# Patient Record
Sex: FEMALE | Race: WHITE | Employment: FULL TIME | URBAN - METROPOLITAN AREA
[De-identification: names, ages, dates, MRNs, and addresses within clinical notes are randomized per-mention and may not be internally consistent; named-entity substitution may affect disease eponyms.]

---

## 2018-08-17 ENCOUNTER — TELEPHONE (OUTPATIENT)
Dept: FAMILY MEDICINE CLINIC | Age: 63
End: 2018-08-17

## 2018-08-17 RX ORDER — MECLIZINE HCL 12.5 MG/1
12.5 TABLET ORAL 3 TIMES DAILY PRN
Qty: 90 TABLET | Refills: 1 | Status: SHIPPED | OUTPATIENT
Start: 2018-08-17 | End: 2019-02-11 | Stop reason: SDUPTHER

## 2018-08-22 RX ORDER — ATENOLOL AND CHLORTHALIDONE TABLET 50; 25 MG/1; MG/1
1 TABLET ORAL DAILY
Qty: 90 TABLET | Refills: 3 | Status: SHIPPED | OUTPATIENT
Start: 2018-08-22 | End: 2018-10-31 | Stop reason: SDUPTHER

## 2018-12-12 ENCOUNTER — OFFICE VISIT (OUTPATIENT)
Dept: FAMILY MEDICINE CLINIC | Age: 63
End: 2018-12-12
Payer: COMMERCIAL

## 2018-12-12 ENCOUNTER — HOSPITAL ENCOUNTER (OUTPATIENT)
Age: 63
Discharge: HOME | End: 2018-12-14
Payer: COMMERCIAL

## 2018-12-12 VITALS
RESPIRATION RATE: 18 BRPM | HEIGHT: 62 IN | BODY MASS INDEX: 30.55 KG/M2 | TEMPERATURE: 98.2 F | SYSTOLIC BLOOD PRESSURE: 110 MMHG | HEART RATE: 55 BPM | DIASTOLIC BLOOD PRESSURE: 82 MMHG | OXYGEN SATURATION: 96 % | WEIGHT: 166 LBS

## 2018-12-12 DIAGNOSIS — I10 ESSENTIAL HYPERTENSION, BENIGN: ICD-10-CM

## 2018-12-12 DIAGNOSIS — F51.01 PRIMARY INSOMNIA: ICD-10-CM

## 2018-12-12 DIAGNOSIS — E55.9 VITAMIN D DEFICIENCY: ICD-10-CM

## 2018-12-12 DIAGNOSIS — H61.21 IMPACTED CERUMEN OF RIGHT EAR: ICD-10-CM

## 2018-12-12 DIAGNOSIS — E78.49 OTHER HYPERLIPIDEMIA: ICD-10-CM

## 2018-12-12 DIAGNOSIS — Z00.00 WELL ADULT EXAM: Primary | ICD-10-CM

## 2018-12-12 LAB
ALBUMIN SERPL-MCNC: 4.8 G/DL (ref 3.5–5.2)
ALP BLD-CCNC: 53 U/L (ref 35–104)
ALT SERPL-CCNC: 22 U/L (ref 0–32)
ANION GAP SERPL CALCULATED.3IONS-SCNC: 14 MMOL/L (ref 7–16)
AST SERPL-CCNC: 18 U/L (ref 0–31)
BASOPHILS ABSOLUTE: 0.02 E9/L (ref 0–0.2)
BASOPHILS RELATIVE PERCENT: 0.4 % (ref 0–2)
BILIRUB SERPL-MCNC: 0.7 MG/DL (ref 0–1.2)
BUN BLDV-MCNC: 10 MG/DL (ref 8–23)
CALCIUM SERPL-MCNC: 9.6 MG/DL (ref 8.6–10.2)
CHLORIDE BLD-SCNC: 98 MMOL/L (ref 98–107)
CHOLESTEROL, TOTAL: 204 MG/DL (ref 0–199)
CO2: 28 MMOL/L (ref 22–29)
CREAT SERPL-MCNC: 0.6 MG/DL (ref 0.5–1)
EOSINOPHILS ABSOLUTE: 0.03 E9/L (ref 0.05–0.5)
EOSINOPHILS RELATIVE PERCENT: 0.6 % (ref 0–6)
GFR AFRICAN AMERICAN: >60
GFR NON-AFRICAN AMERICAN: >60 ML/MIN/1.73
GLUCOSE BLD-MCNC: 104 MG/DL (ref 74–99)
HCT VFR BLD CALC: 41.9 % (ref 34–48)
HDLC SERPL-MCNC: 60 MG/DL
HEMOGLOBIN: 13.4 G/DL (ref 11.5–15.5)
IMMATURE GRANULOCYTES #: 0.01 E9/L
IMMATURE GRANULOCYTES %: 0.2 % (ref 0–5)
LDL CHOLESTEROL CALCULATED: 114 MG/DL (ref 0–99)
LYMPHOCYTES ABSOLUTE: 1.86 E9/L (ref 1.5–4)
LYMPHOCYTES RELATIVE PERCENT: 36.5 % (ref 20–42)
MCH RBC QN AUTO: 24.9 PG (ref 26–35)
MCHC RBC AUTO-ENTMCNC: 32 % (ref 32–34.5)
MCV RBC AUTO: 77.9 FL (ref 80–99.9)
MONOCYTES ABSOLUTE: 0.47 E9/L (ref 0.1–0.95)
MONOCYTES RELATIVE PERCENT: 9.2 % (ref 2–12)
NEUTROPHILS ABSOLUTE: 2.71 E9/L (ref 1.8–7.3)
NEUTROPHILS RELATIVE PERCENT: 53.1 % (ref 43–80)
PDW BLD-RTO: 14 FL (ref 11.5–15)
PLATELET # BLD: 315 E9/L (ref 130–450)
PMV BLD AUTO: 11.1 FL (ref 7–12)
POTASSIUM SERPL-SCNC: 3.4 MMOL/L (ref 3.5–5)
RBC # BLD: 5.38 E12/L (ref 3.5–5.5)
SODIUM BLD-SCNC: 140 MMOL/L (ref 132–146)
TOTAL PROTEIN: 7.3 G/DL (ref 6.4–8.3)
TRIGL SERPL-MCNC: 149 MG/DL (ref 0–149)
TSH SERPL DL<=0.05 MIU/L-ACNC: 1.85 UIU/ML (ref 0.27–4.2)
VITAMIN D 25-HYDROXY: 18 NG/ML (ref 30–100)
VLDLC SERPL CALC-MCNC: 30 MG/DL
WBC # BLD: 5.1 E9/L (ref 4.5–11.5)

## 2018-12-12 PROCEDURE — 85025 COMPLETE CBC W/AUTO DIFF WBC: CPT

## 2018-12-12 PROCEDURE — 99396 PREV VISIT EST AGE 40-64: CPT | Performed by: FAMILY MEDICINE

## 2018-12-12 PROCEDURE — 82306 VITAMIN D 25 HYDROXY: CPT

## 2018-12-12 PROCEDURE — 80061 LIPID PANEL: CPT

## 2018-12-12 PROCEDURE — 80053 COMPREHEN METABOLIC PANEL: CPT

## 2018-12-12 PROCEDURE — 36415 COLL VENOUS BLD VENIPUNCTURE: CPT | Performed by: FAMILY MEDICINE

## 2018-12-12 PROCEDURE — 84443 ASSAY THYROID STIM HORMONE: CPT

## 2018-12-12 ASSESSMENT — ENCOUNTER SYMPTOMS
COUGH: 0
CHEST TIGHTNESS: 0
SORE THROAT: 0
PHOTOPHOBIA: 0
DIARRHEA: 0
SHORTNESS OF BREATH: 0
COLOR CHANGE: 0
ABDOMINAL PAIN: 0
ALLERGIC/IMMUNOLOGIC NEGATIVE: 1
NAUSEA: 0
VOMITING: 0
BLOOD IN STOOL: 0
FACIAL SWELLING: 0
SINUS PRESSURE: 0
APNEA: 0
WHEEZING: 0
BACK PAIN: 0

## 2018-12-12 ASSESSMENT — PATIENT HEALTH QUESTIONNAIRE - PHQ9
2. FEELING DOWN, DEPRESSED OR HOPELESS: 0
SUM OF ALL RESPONSES TO PHQ QUESTIONS 1-9: 0
SUM OF ALL RESPONSES TO PHQ QUESTIONS 1-9: 0
SUM OF ALL RESPONSES TO PHQ9 QUESTIONS 1 & 2: 0
1. LITTLE INTEREST OR PLEASURE IN DOING THINGS: 0

## 2018-12-12 NOTE — PROGRESS NOTES
Chief Complaint:   Chief Complaint   Patient presents with    Annual Exam       Hypertension   This is a chronic problem. The current episode started yesterday. The problem has been resolved since onset. The problem is controlled. Pertinent negatives include no chest pain, headaches, palpitations or shortness of breath. Past treatments include diuretics and beta blockers. The current treatment provides significant improvement. Hyperlipidemia   This is a chronic problem. The current episode started more than 1 year ago. The problem is controlled. Recent lipid tests were reviewed and are normal. Pertinent negatives include no chest pain, myalgias or shortness of breath. Current antihyperlipidemic treatment includes statins. The current treatment provides significant improvement of lipids. Ear Fullness    There is pain in the right ear. This is a new problem. The current episode started in the past 7 days. The problem occurs constantly. The problem has been resolved. There has been no fever. The patient is experiencing no pain. Pertinent negatives include no abdominal pain, coughing, diarrhea, headaches, hearing loss, rash, sore throat or vomiting.   c/o insomnia- on multiple meds in past    Patient Active Problem List   Diagnosis    Hyperlipidemia    Essential hypertension, benign    Vitamin D deficiency       Past Medical History:   Diagnosis Date    Hyperlipidemia     Hypertension     Vitamin D deficiency        Past Surgical History:   Procedure Laterality Date    APPENDECTOMY      BREAST SURGERY Bilateral     cancer    HYSTERECTOMY      OVARY SURGERY      TONSILLECTOMY         Current Outpatient Prescriptions   Medication Sig Dispense Refill    Suvorexant 15 MG TABS Take 1 tablet by mouth nightly as needed (insomnia) for up to 30 days. . 30 tablet 2    rosuvastatin (CRESTOR) 20 MG tablet TAKE 1 TABLET DAILY 90 tablet 3    atenolol-chlorthalidone (TENORETIC) 50-25 MG per tablet TAKE 1 TABLET DAILY

## 2018-12-13 RX ORDER — CHOLECALCIFEROL (VITAMIN D3) 50 MCG
2000 TABLET ORAL DAILY
Qty: 90 TABLET | Refills: 3 | Status: SHIPPED | OUTPATIENT
Start: 2018-12-13 | End: 2018-12-14 | Stop reason: SDUPTHER

## 2018-12-14 RX ORDER — CHOLECALCIFEROL (VITAMIN D3) 50 MCG
2000 TABLET ORAL DAILY
Qty: 90 TABLET | Refills: 3 | Status: SHIPPED | OUTPATIENT
Start: 2018-12-14

## 2019-01-14 RX ORDER — PANTOPRAZOLE SODIUM 40 MG/1
TABLET, DELAYED RELEASE ORAL
Qty: 90 TABLET | Refills: 3 | Status: SHIPPED | OUTPATIENT
Start: 2019-01-14 | End: 2019-02-11 | Stop reason: SDUPTHER

## 2019-02-11 RX ORDER — ATENOLOL AND CHLORTHALIDONE TABLET 50; 25 MG/1; MG/1
TABLET ORAL
Qty: 90 TABLET | Refills: 3 | Status: SHIPPED | OUTPATIENT
Start: 2019-02-11 | End: 2019-12-13 | Stop reason: SDUPTHER

## 2019-02-11 RX ORDER — ROSUVASTATIN CALCIUM 20 MG/1
TABLET, COATED ORAL
Qty: 90 TABLET | Refills: 3 | Status: SHIPPED | OUTPATIENT
Start: 2019-02-11 | End: 2019-12-13 | Stop reason: SDUPTHER

## 2019-02-11 RX ORDER — PANTOPRAZOLE SODIUM 40 MG/1
TABLET, DELAYED RELEASE ORAL
Qty: 90 TABLET | Refills: 3 | Status: SHIPPED | OUTPATIENT
Start: 2019-02-11 | End: 2019-12-13 | Stop reason: SDUPTHER

## 2019-02-11 RX ORDER — MECLIZINE HCL 12.5 MG/1
12.5 TABLET ORAL 3 TIMES DAILY PRN
Qty: 90 TABLET | Refills: 1 | Status: SHIPPED
Start: 2019-02-11 | End: 2021-02-06 | Stop reason: SDUPTHER

## 2019-05-17 ENCOUNTER — OFFICE VISIT (OUTPATIENT)
Dept: FAMILY MEDICINE CLINIC | Age: 64
End: 2019-05-17
Payer: COMMERCIAL

## 2019-05-17 VITALS
RESPIRATION RATE: 18 BRPM | DIASTOLIC BLOOD PRESSURE: 70 MMHG | OXYGEN SATURATION: 96 % | TEMPERATURE: 98.5 F | SYSTOLIC BLOOD PRESSURE: 110 MMHG | HEIGHT: 62 IN | HEART RATE: 75 BPM | WEIGHT: 173 LBS | BODY MASS INDEX: 31.83 KG/M2

## 2019-05-17 DIAGNOSIS — J20.9 ACUTE BRONCHITIS, UNSPECIFIED ORGANISM: Primary | ICD-10-CM

## 2019-05-17 PROCEDURE — 99213 OFFICE O/P EST LOW 20 MIN: CPT | Performed by: FAMILY MEDICINE

## 2019-05-17 RX ORDER — METHYLPREDNISOLONE 4 MG/1
TABLET ORAL
Qty: 1 KIT | Refills: 0 | Status: SHIPPED | OUTPATIENT
Start: 2019-05-17 | End: 2019-12-13

## 2019-05-17 RX ORDER — DOXYCYCLINE HYCLATE 100 MG
100 TABLET ORAL 2 TIMES DAILY
Qty: 20 TABLET | Refills: 0 | Status: SHIPPED | OUTPATIENT
Start: 2019-05-17 | End: 2019-05-27

## 2019-05-17 ASSESSMENT — ENCOUNTER SYMPTOMS
WHEEZING: 0
SHORTNESS OF BREATH: 0
CHEST TIGHTNESS: 0
SORE THROAT: 0
BACK PAIN: 0
VOMITING: 0
BLOOD IN STOOL: 0
NAUSEA: 0
COUGH: 1
ALLERGIC/IMMUNOLOGIC NEGATIVE: 1
ABDOMINAL PAIN: 0
COLOR CHANGE: 0
PHOTOPHOBIA: 0
DIARRHEA: 0
SINUS PRESSURE: 0
APNEA: 0
FACIAL SWELLING: 0

## 2019-05-17 ASSESSMENT — PATIENT HEALTH QUESTIONNAIRE - PHQ9
SUM OF ALL RESPONSES TO PHQ QUESTIONS 1-9: 0
1. LITTLE INTEREST OR PLEASURE IN DOING THINGS: 0
SUM OF ALL RESPONSES TO PHQ9 QUESTIONS 1 & 2: 0
SUM OF ALL RESPONSES TO PHQ QUESTIONS 1-9: 0
2. FEELING DOWN, DEPRESSED OR HOPELESS: 0

## 2019-05-17 NOTE — PROGRESS NOTES
Chief Complaint:   Chief Complaint   Patient presents with    Cough     x5 days off and on, runny nose starting today    Nasal Congestion       Cough   This is a chronic problem. The current episode started in the past 7 days. The problem has been gradually worsening. The problem occurs every few minutes. The cough is productive of sputum. Pertinent negatives include no chest pain, headaches, myalgias, rash, sore throat, shortness of breath or wheezing. Patient Active Problem List   Diagnosis    Hyperlipidemia    Essential hypertension, benign    Vitamin D deficiency       Past Medical History:   Diagnosis Date    Hyperlipidemia     Hypertension     Vitamin D deficiency        Past Surgical History:   Procedure Laterality Date    APPENDECTOMY      BREAST SURGERY Bilateral     cancer    HYSTERECTOMY      OVARY SURGERY      TONSILLECTOMY         Current Outpatient Medications   Medication Sig Dispense Refill    doxycycline hyclate (VIBRA-TABS) 100 MG tablet Take 1 tablet by mouth 2 times daily for 10 days 20 tablet 0    methylPREDNISolone (MEDROL, LONNIE,) 4 MG tablet Take by mouth. 1 kit 0    rosuvastatin (CRESTOR) 20 MG tablet TAKE 1 TABLET DAILY 90 tablet 3    atenolol-chlorthalidone (TENORETIC) 50-25 MG per tablet TAKE 1 TABLET DAILY 90 tablet 3    pantoprazole (PROTONIX) 40 MG tablet TAKE 1 TABLET DAILY 90 tablet 3    meclizine (ANTIVERT) 12.5 MG tablet Take 1 tablet by mouth 3 times daily as needed for Dizziness 90 tablet 1    Cholecalciferol (VITAMIN D) 2000 units TABS tablet Take 1 tablet by mouth daily 90 tablet 3     No current facility-administered medications for this visit.         No Known Allergies    Social History     Socioeconomic History    Marital status: Single     Spouse name: Not on file    Number of children: Not on file    Years of education: Not on file    Highest education level: Not on file   Occupational History    Occupation: counselor   Social Needs    Financial resource strain: Not on file    Food insecurity:     Worry: Not on file     Inability: Not on file    Transportation needs:     Medical: Not on file     Non-medical: Not on file   Tobacco Use    Smoking status: Never Smoker    Smokeless tobacco: Never Used   Substance and Sexual Activity    Alcohol use: No    Drug use: No    Sexual activity: Not on file   Lifestyle    Physical activity:     Days per week: Not on file     Minutes per session: Not on file    Stress: Not on file   Relationships    Social connections:     Talks on phone: Not on file     Gets together: Not on file     Attends Pentecostalism service: Not on file     Active member of club or organization: Not on file     Attends meetings of clubs or organizations: Not on file     Relationship status: Not on file    Intimate partner violence:     Fear of current or ex partner: Not on file     Emotionally abused: Not on file     Physically abused: Not on file     Forced sexual activity: Not on file   Other Topics Concern    Not on file   Social History Narrative    Not on file       No family history on file. Review of Systems   Constitutional: Negative. HENT: Negative for congestion, facial swelling, hearing loss, nosebleeds, sinus pressure and sore throat. Eyes: Negative for photophobia and visual disturbance. Respiratory: Positive for cough. Negative for apnea, chest tightness, shortness of breath and wheezing. Cardiovascular: Negative for chest pain, palpitations and leg swelling. Gastrointestinal: Negative for abdominal pain, blood in stool, diarrhea, nausea and vomiting. Genitourinary: Negative for difficulty urinating, frequency and urgency. Musculoskeletal: Negative for arthralgias, back pain, joint swelling and myalgias. Skin: Negative for color change and rash. Allergic/Immunologic: Negative. Neurological: Negative for syncope, weakness, light-headedness and headaches. Hematological: Negative. Psychiatric/Behavioral: Negative for agitation, behavioral problems, confusion and self-injury. The patient is not nervous/anxious. All other systems reviewed and are negative. Physical Exam   Constitutional: She is oriented to person, place, and time. She appears well-developed and well-nourished. No distress. HENT:   Head: Normocephalic and atraumatic. Nose: Nose normal.   Mouth/Throat: Oropharynx is clear and moist.   Eyes: Pupils are equal, round, and reactive to light. Conjunctivae and EOM are normal.   Neck: Normal range of motion. Neck supple. No JVD present. No thyromegaly present. Cardiovascular: Normal rate, regular rhythm and normal heart sounds. Exam reveals no gallop and no friction rub. No murmur heard. Pulmonary/Chest: Effort normal. No respiratory distress. She has wheezes. She has rhonchi. Abdominal: Soft. Bowel sounds are normal. She exhibits no distension. There is no tenderness. There is no rebound and no guarding. Musculoskeletal: Normal range of motion. Lymphadenopathy:     She has no cervical adenopathy. Neurological: She is alert and oriented to person, place, and time. She has normal reflexes. No cranial nerve deficit. She exhibits normal muscle tone. Coordination normal.   Skin: Skin is warm and dry. No rash noted. No erythema. Psychiatric: She has a normal mood and affect. Her behavior is normal. Judgment normal.   Nursing note and vitals reviewed. ASSESSMENT/PLAN:    Ash Watson was seen today for cough and nasal congestion. Diagnoses and all orders for this visit:    Acute bronchitis, unspecified organism  -     doxycycline hyclate (VIBRA-TABS) 100 MG tablet; Take 1 tablet by mouth 2 times daily for 10 days  -     methylPREDNISolone (MEDROL, LONNIE,) 4 MG tablet; Take by mouth.             Johanna Beauchamp DO    5/17/2019  7:49 AM

## 2019-05-24 ENCOUNTER — OFFICE VISIT (OUTPATIENT)
Dept: FAMILY MEDICINE CLINIC | Age: 64
End: 2019-05-24
Payer: COMMERCIAL

## 2019-05-24 VITALS
HEIGHT: 62 IN | HEART RATE: 57 BPM | OXYGEN SATURATION: 97 % | RESPIRATION RATE: 16 BRPM | DIASTOLIC BLOOD PRESSURE: 78 MMHG | BODY MASS INDEX: 31.83 KG/M2 | TEMPERATURE: 98.6 F | SYSTOLIC BLOOD PRESSURE: 124 MMHG | WEIGHT: 173 LBS

## 2019-05-24 DIAGNOSIS — J01.90 ACUTE BACTERIAL SINUSITIS: Primary | ICD-10-CM

## 2019-05-24 DIAGNOSIS — B96.89 ACUTE BACTERIAL SINUSITIS: Primary | ICD-10-CM

## 2019-05-24 PROCEDURE — 99213 OFFICE O/P EST LOW 20 MIN: CPT | Performed by: FAMILY MEDICINE

## 2019-05-24 RX ORDER — FLUTICASONE PROPIONATE 50 MCG
1 SPRAY, SUSPENSION (ML) NASAL DAILY
Qty: 2 BOTTLE | Refills: 1 | Status: SHIPPED
Start: 2019-05-24 | End: 2020-12-14

## 2019-05-24 RX ORDER — LEVOFLOXACIN 500 MG/1
500 TABLET, FILM COATED ORAL DAILY
Qty: 10 TABLET | Refills: 0 | Status: SHIPPED | OUTPATIENT
Start: 2019-05-24 | End: 2019-06-03

## 2019-05-24 ASSESSMENT — ENCOUNTER SYMPTOMS
WHEEZING: 0
RHINORRHEA: 1
VOMITING: 0
DIARRHEA: 0
SINUS PRESSURE: 1
EYE REDNESS: 0
SORE THROAT: 0
SHORTNESS OF BREATH: 0
COUGH: 1
NAUSEA: 0

## 2019-05-24 ASSESSMENT — PATIENT HEALTH QUESTIONNAIRE - PHQ9
SUM OF ALL RESPONSES TO PHQ QUESTIONS 1-9: 0
2. FEELING DOWN, DEPRESSED OR HOPELESS: 0
1. LITTLE INTEREST OR PLEASURE IN DOING THINGS: 0
SUM OF ALL RESPONSES TO PHQ9 QUESTIONS 1 & 2: 0
SUM OF ALL RESPONSES TO PHQ QUESTIONS 1-9: 0

## 2019-05-24 NOTE — PROGRESS NOTES
Chief Complaint:     Chief Complaint   Patient presents with    Cough     bronchitis follow up, said missed two dose of medication dr Maranda Arvizu gave her becasue she threw up. Cough   This is a chronic problem. The current episode started 1 to 4 weeks ago. The problem has been waxing and waning. The problem occurs every few minutes. The cough is productive of sputum. Associated symptoms include ear pain, nasal congestion, postnasal drip and rhinorrhea. Pertinent negatives include no chest pain, chills, eye redness, fever, headaches, myalgias, rash, sore throat, shortness of breath or wheezing. Treatments tried: DOXY, MEDROL. The treatment provided mild relief. Patient Active Problem List   Diagnosis    Hyperlipidemia    Essential hypertension, benign    Vitamin D deficiency       Past Medical History:   Diagnosis Date    Hyperlipidemia     Hypertension     Vitamin D deficiency        Past Surgical History:   Procedure Laterality Date    APPENDECTOMY      BREAST SURGERY Bilateral     cancer    HYSTERECTOMY      OVARY SURGERY      TONSILLECTOMY         Current Outpatient Medications   Medication Sig Dispense Refill    fluticasone (FLONASE) 50 MCG/ACT nasal spray 1 spray by Each Nostril route daily 2 Bottle 1    levofloxacin (LEVAQUIN) 500 MG tablet Take 1 tablet by mouth daily for 10 days 10 tablet 0    doxycycline hyclate (VIBRA-TABS) 100 MG tablet Take 1 tablet by mouth 2 times daily for 10 days 20 tablet 0    methylPREDNISolone (MEDROL, LONNIE,) 4 MG tablet Take by mouth.  1 kit 0    rosuvastatin (CRESTOR) 20 MG tablet TAKE 1 TABLET DAILY 90 tablet 3    atenolol-chlorthalidone (TENORETIC) 50-25 MG per tablet TAKE 1 TABLET DAILY 90 tablet 3    pantoprazole (PROTONIX) 40 MG tablet TAKE 1 TABLET DAILY 90 tablet 3    meclizine (ANTIVERT) 12.5 MG tablet Take 1 tablet by mouth 3 times daily as needed for Dizziness 90 tablet 1    Cholecalciferol (VITAMIN D) 2000 units TABS tablet Take 1 tablet by mouth daily 90 tablet 3     No current facility-administered medications for this visit. No Known Allergies    Social History     Socioeconomic History    Marital status: Single     Spouse name: None    Number of children: None    Years of education: None    Highest education level: None   Occupational History    Occupation: counselor     Employer: [employer]    Financial resource strain: None    Food insecurity:     Worry: None     Inability: None    Transportation needs:     Medical: None     Non-medical: None   Tobacco Use    Smoking status: Never Smoker    Smokeless tobacco: Never Used   Substance and Sexual Activity    Alcohol use: No    Drug use: No    Sexual activity: None   Lifestyle    Physical activity:     Days per week: None     Minutes per session: None    Stress: None   Relationships    Social connections:     Talks on phone: None     Gets together: None     Attends Buddhism service: None     Active member of club or organization: None     Attends meetings of clubs or organizations: None     Relationship status: None    Intimate partner violence:     Fear of current or ex partner: None     Emotionally abused: None     Physically abused: None     Forced sexual activity: None   Other Topics Concern    None   Social History Narrative    None       History reviewed. No pertinent family history. Review of Systems   Constitutional: Negative for chills and fever. HENT: Positive for congestion, ear pain, postnasal drip, rhinorrhea and sinus pressure. Negative for ear discharge and sore throat. Eyes: Negative for redness. Respiratory: Positive for cough. Negative for shortness of breath and wheezing. Cardiovascular: Negative for chest pain. Gastrointestinal: Negative for diarrhea, nausea and vomiting. Musculoskeletal: Negative for myalgias. Skin: Negative for rash. Neurological: Negative for headaches.    All other systems reviewed and are negative. /78   Pulse 57   Temp 98.6 °F (37 °C)   Resp 16   Ht 5' 2\" (1.575 m)   Wt 173 lb (78.5 kg)   SpO2 97%   BMI 31.64 kg/m²     Physical Exam   Constitutional: She appears well-developed and well-nourished. Non-toxic appearance. HENT:   Head: Normocephalic and atraumatic. Right Ear: Ear canal normal. A middle ear effusion is present. Left Ear: Ear canal normal. A middle ear effusion is present. Nose: Mucosal edema, rhinorrhea and sinus tenderness present. No nasal deformity or septal deviation. No foreign bodies. Right sinus exhibits maxillary sinus tenderness. Left sinus exhibits maxillary sinus tenderness. Mouth/Throat: Uvula is midline. Mucous membranes are not pale and not dry. Posterior oropharyngeal erythema present. No oropharyngeal exudate or tonsillar abscesses. Eyes: Conjunctivae and EOM are normal. Right eye exhibits no discharge. Left eye exhibits no discharge. Right conjunctiva is not injected. Left conjunctiva is not injected. Right eye exhibits normal extraocular motion. Left eye exhibits normal extraocular motion. Neck: Normal range of motion. Neck supple. Cardiovascular: Normal rate, regular rhythm and normal heart sounds. No murmur heard. Pulmonary/Chest: Effort normal. No respiratory distress. She has no wheezes. She has no rales. She exhibits no tenderness. Abdominal: Soft. Bowel sounds are normal. There is no tenderness. Lymphadenopathy:     She has cervical adenopathy. Neurological: She is alert. Skin: Skin is warm and dry. No rash noted. No erythema. Nursing note and vitals reviewed. ASSESSMENT/PLAN:    Patient Active Problem List   Diagnosis    Hyperlipidemia    Essential hypertension, benign    Vitamin D deficiency       Maggy Krishnamurthy was seen today for cough.     Diagnoses and all orders for this visit:    Acute bacterial sinusitis    Other orders  -     fluticasone (FLONASE) 50 MCG/ACT nasal spray; 1 spray by Each Nostril route daily  -     levofloxacin (LEVAQUIN) 500 MG tablet; Take 1 tablet by mouth daily for 10 days          Return if symptoms worsen or fail to improve.         Lamont Hightower DO  5/24/2019  9:01 AM

## 2019-12-13 ENCOUNTER — OFFICE VISIT (OUTPATIENT)
Dept: PRIMARY CARE CLINIC | Age: 64
End: 2019-12-13
Payer: COMMERCIAL

## 2019-12-13 ENCOUNTER — HOSPITAL ENCOUNTER (OUTPATIENT)
Age: 64
Discharge: HOME | End: 2019-12-15
Payer: COMMERCIAL

## 2019-12-13 VITALS
BODY MASS INDEX: 33.49 KG/M2 | HEIGHT: 62 IN | HEART RATE: 59 BPM | SYSTOLIC BLOOD PRESSURE: 130 MMHG | WEIGHT: 182 LBS | RESPIRATION RATE: 18 BRPM | TEMPERATURE: 99.1 F | OXYGEN SATURATION: 96 % | DIASTOLIC BLOOD PRESSURE: 86 MMHG

## 2019-12-13 DIAGNOSIS — E78.49 OTHER HYPERLIPIDEMIA: ICD-10-CM

## 2019-12-13 DIAGNOSIS — J20.9 ACUTE BRONCHITIS, UNSPECIFIED ORGANISM: Primary | ICD-10-CM

## 2019-12-13 DIAGNOSIS — Z00.01 ENCOUNTER FOR WELL ADULT EXAM WITH ABNORMAL FINDINGS: ICD-10-CM

## 2019-12-13 LAB
ALBUMIN SERPL-MCNC: 4.6 G/DL (ref 3.5–5.2)
ALP BLD-CCNC: 66 U/L (ref 35–104)
ALT SERPL-CCNC: 20 U/L (ref 0–32)
ANION GAP SERPL CALCULATED.3IONS-SCNC: 16 MMOL/L (ref 7–16)
AST SERPL-CCNC: 19 U/L (ref 0–31)
BASOPHILS ABSOLUTE: 0.02 E9/L (ref 0–0.2)
BASOPHILS RELATIVE PERCENT: 0.3 % (ref 0–2)
BILIRUB SERPL-MCNC: 0.8 MG/DL (ref 0–1.2)
BUN BLDV-MCNC: 13 MG/DL (ref 8–23)
CALCIUM SERPL-MCNC: 10.1 MG/DL (ref 8.6–10.2)
CHLORIDE BLD-SCNC: 98 MMOL/L (ref 98–107)
CHOLESTEROL, TOTAL: 217 MG/DL (ref 0–199)
CO2: 25 MMOL/L (ref 22–29)
CREAT SERPL-MCNC: 0.6 MG/DL (ref 0.5–1)
EOSINOPHILS ABSOLUTE: 0.05 E9/L (ref 0.05–0.5)
EOSINOPHILS RELATIVE PERCENT: 0.8 % (ref 0–6)
GFR AFRICAN AMERICAN: >60
GFR NON-AFRICAN AMERICAN: >60 ML/MIN/1.73
GLUCOSE BLD-MCNC: 99 MG/DL (ref 74–99)
HCT VFR BLD CALC: 43.7 % (ref 34–48)
HDLC SERPL-MCNC: 56 MG/DL
HEMOGLOBIN: 13.6 G/DL (ref 11.5–15.5)
IMMATURE GRANULOCYTES #: 0.04 E9/L
IMMATURE GRANULOCYTES %: 0.6 % (ref 0–5)
LDL CHOLESTEROL CALCULATED: 110 MG/DL (ref 0–99)
LYMPHOCYTES ABSOLUTE: 1.83 E9/L (ref 1.5–4)
LYMPHOCYTES RELATIVE PERCENT: 29.7 % (ref 20–42)
MCH RBC QN AUTO: 24.5 PG (ref 26–35)
MCHC RBC AUTO-ENTMCNC: 31.1 % (ref 32–34.5)
MCV RBC AUTO: 78.6 FL (ref 80–99.9)
MONOCYTES ABSOLUTE: 0.54 E9/L (ref 0.1–0.95)
MONOCYTES RELATIVE PERCENT: 8.8 % (ref 2–12)
NEUTROPHILS ABSOLUTE: 3.69 E9/L (ref 1.8–7.3)
NEUTROPHILS RELATIVE PERCENT: 59.8 % (ref 43–80)
PDW BLD-RTO: 13.9 FL (ref 11.5–15)
PLATELET # BLD: 340 E9/L (ref 130–450)
PMV BLD AUTO: 11 FL (ref 7–12)
POTASSIUM SERPL-SCNC: 3.5 MMOL/L (ref 3.5–5)
RBC # BLD: 5.56 E12/L (ref 3.5–5.5)
SODIUM BLD-SCNC: 139 MMOL/L (ref 132–146)
TOTAL PROTEIN: 7.5 G/DL (ref 6.4–8.3)
TRIGL SERPL-MCNC: 254 MG/DL (ref 0–149)
VLDLC SERPL CALC-MCNC: 51 MG/DL
WBC # BLD: 6.2 E9/L (ref 4.5–11.5)

## 2019-12-13 PROCEDURE — 99213 OFFICE O/P EST LOW 20 MIN: CPT | Performed by: FAMILY MEDICINE

## 2019-12-13 PROCEDURE — 85025 COMPLETE CBC W/AUTO DIFF WBC: CPT

## 2019-12-13 PROCEDURE — 36415 COLL VENOUS BLD VENIPUNCTURE: CPT

## 2019-12-13 PROCEDURE — 80053 COMPREHEN METABOLIC PANEL: CPT

## 2019-12-13 PROCEDURE — 80061 LIPID PANEL: CPT

## 2019-12-13 RX ORDER — BROMPHENIRAMINE MALEATE, PSEUDOEPHEDRINE HYDROCHLORIDE, AND DEXTROMETHORPHAN HYDROBROMIDE 2; 30; 10 MG/5ML; MG/5ML; MG/5ML
5 SYRUP ORAL 4 TIMES DAILY PRN
Qty: 80 ML | Refills: 0 | Status: SHIPPED
Start: 2019-12-13 | End: 2020-12-14

## 2019-12-13 RX ORDER — ROSUVASTATIN CALCIUM 20 MG/1
TABLET, COATED ORAL
Qty: 90 TABLET | Refills: 3 | Status: SHIPPED
Start: 2019-12-13 | End: 2021-02-08

## 2019-12-13 RX ORDER — CYCLOSPORINE 0.5 MG/ML
1 EMULSION OPHTHALMIC 2 TIMES DAILY
COMMUNITY

## 2019-12-13 RX ORDER — PANTOPRAZOLE SODIUM 40 MG/1
TABLET, DELAYED RELEASE ORAL
Qty: 90 TABLET | Refills: 3 | Status: SHIPPED
Start: 2019-12-13 | End: 2021-02-06 | Stop reason: SDUPTHER

## 2019-12-13 RX ORDER — ATENOLOL AND CHLORTHALIDONE TABLET 50; 25 MG/1; MG/1
TABLET ORAL
Qty: 90 TABLET | Refills: 3 | Status: SHIPPED
Start: 2019-12-13 | End: 2021-02-08

## 2019-12-13 RX ORDER — DOXYCYCLINE HYCLATE 100 MG
100 TABLET ORAL 2 TIMES DAILY
Qty: 20 TABLET | Refills: 0 | Status: SHIPPED | OUTPATIENT
Start: 2019-12-13 | End: 2019-12-23

## 2019-12-13 RX ORDER — PREDNISONE 20 MG/1
TABLET ORAL
Qty: 11 TABLET | Refills: 0 | Status: SHIPPED
Start: 2019-12-13 | End: 2020-12-14

## 2019-12-13 ASSESSMENT — ENCOUNTER SYMPTOMS
BACK PAIN: 0
COUGH: 1
BLOOD IN STOOL: 0
SORE THROAT: 0
ABDOMINAL PAIN: 0
APNEA: 0
CHEST TIGHTNESS: 0
COLOR CHANGE: 0
WHEEZING: 1
DIARRHEA: 0
VOMITING: 0
SHORTNESS OF BREATH: 0
ALLERGIC/IMMUNOLOGIC NEGATIVE: 1
FACIAL SWELLING: 0
PHOTOPHOBIA: 0
SINUS PRESSURE: 0
NAUSEA: 0

## 2019-12-17 ENCOUNTER — TELEPHONE (OUTPATIENT)
Dept: PRIMARY CARE CLINIC | Age: 64
End: 2019-12-17

## 2019-12-18 ENCOUNTER — TELEPHONE (OUTPATIENT)
Dept: PRIMARY CARE CLINIC | Age: 64
End: 2019-12-18

## 2019-12-18 RX ORDER — BROMPHENIRAMINE MALEATE, PSEUDOEPHEDRINE HYDROCHLORIDE, AND DEXTROMETHORPHAN HYDROBROMIDE 2; 30; 10 MG/5ML; MG/5ML; MG/5ML
5 SYRUP ORAL 4 TIMES DAILY PRN
Qty: 80 ML | Refills: 0 | Status: SHIPPED
Start: 2019-12-18 | End: 2020-12-14

## 2020-12-14 ENCOUNTER — OFFICE VISIT (OUTPATIENT)
Dept: PRIMARY CARE CLINIC | Age: 65
End: 2020-12-14
Payer: COMMERCIAL

## 2020-12-14 VITALS
HEART RATE: 59 BPM | HEIGHT: 62 IN | SYSTOLIC BLOOD PRESSURE: 126 MMHG | WEIGHT: 175 LBS | BODY MASS INDEX: 32.2 KG/M2 | RESPIRATION RATE: 18 BRPM | DIASTOLIC BLOOD PRESSURE: 84 MMHG | TEMPERATURE: 97.7 F | OXYGEN SATURATION: 98 %

## 2020-12-14 DIAGNOSIS — I10 ESSENTIAL HYPERTENSION, BENIGN: ICD-10-CM

## 2020-12-14 DIAGNOSIS — E55.9 VITAMIN D DEFICIENCY: ICD-10-CM

## 2020-12-14 DIAGNOSIS — I10 ESSENTIAL HYPERTENSION: ICD-10-CM

## 2020-12-14 DIAGNOSIS — E78.49 OTHER HYPERLIPIDEMIA: ICD-10-CM

## 2020-12-14 LAB
ALBUMIN SERPL-MCNC: 4.8 G/DL (ref 3.5–5.2)
ALP BLD-CCNC: 62 U/L (ref 35–104)
ALT SERPL-CCNC: 21 U/L (ref 0–32)
ANION GAP SERPL CALCULATED.3IONS-SCNC: 14 MMOL/L (ref 7–16)
AST SERPL-CCNC: 22 U/L (ref 0–31)
BASOPHILS ABSOLUTE: 0.02 E9/L (ref 0–0.2)
BASOPHILS RELATIVE PERCENT: 0.4 % (ref 0–2)
BILIRUB SERPL-MCNC: 1.1 MG/DL (ref 0–1.2)
BUN BLDV-MCNC: 10 MG/DL (ref 8–23)
CALCIUM SERPL-MCNC: 10.3 MG/DL (ref 8.6–10.2)
CHLORIDE BLD-SCNC: 98 MMOL/L (ref 98–107)
CHOLESTEROL, TOTAL: 199 MG/DL (ref 0–199)
CO2: 27 MMOL/L (ref 22–29)
CREAT SERPL-MCNC: 0.6 MG/DL (ref 0.5–1)
EOSINOPHILS ABSOLUTE: 0.05 E9/L (ref 0.05–0.5)
EOSINOPHILS RELATIVE PERCENT: 0.9 % (ref 0–6)
GFR AFRICAN AMERICAN: >60
GFR NON-AFRICAN AMERICAN: >60 ML/MIN/1.73
GLUCOSE BLD-MCNC: 102 MG/DL (ref 74–99)
HCT VFR BLD CALC: 42 % (ref 34–48)
HDLC SERPL-MCNC: 62 MG/DL
HEMOGLOBIN: 13.4 G/DL (ref 11.5–15.5)
IMMATURE GRANULOCYTES #: 0.01 E9/L
IMMATURE GRANULOCYTES %: 0.2 % (ref 0–5)
LDL CHOLESTEROL CALCULATED: 101 MG/DL (ref 0–99)
LYMPHOCYTES ABSOLUTE: 1.77 E9/L (ref 1.5–4)
LYMPHOCYTES RELATIVE PERCENT: 32.4 % (ref 20–42)
MCH RBC QN AUTO: 24.9 PG (ref 26–35)
MCHC RBC AUTO-ENTMCNC: 31.9 % (ref 32–34.5)
MCV RBC AUTO: 78.1 FL (ref 80–99.9)
MONOCYTES ABSOLUTE: 0.52 E9/L (ref 0.1–0.95)
MONOCYTES RELATIVE PERCENT: 9.5 % (ref 2–12)
NEUTROPHILS ABSOLUTE: 3.1 E9/L (ref 1.8–7.3)
NEUTROPHILS RELATIVE PERCENT: 56.6 % (ref 43–80)
PDW BLD-RTO: 14 FL (ref 11.5–15)
PLATELET # BLD: 321 E9/L (ref 130–450)
PMV BLD AUTO: 10.7 FL (ref 7–12)
POTASSIUM SERPL-SCNC: 3.6 MMOL/L (ref 3.5–5)
RBC # BLD: 5.38 E12/L (ref 3.5–5.5)
SODIUM BLD-SCNC: 139 MMOL/L (ref 132–146)
TOTAL PROTEIN: 7.8 G/DL (ref 6.4–8.3)
TRIGL SERPL-MCNC: 181 MG/DL (ref 0–149)
TSH SERPL DL<=0.05 MIU/L-ACNC: 1.31 UIU/ML (ref 0.27–4.2)
VITAMIN D 25-HYDROXY: 33 NG/ML (ref 30–100)
VLDLC SERPL CALC-MCNC: 36 MG/DL
WBC # BLD: 5.5 E9/L (ref 4.5–11.5)

## 2020-12-14 PROCEDURE — 99397 PER PM REEVAL EST PAT 65+ YR: CPT | Performed by: FAMILY MEDICINE

## 2020-12-14 RX ORDER — M-VIT,TX,IRON,MINS/CALC/FOLIC 27MG-0.4MG
1 TABLET ORAL DAILY
COMMUNITY

## 2020-12-14 ASSESSMENT — ENCOUNTER SYMPTOMS
ALLERGIC/IMMUNOLOGIC NEGATIVE: 1
CHEST TIGHTNESS: 0
NAUSEA: 0
VOMITING: 0
WHEEZING: 0
BLOOD IN STOOL: 0
DIARRHEA: 0
COUGH: 0
ABDOMINAL PAIN: 0
PHOTOPHOBIA: 0
BACK PAIN: 0
SORE THROAT: 0
COLOR CHANGE: 0
SHORTNESS OF BREATH: 0
APNEA: 0
FACIAL SWELLING: 0
SINUS PRESSURE: 0

## 2020-12-14 ASSESSMENT — PATIENT HEALTH QUESTIONNAIRE - PHQ9
SUM OF ALL RESPONSES TO PHQ QUESTIONS 1-9: 0
1. LITTLE INTEREST OR PLEASURE IN DOING THINGS: 0
2. FEELING DOWN, DEPRESSED OR HOPELESS: 0
SUM OF ALL RESPONSES TO PHQ QUESTIONS 1-9: 0
SUM OF ALL RESPONSES TO PHQ QUESTIONS 1-9: 0
SUM OF ALL RESPONSES TO PHQ9 QUESTIONS 1 & 2: 0

## 2020-12-14 NOTE — PROGRESS NOTES
Chief Complaint:   Chief Complaint   Patient presents with    Annual Exam    Hip Pain     right sided for about 2mo       Hip Pain   The incident occurred more than 1 week ago. There was no injury mechanism. The pain is present in the right hip. The quality of the pain is described as aching. The pain is at a severity of 5/10. The pain is moderate. The pain has been worsening since onset. Hypertension  This is a chronic problem. The current episode started more than 1 year ago. The problem has been resolved since onset. The problem is controlled. Pertinent negatives include no chest pain, headaches, palpitations or shortness of breath. Past treatments include beta blockers and diuretics. The current treatment provides significant improvement. There are no compliance problems. Hyperlipidemia  This is a chronic problem. The current episode started more than 1 year ago. The problem is controlled. Recent lipid tests were reviewed and are normal. Pertinent negatives include no chest pain, myalgias or shortness of breath. Current antihyperlipidemic treatment includes statins. The current treatment provides significant improvement of lipids.        Patient Active Problem List   Diagnosis    Hyperlipidemia    Essential hypertension, benign    Vitamin D deficiency       Past Medical History:   Diagnosis Date    Hyperlipidemia     Hypertension     Vitamin D deficiency        Past Surgical History:   Procedure Laterality Date    APPENDECTOMY      BREAST SURGERY Bilateral     cancer    HYSTERECTOMY      OVARY SURGERY      TONSILLECTOMY         Current Outpatient Medications   Medication Sig Dispense Refill    Probiotic Product (PROBIOTIC MULTI-ENZYME PO) Take by mouth      Multiple Vitamins-Minerals (THERAPEUTIC MULTIVITAMIN-MINERALS) tablet Take 1 tablet by mouth daily      cycloSPORINE (RESTASIS) 0.05 % ophthalmic emulsion Place 1 drop into both eyes 2 times daily      rosuvastatin (CRESTOR) 20 MG tablet disturbance. Respiratory: Negative for apnea, cough, chest tightness, shortness of breath and wheezing. Cardiovascular: Negative for chest pain, palpitations and leg swelling. Gastrointestinal: Negative for abdominal pain, blood in stool, diarrhea, nausea and vomiting. Genitourinary: Negative for difficulty urinating, frequency and urgency. Musculoskeletal: Positive for arthralgias. Negative for back pain, joint swelling and myalgias. Skin: Negative for color change and rash. Allergic/Immunologic: Negative. Neurological: Negative for syncope, weakness, light-headedness and headaches. Hematological: Negative. Psychiatric/Behavioral: Negative for agitation, behavioral problems, confusion and self-injury. The patient is not nervous/anxious. All other systems reviewed and are negative. Physical Exam  Vitals signs and nursing note reviewed. Constitutional:       General: She is not in acute distress. Appearance: She is well-developed. HENT:      Head: Normocephalic and atraumatic. Nose: Nose normal.   Eyes:      Conjunctiva/sclera: Conjunctivae normal.      Pupils: Pupils are equal, round, and reactive to light. Neck:      Musculoskeletal: Normal range of motion and neck supple. Thyroid: No thyromegaly. Vascular: No JVD. Cardiovascular:      Rate and Rhythm: Normal rate and regular rhythm. Heart sounds: Normal heart sounds. No murmur. No friction rub. No gallop. Pulmonary:      Effort: Pulmonary effort is normal. No respiratory distress. Breath sounds: Normal breath sounds. No wheezing. Abdominal:      General: Bowel sounds are normal. There is no distension. Palpations: Abdomen is soft. Tenderness: There is no abdominal tenderness. There is no guarding or rebound. Musculoskeletal:      Right hip: She exhibits decreased range of motion, tenderness and bony tenderness. Lymphadenopathy:      Cervical: No cervical adenopathy.    Skin: General: Skin is warm and dry. Findings: No erythema or rash. Neurological:      Mental Status: She is alert and oriented to person, place, and time. Cranial Nerves: No cranial nerve deficit. Motor: No abnormal muscle tone. Coordination: Coordination normal.      Deep Tendon Reflexes: Reflexes are normal and symmetric. Psychiatric:         Behavior: Behavior normal.         Judgment: Judgment normal.                               ASSESSMENT/PLAN:    Rosette Kocher was seen today for annual exam and hip pain. Diagnoses and all orders for this visit:    Encounter for well adult exam with abnormal findings    Hip pain  -     XR HIP RIGHT (2-3 VIEWS); Future  -     External Referral To Orthopedic Surgery    Other hyperlipidemia  -     Comprehensive Metabolic Panel; Future  -     CBC Auto Differential; Future  -     Lipid Panel; Future  -     TSH without Reflex; Future  -     Vitamin D 25 Hydroxy; Future    Essential hypertension  -     Comprehensive Metabolic Panel; Future  -     CBC Auto Differential; Future  -     Lipid Panel; Future  -     TSH without Reflex; Future  -     Vitamin D 25 Hydroxy; Future    Vitamin D deficiency  -     Comprehensive Metabolic Panel; Future  -     CBC Auto Differential; Future  -     Lipid Panel; Future  -     TSH without Reflex; Future  -     Vitamin D 25 Hydroxy; Future    Essential hypertension, benign  -     Comprehensive Metabolic Panel; Future  -     CBC Auto Differential; Future  -     Lipid Panel; Future  -     TSH without Reflex; Future  -     Vitamin D 25 Hydroxy; Future    Exposure to COVID-19 virus  -     COVID-19 Ambulatory;  Future            Gerri Ritter DO    12/14/2020  7:52 AM

## 2020-12-18 DIAGNOSIS — Z20.822 EXPOSURE TO COVID-19 VIRUS: ICD-10-CM

## 2020-12-19 LAB
SARS-COV-2: NOT DETECTED
SOURCE: NORMAL

## 2021-02-08 RX ORDER — PANTOPRAZOLE SODIUM 40 MG/1
TABLET, DELAYED RELEASE ORAL
Qty: 90 TABLET | Refills: 3 | Status: SHIPPED
Start: 2021-02-08 | End: 2021-12-20

## 2021-02-08 RX ORDER — ROSUVASTATIN CALCIUM 20 MG/1
TABLET, COATED ORAL
Qty: 90 TABLET | Refills: 3 | Status: SHIPPED
Start: 2021-02-08 | End: 2021-12-20

## 2021-02-08 RX ORDER — ATENOLOL AND CHLORTHALIDONE TABLET 50; 25 MG/1; MG/1
TABLET ORAL
Qty: 90 TABLET | Refills: 3 | Status: SHIPPED
Start: 2021-02-08 | End: 2021-12-20

## 2021-02-08 RX ORDER — MECLIZINE HCL 12.5 MG/1
12.5 TABLET ORAL 3 TIMES DAILY PRN
Qty: 90 TABLET | Refills: 1 | Status: SHIPPED | OUTPATIENT
Start: 2021-02-08

## 2021-03-08 ENCOUNTER — OFFICE VISIT (OUTPATIENT)
Dept: PRIMARY CARE CLINIC | Age: 66
End: 2021-03-08
Payer: COMMERCIAL

## 2021-03-08 VITALS
OXYGEN SATURATION: 96 % | TEMPERATURE: 97.8 F | BODY MASS INDEX: 34.16 KG/M2 | HEIGHT: 62 IN | WEIGHT: 185.6 LBS | DIASTOLIC BLOOD PRESSURE: 82 MMHG | SYSTOLIC BLOOD PRESSURE: 136 MMHG | RESPIRATION RATE: 16 BRPM | HEART RATE: 74 BPM

## 2021-03-08 DIAGNOSIS — R94.31 ABNORMAL EKG: ICD-10-CM

## 2021-03-08 DIAGNOSIS — M16.11 PRIMARY OSTEOARTHRITIS OF RIGHT HIP: ICD-10-CM

## 2021-03-08 DIAGNOSIS — Z01.818 PREOP EXAMINATION: Primary | ICD-10-CM

## 2021-03-08 PROCEDURE — 99214 OFFICE O/P EST MOD 30 MIN: CPT | Performed by: FAMILY MEDICINE

## 2021-03-08 PROCEDURE — 93000 ELECTROCARDIOGRAM COMPLETE: CPT | Performed by: FAMILY MEDICINE

## 2021-03-08 ASSESSMENT — ENCOUNTER SYMPTOMS
ALLERGIC/IMMUNOLOGIC NEGATIVE: 1
NAUSEA: 0
BLOOD IN STOOL: 0
PHOTOPHOBIA: 0
CHEST TIGHTNESS: 0
FACIAL SWELLING: 0
COLOR CHANGE: 0
DIARRHEA: 0
SHORTNESS OF BREATH: 0
COUGH: 0
SORE THROAT: 0
VOMITING: 0
APNEA: 0
WHEEZING: 0
BACK PAIN: 0
SINUS PRESSURE: 0
ABDOMINAL PAIN: 0

## 2021-03-08 ASSESSMENT — PATIENT HEALTH QUESTIONNAIRE - PHQ9
SUM OF ALL RESPONSES TO PHQ9 QUESTIONS 1 & 2: 0
2. FEELING DOWN, DEPRESSED OR HOPELESS: 0
SUM OF ALL RESPONSES TO PHQ QUESTIONS 1-9: 0

## 2021-03-08 NOTE — PROGRESS NOTES
Chief Complaint:   Chief Complaint   Patient presents with    Pre-op Exam       Hip Pain   The incident occurred more than 1 week ago. The incident occurred at the gym. There was no injury mechanism. The pain is present in the right hip. The quality of the pain is described as aching. The pain is at a severity of 5/10. The pain is severe. The pain has been worsening since onset. She has tried acetaminophen for the symptoms. The treatment provided mild relief. Patient Active Problem List   Diagnosis    Hyperlipidemia    Essential hypertension, benign    Vitamin D deficiency       Past Medical History:   Diagnosis Date    Hyperlipidemia     Hypertension     Vitamin D deficiency        Past Surgical History:   Procedure Laterality Date    APPENDECTOMY      BREAST SURGERY Bilateral     cancer    HYSTERECTOMY      OVARY SURGERY      TONSILLECTOMY         Current Outpatient Medications   Medication Sig Dispense Refill    meclizine (ANTIVERT) 12.5 MG tablet Take 1 tablet by mouth 3 times daily as needed for Dizziness 90 tablet 1    pantoprazole (PROTONIX) 40 MG tablet TAKE 1 TABLET DAILY 90 tablet 3    rosuvastatin (CRESTOR) 20 MG tablet TAKE 1 TABLET DAILY 90 tablet 3    atenolol-chlorthalidone (TENORETIC) 50-25 MG per tablet TAKE 1 TABLET DAILY 90 tablet 3    Probiotic Product (PROBIOTIC MULTI-ENZYME PO) Take by mouth      Multiple Vitamins-Minerals (THERAPEUTIC MULTIVITAMIN-MINERALS) tablet Take 1 tablet by mouth daily      cycloSPORINE (RESTASIS) 0.05 % ophthalmic emulsion Place 1 drop into both eyes 2 times daily      Cholecalciferol (VITAMIN D) 2000 units TABS tablet Take 1 tablet by mouth daily 90 tablet 3     No current facility-administered medications for this visit.         No Known Allergies    Social History     Socioeconomic History    Marital status: Single     Spouse name: None    Number of children: None    Years of education: None    Highest education level: None Occupational History    Occupation: counselor     Employer: [employer]    Financial resource strain: None    Food insecurity     Worry: None     Inability: None    Transportation needs     Medical: None     Non-medical: None   Tobacco Use    Smoking status: Never Smoker    Smokeless tobacco: Never Used   Substance and Sexual Activity    Alcohol use: No    Drug use: No    Sexual activity: None   Lifestyle    Physical activity     Days per week: None     Minutes per session: None    Stress: None   Relationships    Social connections     Talks on phone: None     Gets together: None     Attends Anglican service: None     Active member of club or organization: None     Attends meetings of clubs or organizations: None     Relationship status: None    Intimate partner violence     Fear of current or ex partner: None     Emotionally abused: None     Physically abused: None     Forced sexual activity: None   Other Topics Concern    None   Social History Narrative    None       History reviewed. No pertinent family history. Review of Systems   Constitutional: Negative. HENT: Negative for congestion, facial swelling, hearing loss, nosebleeds, sinus pressure and sore throat. Eyes: Negative for photophobia and visual disturbance. Respiratory: Negative for apnea, cough, chest tightness, shortness of breath and wheezing. Cardiovascular: Negative for chest pain, palpitations and leg swelling. Gastrointestinal: Negative for abdominal pain, blood in stool, diarrhea, nausea and vomiting. Genitourinary: Negative for difficulty urinating, frequency and urgency. Musculoskeletal: Positive for arthralgias. Negative for back pain, joint swelling and myalgias. Skin: Negative for color change and rash. Allergic/Immunologic: Negative. Neurological: Negative for syncope, weakness, light-headedness and headaches. Hematological: Negative.     Psychiatric/Behavioral: Negative for agitation, behavioral problems, confusion and self-injury. The patient is not nervous/anxious. All other systems reviewed and are negative. Physical Exam  Vitals signs and nursing note reviewed. Constitutional:       General: She is not in acute distress. Appearance: She is well-developed. HENT:      Head: Normocephalic and atraumatic. Nose: Nose normal.   Eyes:      Conjunctiva/sclera: Conjunctivae normal.      Pupils: Pupils are equal, round, and reactive to light. Neck:      Musculoskeletal: Normal range of motion and neck supple. Thyroid: No thyromegaly. Vascular: No JVD. Cardiovascular:      Rate and Rhythm: Normal rate and regular rhythm. Heart sounds: Normal heart sounds. No murmur. No friction rub. No gallop. Pulmonary:      Effort: Pulmonary effort is normal. No respiratory distress. Breath sounds: Normal breath sounds. No wheezing. Abdominal:      General: Bowel sounds are normal. There is no distension. Palpations: Abdomen is soft. Tenderness: There is no abdominal tenderness. There is no guarding or rebound. Musculoskeletal:      Right hip: She exhibits decreased range of motion, decreased strength and tenderness. Lymphadenopathy:      Cervical: No cervical adenopathy. Skin:     General: Skin is warm and dry. Findings: No erythema or rash. Neurological:      Mental Status: She is alert and oriented to person, place, and time. Cranial Nerves: No cranial nerve deficit. Motor: No abnormal muscle tone. Coordination: Coordination normal.      Deep Tendon Reflexes: Reflexes are normal and symmetric. Psychiatric:         Behavior: Behavior normal.         Judgment: Judgment normal.                               ASSESSMENT/PLAN:    Alma Dasilva was seen today for pre-op exam.    Diagnoses and all orders for this visit:    Preop examination  -     EKG 12 lead;  Future  -     EKG 12 lead  -     Cancel: Boo Reddy Khadra Craven MD, Cardiology, Jazmine Roblero MD, Cardiology, [place]    Abnormal EKG  -     Cancel: Tyra Linares MD, Cardiology, Jazmine Roblero MD, Cardiology, [place]    Primary osteoarthritis of right hip            Maggi Farah DO    3/8/2021  4:11 PM

## 2021-03-10 ENCOUNTER — OFFICE VISIT (OUTPATIENT)
Dept: CARDIOLOGY CLINIC | Age: 66
End: 2021-03-10
Payer: COMMERCIAL

## 2021-03-10 VITALS
HEART RATE: 66 BPM | HEIGHT: 62 IN | DIASTOLIC BLOOD PRESSURE: 90 MMHG | RESPIRATION RATE: 12 BRPM | BODY MASS INDEX: 33.13 KG/M2 | SYSTOLIC BLOOD PRESSURE: 140 MMHG | WEIGHT: 180 LBS

## 2021-03-10 DIAGNOSIS — Z01.818 PRE-OPERATIVE CLEARANCE: Primary | ICD-10-CM

## 2021-03-10 PROCEDURE — 99202 OFFICE O/P NEW SF 15 MIN: CPT | Performed by: STUDENT IN AN ORGANIZED HEALTH CARE EDUCATION/TRAINING PROGRAM

## 2021-03-10 PROCEDURE — 93000 ELECTROCARDIOGRAM COMPLETE: CPT | Performed by: STUDENT IN AN ORGANIZED HEALTH CARE EDUCATION/TRAINING PROGRAM

## 2021-03-10 NOTE — PROGRESS NOTES
OUTPATIENT CARDIOLOGY CONSULT    Name: Jose Barry    Age: 76 y.o. Date of Service: 3/10/2021    Reason for Consultation: Preoperative cardiac evaluation. Referring Physician: Bina Milligan DO    History of Present Illness:  48-year-old female with no significant cardiac medical history and past medical history of hypertension hyperlipidemia presents for preoperative cardiac evaluation prior to hip replacement. Patient was very active prior to her hip pain that started last September. She used to walk 5 miles every day with no cardiac symptoms and no limitations. Review of Systems:  Cardiac: As per HPI  General: No fever, chills  Pulmonary: As per HPI  HEENT: No visual disturbances, difficult swallowing  GI: No nausea, vomiting  Endocrine: No thyroid disease or DM  Musculoskeletal: SELBY x 4, no focal motor deficits  Skin: Intact, no rashes  Neuro/Psych: No headache or seizures    Past Medical History:  Past Medical History:   Diagnosis Date    Hyperlipidemia     Hypertension     Vitamin D deficiency        Past Surgical History:  Past Surgical History:   Procedure Laterality Date    APPENDECTOMY      BREAST SURGERY Bilateral     cancer    HYSTERECTOMY      OVARY SURGERY      TONSILLECTOMY         Family History:  No family history on file. Social History:  Social History     Socioeconomic History    Marital status: Single     Spouse name: Not on file    Number of children: Not on file    Years of education: Not on file    Highest education level: Not on file   Occupational History    Occupation: counselor     Employer: [employer]    Financial resource strain: Not on file    Food insecurity     Worry: Not on file     Inability: Not on file    Transportation needs     Medical: Not on file     Non-medical: Not on file   Tobacco Use    Smoking status: Never Smoker    Smokeless tobacco: Never Used   Substance and Sexual Activity    Alcohol use:  No  Drug use: No    Sexual activity: Not on file   Lifestyle    Physical activity     Days per week: Not on file     Minutes per session: Not on file    Stress: Not on file   Relationships    Social connections     Talks on phone: Not on file     Gets together: Not on file     Attends Advent service: Not on file     Active member of club or organization: Not on file     Attends meetings of clubs or organizations: Not on file     Relationship status: Not on file    Intimate partner violence     Fear of current or ex partner: Not on file     Emotionally abused: Not on file     Physically abused: Not on file     Forced sexual activity: Not on file   Other Topics Concern    Not on file   Social History Narrative    Not on file       Allergies:  No Known Allergies    Current Medications:  Current Outpatient Medications   Medication Sig Dispense Refill    meclizine (ANTIVERT) 12.5 MG tablet Take 1 tablet by mouth 3 times daily as needed for Dizziness 90 tablet 1    pantoprazole (PROTONIX) 40 MG tablet TAKE 1 TABLET DAILY 90 tablet 3    rosuvastatin (CRESTOR) 20 MG tablet TAKE 1 TABLET DAILY 90 tablet 3    atenolol-chlorthalidone (TENORETIC) 50-25 MG per tablet TAKE 1 TABLET DAILY 90 tablet 3    Multiple Vitamins-Minerals (THERAPEUTIC MULTIVITAMIN-MINERALS) tablet Take 1 tablet by mouth daily      Cholecalciferol (VITAMIN D) 2000 units TABS tablet Take 1 tablet by mouth daily 90 tablet 3    Probiotic Product (PROBIOTIC MULTI-ENZYME PO) Take by mouth      cycloSPORINE (RESTASIS) 0.05 % ophthalmic emulsion Place 1 drop into both eyes 2 times daily       No current facility-administered medications for this visit.         Physical Exam:  BP (!) 140/90   Pulse 66   Resp 12   Ht 5' 2\" (1.575 m)   Wt 180 lb (81.6 kg)   BMI 32.92 kg/m²   Wt Readings from Last 3 Encounters:   03/10/21 180 lb (81.6 kg)   03/08/21 185 lb 9.6 oz (84.2 kg)   12/14/20 175 lb (79.4 kg)     Appearance: Awake, alert, no acute respiratory distress  Skin: Intact, no rash  Head: Normocephalic, atraumatic  Eyes: EOMI, no conjunctival erythema  Neck: Supple, no elevated JVP, no carotid bruits  Lungs: Clear to auscultation bilaterally. No wheezes, rales, or rhonchi.   Cardiac: Regular rate and rhythm, +S1S2, no murmurs apparent  Abdomen: Soft, nontender, +bowel sounds  Extremities: Moves all extremities x 4, no lower extremity edema  Neurologic: No focal motor deficits apparent, normal mood and affect  Peripheral Pulses: Intact posterior tibial pulses bilaterally    Laboratory Tests:  Lab Results   Component Value Date    CREATININE 0.6 12/14/2020    BUN 10 12/14/2020     12/14/2020    K 3.6 12/14/2020    CL 98 12/14/2020    CO2 27 12/14/2020     No results found for: MG  Lab Results   Component Value Date    WBC 5.5 12/14/2020    HGB 13.4 12/14/2020    HCT 42.0 12/14/2020    MCV 78.1 (L) 12/14/2020     12/14/2020     Lab Results   Component Value Date    ALT 21 12/14/2020    AST 22 12/14/2020    ALKPHOS 62 12/14/2020    BILITOT 1.1 12/14/2020     Lab Results   Component Value Date    TROPONINI <0.01 04/09/2017     Lab Results   Component Value Date    INR 1.2 04/09/2017    PROTIME 12.8 (H) 04/09/2017     Lab Results   Component Value Date    TSH 1.310 12/14/2020     No results found for: LABA1C  No results found for: EAG  Lab Results   Component Value Date    CHOL 199 12/14/2020    CHOL 217 (H) 12/13/2019    CHOL 204 (H) 12/12/2018     Lab Results   Component Value Date    TRIG 181 (H) 12/14/2020    TRIG 254 (H) 12/13/2019    TRIG 149 12/12/2018     Lab Results   Component Value Date    HDL 62 12/14/2020    HDL 56 12/13/2019    HDL 60 12/12/2018     Lab Results   Component Value Date    LDLCALC 101 (H) 12/14/2020    LDLCALC 110 (H) 12/13/2019    LDLCALC 114 (H) 12/12/2018     Lab Results   Component Value Date    LABVLDL 36 12/14/2020    LABVLDL 51 12/13/2019    LABVLDL 30 12/12/2018     No results found for: CHOLHDLRATIO  No results for input(s): PROBNP in the last 72 hours. Cardiac Tests:  ECG: Normal sinus rhythm. ASSESSMENT / PLAN:  48-year-old female presents for preoperative cardiac evaluation prior to hip surgery. No cardiac symptoms. Cardiac exam is normal.  ECG is normal.    Recommendations  METs above 4 with risk stratification index of 0. I recommend no further cardiac work up prior to surgery. Thank you for allowing me to participate in your patient's care. Please feel free to contact me if you have any questions or concerns.     Stefanie Whitehead MD  [facility]) Cardiology

## 2021-03-23 LAB
ALBUMIN SERPL-MCNC: 4.5 G/DL
ALBUMIN SERPL-MCNC: 4.5 G/DL
ALP BLD-CCNC: 59 U/L
ALP BLD-CCNC: 59 U/L
ALT SERPL-CCNC: 23 U/L
ALT SERPL-CCNC: 23 U/L
ANION GAP SERPL CALCULATED.3IONS-SCNC: 11 MMOL/L
ANION GAP SERPL CALCULATED.3IONS-SCNC: NORMAL MMOL/L
AST SERPL-CCNC: 21 U/L
AST SERPL-CCNC: 21 U/L
BASOPHILS ABSOLUTE: ABNORMAL
BASOPHILS RELATIVE PERCENT: ABNORMAL
BILIRUB SERPL-MCNC: 1 MG/DL (ref 0.1–1.4)
BILIRUB SERPL-MCNC: 1 MG/DL (ref 0.1–1.4)
BILIRUBIN, URINE: NEGATIVE
BILIRUBIN, URINE: NEGATIVE
BLOOD, URINE: POSITIVE
BLOOD, URINE: POSITIVE
BUN BLDV-MCNC: 11 MG/DL
BUN BLDV-MCNC: 11 MG/DL
CALCIUM SERPL-MCNC: 9.7 MG/DL
CALCIUM SERPL-MCNC: 9.7 MG/DL
CHLORIDE BLD-SCNC: 99 MMOL/L
CHLORIDE BLD-SCNC: 99 MMOL/L
CLARITY: CLEAR
CLARITY: CLEAR
CO2: 26 MMOL/L
CO2: 26 MMOL/L
COLOR: YELLOW
COLOR: YELLOW
CREAT SERPL-MCNC: 0.7 MG/DL
CREAT SERPL-MCNC: 0.7 MG/DL
EOSINOPHILS ABSOLUTE: ABNORMAL
EOSINOPHILS RELATIVE PERCENT: ABNORMAL
GFR CALCULATED: 84
GFR CALCULATED: 84
GLUCOSE BLD-MCNC: 101 MG/DL
GLUCOSE BLD-MCNC: 101 MG/DL
GLUCOSE URINE: NEGATIVE
GLUCOSE URINE: NORMAL
HCT VFR BLD CALC: 39.4 % (ref 36–46)
HEMOGLOBIN: 13 G/DL (ref 12–16)
INR BLD: 0.95
INR BLD: 0.95
KETONES, URINE: NEGATIVE
KETONES, URINE: NEGATIVE
LEUKOCYTE ESTERASE, URINE: NEGATIVE
LEUKOCYTE ESTERASE, URINE: NEGATIVE
LYMPHOCYTES ABSOLUTE: ABNORMAL
LYMPHOCYTES RELATIVE PERCENT: ABNORMAL
MCH RBC QN AUTO: 25.4 PG
MCHC RBC AUTO-ENTMCNC: 33.1 G/DL
MCV RBC AUTO: 76.7 FL
MONOCYTES ABSOLUTE: ABNORMAL
MONOCYTES RELATIVE PERCENT: ABNORMAL
NEUTROPHILS ABSOLUTE: ABNORMAL
NEUTROPHILS RELATIVE PERCENT: ABNORMAL
NITRITE, URINE: NEGATIVE
NITRITE, URINE: NEGATIVE
PH UA: 8 (ref 4.5–8)
PH UA: 8 (ref 4.5–8)
PLATELET # BLD: 283 K/ΜL
PMV BLD AUTO: 8.3 FL
POTASSIUM SERPL-SCNC: 3.4 MMOL/L
POTASSIUM SERPL-SCNC: 3.4 MMOL/L
PROTEIN UA: NEGATIVE
PROTEIN UA: NEGATIVE
PROTIME: 11.2 SECONDS
PROTIME: 11.2 SECONDS
RBC # BLD: 5.14 10^6/ΜL
SODIUM BLD-SCNC: 136 MMOL/L
SODIUM BLD-SCNC: 136 MMOL/L
SPECIFIC GRAVITY, URINE: 1.01
SPECIFIC GRAVITY, URINE: 1.01
TOTAL PROTEIN: 7.5
TOTAL PROTEIN: 7.5
UROBILINOGEN, URINE: NORMAL
UROBILINOGEN, URINE: NORMAL
WBC # BLD: 6.7 10^3/ML

## 2021-04-27 ENCOUNTER — TELEPHONE (OUTPATIENT)
Dept: PRIMARY CARE CLINIC | Age: 66
End: 2021-04-27

## 2021-04-27 DIAGNOSIS — F41.9 ANXIETY: Primary | ICD-10-CM

## 2021-04-27 RX ORDER — LORAZEPAM 0.5 MG/1
0.5 TABLET ORAL NIGHTLY PRN
Qty: 30 TABLET | Refills: 0 | Status: SHIPPED
Start: 2021-04-27 | End: 2021-05-23 | Stop reason: SDUPTHER

## 2021-04-27 NOTE — TELEPHONE ENCOUNTER
The pt had a hip replacement done at the beginning of the month, and is now having lots of anxiety and having a hard time sleeping. She is calling to see if something can be sent over to the pharmacy that can help her sleep.  She did want me to let you know that she is taking tramadol

## 2021-05-23 DIAGNOSIS — F41.9 ANXIETY: ICD-10-CM

## 2021-05-24 RX ORDER — LORAZEPAM 0.5 MG/1
0.5 TABLET ORAL NIGHTLY PRN
Qty: 30 TABLET | Refills: 0 | Status: SHIPPED
Start: 2021-05-24 | End: 2021-07-02

## 2021-06-30 DIAGNOSIS — F41.9 ANXIETY: ICD-10-CM

## 2021-07-02 RX ORDER — LORAZEPAM 0.5 MG/1
0.5 TABLET ORAL NIGHTLY PRN
Qty: 30 TABLET | Refills: 0 | Status: SHIPPED
Start: 2021-07-02 | End: 2021-08-02

## 2021-08-01 DIAGNOSIS — F41.9 ANXIETY: ICD-10-CM

## 2021-08-02 RX ORDER — LORAZEPAM 0.5 MG/1
0.5 TABLET ORAL NIGHTLY PRN
Qty: 30 TABLET | Refills: 0 | Status: SHIPPED
Start: 2021-08-02 | End: 2021-09-01

## 2021-08-31 DIAGNOSIS — F41.9 ANXIETY: ICD-10-CM

## 2021-09-01 RX ORDER — LORAZEPAM 0.5 MG/1
TABLET ORAL
Qty: 30 TABLET | Refills: 0 | Status: SHIPPED
Start: 2021-09-01 | End: 2021-10-06

## 2021-10-05 DIAGNOSIS — F41.9 ANXIETY: ICD-10-CM

## 2021-10-06 RX ORDER — LORAZEPAM 0.5 MG/1
TABLET ORAL
Qty: 30 TABLET | Refills: 0 | Status: SHIPPED
Start: 2021-10-06 | End: 2021-11-02

## 2021-11-01 DIAGNOSIS — F41.9 ANXIETY: ICD-10-CM

## 2021-11-02 RX ORDER — LORAZEPAM 0.5 MG/1
TABLET ORAL
Qty: 30 TABLET | Refills: 0 | Status: SHIPPED
Start: 2021-11-02 | End: 2021-11-29

## 2021-11-28 DIAGNOSIS — F41.9 ANXIETY: ICD-10-CM

## 2021-11-29 RX ORDER — LORAZEPAM 0.5 MG/1
TABLET ORAL
Qty: 30 TABLET | Refills: 0 | Status: SHIPPED
Start: 2021-11-29 | End: 2021-12-20 | Stop reason: SDUPTHER

## 2021-12-20 ENCOUNTER — OFFICE VISIT (OUTPATIENT)
Dept: PRIMARY CARE CLINIC | Age: 66
End: 2021-12-20
Payer: COMMERCIAL

## 2021-12-20 VITALS
DIASTOLIC BLOOD PRESSURE: 76 MMHG | RESPIRATION RATE: 18 BRPM | WEIGHT: 180 LBS | HEIGHT: 62 IN | SYSTOLIC BLOOD PRESSURE: 136 MMHG | TEMPERATURE: 97.1 F | BODY MASS INDEX: 33.13 KG/M2 | OXYGEN SATURATION: 98 % | HEART RATE: 62 BPM

## 2021-12-20 DIAGNOSIS — F41.9 ANXIETY: ICD-10-CM

## 2021-12-20 DIAGNOSIS — M79.89 SWELLING OF LOWER EXTREMITY: ICD-10-CM

## 2021-12-20 DIAGNOSIS — E55.9 VITAMIN D DEFICIENCY: ICD-10-CM

## 2021-12-20 DIAGNOSIS — M79.672 FOOT PAIN, BILATERAL: ICD-10-CM

## 2021-12-20 DIAGNOSIS — I10 ESSENTIAL HYPERTENSION, BENIGN: ICD-10-CM

## 2021-12-20 DIAGNOSIS — I10 ESSENTIAL HYPERTENSION: Primary | ICD-10-CM

## 2021-12-20 DIAGNOSIS — I10 ESSENTIAL HYPERTENSION: ICD-10-CM

## 2021-12-20 DIAGNOSIS — M79.604 PAIN OF RIGHT LOWER EXTREMITY: ICD-10-CM

## 2021-12-20 DIAGNOSIS — M79.671 FOOT PAIN, BILATERAL: ICD-10-CM

## 2021-12-20 DIAGNOSIS — E78.49 OTHER HYPERLIPIDEMIA: ICD-10-CM

## 2021-12-20 LAB
ALBUMIN SERPL-MCNC: 5 G/DL (ref 3.5–5.2)
ALP BLD-CCNC: 61 U/L (ref 35–104)
ALT SERPL-CCNC: 17 U/L (ref 0–32)
ANION GAP SERPL CALCULATED.3IONS-SCNC: 15 MMOL/L (ref 7–16)
AST SERPL-CCNC: 22 U/L (ref 0–31)
BASOPHILS ABSOLUTE: 0.02 E9/L (ref 0–0.2)
BASOPHILS RELATIVE PERCENT: 0.3 % (ref 0–2)
BILIRUB SERPL-MCNC: 1.1 MG/DL (ref 0–1.2)
BUN BLDV-MCNC: 9 MG/DL (ref 6–23)
CALCIUM SERPL-MCNC: 10.2 MG/DL (ref 8.6–10.2)
CHLORIDE BLD-SCNC: 96 MMOL/L (ref 98–107)
CHOLESTEROL, TOTAL: 216 MG/DL (ref 0–199)
CO2: 26 MMOL/L (ref 22–29)
CREAT SERPL-MCNC: 0.6 MG/DL (ref 0.5–1)
EOSINOPHILS ABSOLUTE: 0.05 E9/L (ref 0.05–0.5)
EOSINOPHILS RELATIVE PERCENT: 0.6 % (ref 0–6)
GFR AFRICAN AMERICAN: >60
GFR NON-AFRICAN AMERICAN: >60 ML/MIN/1.73
GLUCOSE BLD-MCNC: 104 MG/DL (ref 74–99)
HCT VFR BLD CALC: 40.9 % (ref 34–48)
HDLC SERPL-MCNC: 59 MG/DL
HEMOGLOBIN: 13.3 G/DL (ref 11.5–15.5)
IMMATURE GRANULOCYTES #: 0.01 E9/L
IMMATURE GRANULOCYTES %: 0.1 % (ref 0–5)
LDL CHOLESTEROL CALCULATED: 116 MG/DL (ref 0–99)
LYMPHOCYTES ABSOLUTE: 2.55 E9/L (ref 1.5–4)
LYMPHOCYTES RELATIVE PERCENT: 32 % (ref 20–42)
MAMMOGRAPHY, EXTERNAL: NORMAL
MAMMOGRAPHY, EXTERNAL: NORMAL
MCH RBC QN AUTO: 24.7 PG (ref 26–35)
MCHC RBC AUTO-ENTMCNC: 32.5 % (ref 32–34.5)
MCV RBC AUTO: 76 FL (ref 80–99.9)
MONOCYTES ABSOLUTE: 0.71 E9/L (ref 0.1–0.95)
MONOCYTES RELATIVE PERCENT: 8.9 % (ref 2–12)
NEUTROPHILS ABSOLUTE: 4.62 E9/L (ref 1.8–7.3)
NEUTROPHILS RELATIVE PERCENT: 58.1 % (ref 43–80)
PDW BLD-RTO: 13.8 FL (ref 11.5–15)
PLATELET # BLD: 367 E9/L (ref 130–450)
PMV BLD AUTO: 10.6 FL (ref 7–12)
POTASSIUM SERPL-SCNC: 3.3 MMOL/L (ref 3.5–5)
RBC # BLD: 5.38 E12/L (ref 3.5–5.5)
SODIUM BLD-SCNC: 137 MMOL/L (ref 132–146)
TOTAL PROTEIN: 7.6 G/DL (ref 6.4–8.3)
TRIGL SERPL-MCNC: 206 MG/DL (ref 0–149)
TSH SERPL DL<=0.05 MIU/L-ACNC: 1.11 UIU/ML (ref 0.27–4.2)
VITAMIN D 25-HYDROXY: 44 NG/ML (ref 30–100)
VLDLC SERPL CALC-MCNC: 41 MG/DL
WBC # BLD: 8 E9/L (ref 4.5–11.5)

## 2021-12-20 PROCEDURE — G8417 CALC BMI ABV UP PARAM F/U: HCPCS | Performed by: FAMILY MEDICINE

## 2021-12-20 PROCEDURE — 99214 OFFICE O/P EST MOD 30 MIN: CPT | Performed by: FAMILY MEDICINE

## 2021-12-20 PROCEDURE — G8427 DOCREV CUR MEDS BY ELIG CLIN: HCPCS | Performed by: FAMILY MEDICINE

## 2021-12-20 PROCEDURE — 1036F TOBACCO NON-USER: CPT | Performed by: FAMILY MEDICINE

## 2021-12-20 PROCEDURE — 1123F ACP DISCUSS/DSCN MKR DOCD: CPT | Performed by: FAMILY MEDICINE

## 2021-12-20 PROCEDURE — 4040F PNEUMOC VAC/ADMIN/RCVD: CPT | Performed by: FAMILY MEDICINE

## 2021-12-20 PROCEDURE — G9899 SCRN MAM PERF RSLTS DOC: HCPCS | Performed by: FAMILY MEDICINE

## 2021-12-20 PROCEDURE — G8484 FLU IMMUNIZE NO ADMIN: HCPCS | Performed by: FAMILY MEDICINE

## 2021-12-20 PROCEDURE — G8400 PT W/DXA NO RESULTS DOC: HCPCS | Performed by: FAMILY MEDICINE

## 2021-12-20 PROCEDURE — 1090F PRES/ABSN URINE INCON ASSESS: CPT | Performed by: FAMILY MEDICINE

## 2021-12-20 PROCEDURE — 3017F COLORECTAL CA SCREEN DOC REV: CPT | Performed by: FAMILY MEDICINE

## 2021-12-20 RX ORDER — ATENOLOL AND CHLORTHALIDONE TABLET 50; 25 MG/1; MG/1
TABLET ORAL
Qty: 90 TABLET | Refills: 3 | Status: SHIPPED
Start: 2021-12-20 | End: 2021-12-20 | Stop reason: SDUPTHER

## 2021-12-20 RX ORDER — ROSUVASTATIN CALCIUM 20 MG/1
TABLET, COATED ORAL
Qty: 90 TABLET | Refills: 3 | Status: SHIPPED | OUTPATIENT
Start: 2021-12-20

## 2021-12-20 RX ORDER — ATENOLOL AND CHLORTHALIDONE TABLET 50; 25 MG/1; MG/1
TABLET ORAL
Qty: 90 TABLET | Refills: 3 | Status: SHIPPED | OUTPATIENT
Start: 2021-12-20

## 2021-12-20 RX ORDER — ROSUVASTATIN CALCIUM 20 MG/1
TABLET, COATED ORAL
Qty: 90 TABLET | Refills: 3 | Status: SHIPPED
Start: 2021-12-20 | End: 2021-12-20 | Stop reason: SDUPTHER

## 2021-12-20 RX ORDER — PANTOPRAZOLE SODIUM 40 MG/1
TABLET, DELAYED RELEASE ORAL
Qty: 90 TABLET | Refills: 3 | Status: SHIPPED
Start: 2021-12-20 | End: 2021-12-20 | Stop reason: SDUPTHER

## 2021-12-20 RX ORDER — PANTOPRAZOLE SODIUM 40 MG/1
TABLET, DELAYED RELEASE ORAL
Qty: 90 TABLET | Refills: 3 | Status: SHIPPED | OUTPATIENT
Start: 2021-12-20

## 2021-12-20 RX ORDER — LORAZEPAM 0.5 MG/1
0.5 TABLET ORAL 2 TIMES DAILY
Qty: 60 TABLET | Refills: 2 | Status: SHIPPED
Start: 2021-12-20 | End: 2022-03-21

## 2021-12-20 ASSESSMENT — ENCOUNTER SYMPTOMS
PHOTOPHOBIA: 0
NAUSEA: 0
SINUS PRESSURE: 0
ABDOMINAL PAIN: 0
DIARRHEA: 0
SORE THROAT: 0
BACK PAIN: 0
VOMITING: 0
WHEEZING: 0
SHORTNESS OF BREATH: 0
ALLERGIC/IMMUNOLOGIC NEGATIVE: 1
APNEA: 0
BLOOD IN STOOL: 0
CHEST TIGHTNESS: 0
COUGH: 0
COLOR CHANGE: 0
FACIAL SWELLING: 0

## 2021-12-20 NOTE — PROGRESS NOTES
Chief Complaint:   Chief Complaint   Patient presents with    Annual Exam    Hypertension    Hip Pain     from hip replacement.  Discuss Medications       Hypertension  This is a chronic problem. The current episode started 1 to 4 weeks ago. The problem is controlled. Pertinent negatives include no chest pain, headaches, palpitations or shortness of breath. Past treatments include beta blockers and diuretics. The current treatment provides significant improvement. There are no compliance problems. Hyperlipidemia  This is a chronic problem. The problem is controlled. Recent lipid tests were reviewed and are normal. Associated symptoms include leg pain. Pertinent negatives include no chest pain, myalgias or shortness of breath. Current antihyperlipidemic treatment includes statins. The current treatment provides significant improvement of lipids. There are no compliance problems. Leg Pain   The incident occurred more than 1 week ago. There was no injury mechanism. The pain is present in the right leg. The quality of the pain is described as aching. The pain is at a severity of 5/10. The pain is moderate. The pain has been constant since onset. Mental Health Problem  Primary symptoms comment: anxiety seems worsens. The current episode started 1 to 2 weeks ago. The degree of incapacity that she is experiencing as a consequence of her illness is moderate. Additional symptoms of the illness do not include agitation, headaches or abdominal pain. She does not admit to suicidal ideas. She does not have a plan to attempt suicide. She does not contemplate harming herself. She has not already injured self. She does not contemplate injuring another person. She has not already  injured another person.        Patient Active Problem List   Diagnosis    Hyperlipidemia    Essential hypertension, benign    Vitamin D deficiency       Past Medical History:   Diagnosis Date    Hyperlipidemia     Hypertension     Vitamin D deficiency        Past Surgical History:   Procedure Laterality Date    APPENDECTOMY      BREAST SURGERY Bilateral     cancer    HYSTERECTOMY      OVARY SURGERY      TONSILLECTOMY         Current Outpatient Medications   Medication Sig Dispense Refill    DULoxetine HCl 30 MG CSDR Take by mouth      atenolol-chlorthalidone (TENORETIC) 50-25 MG per tablet TAKE 1 TABLET DAILY 90 tablet 3    pantoprazole (PROTONIX) 40 MG tablet TAKE 1 TABLET DAILY 90 tablet 3    rosuvastatin (CRESTOR) 20 MG tablet TAKE 1 TABLET DAILY 90 tablet 3    LORazepam (ATIVAN) 0.5 MG tablet Take 1 tablet by mouth 2 times daily for 30 days. 60 tablet 2    meclizine (ANTIVERT) 12.5 MG tablet Take 1 tablet by mouth 3 times daily as needed for Dizziness 90 tablet 1    Probiotic Product (PROBIOTIC MULTI-ENZYME PO) Take by mouth      Multiple Vitamins-Minerals (THERAPEUTIC MULTIVITAMIN-MINERALS) tablet Take 1 tablet by mouth daily      cycloSPORINE (RESTASIS) 0.05 % ophthalmic emulsion Place 1 drop into both eyes 2 times daily      Cholecalciferol (VITAMIN D) 2000 units TABS tablet Take 1 tablet by mouth daily 90 tablet 3     No current facility-administered medications for this visit.        No Known Allergies    Social History     Socioeconomic History    Marital status: Single     Spouse name: None    Number of children: None    Years of education: None    Highest education level: None   Occupational History    Occupation: counselor     Employer: [employer]   Tobacco Use    Smoking status: Never Smoker    Smokeless tobacco: Never Used   Substance and Sexual Activity    Alcohol use: No    Drug use: No    Sexual activity: None   Other Topics Concern    None   Social History Narrative    None     Social Determinants of Health     Financial Resource Strain:     Difficulty of Paying Living Expenses: Not on file   Food Insecurity:     Worried About Running Out of Food in the Last Year: Not on file   Cate Busch Ran Out of Food in the Last Year: Not on file   Transportation Needs:     Lack of Transportation (Medical): Not on file    Lack of Transportation (Non-Medical): Not on file   Physical Activity:     Days of Exercise per Week: Not on file    Minutes of Exercise per Session: Not on file   Stress:     Feeling of Stress : Not on file   Social Connections:     Frequency of Communication with Friends and Family: Not on file    Frequency of Social Gatherings with Friends and Family: Not on file    Attends Gnosticist Services: Not on file    Active Member of [address] [place] [address] [employer] or Organizations: Not on file    Attends Club or Organization Meetings: Not on file    Marital Status: Not on file   Intimate Partner Violence:     Fear of Current or Ex-Partner: Not on file    Emotionally Abused: Not on file    Physically Abused: Not on file    Sexually Abused: Not on file   Housing Stability:     Unable to Pay for Housing in the Last Year: Not on file    Number of [place] in the Last Year: Not on file    Unstable Housing in the Last Year: Not on file       No family history on file. Review of Systems   Constitutional: Negative. HENT: Negative for congestion, facial swelling, hearing loss, nosebleeds, sinus pressure and sore throat. Eyes: Negative for photophobia and visual disturbance. Respiratory: Negative for apnea, cough, chest tightness, shortness of breath and wheezing. Cardiovascular: Positive for leg swelling. Negative for chest pain and palpitations. Gastrointestinal: Negative for abdominal pain, blood in stool, diarrhea, nausea and vomiting. Genitourinary: Negative for difficulty urinating, frequency and urgency. Musculoskeletal: Positive for arthralgias. Negative for back pain, joint swelling and myalgias. Skin: Negative for color change and rash. Allergic/Immunologic: Negative. Neurological: Negative for syncope, weakness, light-headedness and headaches. Hematological: Negative. Psychiatric/Behavioral: Negative for agitation, behavioral problems, confusion and self-injury. The patient is nervous/anxious. All other systems reviewed and are negative. Physical Exam  Vitals and nursing note reviewed. Constitutional:       General: She is not in acute distress. Appearance: She is well-developed. HENT:      Head: Normocephalic and atraumatic. Nose: Nose normal.   Eyes:      Conjunctiva/sclera: Conjunctivae normal.      Pupils: Pupils are equal, round, and reactive to light. Neck:      Thyroid: No thyromegaly. Vascular: No JVD. Cardiovascular:      Rate and Rhythm: Normal rate and regular rhythm. Heart sounds: Normal heart sounds. No murmur heard. No friction rub. No gallop. Pulmonary:      Effort: Pulmonary effort is normal. No respiratory distress. Breath sounds: Normal breath sounds. No wheezing. Abdominal:      General: Bowel sounds are normal. There is no distension. Palpations: Abdomen is soft. Tenderness: There is no abdominal tenderness. There is no guarding or rebound. Musculoskeletal:         General: Normal range of motion. Cervical back: Normal range of motion and neck supple. Right upper leg: Swelling and tenderness present. Lymphadenopathy:      Cervical: No cervical adenopathy. Skin:     General: Skin is warm and dry. Findings: No erythema or rash. Neurological:      Mental Status: She is alert and oriented to person, place, and time. Cranial Nerves: No cranial nerve deficit. Motor: No abnormal muscle tone. Coordination: Coordination normal.      Deep Tendon Reflexes: Reflexes are normal and symmetric. Psychiatric:         Mood and Affect: Mood is anxious. Behavior: Behavior normal.         Judgment: Judgment normal.                               ASSESSMENT/PLAN:    Robbi Bal was seen today for annual exam, hypertension, hip pain and discuss medications.     Diagnoses and all orders for this visit:    Essential hypertension  -     atenolol-chlorthalidone (TENORETIC) 50-25 MG per tablet; TAKE 1 TABLET DAILY  -     CBC Auto Differential; Future  -     Comprehensive Metabolic Panel; Future  -     Lipid Panel; Future  -     TSH without Reflex; Future  -     Vitamin D 25 Hydroxy; Future    Swelling of lower extremity  -     US DUP LOWER EXTREMITY RIGHT TAMANNA; Future  -     CBC Auto Differential; Future  -     Comprehensive Metabolic Panel; Future  -     Lipid Panel; Future  -     TSH without Reflex; Future  -     Vitamin D 25 Hydroxy; Future    Other hyperlipidemia  -     rosuvastatin (CRESTOR) 20 MG tablet; TAKE 1 TABLET DAILY  -     CBC Auto Differential; Future  -     Comprehensive Metabolic Panel; Future  -     Lipid Panel; Future  -     TSH without Reflex; Future  -     Vitamin D 25 Hydroxy; Future    Essential hypertension, benign  -     atenolol-chlorthalidone (TENORETIC) 50-25 MG per tablet; TAKE 1 TABLET DAILY  -     CBC Auto Differential; Future  -     Comprehensive Metabolic Panel; Future  -     Lipid Panel; Future  -     TSH without Reflex; Future  -     Vitamin D 25 Hydroxy; Future    Vitamin D deficiency  -     CBC Auto Differential; Future  -     Comprehensive Metabolic Panel; Future  -     Lipid Panel; Future  -     TSH without Reflex; Future  -     Vitamin D 25 Hydroxy; Future    Pain of right lower extremity  -     XR FEMUR RIGHT (MIN 2 VIEWS); Future  -     US DUP LOWER EXTREMITY RIGHT TAMANNA; Future    Anxiety  -     LORazepam (ATIVAN) 0.5 MG tablet; Take 1 tablet by mouth 2 times daily for 30 days. Foot pain, bilateral  -     XR FOOT LEFT (MIN 3 VIEWS); Future  -     XR FOOT RIGHT (MIN 3 VIEWS);  Future  -     External Referral To Podiatry    Other orders  -     pantoprazole (PROTONIX) 40 MG tablet; TAKE 1 TABLET DAILY            Lopez Mckay DO    12/20/2021  10:46 AM

## 2022-03-18 DIAGNOSIS — F41.9 ANXIETY: ICD-10-CM

## 2022-03-21 RX ORDER — LORAZEPAM 0.5 MG/1
TABLET ORAL
Qty: 60 TABLET | Refills: 1 | Status: SHIPPED | OUTPATIENT
Start: 2022-03-21 | End: 2022-04-20

## 2022-05-23 DIAGNOSIS — F41.9 ANXIETY: Primary | ICD-10-CM

## 2022-05-23 RX ORDER — LORAZEPAM 0.5 MG/1
0.5 TABLET ORAL 2 TIMES DAILY
Qty: 60 TABLET | Refills: 0 | Status: SHIPPED
Start: 2022-05-23 | End: 2022-06-13

## 2022-05-23 RX ORDER — LORAZEPAM 0.5 MG/1
0.5 TABLET ORAL 2 TIMES DAILY
COMMUNITY
End: 2022-05-23 | Stop reason: SDUPTHER

## 2022-06-13 DIAGNOSIS — F41.9 ANXIETY: ICD-10-CM

## 2022-06-13 RX ORDER — LORAZEPAM 0.5 MG/1
0.5 TABLET ORAL 2 TIMES DAILY
Qty: 60 TABLET | Refills: 0 | Status: SHIPPED
Start: 2022-06-13 | End: 2022-07-29

## 2022-06-20 ENCOUNTER — OFFICE VISIT (OUTPATIENT)
Dept: PRIMARY CARE CLINIC | Age: 67
End: 2022-06-20
Payer: MEDICARE

## 2022-06-20 VITALS
BODY MASS INDEX: 34.41 KG/M2 | WEIGHT: 187 LBS | TEMPERATURE: 97.2 F | HEART RATE: 74 BPM | DIASTOLIC BLOOD PRESSURE: 82 MMHG | OXYGEN SATURATION: 97 % | SYSTOLIC BLOOD PRESSURE: 136 MMHG | HEIGHT: 62 IN

## 2022-06-20 DIAGNOSIS — I10 ESSENTIAL HYPERTENSION: Primary | ICD-10-CM

## 2022-06-20 DIAGNOSIS — E55.9 VITAMIN D DEFICIENCY: ICD-10-CM

## 2022-06-20 DIAGNOSIS — G89.4 CHRONIC PAIN SYNDROME: ICD-10-CM

## 2022-06-20 DIAGNOSIS — E78.49 OTHER HYPERLIPIDEMIA: ICD-10-CM

## 2022-06-20 DIAGNOSIS — F41.9 ANXIETY: ICD-10-CM

## 2022-06-20 PROCEDURE — 3017F COLORECTAL CA SCREEN DOC REV: CPT | Performed by: FAMILY MEDICINE

## 2022-06-20 PROCEDURE — 99213 OFFICE O/P EST LOW 20 MIN: CPT | Performed by: FAMILY MEDICINE

## 2022-06-20 PROCEDURE — 1090F PRES/ABSN URINE INCON ASSESS: CPT | Performed by: FAMILY MEDICINE

## 2022-06-20 PROCEDURE — 1123F ACP DISCUSS/DSCN MKR DOCD: CPT | Performed by: FAMILY MEDICINE

## 2022-06-20 PROCEDURE — G8400 PT W/DXA NO RESULTS DOC: HCPCS | Performed by: FAMILY MEDICINE

## 2022-06-20 PROCEDURE — G8417 CALC BMI ABV UP PARAM F/U: HCPCS | Performed by: FAMILY MEDICINE

## 2022-06-20 PROCEDURE — G8427 DOCREV CUR MEDS BY ELIG CLIN: HCPCS | Performed by: FAMILY MEDICINE

## 2022-06-20 PROCEDURE — 1036F TOBACCO NON-USER: CPT | Performed by: FAMILY MEDICINE

## 2022-06-20 RX ORDER — DOXYCYCLINE 100 MG/1
100 CAPSULE ORAL 2 TIMES DAILY
COMMUNITY
Start: 2022-06-08 | End: 2022-08-31

## 2022-06-20 RX ORDER — SENNA PLUS 8.6 MG/1
8.6 TABLET ORAL 2 TIMES DAILY PRN
COMMUNITY
Start: 2022-06-08 | End: 2022-06-22

## 2022-06-20 RX ORDER — DULOXETIN HYDROCHLORIDE 60 MG/1
CAPSULE, DELAYED RELEASE ORAL
COMMUNITY
Start: 2022-05-30 | End: 2022-06-20 | Stop reason: SDUPTHER

## 2022-06-20 RX ORDER — PSEUDOEPHEDRINE HCL 30 MG
100 TABLET ORAL 2 TIMES DAILY PRN
COMMUNITY
Start: 2022-06-08 | End: 2022-06-22

## 2022-06-20 RX ORDER — DULOXETIN HYDROCHLORIDE 60 MG/1
CAPSULE, DELAYED RELEASE ORAL
Qty: 90 CAPSULE | Refills: 3 | Status: SHIPPED | OUTPATIENT
Start: 2022-06-20

## 2022-06-20 RX ORDER — ASPIRIN 81 MG/1
81 TABLET ORAL 2 TIMES DAILY WITH MEALS
COMMUNITY
Start: 2022-06-08 | End: 2022-07-06

## 2022-06-20 RX ORDER — MELOXICAM 7.5 MG/1
7.5 TABLET ORAL
COMMUNITY
Start: 2022-06-09 | End: 2022-07-09

## 2022-06-20 RX ORDER — ACETAMINOPHEN 500 MG
1000 TABLET ORAL EVERY 8 HOURS PRN
COMMUNITY
Start: 2022-06-08 | End: 2022-06-22

## 2022-06-20 RX ORDER — OXYCODONE HYDROCHLORIDE 5 MG/1
5 TABLET ORAL EVERY 6 HOURS PRN
COMMUNITY
Start: 2022-06-17 | End: 2022-06-24

## 2022-06-20 ASSESSMENT — ENCOUNTER SYMPTOMS
COLOR CHANGE: 0
COUGH: 0
APNEA: 0
PHOTOPHOBIA: 0
SORE THROAT: 0
ALLERGIC/IMMUNOLOGIC NEGATIVE: 1
BACK PAIN: 0
SHORTNESS OF BREATH: 0
ABDOMINAL PAIN: 0
CHEST TIGHTNESS: 0
VOMITING: 0
NAUSEA: 0
FACIAL SWELLING: 0
DIARRHEA: 0
BLOOD IN STOOL: 0
WHEEZING: 0
SINUS PRESSURE: 0

## 2022-06-20 ASSESSMENT — PATIENT HEALTH QUESTIONNAIRE - PHQ9
SUM OF ALL RESPONSES TO PHQ QUESTIONS 1-9: 0
SUM OF ALL RESPONSES TO PHQ QUESTIONS 1-9: 0
SUM OF ALL RESPONSES TO PHQ9 QUESTIONS 1 & 2: 0
2. FEELING DOWN, DEPRESSED OR HOPELESS: 0
SUM OF ALL RESPONSES TO PHQ QUESTIONS 1-9: 0
1. LITTLE INTEREST OR PLEASURE IN DOING THINGS: 0
SUM OF ALL RESPONSES TO PHQ QUESTIONS 1-9: 0

## 2022-06-20 NOTE — PROGRESS NOTES
Chief Complaint:   Chief Complaint   Patient presents with    6 Month Follow-Up       Hyperlipidemia  This is a chronic problem. The current episode started more than 1 year ago. The problem is controlled. Recent lipid tests were reviewed and are normal. Pertinent negatives include no chest pain, myalgias or shortness of breath. Current antihyperlipidemic treatment includes statins. The current treatment provides significant improvement of lipids. There are no compliance problems. Hypertension  This is a chronic problem. The current episode started more than 1 year ago. The problem has been resolved since onset. The problem is controlled. Pertinent negatives include no chest pain, headaches, palpitations or shortness of breath. Past treatments include beta blockers and diuretics. The current treatment provides significant improvement. There are no compliance problems. Patient Active Problem List   Diagnosis    Hyperlipidemia    Essential hypertension, benign    Vitamin D deficiency       Past Medical History:   Diagnosis Date    Hyperlipidemia     Hypertension     Vitamin D deficiency        Past Surgical History:   Procedure Laterality Date    APPENDECTOMY      BREAST SURGERY Bilateral     cancer    HYSTERECTOMY      OVARY SURGERY      TONSILLECTOMY         Current Outpatient Medications   Medication Sig Dispense Refill    acetaminophen (TYLENOL) 500 MG tablet Take 1,000 mg by mouth every 8 hours as needed      aspirin 81 MG EC tablet Take 81 mg by mouth 2 times daily (with meals)      docusate (COLACE, DULCOLAX) 100 MG CAPS Take 100 mg by mouth 2 times daily as needed      doxycycline monohydrate (MONODOX) 100 MG capsule Take 100 mg by mouth 2 times daily      meloxicam (MOBIC) 7.5 MG tablet Take 7.5 mg by mouth      oxyCODONE (ROXICODONE) 5 MG immediate release tablet Take 5 mg by mouth every 6 hours as needed.       senna (SENOKOT) 8.6 MG tablet Take 8.6 mg by mouth 2 times daily as needed      DULoxetine (CYMBALTA) 60 MG extended release capsule TAKE ONE CAPSULE BY MOUTH ONCE A DAY 90 capsule 3    LORazepam (ATIVAN) 0.5 MG tablet TAKE 1 TABLET BY MOUTH 2 TIMES DAILY FOR 30 DAYS 60 tablet 0    atenolol-chlorthalidone (TENORETIC) 50-25 MG per tablet TAKE 1 TABLET DAILY 90 tablet 3    pantoprazole (PROTONIX) 40 MG tablet TAKE 1 TABLET DAILY 90 tablet 3    rosuvastatin (CRESTOR) 20 MG tablet TAKE 1 TABLET DAILY 90 tablet 3    meclizine (ANTIVERT) 12.5 MG tablet Take 1 tablet by mouth 3 times daily as needed for Dizziness 90 tablet 1    Probiotic Product (PROBIOTIC MULTI-ENZYME PO) Take by mouth      Multiple Vitamins-Minerals (THERAPEUTIC MULTIVITAMIN-MINERALS) tablet Take 1 tablet by mouth daily      cycloSPORINE (RESTASIS) 0.05 % ophthalmic emulsion Place 1 drop into both eyes 2 times daily      Cholecalciferol (VITAMIN D) 2000 units TABS tablet Take 1 tablet by mouth daily 90 tablet 3     No current facility-administered medications for this visit. No Known Allergies    Social History     Socioeconomic History    Marital status: Single     Spouse name: Not on file    Number of children: Not on file    Years of education: Not on file    Highest education level: Not on file   Occupational History    Occupation: counselor     Employer: [employer] [address] [place] [address]   Tobacco Use    Smoking status: Never Smoker    Smokeless tobacco: Never Used   Substance and Sexual Activity    Alcohol use: No    Drug use: No    Sexual activity: Not on file   Other Topics Concern    Not on file   Social History Narrative    Not on file     Social Determinants of Health     Financial Resource Strain:     Difficulty of Paying Living Expenses: Not on file   Food Insecurity:     Worried About Running Out of Food in the Last Year: Not on file    Marge of Food in the Last Year: Not on file   Transportation Needs:     Lack of Transportation (Medical):  Not on file    Lack of Transportation (Non-Medical): Not on file   Physical Activity:     Days of Exercise per Week: Not on file    Minutes of Exercise per Session: Not on file   Stress:     Feeling of Stress : Not on file   Social Connections:     Frequency of Communication with Friends and Family: Not on file    Frequency of Social Gatherings with Friends and Family: Not on file    Attends [employer] Services: Not on file    Active Member of [address] [place] [address] or Organizations: Not on file    Attends Club or Organization Meetings: Not on file    Marital Status: Not on file   Intimate Partner Violence:     Fear of Current or Ex-Partner: Not on file    Emotionally Abused: Not on file    Physically Abused: Not on file    Sexually Abused: Not on file   Housing Stability:     Unable to Pay for Housing in the Last Year: Not on file    Number of [place] in the Last Year: Not on file    Unstable Housing in the Last Year: Not on file       No family history on file. Review of Systems   Constitutional: Negative. HENT: Negative for congestion, facial swelling, hearing loss, nosebleeds, sinus pressure and sore throat. Eyes: Negative for photophobia and visual disturbance. Respiratory: Negative for apnea, cough, chest tightness, shortness of breath and wheezing. Cardiovascular: Negative for chest pain, palpitations and leg swelling. Gastrointestinal: Negative for abdominal pain, blood in stool, diarrhea, nausea and vomiting. Genitourinary: Negative for difficulty urinating, frequency and urgency. Musculoskeletal: Negative for arthralgias, back pain, joint swelling and myalgias. Skin: Negative for color change and rash. Allergic/Immunologic: Negative. Neurological: Negative for syncope, weakness, light-headedness and headaches. Hematological: Negative. Psychiatric/Behavioral: Negative for agitation, behavioral problems, confusion and self-injury. The patient is not nervous/anxious.     All other systems reviewed and are negative. Physical Exam  Vitals and nursing note reviewed. Constitutional:       General: She is not in acute distress. Appearance: She is well-developed. HENT:      Head: Normocephalic and atraumatic. Nose: Nose normal.   Eyes:      Conjunctiva/sclera: Conjunctivae normal.      Pupils: Pupils are equal, round, and reactive to light. Neck:      Thyroid: No thyromegaly. Vascular: No JVD. Cardiovascular:      Rate and Rhythm: Normal rate and regular rhythm. Heart sounds: Normal heart sounds. No murmur heard. No friction rub. No gallop. Pulmonary:      Effort: Pulmonary effort is normal. No respiratory distress. Breath sounds: Normal breath sounds. No wheezing. Abdominal:      General: Bowel sounds are normal. There is no distension. Palpations: Abdomen is soft. Tenderness: There is no abdominal tenderness. There is no guarding or rebound. Musculoskeletal:         General: Normal range of motion. Cervical back: Normal range of motion and neck supple. Lymphadenopathy:      Cervical: No cervical adenopathy. Skin:     General: Skin is warm and dry. Findings: No erythema or rash. Neurological:      Mental Status: She is alert and oriented to person, place, and time. Cranial Nerves: No cranial nerve deficit. Motor: No abnormal muscle tone. Coordination: Coordination normal.      Deep Tendon Reflexes: Reflexes are normal and symmetric. Psychiatric:         Behavior: Behavior normal.         Judgment: Judgment normal.                               ASSESSMENT/PLAN:    Furman Schlatter was seen today for 6 month follow-up.     Diagnoses and all orders for this visit:    Essential hypertension    Other hyperlipidemia    Anxiety    Vitamin D deficiency    Chronic pain syndrome  -     DULoxetine (CYMBALTA) 60 MG extended release capsule; TAKE ONE CAPSULE BY MOUTH ONCE A DAY      The current medical regimen is effective;  continue present plan and medications.         Maggi Griffin DO    6/20/2022  8:55 AM

## 2022-07-29 DIAGNOSIS — F41.9 ANXIETY: ICD-10-CM

## 2022-07-29 RX ORDER — LORAZEPAM 0.5 MG/1
TABLET ORAL
Qty: 60 TABLET | Refills: 1 | Status: SHIPPED
Start: 2022-07-29 | End: 2022-09-23

## 2022-09-22 DIAGNOSIS — F41.9 ANXIETY: ICD-10-CM

## 2022-09-23 RX ORDER — LORAZEPAM 0.5 MG/1
TABLET ORAL
Qty: 60 TABLET | Refills: 0 | Status: SHIPPED
Start: 2022-09-23 | End: 2022-10-24

## 2022-10-23 DIAGNOSIS — F41.9 ANXIETY: ICD-10-CM

## 2022-10-24 RX ORDER — LORAZEPAM 0.5 MG/1
TABLET ORAL
Qty: 60 TABLET | Refills: 0 | Status: ON HOLD
Start: 2022-10-24 | End: 2022-12-01

## 2022-11-22 ENCOUNTER — APPOINTMENT (OUTPATIENT)
Dept: CT IMAGING | Age: 67
DRG: 853 | End: 2022-11-22
Payer: MEDICARE

## 2022-11-22 ENCOUNTER — ANESTHESIA (OUTPATIENT)
Dept: OPERATING ROOM | Age: 67
DRG: 853 | End: 2022-11-22
Payer: MEDICARE

## 2022-11-22 ENCOUNTER — HOSPITAL ENCOUNTER (INPATIENT)
Age: 67
LOS: 9 days | Discharge: HOME | DRG: 853 | End: 2022-12-01
Attending: STUDENT IN AN ORGANIZED HEALTH CARE EDUCATION/TRAINING PROGRAM | Admitting: SURGERY
Payer: MEDICARE

## 2022-11-22 ENCOUNTER — ANESTHESIA EVENT (OUTPATIENT)
Dept: OPERATING ROOM | Age: 67
DRG: 853 | End: 2022-11-22
Payer: MEDICARE

## 2022-11-22 DIAGNOSIS — K65.9 PERITONITIS (HCC): ICD-10-CM

## 2022-11-22 DIAGNOSIS — F41.9 ANXIETY: ICD-10-CM

## 2022-11-22 DIAGNOSIS — K55.9 ISCHEMIC BOWEL DISEASE (HCC): ICD-10-CM

## 2022-11-22 DIAGNOSIS — K57.80 PERFORATED DIVERTICULUM: ICD-10-CM

## 2022-11-22 DIAGNOSIS — E87.20 LACTIC ACIDOSIS: Primary | ICD-10-CM

## 2022-11-22 PROBLEM — A41.9 SEPSIS WITHOUT ACUTE ORGAN DYSFUNCTION (HCC): Status: ACTIVE | Noted: 2022-11-22

## 2022-11-22 PROBLEM — K66.8 INTRA-ABDOMINAL FREE AIR OF UNKNOWN ETIOLOGY: Status: ACTIVE | Noted: 2022-11-22

## 2022-11-22 LAB
ABO/RH: NORMAL
ALBUMIN SERPL-MCNC: 4.6 G/DL (ref 3.5–5.2)
ALP BLD-CCNC: 81 U/L (ref 35–104)
ALT SERPL-CCNC: 19 U/L (ref 0–32)
ANION GAP SERPL CALCULATED.3IONS-SCNC: 22 MMOL/L (ref 7–16)
ANTIBODY SCREEN: NORMAL
APTT: 25.5 SEC (ref 24.5–35.1)
AST SERPL-CCNC: 22 U/L (ref 0–31)
BASOPHILS ABSOLUTE: 0.02 E9/L (ref 0–0.2)
BASOPHILS RELATIVE PERCENT: 0.1 % (ref 0–2)
BILIRUB SERPL-MCNC: 0.7 MG/DL (ref 0–1.2)
BILIRUBIN DIRECT: <0.2 MG/DL (ref 0–0.3)
BILIRUBIN, INDIRECT: NORMAL MG/DL (ref 0–1)
BUN BLDV-MCNC: 12 MG/DL (ref 6–23)
CALCIUM SERPL-MCNC: 10.4 MG/DL (ref 8.6–10.2)
CHLORIDE BLD-SCNC: 92 MMOL/L (ref 98–107)
CO2: 22 MMOL/L (ref 22–29)
CREAT SERPL-MCNC: 0.7 MG/DL (ref 0.5–1)
EOSINOPHILS ABSOLUTE: 0.01 E9/L (ref 0.05–0.5)
EOSINOPHILS RELATIVE PERCENT: 0.1 % (ref 0–6)
GFR SERPL CREATININE-BSD FRML MDRD: >60 ML/MIN/1.73
GLUCOSE BLD-MCNC: 122 MG/DL (ref 74–99)
HCT VFR BLD CALC: 43.8 % (ref 34–48)
HEMOGLOBIN: 14.3 G/DL (ref 11.5–15.5)
IMMATURE GRANULOCYTES #: 0.04 E9/L
IMMATURE GRANULOCYTES %: 0.3 % (ref 0–5)
INR BLD: 1.1
LACTIC ACID: 6.6 MMOL/L (ref 0.5–2.2)
LYMPHOCYTES ABSOLUTE: 1.24 E9/L (ref 1.5–4)
LYMPHOCYTES RELATIVE PERCENT: 9.3 % (ref 20–42)
MCH RBC QN AUTO: 24.9 PG (ref 26–35)
MCHC RBC AUTO-ENTMCNC: 32.6 % (ref 32–34.5)
MCV RBC AUTO: 76.3 FL (ref 80–99.9)
MONOCYTES ABSOLUTE: 0.52 E9/L (ref 0.1–0.95)
MONOCYTES RELATIVE PERCENT: 3.9 % (ref 2–12)
NEUTROPHILS ABSOLUTE: 11.51 E9/L (ref 1.8–7.3)
NEUTROPHILS RELATIVE PERCENT: 86.3 % (ref 43–80)
PDW BLD-RTO: 14.2 FL (ref 11.5–15)
PLATELET # BLD: 374 E9/L (ref 130–450)
PMV BLD AUTO: 10.6 FL (ref 7–12)
POTASSIUM SERPL-SCNC: 2.9 MMOL/L (ref 3.5–5)
PROTHROMBIN TIME: 12.2 SEC (ref 9.3–12.4)
RBC # BLD: 5.74 E12/L (ref 3.5–5.5)
SODIUM BLD-SCNC: 136 MMOL/L (ref 132–146)
TOTAL PROTEIN: 7.8 G/DL (ref 6.4–8.3)
WBC # BLD: 13.3 E9/L (ref 4.5–11.5)

## 2022-11-22 PROCEDURE — 3600000014 HC SURGERY LEVEL 4 ADDTL 15MIN: Performed by: SURGERY

## 2022-11-22 PROCEDURE — 6360000004 HC RX CONTRAST MEDICATION: Performed by: RADIOLOGY

## 2022-11-22 PROCEDURE — 2580000003 HC RX 258: Performed by: STUDENT IN AN ORGANIZED HEALTH CARE EDUCATION/TRAINING PROGRAM

## 2022-11-22 PROCEDURE — 96375 TX/PRO/DX INJ NEW DRUG ADDON: CPT

## 2022-11-22 PROCEDURE — 86900 BLOOD TYPING SEROLOGIC ABO: CPT

## 2022-11-22 PROCEDURE — 6360000002 HC RX W HCPCS: Performed by: STUDENT IN AN ORGANIZED HEALTH CARE EDUCATION/TRAINING PROGRAM

## 2022-11-22 PROCEDURE — 7100000000 HC PACU RECOVERY - FIRST 15 MIN: Performed by: SURGERY

## 2022-11-22 PROCEDURE — 85610 PROTHROMBIN TIME: CPT

## 2022-11-22 PROCEDURE — 0DTN0ZZ RESECTION OF SIGMOID COLON, OPEN APPROACH: ICD-10-PCS | Performed by: SURGERY

## 2022-11-22 PROCEDURE — 87205 SMEAR GRAM STAIN: CPT

## 2022-11-22 PROCEDURE — 80048 BASIC METABOLIC PNL TOTAL CA: CPT

## 2022-11-22 PROCEDURE — 88307 TISSUE EXAM BY PATHOLOGIST: CPT

## 2022-11-22 PROCEDURE — 6360000002 HC RX W HCPCS

## 2022-11-22 PROCEDURE — 87075 CULTR BACTERIA EXCEPT BLOOD: CPT

## 2022-11-22 PROCEDURE — 86850 RBC ANTIBODY SCREEN: CPT

## 2022-11-22 PROCEDURE — 2709999900 HC NON-CHARGEABLE SUPPLY: Performed by: SURGERY

## 2022-11-22 PROCEDURE — 86901 BLOOD TYPING SEROLOGIC RH(D): CPT

## 2022-11-22 PROCEDURE — 87206 SMEAR FLUORESCENT/ACID STAI: CPT

## 2022-11-22 PROCEDURE — 2500000003 HC RX 250 WO HCPCS

## 2022-11-22 PROCEDURE — 85025 COMPLETE CBC W/AUTO DIFF WBC: CPT

## 2022-11-22 PROCEDURE — 2580000003 HC RX 258

## 2022-11-22 PROCEDURE — 99223 1ST HOSP IP/OBS HIGH 75: CPT | Performed by: SURGERY

## 2022-11-22 PROCEDURE — 80076 HEPATIC FUNCTION PANEL: CPT

## 2022-11-22 PROCEDURE — 3600000004 HC SURGERY LEVEL 4 BASE: Performed by: SURGERY

## 2022-11-22 PROCEDURE — 83605 ASSAY OF LACTIC ACID: CPT

## 2022-11-22 PROCEDURE — 87070 CULTURE OTHR SPECIMN AEROBIC: CPT

## 2022-11-22 PROCEDURE — 1200000000 HC SEMI PRIVATE

## 2022-11-22 PROCEDURE — 74177 CT ABD & PELVIS W/CONTRAST: CPT

## 2022-11-22 PROCEDURE — 99285 EMERGENCY DEPT VISIT HI MDM: CPT

## 2022-11-22 PROCEDURE — 0DBN0ZZ EXCISION OF SIGMOID COLON, OPEN APPROACH: ICD-10-PCS | Performed by: SURGERY

## 2022-11-22 PROCEDURE — 44143 PARTIAL REMOVAL OF COLON: CPT | Performed by: SURGERY

## 2022-11-22 PROCEDURE — 87116 MYCOBACTERIA CULTURE: CPT

## 2022-11-22 PROCEDURE — 87102 FUNGUS ISOLATION CULTURE: CPT

## 2022-11-22 PROCEDURE — 85730 THROMBOPLASTIN TIME PARTIAL: CPT

## 2022-11-22 PROCEDURE — 3700000000 HC ANESTHESIA ATTENDED CARE: Performed by: SURGERY

## 2022-11-22 PROCEDURE — 96365 THER/PROPH/DIAG IV INF INIT: CPT

## 2022-11-22 PROCEDURE — 3700000001 HC ADD 15 MINUTES (ANESTHESIA): Performed by: SURGERY

## 2022-11-22 PROCEDURE — 2720000010 HC SURG SUPPLY STERILE: Performed by: SURGERY

## 2022-11-22 PROCEDURE — 7100000001 HC PACU RECOVERY - ADDTL 15 MIN: Performed by: SURGERY

## 2022-11-22 RX ORDER — NEOSTIGMINE METHYLSULFATE 1 MG/ML
INJECTION, SOLUTION INTRAVENOUS PRN
Status: DISCONTINUED | OUTPATIENT
Start: 2022-11-22 | End: 2022-11-22 | Stop reason: SDUPTHER

## 2022-11-22 RX ORDER — LABETALOL HYDROCHLORIDE 5 MG/ML
INJECTION, SOLUTION INTRAVENOUS PRN
Status: DISCONTINUED | OUTPATIENT
Start: 2022-11-22 | End: 2022-11-22 | Stop reason: SDUPTHER

## 2022-11-22 RX ORDER — SODIUM CHLORIDE 9 MG/ML
INJECTION, SOLUTION INTRAVENOUS CONTINUOUS PRN
Status: DISCONTINUED | OUTPATIENT
Start: 2022-11-22 | End: 2022-11-22 | Stop reason: SDUPTHER

## 2022-11-22 RX ORDER — SODIUM CHLORIDE 0.9 % (FLUSH) 0.9 %
5-40 SYRINGE (ML) INJECTION PRN
Status: DISCONTINUED | OUTPATIENT
Start: 2022-11-22 | End: 2022-11-23 | Stop reason: HOSPADM

## 2022-11-22 RX ORDER — MIDAZOLAM HYDROCHLORIDE 1 MG/ML
INJECTION INTRAMUSCULAR; INTRAVENOUS PRN
Status: DISCONTINUED | OUTPATIENT
Start: 2022-11-22 | End: 2022-11-22 | Stop reason: SDUPTHER

## 2022-11-22 RX ORDER — DIPHENHYDRAMINE HYDROCHLORIDE 50 MG/ML
12.5 INJECTION INTRAMUSCULAR; INTRAVENOUS
Status: DISCONTINUED | OUTPATIENT
Start: 2022-11-22 | End: 2022-11-23 | Stop reason: HOSPADM

## 2022-11-22 RX ORDER — ONDANSETRON 2 MG/ML
4 INJECTION INTRAMUSCULAR; INTRAVENOUS
Status: DISCONTINUED | OUTPATIENT
Start: 2022-11-22 | End: 2022-11-23 | Stop reason: HOSPADM

## 2022-11-22 RX ORDER — SODIUM CHLORIDE 0.9 % (FLUSH) 0.9 %
5-40 SYRINGE (ML) INJECTION EVERY 12 HOURS SCHEDULED
Status: DISCONTINUED | OUTPATIENT
Start: 2022-11-22 | End: 2022-11-23 | Stop reason: HOSPADM

## 2022-11-22 RX ORDER — SODIUM CHLORIDE, SODIUM LACTATE, POTASSIUM CHLORIDE, AND CALCIUM CHLORIDE .6; .31; .03; .02 G/100ML; G/100ML; G/100ML; G/100ML
1000 INJECTION, SOLUTION INTRAVENOUS ONCE
Status: COMPLETED | OUTPATIENT
Start: 2022-11-22 | End: 2022-11-23

## 2022-11-22 RX ORDER — SODIUM CHLORIDE, SODIUM LACTATE, POTASSIUM CHLORIDE, CALCIUM CHLORIDE 600; 310; 30; 20 MG/100ML; MG/100ML; MG/100ML; MG/100ML
INJECTION, SOLUTION INTRAVENOUS CONTINUOUS
Status: DISCONTINUED | OUTPATIENT
Start: 2022-11-22 | End: 2022-11-22

## 2022-11-22 RX ORDER — HYDRALAZINE HYDROCHLORIDE 20 MG/ML
5 INJECTION INTRAMUSCULAR; INTRAVENOUS
Status: DISCONTINUED | OUTPATIENT
Start: 2022-11-22 | End: 2022-11-23 | Stop reason: HOSPADM

## 2022-11-22 RX ORDER — MIDAZOLAM HYDROCHLORIDE 2 MG/2ML
2 INJECTION, SOLUTION INTRAMUSCULAR; INTRAVENOUS
Status: DISCONTINUED | OUTPATIENT
Start: 2022-11-22 | End: 2022-11-23 | Stop reason: HOSPADM

## 2022-11-22 RX ORDER — TRAMADOL HYDROCHLORIDE 50 MG/1
50 TABLET ORAL
Status: DISCONTINUED | OUTPATIENT
Start: 2022-11-22 | End: 2022-11-23 | Stop reason: HOSPADM

## 2022-11-22 RX ORDER — GLYCOPYRROLATE 0.2 MG/ML
INJECTION INTRAMUSCULAR; INTRAVENOUS PRN
Status: DISCONTINUED | OUTPATIENT
Start: 2022-11-22 | End: 2022-11-22 | Stop reason: SDUPTHER

## 2022-11-22 RX ORDER — POTASSIUM CHLORIDE 7.45 MG/ML
INJECTION INTRAVENOUS PRN
Status: DISCONTINUED | OUTPATIENT
Start: 2022-11-22 | End: 2022-11-22

## 2022-11-22 RX ORDER — LABETALOL HYDROCHLORIDE 5 MG/ML
5 INJECTION, SOLUTION INTRAVENOUS
Status: DISCONTINUED | OUTPATIENT
Start: 2022-11-22 | End: 2022-11-23 | Stop reason: HOSPADM

## 2022-11-22 RX ORDER — ETOMIDATE 2 MG/ML
INJECTION INTRAVENOUS PRN
Status: DISCONTINUED | OUTPATIENT
Start: 2022-11-22 | End: 2022-11-22 | Stop reason: SDUPTHER

## 2022-11-22 RX ORDER — ACETAMINOPHEN 325 MG/1
650 TABLET ORAL
Status: DISCONTINUED | OUTPATIENT
Start: 2022-11-22 | End: 2022-11-23 | Stop reason: HOSPADM

## 2022-11-22 RX ORDER — IPRATROPIUM BROMIDE AND ALBUTEROL SULFATE 2.5; .5 MG/3ML; MG/3ML
1 SOLUTION RESPIRATORY (INHALATION)
Status: DISCONTINUED | OUTPATIENT
Start: 2022-11-22 | End: 2022-11-23 | Stop reason: HOSPADM

## 2022-11-22 RX ORDER — LIDOCAINE HYDROCHLORIDE 20 MG/ML
INJECTION, SOLUTION INTRAVENOUS PRN
Status: DISCONTINUED | OUTPATIENT
Start: 2022-11-22 | End: 2022-11-22 | Stop reason: SDUPTHER

## 2022-11-22 RX ORDER — ONDANSETRON 2 MG/ML
INJECTION INTRAMUSCULAR; INTRAVENOUS PRN
Status: DISCONTINUED | OUTPATIENT
Start: 2022-11-22 | End: 2022-11-22 | Stop reason: SDUPTHER

## 2022-11-22 RX ORDER — SODIUM CHLORIDE, SODIUM LACTATE, POTASSIUM CHLORIDE, CALCIUM CHLORIDE 600; 310; 30; 20 MG/100ML; MG/100ML; MG/100ML; MG/100ML
INJECTION, SOLUTION INTRAVENOUS CONTINUOUS PRN
Status: DISCONTINUED | OUTPATIENT
Start: 2022-11-22 | End: 2022-11-22

## 2022-11-22 RX ORDER — DEXAMETHASONE SODIUM PHOSPHATE 10 MG/ML
INJECTION INTRAMUSCULAR; INTRAVENOUS PRN
Status: DISCONTINUED | OUTPATIENT
Start: 2022-11-22 | End: 2022-11-22 | Stop reason: SDUPTHER

## 2022-11-22 RX ORDER — SODIUM CHLORIDE 9 MG/ML
25 INJECTION, SOLUTION INTRAVENOUS PRN
Status: DISCONTINUED | OUTPATIENT
Start: 2022-11-22 | End: 2022-11-23 | Stop reason: HOSPADM

## 2022-11-22 RX ORDER — SODIUM CHLORIDE, SODIUM LACTATE, POTASSIUM CHLORIDE, CALCIUM CHLORIDE 600; 310; 30; 20 MG/100ML; MG/100ML; MG/100ML; MG/100ML
INJECTION, SOLUTION INTRAVENOUS CONTINUOUS
Status: DISCONTINUED | OUTPATIENT
Start: 2022-11-22 | End: 2022-12-01 | Stop reason: HOSPADM

## 2022-11-22 RX ORDER — POTASSIUM CHLORIDE 7.45 MG/ML
10 INJECTION INTRAVENOUS
Status: COMPLETED | OUTPATIENT
Start: 2022-11-22 | End: 2022-11-23

## 2022-11-22 RX ORDER — NALOXONE HYDROCHLORIDE 0.4 MG/ML
INJECTION, SOLUTION INTRAMUSCULAR; INTRAVENOUS; SUBCUTANEOUS PRN
Status: DISCONTINUED | OUTPATIENT
Start: 2022-11-22 | End: 2022-11-22 | Stop reason: SDUPTHER

## 2022-11-22 RX ORDER — FENTANYL CITRATE 50 UG/ML
INJECTION, SOLUTION INTRAMUSCULAR; INTRAVENOUS PRN
Status: DISCONTINUED | OUTPATIENT
Start: 2022-11-22 | End: 2022-11-22 | Stop reason: SDUPTHER

## 2022-11-22 RX ORDER — ROCURONIUM BROMIDE 10 MG/ML
INJECTION, SOLUTION INTRAVENOUS PRN
Status: DISCONTINUED | OUTPATIENT
Start: 2022-11-22 | End: 2022-11-22 | Stop reason: SDUPTHER

## 2022-11-22 RX ORDER — SUCCINYLCHOLINE/SOD CL,ISO/PF 200MG/10ML
SYRINGE (ML) INTRAVENOUS PRN
Status: DISCONTINUED | OUTPATIENT
Start: 2022-11-22 | End: 2022-11-22 | Stop reason: SDUPTHER

## 2022-11-22 RX ORDER — DROPERIDOL 2.5 MG/ML
0.62 INJECTION, SOLUTION INTRAMUSCULAR; INTRAVENOUS
Status: DISCONTINUED | OUTPATIENT
Start: 2022-11-22 | End: 2022-11-23 | Stop reason: HOSPADM

## 2022-11-22 RX ADMIN — ROCURONIUM BROMIDE 10 MG: 10 INJECTION INTRAVENOUS at 22:30

## 2022-11-22 RX ADMIN — LABETALOL HYDROCHLORIDE 10 MG: 5 INJECTION INTRAVENOUS at 22:02

## 2022-11-22 RX ADMIN — IOPAMIDOL 75 ML: 755 INJECTION, SOLUTION INTRAVENOUS at 19:16

## 2022-11-22 RX ADMIN — SODIUM CHLORIDE, POTASSIUM CHLORIDE, SODIUM LACTATE AND CALCIUM CHLORIDE 1000 ML: 600; 310; 30; 20 INJECTION, SOLUTION INTRAVENOUS at 20:56

## 2022-11-22 RX ADMIN — DEXAMETHASONE SODIUM PHOSPHATE 10 MG: 10 INJECTION INTRAMUSCULAR; INTRAVENOUS at 21:47

## 2022-11-22 RX ADMIN — Medication 160 MG: at 21:38

## 2022-11-22 RX ADMIN — ROCURONIUM BROMIDE 30 MG: 10 INJECTION INTRAVENOUS at 21:43

## 2022-11-22 RX ADMIN — SODIUM CHLORIDE, POTASSIUM CHLORIDE, SODIUM LACTATE AND CALCIUM CHLORIDE: 600; 310; 30; 20 INJECTION, SOLUTION INTRAVENOUS at 23:27

## 2022-11-22 RX ADMIN — Medication 10 ML: at 21:43

## 2022-11-22 RX ADMIN — HYDROMORPHONE HYDROCHLORIDE 1 MG: 1 INJECTION, SOLUTION INTRAMUSCULAR; INTRAVENOUS; SUBCUTANEOUS at 21:05

## 2022-11-22 RX ADMIN — ONDANSETRON 4 MG: 2 INJECTION INTRAMUSCULAR; INTRAVENOUS at 22:09

## 2022-11-22 RX ADMIN — LABETALOL HYDROCHLORIDE 5 MG: 5 INJECTION INTRAVENOUS at 21:59

## 2022-11-22 RX ADMIN — PIPERACILLIN SODIUM AND TAZOBACTAM SODIUM 4500 MG: 4; .5 INJECTION, POWDER, LYOPHILIZED, FOR SOLUTION INTRAVENOUS at 20:36

## 2022-11-22 RX ADMIN — FENTANYL CITRATE 50 MCG: 50 INJECTION, SOLUTION INTRAMUSCULAR; INTRAVENOUS at 21:56

## 2022-11-22 RX ADMIN — SODIUM CHLORIDE: 9 INJECTION, SOLUTION INTRAVENOUS at 21:46

## 2022-11-22 RX ADMIN — FENTANYL CITRATE 100 MCG: 50 INJECTION, SOLUTION INTRAMUSCULAR; INTRAVENOUS at 22:02

## 2022-11-22 RX ADMIN — NALXONE HYDROCHLORIDE 0.1 MG: 0.4 INJECTION INTRAMUSCULAR; INTRAVENOUS; SUBCUTANEOUS at 23:02

## 2022-11-22 RX ADMIN — Medication 10 MEQ: at 23:33

## 2022-11-22 RX ADMIN — Medication 3 MG: at 22:42

## 2022-11-22 RX ADMIN — HYDROMORPHONE HYDROCHLORIDE 0.5 MG: 1 INJECTION, SOLUTION INTRAMUSCULAR; INTRAVENOUS; SUBCUTANEOUS at 19:28

## 2022-11-22 RX ADMIN — FENTANYL CITRATE 150 MCG: 50 INJECTION, SOLUTION INTRAMUSCULAR; INTRAVENOUS at 21:38

## 2022-11-22 RX ADMIN — MIDAZOLAM 2 MG: 1 INJECTION INTRAMUSCULAR; INTRAVENOUS at 21:34

## 2022-11-22 RX ADMIN — Medication 1 MG: at 21:05

## 2022-11-22 RX ADMIN — GLYCOPYRROLATE 0.6 MG: 0.2 INJECTION, SOLUTION INTRAMUSCULAR; INTRAVENOUS at 22:42

## 2022-11-22 RX ADMIN — FENTANYL CITRATE 50 MCG: 50 INJECTION, SOLUTION INTRAMUSCULAR; INTRAVENOUS at 21:55

## 2022-11-22 RX ADMIN — ETOMIDATE 180 MG: 2 INJECTION, SOLUTION INTRAVENOUS at 21:38

## 2022-11-22 RX ADMIN — SODIUM CHLORIDE: 9 INJECTION, SOLUTION INTRAVENOUS at 21:33

## 2022-11-22 RX ADMIN — LIDOCAINE HYDROCHLORIDE 100 MG: 20 INJECTION, SOLUTION INTRAVENOUS at 21:38

## 2022-11-22 RX ADMIN — Medication 10 MEQ: at 20:48

## 2022-11-22 ASSESSMENT — PAIN SCALES - GENERAL
PAINLEVEL_OUTOF10: 9
PAINLEVEL_OUTOF10: 9

## 2022-11-22 ASSESSMENT — ENCOUNTER SYMPTOMS
EYE REDNESS: 0
SINUS PRESSURE: 0
CONSTIPATION: 1
BACK PAIN: 0
COUGH: 0
ABDOMINAL PAIN: 1
VOMITING: 1
WHEEZING: 0
EYE DISCHARGE: 0
DIARRHEA: 0
NAUSEA: 1
SORE THROAT: 0
SHORTNESS OF BREATH: 0
EYE PAIN: 0

## 2022-11-22 ASSESSMENT — PAIN DESCRIPTION - LOCATION: LOCATION: ABDOMEN

## 2022-11-22 ASSESSMENT — PAIN DESCRIPTION - DESCRIPTORS: DESCRIPTORS: DISCOMFORT

## 2022-11-22 ASSESSMENT — PAIN - FUNCTIONAL ASSESSMENT: PAIN_FUNCTIONAL_ASSESSMENT: 0-10

## 2022-11-22 ASSESSMENT — PAIN DESCRIPTION - ORIENTATION: ORIENTATION: LOWER

## 2022-11-22 ASSESSMENT — LIFESTYLE VARIABLES: SMOKING_STATUS: 0

## 2022-11-22 NOTE — ED PROVIDER NOTES
69-year-old female presenting emerged hospitals emergency department for sudden onset lower abdominal pain, severe in severity, worse with palpation, associated with abdominal distention, nothing makes it better. She is a history of prior hysterectomy, prior appendectomy, still has a gallbladder. States she has had a bowel movement, but was unable to complete bowel movement as she says it was extremely painful. No nausea or vomiting. Review of Systems   Constitutional:  Negative for chills and fever. HENT:  Negative for ear pain, sinus pressure and sore throat. Eyes:  Negative for pain, discharge and redness. Respiratory:  Negative for cough, shortness of breath and wheezing. Cardiovascular:  Negative for chest pain. Gastrointestinal:  Positive for abdominal pain, constipation, nausea and vomiting. Negative for diarrhea. Genitourinary:  Negative for dysuria and frequency. Musculoskeletal:  Negative for arthralgias and back pain. Skin:  Negative for rash and wound. Neurological:  Negative for weakness and headaches. Hematological:  Negative for adenopathy. All other systems reviewed and are negative. Physical Exam  Vitals and nursing note reviewed. Constitutional:       General: She is in acute distress. Appearance: Normal appearance. She is ill-appearing. HENT:      Head: Normocephalic and atraumatic. Right Ear: External ear normal.      Left Ear: External ear normal.      Nose: Nose normal.      Mouth/Throat:      Mouth: Mucous membranes are moist.   Eyes:      Extraocular Movements: Extraocular movements intact. Pupils: Pupils are equal, round, and reactive to light. Cardiovascular:      Rate and Rhythm: Normal rate and regular rhythm. Pulses: Normal pulses. Heart sounds: Normal heart sounds. Pulmonary:      Effort: Pulmonary effort is normal.      Breath sounds: Normal breath sounds. Abdominal:      General: Abdomen is flat.  Bowel sounds are normal.      Palpations: Abdomen is soft. Tenderness: There is abdominal tenderness. There is guarding. Musculoskeletal:         General: Normal range of motion. Cervical back: Normal range of motion and neck supple. Skin:     General: Skin is warm and dry. Neurological:      Mental Status: She is alert. Procedures     MDM     Amount and/or Complexity of Data Reviewed  Clinical lab tests: reviewed  Tests in the radiology section of CPT®: reviewed         ED Course as of 11/22/22 2258 Tue Nov 22, 2022 1932 Free air on CT, will consult surgery, no prior general surgeon. Dr. Satya Lizama consulted [JG]   2015 Surgery resident currently evaluating patient. [JG]   [age]-year-old female presenting emerged part for abdominal pain, nausea, vomiting on initial exam patient was peritoneal with what appeared to be surgical abdomen, with patient's labs, taken to CAT scan, found to have free air, likely from a perforated sigmoid diverticulum. Admitted by general surgery. She was given antibiotics and pain medication in the emergency department. [JG]      ED Course User Index  [JG] Annamarie Alonzo MD      19-year-old female presenting emerged part for abdominal pain, nausea, vomiting on initial exam patient was peritoneal with what appeared to be surgical abdomen, with patient's labs, taken to CAT scan, found to have free air, likely from a perforated sigmoid diverticulum. Admitted by general surgery. She was given antibiotics and pain medication in the emergency department. ED Course as of 11/22/22 2258 Tue Nov 22, 2022 1932 Free air on CT, will consult surgery, no prior general surgeon. Dr. Satya Lizama consulted [JG]   2015 Surgery resident currently evaluating patient.  [JG]   [age]-year-old female presenting emerged part for abdominal pain, nausea, vomiting on initial exam patient was peritoneal with what appeared to be surgical abdomen, with patient's labs, taken to CAT scan, found to have free air, likely from a perforated sigmoid diverticulum. Admitted by general surgery. She was given antibiotics and pain medication in the emergency department. [JG]      ED Course User Index  [JG] Dayanara Schumacher MD       --------------------------------------------- PAST HISTORY ---------------------------------------------  Past Medical History:  has a past medical history of Hyperlipidemia, Hypertension, and Vitamin D deficiency. Past Surgical History:  has a past surgical history that includes Breast surgery (Bilateral); Ovary surgery; Appendectomy; Hysterectomy; and Tonsillectomy. Social History:  reports that she has never smoked. She has never used smokeless tobacco. She reports that she does not drink alcohol and does not use drugs. Family History: family history is not on file. The patients home medications have been reviewed. Allergies: Patient has no known allergies.     -------------------------------------------------- RESULTS -------------------------------------------------    Lab  Results for orders placed or performed during the hospital encounter of 11/22/22   CBC with Auto Differential   Result Value Ref Range    WBC 13.3 (H) 4.5 - 11.5 E9/L    RBC 5.74 (H) 3.50 - 5.50 E12/L    Hemoglobin 14.3 11.5 - 15.5 g/dL    Hematocrit 43.8 34.0 - 48.0 %    MCV 76.3 (L) 80.0 - 99.9 fL    MCH 24.9 (L) 26.0 - 35.0 pg    MCHC 32.6 32.0 - 34.5 %    RDW 14.2 11.5 - 15.0 fL    Platelets 183 893 - 915 E9/L    MPV 10.6 7.0 - 12.0 fL    Neutrophils % 86.3 (H) 43.0 - 80.0 %    Immature Granulocytes % 0.3 0.0 - 5.0 %    Lymphocytes % 9.3 (L) 20.0 - 42.0 %    Monocytes % 3.9 2.0 - 12.0 %    Eosinophils % 0.1 0.0 - 6.0 %    Basophils % 0.1 0.0 - 2.0 %    Neutrophils Absolute 11.51 (H) 1.80 - 7.30 E9/L    Immature Granulocytes # 0.04 E9/L    Lymphocytes Absolute 1.24 (L) 1.50 - 4.00 E9/L    Monocytes Absolute 0.52 0.10 - 0.95 E9/L    Eosinophils Absolute 0.01 (L) 0.05 - 0.50 E9/L    Basophils Absolute 0.02 0.00 - 0.20 H5/P   Basic Metabolic Panel   Result Value Ref Range    Sodium 136 132 - 146 mmol/L    Potassium 2.9 (L) 3.5 - 5.0 mmol/L    Chloride 92 (L) 98 - 107 mmol/L    CO2 22 22 - 29 mmol/L    Anion Gap 22 (H) 7 - 16 mmol/L    Glucose 122 (H) 74 - 99 mg/dL    BUN 12 6 - 23 mg/dL    Creatinine 0.7 0.5 - 1.0 mg/dL    Est, Glom Filt Rate >60 >=60 mL/min/1.73    Calcium 10.4 (H) 8.6 - 10.2 mg/dL   Hepatic Function Panel   Result Value Ref Range    Total Protein 7.8 6.4 - 8.3 g/dL    Albumin 4.6 3.5 - 5.2 g/dL    Alkaline Phosphatase 81 35 - 104 U/L    ALT 19 0 - 32 U/L    AST 22 0 - 31 U/L    Total Bilirubin 0.7 0.0 - 1.2 mg/dL    Bilirubin, Direct <0.2 0.0 - 0.3 mg/dL    Bilirubin, Indirect see below 0.0 - 1.0 mg/dL   Lactic Acid   Result Value Ref Range    Lactic Acid 6.6 (HH) 0.5 - 2.2 mmol/L   Protime-INR   Result Value Ref Range    Protime 12.2 9.3 - 12.4 sec    INR 1.1    APTT   Result Value Ref Range    aPTT 25.5 24.5 - 35.1 sec   TYPE AND SCREEN   Result Value Ref Range    ABO/Rh O POS     Antibody Screen NEG        Radiology  CT ABDOMEN PELVIS W IV CONTRAST Additional Contrast? None   Final Result   1. Pneumoperitoneum and ascites, most likely due to perforated sigmoid   diverticulitis. Critical results were called by Dr. Gwenith Peabody, MD to Dr. Lisa Philip MD on 11/22/2022 at 20:17.   2. Incidental findings given above with note of mesenteric adenopathy which   is more conspicuous and abnormality such as lymphoma is a consideration.               ------------------------- NURSING NOTES AND VITALS REVIEWED ---------------------------  Date / Time Roomed:  11/22/2022  6:57 PM  ED Bed Assignment:  OR POOL /NONE    The nursing notes within the ED encounter and vital signs as below have been reviewed.    Patient Vitals for the past 24 hrs:   BP Temp Pulse Resp SpO2   11/22/22 1859 (!) 150/109 97.9 °F (36.6 °C) 84 18 93 %       Oxygen Saturation Interpretation: Normal      ------------------------------------------ PROGRESS NOTES ------------------------------------------      I have spoken with the patient and discussed todays results, in addition to providing specific details for the plan of care and counseling regarding the diagnosis and prognosis. Their questions are answered at this time and they are agreeable with the plan.      --------------------------------- ADDITIONAL PROVIDER NOTES ---------------------------------  Consultations:  Spoke with Dr. Tanvi Swenson,  They will admit this patient. This patient's ED course included: a personal history and physicial examination, re-evaluation prior to disposition, multiple bedside re-evaluations, IV medications, cardiac monitoring, continuous pulse oximetry, and complex medical decision making and emergency management    This patient has remained hemodynamically stable and been closely monitored during their ED course. Please note that the withdrawal or failure to initiate urgent interventions for this patient would likely result in a life threatening deterioration or permanent disability. Accordingly this patient received 31 minutes of critical care time, excluding separately billable procedures. Clinical Impression  1. Lactic acidosis    2. Ischemic bowel disease ([address]    3. Perforated diverticulum          Disposition  Patient's disposition: Admit to operating room  Patient's condition is stable.          Rudi Duque MD  Resident  11/22/22 9358

## 2022-11-23 LAB
ANION GAP SERPL CALCULATED.3IONS-SCNC: 18 MMOL/L (ref 7–16)
BASOPHILS ABSOLUTE: 0 E9/L (ref 0–0.2)
BASOPHILS RELATIVE PERCENT: 0.2 % (ref 0–2)
BUN BLDV-MCNC: 12 MG/DL (ref 6–23)
CALCIUM SERPL-MCNC: 8.7 MG/DL (ref 8.6–10.2)
CHLORIDE BLD-SCNC: 98 MMOL/L (ref 98–107)
CO2: 20 MMOL/L (ref 22–29)
CREAT SERPL-MCNC: 0.7 MG/DL (ref 0.5–1)
EOSINOPHILS ABSOLUTE: 0 E9/L (ref 0.05–0.5)
EOSINOPHILS RELATIVE PERCENT: 0 % (ref 0–6)
GFR SERPL CREATININE-BSD FRML MDRD: >60 ML/MIN/1.73
GLUCOSE BLD-MCNC: 173 MG/DL (ref 74–99)
GRAM STAIN ORDERABLE: NORMAL
HCT VFR BLD CALC: 36.2 % (ref 34–48)
HEMOGLOBIN: 11.8 G/DL (ref 11.5–15.5)
LACTIC ACID: 3.4 MMOL/L (ref 0.5–2.2)
LYMPHOCYTES ABSOLUTE: 0.44 E9/L (ref 1.5–4)
LYMPHOCYTES RELATIVE PERCENT: 3.5 % (ref 20–42)
MCH RBC QN AUTO: 25.1 PG (ref 26–35)
MCHC RBC AUTO-ENTMCNC: 32.6 % (ref 32–34.5)
MCV RBC AUTO: 77 FL (ref 80–99.9)
MONOCYTES ABSOLUTE: 0.44 E9/L (ref 0.1–0.95)
MONOCYTES RELATIVE PERCENT: 3.5 % (ref 2–12)
NEUTROPHILS ABSOLUTE: 10.23 E9/L (ref 1.8–7.3)
NEUTROPHILS RELATIVE PERCENT: 93 % (ref 43–80)
OVALOCYTES: ABNORMAL
PDW BLD-RTO: 14.1 FL (ref 11.5–15)
PLATELET # BLD: 291 E9/L (ref 130–450)
PMV BLD AUTO: 10.8 FL (ref 7–12)
POIKILOCYTES: ABNORMAL
POTASSIUM REFLEX MAGNESIUM: 3.9 MMOL/L (ref 3.5–5)
RBC # BLD: 4.7 E12/L (ref 3.5–5.5)
SODIUM BLD-SCNC: 136 MMOL/L (ref 132–146)
WBC # BLD: 11 E9/L (ref 4.5–11.5)

## 2022-11-23 PROCEDURE — 97165 OT EVAL LOW COMPLEX 30 MIN: CPT

## 2022-11-23 PROCEDURE — 80048 BASIC METABOLIC PNL TOTAL CA: CPT

## 2022-11-23 PROCEDURE — 6370000000 HC RX 637 (ALT 250 FOR IP)

## 2022-11-23 PROCEDURE — 6360000002 HC RX W HCPCS: Performed by: STUDENT IN AN ORGANIZED HEALTH CARE EDUCATION/TRAINING PROGRAM

## 2022-11-23 PROCEDURE — 83605 ASSAY OF LACTIC ACID: CPT

## 2022-11-23 PROCEDURE — 2580000003 HC RX 258: Performed by: STUDENT IN AN ORGANIZED HEALTH CARE EDUCATION/TRAINING PROGRAM

## 2022-11-23 PROCEDURE — 36415 COLL VENOUS BLD VENIPUNCTURE: CPT

## 2022-11-23 PROCEDURE — 97535 SELF CARE MNGMENT TRAINING: CPT

## 2022-11-23 PROCEDURE — 6370000000 HC RX 637 (ALT 250 FOR IP): Performed by: STUDENT IN AN ORGANIZED HEALTH CARE EDUCATION/TRAINING PROGRAM

## 2022-11-23 PROCEDURE — 1200000000 HC SEMI PRIVATE

## 2022-11-23 PROCEDURE — 85025 COMPLETE CBC W/AUTO DIFF WBC: CPT

## 2022-11-23 PROCEDURE — 99024 POSTOP FOLLOW-UP VISIT: CPT | Performed by: SURGERY

## 2022-11-23 PROCEDURE — 97530 THERAPEUTIC ACTIVITIES: CPT

## 2022-11-23 PROCEDURE — 2700000000 HC OXYGEN THERAPY PER DAY

## 2022-11-23 RX ORDER — ONDANSETRON 2 MG/ML
4 INJECTION INTRAMUSCULAR; INTRAVENOUS EVERY 6 HOURS PRN
Status: DISCONTINUED | OUTPATIENT
Start: 2022-11-23 | End: 2022-12-01 | Stop reason: HOSPADM

## 2022-11-23 RX ORDER — OXYCODONE HYDROCHLORIDE 10 MG/1
10 TABLET ORAL EVERY 4 HOURS PRN
Status: DISCONTINUED | OUTPATIENT
Start: 2022-11-23 | End: 2022-11-28 | Stop reason: SDUPTHER

## 2022-11-23 RX ORDER — CHLORTHALIDONE 25 MG/1
25 TABLET ORAL DAILY
Status: DISCONTINUED | OUTPATIENT
Start: 2022-11-23 | End: 2022-12-01 | Stop reason: HOSPADM

## 2022-11-23 RX ORDER — ATENOLOL AND CHLORTHALIDONE TABLET 50; 25 MG/1; MG/1
1 TABLET ORAL DAILY
Status: DISCONTINUED | OUTPATIENT
Start: 2022-11-23 | End: 2022-11-23 | Stop reason: CLARIF

## 2022-11-23 RX ORDER — SODIUM CHLORIDE 0.9 % (FLUSH) 0.9 %
10 SYRINGE (ML) INJECTION PRN
Status: DISCONTINUED | OUTPATIENT
Start: 2022-11-23 | End: 2022-12-01 | Stop reason: HOSPADM

## 2022-11-23 RX ORDER — ONDANSETRON 4 MG/1
4 TABLET, ORALLY DISINTEGRATING ORAL EVERY 8 HOURS PRN
Status: DISCONTINUED | OUTPATIENT
Start: 2022-11-23 | End: 2022-12-01 | Stop reason: HOSPADM

## 2022-11-23 RX ORDER — ATENOLOL 50 MG/1
50 TABLET ORAL DAILY
Status: DISCONTINUED | OUTPATIENT
Start: 2022-11-23 | End: 2022-12-01 | Stop reason: HOSPADM

## 2022-11-23 RX ORDER — DULOXETIN HYDROCHLORIDE 60 MG/1
60 CAPSULE, DELAYED RELEASE ORAL DAILY
Status: DISCONTINUED | OUTPATIENT
Start: 2022-11-23 | End: 2022-12-01 | Stop reason: HOSPADM

## 2022-11-23 RX ORDER — LORAZEPAM 0.5 MG/1
0.5 TABLET ORAL DAILY PRN
Status: DISCONTINUED | OUTPATIENT
Start: 2022-11-23 | End: 2022-12-01 | Stop reason: HOSPADM

## 2022-11-23 RX ORDER — ENOXAPARIN SODIUM 100 MG/ML
40 INJECTION SUBCUTANEOUS DAILY
Status: DISCONTINUED | OUTPATIENT
Start: 2022-11-23 | End: 2022-12-01 | Stop reason: HOSPADM

## 2022-11-23 RX ORDER — ACETAMINOPHEN 325 MG/1
650 TABLET ORAL EVERY 6 HOURS
Status: DISCONTINUED | OUTPATIENT
Start: 2022-11-23 | End: 2022-11-28 | Stop reason: SDUPTHER

## 2022-11-23 RX ORDER — ROSUVASTATIN CALCIUM 20 MG/1
20 TABLET, COATED ORAL DAILY
Status: DISCONTINUED | OUTPATIENT
Start: 2022-11-23 | End: 2022-12-01 | Stop reason: HOSPADM

## 2022-11-23 RX ORDER — SODIUM CHLORIDE 9 MG/ML
INJECTION, SOLUTION INTRAVENOUS PRN
Status: DISCONTINUED | OUTPATIENT
Start: 2022-11-23 | End: 2022-12-01 | Stop reason: HOSPADM

## 2022-11-23 RX ORDER — SODIUM CHLORIDE 0.9 % (FLUSH) 0.9 %
10 SYRINGE (ML) INJECTION EVERY 12 HOURS SCHEDULED
Status: DISCONTINUED | OUTPATIENT
Start: 2022-11-23 | End: 2022-12-01 | Stop reason: HOSPADM

## 2022-11-23 RX ORDER — OXYCODONE HYDROCHLORIDE 5 MG/1
5 TABLET ORAL EVERY 4 HOURS PRN
Status: DISCONTINUED | OUTPATIENT
Start: 2022-11-23 | End: 2022-11-28 | Stop reason: SDUPTHER

## 2022-11-23 RX ORDER — PANTOPRAZOLE SODIUM 40 MG/1
40 TABLET, DELAYED RELEASE ORAL
Status: DISCONTINUED | OUTPATIENT
Start: 2022-11-23 | End: 2022-11-24

## 2022-11-23 RX ADMIN — CHLORTHALIDONE 25 MG: 25 TABLET ORAL at 09:25

## 2022-11-23 RX ADMIN — ACETAMINOPHEN 650 MG: 325 TABLET, FILM COATED ORAL at 05:16

## 2022-11-23 RX ADMIN — Medication 10 MEQ: at 02:06

## 2022-11-23 RX ADMIN — HYDROMORPHONE HYDROCHLORIDE 0.5 MG: 1 INJECTION, SOLUTION INTRAMUSCULAR; INTRAVENOUS; SUBCUTANEOUS at 15:47

## 2022-11-23 RX ADMIN — OXYCODONE HYDROCHLORIDE 10 MG: 10 TABLET ORAL at 09:40

## 2022-11-23 RX ADMIN — ACETAMINOPHEN 650 MG: 325 TABLET, FILM COATED ORAL at 23:26

## 2022-11-23 RX ADMIN — DULOXETINE HYDROCHLORIDE 60 MG: 60 CAPSULE, DELAYED RELEASE ORAL at 09:25

## 2022-11-23 RX ADMIN — Medication 10 MEQ: at 01:02

## 2022-11-23 RX ADMIN — ROSUVASTATIN 20 MG: 20 TABLET, FILM COATED ORAL at 09:26

## 2022-11-23 RX ADMIN — ENOXAPARIN SODIUM 40 MG: 100 INJECTION SUBCUTANEOUS at 09:26

## 2022-11-23 RX ADMIN — OXYCODONE HYDROCHLORIDE 10 MG: 10 TABLET ORAL at 05:16

## 2022-11-23 RX ADMIN — HYDROMORPHONE HYDROCHLORIDE 0.5 MG: 1 INJECTION, SOLUTION INTRAMUSCULAR; INTRAVENOUS; SUBCUTANEOUS at 09:25

## 2022-11-23 RX ADMIN — PIPERACILLIN AND TAZOBACTAM 3375 MG: 3; .375 INJECTION, POWDER, FOR SOLUTION INTRAVENOUS at 20:33

## 2022-11-23 RX ADMIN — ACETAMINOPHEN 650 MG: 325 TABLET, FILM COATED ORAL at 18:39

## 2022-11-23 RX ADMIN — PIPERACILLIN AND TAZOBACTAM 3375 MG: 3; .375 INJECTION, POWDER, FOR SOLUTION INTRAVENOUS at 13:20

## 2022-11-23 RX ADMIN — HYDROMORPHONE HYDROCHLORIDE 0.5 MG: 1 INJECTION, SOLUTION INTRAMUSCULAR; INTRAVENOUS; SUBCUTANEOUS at 02:02

## 2022-11-23 RX ADMIN — OXYCODONE HYDROCHLORIDE 10 MG: 10 TABLET ORAL at 20:27

## 2022-11-23 RX ADMIN — Medication 10 ML: at 15:48

## 2022-11-23 RX ADMIN — PANTOPRAZOLE SODIUM 40 MG: 40 TABLET, DELAYED RELEASE ORAL at 05:16

## 2022-11-23 RX ADMIN — OXYCODONE HYDROCHLORIDE 10 MG: 10 TABLET ORAL at 01:00

## 2022-11-23 RX ADMIN — ACETAMINOPHEN 650 MG: 325 TABLET, FILM COATED ORAL at 13:20

## 2022-11-23 RX ADMIN — ACETAMINOPHEN 650 MG: 325 TABLET, FILM COATED ORAL at 01:00

## 2022-11-23 RX ADMIN — SODIUM CHLORIDE, POTASSIUM CHLORIDE, SODIUM LACTATE AND CALCIUM CHLORIDE: 600; 310; 30; 20 INJECTION, SOLUTION INTRAVENOUS at 15:42

## 2022-11-23 RX ADMIN — PIPERACILLIN AND TAZOBACTAM 3375 MG: 3; .375 INJECTION, POWDER, FOR SOLUTION INTRAVENOUS at 05:20

## 2022-11-23 RX ADMIN — BENZOCAINE AND MENTHOL 1 LOZENGE: 15; 3.6 LOZENGE ORAL at 15:42

## 2022-11-23 RX ADMIN — ATENOLOL 50 MG: 50 TABLET ORAL at 09:26

## 2022-11-23 ASSESSMENT — PAIN DESCRIPTION - DESCRIPTORS
DESCRIPTORS: ACHING;DISCOMFORT;THROBBING
DESCRIPTORS: THROBBING;STABBING;SORE
DESCRIPTORS: STABBING;SHARP;TIGHTNESS
DESCRIPTORS: ACHING;DISCOMFORT;SORE
DESCRIPTORS: ACHING;DISCOMFORT;SORE

## 2022-11-23 ASSESSMENT — PAIN DESCRIPTION - ONSET
ONSET: ON-GOING

## 2022-11-23 ASSESSMENT — PAIN SCALES - GENERAL
PAINLEVEL_OUTOF10: 10
PAINLEVEL_OUTOF10: 7
PAINLEVEL_OUTOF10: 8
PAINLEVEL_OUTOF10: 8
PAINLEVEL_OUTOF10: 0
PAINLEVEL_OUTOF10: 8
PAINLEVEL_OUTOF10: 7
PAINLEVEL_OUTOF10: 6
PAINLEVEL_OUTOF10: 4
PAINLEVEL_OUTOF10: 10

## 2022-11-23 ASSESSMENT — PAIN DESCRIPTION - FREQUENCY
FREQUENCY: CONTINUOUS

## 2022-11-23 ASSESSMENT — PAIN DESCRIPTION - ORIENTATION
ORIENTATION: LOWER
ORIENTATION: LOWER;MID
ORIENTATION: LOWER;MID
ORIENTATION: RIGHT;LEFT

## 2022-11-23 ASSESSMENT — PAIN - FUNCTIONAL ASSESSMENT
PAIN_FUNCTIONAL_ASSESSMENT: PREVENTS OR INTERFERES SOME ACTIVE ACTIVITIES AND ADLS

## 2022-11-23 ASSESSMENT — PAIN DESCRIPTION - LOCATION
LOCATION: ABDOMEN

## 2022-11-23 ASSESSMENT — PAIN DESCRIPTION - PAIN TYPE
TYPE: SURGICAL PAIN

## 2022-11-23 NOTE — OP NOTE
Operative Note      Patient: Gosia Franklin  YOB: 1955  MRN: [id number]    Date of Procedure: 11/22/2022    Pre-Op Diagnosis: peritonitis     Post-Op Diagnosis:  perforated stercoral sigmoid colitis       Procedure(s):  EXPLORATORY LAPAROTOMY, MIKE'S  PROCEDURE    Surgeon(s):  Mariana Wilson MD    Assistant:   Resident: Hunter Cox MD; Ubaldo Tovar DO; Julio Vitale MD    Anesthesia: General    Estimated Blood Loss (mL): less than 469     Complications: None    Specimens:   ID Type Source Tests Collected by Time Destination   1 : PERITONEAL FLUID Body Fluid Fluid CULTURE, ANAEROBIC, CULTURE, FUNGUS, GRAM STAIN, CULTURE, BODY FLUID, CULTURE WITH SMEAR, ACID FAST Colleen Will MD 11/22/2022 2154    A : PERFORATED SIGMOID COLON Tissue Colon SURGICAL PATHOLOGY Mariana Wilson MD 11/22/2022 2201        Implants:  * No implants in log *      Drains:   Colostomy LUQ Transverse (Active)       Urinary Catheter 11/22/22 Reyes (Active)       Findings: perforated sigmoid stercoral colitis, resected ~10cm segment, end colostomy, peritoneal fluid cultures taken; THE ABDOMEN APPEARED INFECTED    Detailed Description of Procedure: The patient was brought to the operating room and positioned supine on the operating room table. Sequential compression devices were placed on the patient's lower extremities and were powered on. General anesthesia was administered and preoperative antibiotics were administered per the anesthetic record. The patient was prepped and draped in the usual sterile fashion and the procedure went forth with strict aseptic technique under maximal barrier precautions. Immediately prior to the procedure a time-out was called and the surgical checklist was reviewed and agreed upon by all present. A lower midline incision was made with a 10 scalpel blade.   Incision was deepened using electrocautery through the dermis and subcutaneous tissues until the midline fascia was encountered. This was incised with electrocautery and the finger was inserted into the fascia and through the preperitoneum injury into the peritoneal space. The incision was opened cephalad and caudad using electrocautery using a finger to protect the intra-abdominal contents. The abdomen appeared infected. We are met with a significant amount of dark reddish-brown, purulent appearing fluid which was suctioned. A portion of this was collected in a Lukens trap and sent for as peritoneal fluid for culture and sensitivity. Once completely evacuated we inspected the left lower quadrant and found a solitary stool ball which was evacuated. The rectum and distal sigmoid colon appeared without injury or perforation however at the mid sigmoid colon there was a 1.5 centimeter perforation noted. At this location the associated mesentery was significantly inflamed, edematous, hyperemic. We proceeded with resection of the perforated segment. Proximally and distally a mesenteric window was created with electrocautery and a IRA 75 blue load stapler was fired across either ends of an approximately 10 cm segment of sigmoid colon. The LigaSure device was used to come across the mesentery which was fired multiple times to ensure that the engorged and hyperemic mesentery was hemostatic. Despite these efforts bleeding was controlled with 3-0 silk suture ligation as needed. Once the specimen was taken away from the associated mesentery was passed off the field for pathologic examination. Using LigaSure device to lengthen the associated mesentery without compromising the blood supply of the proximal stapled distal descending colon which we would use to form an end colostomy. Once we are satisfied with the lengthening of the mesentery and hemostasis was obtained, we turned towards creation of our end colostomy site.     An Allis clamp was used to grasp the left hemiabdominal wall skin overlying the rectus muscle and electrocautery was used to come through the skin and subcutaneous tissues in a circular fashion. We then amputated this and passed off the field. Using [employer] retractors we were able to identify the anterior rectus muscle fascia which was incised with electrocautery in a cruciate fashion. We then used [employer] retractors in order to visualize the posterior sheath of the rectus fascia by retracting the muscle fibers of the rectus muscle laterally and medially and incised this with electrocautery using a lap pad within the intra-abdominal space to protect the intra-abdominal contents. Using a Stealth10 grasper after fitting 2 fingers through the ostomy site, we grasped the proximal stapled descending colon segment which we would used to fashion our end colostomy. It was delivered through the wound and placed aside. We inspected the remainder of the abdomen which appeared grossly normal without signs of perforation or other pathology. We irrigated the abdomen copiously with warm saline and suctioned this until dry. We then proceeded with closure of the midline fascia. Using 2 O - looped PDS sutures we closed the midline fascia cephalad to caudad and caudad to cephalad meeting in the middle and tying the two together. The subcutaneous tissues were irrigated and a blue towel was used to cover the midline wound. We turned towards maturation of the end colostomy. Hernia scissors were used to amputate the staple line and bleeding was controlled with selective electrocautery. Using 3-0 Vicryl suture we matured the end colostomy to the surrounding dermis. The end colostomy was digitized with a lubricated finger and was noted to be widely patent. An ostomy appliance was cut to size and applied. The midline wound was packed with saline soaked Kerlix gauze and covered with an absorbent dressing and secured with tape. Counts were correct x2.  The patient was awoken from anesthesia and brought to the PACU in stable condition. Dr. Adan Kirby was present for this procedure.       Electronically signed by Valentín Granado MD on 11/22/2022 at 11:00 PM

## 2022-11-23 NOTE — PROGRESS NOTES
This patient is on medication that requires renal, weight, and/or indication dose adjustment. Date Body Weight IBW  Adjusted BW SCr  CrCl Dialysis status   11/23/2022 190 lb 8 oz (86.4 kg) Ideal body weight: 50.1 kg (110 lb 7.2 oz)  Adjusted ideal body weight: 64.6 kg (142 lb 7.5 oz) Serum creatinine: 0.7 mg/dL 11/22/22 1938  Estimated creatinine clearance: 78 mL/min N/a       Pharmacy has dose-adjusted the following medication(s):    Date Previous Order Adjusted Order   11/23/2022 Piperacillin-tazobactam 4,500mg IV q8h Piperacillin-tazobactam 3,375mg IV q8h       These changes were made per protocol according to the [facility] Clinical Guidance for Pharmacists. *Please note this dose may need readjusted if patient's condition changes. Please contact pharmacy with any questions regarding these changes.     Giuliana Flores, PharmD, [facility], BCPS 11/23/2022 1:02 AM

## 2022-11-23 NOTE — PROGRESS NOTES
NEEDED TO CUT PT'S SHIRT OFF. PT'S BELONGINGS (SHOES, SOCKS, BOTTOMS, AND SHIRT) PLACED IN A BAG WITH PT'S STICKER, AND SENT WITH THE PT'S CHART TO PACU.

## 2022-11-23 NOTE — PROGRESS NOTES
Patient has 1 bag of clothes, jacket with phone, cane, and glasses to go with her upstairs, she is awake and alert and aware of transfer.

## 2022-11-23 NOTE — H&P
GENERAL SURGERY  HISTORY AND PHYSICAL  11/22/2022    Chief Complaint   Patient presents with    Abdominal Pain     Abd pain denies N/V last bowel movement today       DINO Kulkarni is a 75 y.o. female who presents for evaluation of sudden onset abdominal pain that started at 1100 today. The pain is constant, diffuse, stabbing, and not relieved by anything. Movement makes the pain worse. She reports some nausea but no emesis. She had a bowel movement immediately prior to the pain. She thinks that her stool was hard but has never had an issue with constipation in the past. She recently had hip surgery and has been taking Advil daily. She has not been on steroids recently. She has never had anything like this before. She is not on any anticoagulation. Patient denies personal/family history of ulcerative colitis, Crohn's disease, colon cancer, and irritable bowel disease. She had a colonoscopy within the past 10 years with Dr. Kayren Primrose that she reports was unremarkable. She denies recent weight loss. Patient has history of appendectomy and hysterectomy. CT AP reveals sigmoid inflammation with moderate amount of free air around sigmoid and under the diaphragm by liver. She has lactic acidosis of 6.6 and has been ordered 1 L bolus and started on fluids. She is hypokalemic at 2.9 that is being treated and has leukocytosis of 13.3. She has been hypertensive, with regular rate, and afebrile. Past Medical History:   Diagnosis Date    Hyperlipidemia     Hypertension     Vitamin D deficiency        Past Surgical History:   Procedure Laterality Date    APPENDECTOMY      BREAST SURGERY Bilateral     cancer    HYSTERECTOMY      OVARY SURGERY      TONSILLECTOMY         Prior to Admission medications    Medication Sig Start Date End Date Taking?  Authorizing Provider   LORazepam (ATIVAN) 0.5 MG tablet TAKE ONE TABLET BY MOUTH TWO TIMES A DAY for 30 days 10/24/22 11/23/22  Charline Roberto DO   meloxicam (MOBIC) 7.5 MG tablet Take 7.5 mg by mouth 6/9/22 7/9/22  Historical Provider, MD   DULoxetine (CYMBALTA) 60 MG extended release capsule TAKE ONE CAPSULE BY MOUTH ONCE A DAY 6/20/22   Sylvester Aleman DO   atenolol-chlorthalidone (TENORETIC) 50-25 MG per tablet TAKE 1 TABLET DAILY 12/20/21   Sylvester Aleman DO   pantoprazole (PROTONIX) 40 MG tablet TAKE 1 TABLET DAILY 12/20/21   Sylvester Aleman DO   rosuvastatin (CRESTOR) 20 MG tablet TAKE 1 TABLET DAILY 12/20/21   Sylvester Aleman DO   meclizine (ANTIVERT) 12.5 MG tablet Take 1 tablet by mouth 3 times daily as needed for Dizziness 2/8/21   Sylvester Aleman DO   Probiotic Product (PROBIOTIC MULTI-ENZYME PO) Take by mouth    Historical Provider, MD   Multiple Vitamins-Minerals (THERAPEUTIC MULTIVITAMIN-MINERALS) tablet Take 1 tablet by mouth daily    Historical Provider, MD   cycloSPORINE (RESTASIS) 0.05 % ophthalmic emulsion Place 1 drop into both eyes 2 times daily    Historical Provider, MD   Cholecalciferol (VITAMIN D) 2000 units TABS tablet Take 1 tablet by mouth daily 12/14/18   Sylvester Aleman DO       No Known Allergies    No family history on file. Social History     Tobacco Use    Smoking status: Never    Smokeless tobacco: Never   Substance Use Topics    Alcohol use: No    Drug use: No         Review of Systems - History obtained from chart review and the patient  General ROS: negative for - chills or fever  Hematological and Lymphatic ROS: negative for - bleeding problems or blood clots  Respiratory ROS: no cough, shortness of breath, or wheezing  Cardiovascular ROS: no chest pain or dyspnea on exertion  Gastrointestinal ROS: positive for abdominal pain and nausea. Negative for emesis.   Genito-Urinary ROS: no dysuria, trouble voiding, or hematuria  Musculoskeletal ROS: negative for - muscle pain or muscular weakness  Neurological ROS: no TIA or stroke symptoms  Dermatological ROS: negative for - rash      PHYSICAL EXAM:    Vitals:    11/22/22 1859   BP: (!) 150/109   Pulse: 84   Resp: 18   Temp: 97.9 °F (36.6 °C)   SpO2: 93%       General Appearance:  awake, alert, oriented, in no acute distress  Skin:  Skin color, texture, turgor normal. No rashes or lesions. Head/face:  NCAT  Eyes:  No gross abnormalities. Lungs:  normal work of breathing on room air  Heart:  regular rate, hypertensive  Abdomen:  firm, distended, peritoneal signs present  Extremities: Extremities warm to touch, pink, with no edema. Neurologic:  grossly normal    LABS:  CBC  Recent Labs     11/22/22 1938   WBC 13.3*   HGB 14.3   HCT 43.8        BMP  Recent Labs     11/22/22 1938      K 2.9*   CL 92*   CO2 22   BUN 12   CREATININE 0.7   CALCIUM 10.4*     Liver Function  Recent Labs     11/22/22 1938   BILITOT 0.7   BILIDIR <0.2   AST 22   ALT 19   ALKPHOS 81   PROT 7.8   LABALBU 4.6     No results for input(s): LACTATE in the last 72 hours. Recent Labs     11/22/22 1938   INR 1.1       RADIOLOGY    CT ABDOMEN PELVIS W IV CONTRAST Additional Contrast? None    Result Date: 11/22/2022  EXAMINATION: CT OF THE ABDOMEN AND PELVIS WITH CONTRAST 11/22/2022 7:15 pm TECHNIQUE: CT of the abdomen and pelvis was performed with the administration of intravenous contrast. Multiplanar reformatted images are provided for review. Automated exposure control, iterative reconstruction, and/or weight based adjustment of the mA/kV was utilized to reduce the radiation dose to as low as reasonably achievable.  COMPARISON: 4-17 HISTORY: ORDERING SYSTEM PROVIDED HISTORY: suddne onset lower abdominal pain, hisotry of hysterectomy and prior appendectomy TECHNOLOGIST PROVIDED HISTORY: Reason for exam:->suddne onset lower abdominal pain, hisotry of hysterectomy and prior appendectomy Additional Contrast?->None Decision Support Exception - unselect if not a suspected or confirmed emergency medical condition->Emergency Medical Condition (MA) What reading provider will be dictating this exam?->CRC FINDINGS: Lower Chest:   Stable tiny nodule lateral image 3 right-side. No routine follow-up imaging is recommended. .  No infiltrate or effusion in the visible lower chest.  Minimal hiatal hernia Organs: Low-attenuation hepatic lesion on image 55 is subcentimeter about 8 mm maximum size, not able to be characterized. Not clearly seen previously but noncontrast exam.  Subtle calcification right renal hilus around image 62 suspicious for small renal artery aneurysm. There are subcentimeter low-attenuation renal lesions which are too small to characterize but most likely represent cysts. GI/Bowel: Large amount of colonic stool suggests constipation. Diverticulosis of the sigmoid colon. Free air most conspicuous at the sigmoid mesentery along with fluid but not limited to this location also seen in the abdomen including into the subdiaphragmatic location, along with mild ascites mainly pericolic gutter position. Also mildly prominent fluid-filled small bowel suggesting reactive Pelvis: Post hysterectomy. Right hip replacement Peritoneum/Retroperitoneum: In addition to the findings described above there are prominent mesenteric lymph nodes most conspicuous at the root, present previously but appearing to be worse for example image 93 on the right is approximately 2.7 x 1.4 cm which may be an aggregate of nodes and along the retroperitoneum the nodes are mildly prominent although not technically enlarged. Consider PET-CT or tissue sampling of mesenteric lymph nodes as clinically warranted Bones/Soft Tissues: Upper left acetabulum sclerotic area probably a relatively large bone island but has increased compared to previously such that other etiology is not excluded. Scoliosis, lumbar degenerative changes, probable osteoporosis and chronic thoracolumbar compression deformities     1. Pneumoperitoneum and ascites, most likely due to perforated sigmoid diverticulitis.   Critical results were called by Dr. Rogelio Waldron MD to Dr. Linda Lehman MD on 11/22/2022 at 20:17. 2. Incidental findings given above with note of mesenteric adenopathy which is more conspicuous and abnormality such as lymphoma is a consideration. ASSESSMENT:  75 y.o. female with free air and peritonitis. CT imaging and clinical presentation concerning for sigmoid perforation. PLAN:  NPO  Type and screen  Being treated for hypokalemia  1 L fluid bolus and LR at 125  Zosyn  STAT OR for exploratory laparotomy, possible ostomy, possible bowel resection. Risks, benefits, and complications of the procedure discussed with the patient and her daughter who express understanding and agree to proceed. Patient findings and plan discussed with Dr. Adele Reeves.      Electronically signed by Nury Sidhu MD on 11/22/22 at 8:37 PM EST

## 2022-11-23 NOTE — ED NOTES
Radiology Procedure Waiver   Name: Jeremiah Fofana  : 1955  MRN: [id number]    Date:  22    Time: 7:06 PM EST    Benefits of immediately proceeding with Radiology exam(s) without pre-testing outweigh the risks or are not indicated as specified below and therefore the following is/are being waived:    [] Pregnancy test   [] Patients LMP on-time and regular.   [] Patient had Tubal Ligation or has other Contraception Device. [] Patient  is Menopausal or Premenarcheal.    [] Patient had Full or Partial Hysterectomy. [] Protocol for Iodine allergy    [] MRI Questionnaire     [x] BUN/Creatinine   [] Patient age w/no hx of renal dysfunction. [] Patient on Dialysis. [] Recent Normal Labs.   Electronically signed by Margy Osei MD on 22 at 7:06 PM EST            Margy Osei MD PGY-3  2022 7:06 PM       Annamarie Alnozo MD  Resident  [phone]

## 2022-11-23 NOTE — FLOWSHEET NOTE
ET Nurse (Initial consult) [id number]  Admit Date: 11/22/2022  6:57 PM    Reason for consult:  New Colostomy    Significant history: Per general surgery note     Pre-Op Diagnosis: peritonitis      Post-Op Diagnosis:  perforated stercoral sigmoid colitis       Procedure(s):  EXPLORATORY LAPAROTOMY, MIKE'S  PROCEDURE    Stoma assessment:     11/23/22 1130   Colostomy LLQ   Placement Date/Time: 11/22/22 2255   Present on Admission/Arrival: No  Location: LLQ   Stomal Appliance 1 piece; Intact   Treatment   (Pouch not changed)   Stool Appearance Bloody   Stool Color Red   Stool Amount Smear       Plan:  Pouch not change, no output at this time  Teaching initiated with patient, will follow up on [address] [place] [address] for colostomy teach and care      Sajan Asencio RN 11/23/2022 11:31 AM

## 2022-11-23 NOTE — PROGRESS NOTES
Physical Therapy    Date: 2022       Patient Name: Ragena Angelucci  : 1955      MRN: [id number]    PT orders received and appreciated; medical chart reviewed. Patient held for physical therapy services as patient verbalized significant pain and wishes to participate at later date. Will follow up as appropriate.     Thank you,  Jaskaran Bolaños PT, DPT  [id number]

## 2022-11-23 NOTE — DISCHARGE INSTRUCTIONS
Your information:  Name: Suzetet Ragsdale  : 1955    Your instructions:    Home care for: Colostomy teaching and care    What to do after you leave the hospital:    Recommended diet: {diet:13068}    Recommended activity: {discharge activity:19802}        The following personal items were collected during your admission and were returned to you:    Belongings  Dental Appliances: None  Vision - Corrective Lenses: Eyeglasses  Hearing Aid: None  Clothing: Shirt, Undergarments, Pants, Footwear  Jewelry: None  Body Piercings Removed: N/A  Electronic Devices: Cell Phone  Weapons (Notify Protective Services/Security): None  Other Valuables: Cane  Home Medications: None  Valuables Given To: Patient  Provide Name(s) of Who Valuable(s) Were Given To: na  Responsible person(s) in the waiting room: na  Patient approves for provider to speak to responsible person post operatively: Yes    Information obtained by:  By signing below, I understand that if any problems occur once I leave the hospital I am to contact ***. I understand and acknowledge receipt of the instructions indicated above. INFORMATION ABOUT OSTOMIES    LEAVE ABDOMINAL DRESSING ON UNTIL   You should have received instructions on how to care for your ostomy in the hospital.  If you did not please contact your surgeon or the [id number] [facility]. You most likely will have a home health nurse helping you at home for the first few days as well. Instructions   Belt application. If a belt is used, attach to the tabs on the pouch's flange and adjust to a comfortable fit. Pouch replacement and gas release. Remove the pouch by pulling the tab at the top of the pouch while maintaining gentle pressure on the wafer. To release accumulated gas, use same tab and then reseal pouch. If you have a pouch with a filter system the gas should release naturally on its own.    Tail closure attachment and removal: Tail closure may be attached before pouch application. Fold pouch tail over knife edge of closure only once. Hold in place. Press firmly on raised bar of closure (especially at center) until it snaps securely closed. To remove closure, separate knife edge from bar. If you have been given a pouch without a plastic clamp, roll the bottom of the pouch 3 times and push closed the Velcro system to lock in place. Stoma Problems  Discoloration: It should be pink to red in color. Should the stoma show color changes (gray/black), notify your surgeon. Retraction: The surface of the stoma will be at the same level as the surface of the abdomen or protrude ½ to 1 inch. If the stoma appears to be disappearing below the surface of your abdomen, then notify your surgeon. Prolapse: The stoma will project ½ to 1 inch from the surface of your abdomen. If the stoma becomes prolapsed it will look much longer than this. Herniation: A bulge in the skin around the stoma. Stenosis: Stoma becomes narrow. Laceration: Small cuts to the soma surface due to scraping from the ostomy appliance or being too rough when cleansing stoma. Bleeding: Small pinpoint droplets of blood are normal. If bleeding doesn't stop, your surgeon should be notified. Nutrition  You will eventually be able to return to most of your normal dietary habits. You may need to modify your diet. You will learn which foods produce gas or odor, which cause diarrhea, and which produce constipation. The dietitian will give you information about your dietary regime. Ensure to drink plenty of fluids (6-8 glasses/day). Drinking cranberry juice twice a day is encouraged to maintain adequate output and lessen the occurrence of infection and odor in the urine. Activity   Once you have recovered your health, you may continue a normal daily routine as you did before the operation. You are advised not to do heavy lifting or do strenuous activity for at least six weeks unless ordered by your surgeon.  Walking is a good form of exercise and helps to tone up muscles. Special Instructions  You will be shown how to care for your ostomy by the ET/Ostomy Nurse. Your incision may have a special dressing. You will be shown how to care for this and a Home Health Nurse will visit your home. Your sutures/staples are usually removed within 1-2 weeks following your surgery. You are advised to continue to do your deep breathing and coughing exercises to prevent chest congestion and to do your toe, leg, and ankle exercises to prevent blood clots in the legs and for good circulation. Take regular pain medications as ordered by your surgeon. Alcoholic beverages are not recommended while taking pain medications. Certain medications may affect the color and consistency of your ostomy. You may be given information on this, or your pharmacist can advise you also. When You Should Call a Doctor or a Nurse   Fever - this may be a sign of infection. Pain - prolonged unusual pain in the operative and stoma area. Increased tenderness, swelling, bleeding, redness, or drainage from incision. Unusual change in stoma size and appearance (may become narrow, may grow outward, or may protrude). Obstruction at the stoma site and/or prolapse. Excessive bleeding from the stoma opening or a moderate amount in the pouch over several emptying of the pouch. (Note: The eating of beets will lead to some red discoloration). Injury to the stoma or a cut in the stoma. Continuous bleeding at the junction between the stoma and the skin. Severe watery discharge lasting more than 5-6 hours. Any other unusual occurrences regarding the ostomy. How to Take Care of Minor Skin Irritations   Wash affected area with water only. Soap leaves residue that may irritate the skin. Make sure the appliance is secure and doesn't leak. If it does leak, do not patch the leak. Change the appliance.   Do not use ointment or cream unless recommended by your physician or ET Nurse. Do not use any barrier wipes containing alcohol to skin that is already irritated. If skin irritation persists, notify your ET nurse or doctor. Stoma Hints   Stoma will get smaller over a 6-8-week period. This is normal. Stoma should be pink to red in color and moist.   Stoma surface will be at the same level as the surface of the abdomen or protrude ½ to 1 inch. At times, your stoma may bleed slightly when you change the appliance or clean the surrounding skin. Do not be alarmed, this is not a cause for concern. POSTOPERATIVE TOPICS  After you go home, you may wish to have someone at home with you at least part of each day for the first one or two weeks to help with meals, bathing, etc. Often it is possible for you to stay with a relative or friend for several weeks until you are back on your feet. If no one is available, let us know so that we can begin planning for a home health aide or home healthcare referral. Sometimes placement in a temporary rehabilitation facility is an option. Constipation  Many people get constipated following surgery from the pain medications. This is the most common problem experienced after discharge. Increase fluid intake and fiber in your diet. We recommend all patients start taking stool softeners (such as Metamucil®) two to three times per day as directed on the label while you are still taking narcotic pain medicine. Don't wait until you are constipated to start. Also, increase physical activity such as walking. Over the counter stool softeners, laxatives, etc:  Miralax  Milk of magnesia  Benefiber, Fibercon, citrucel  Colace (docusate)  Dulcolax  Senokot (senna)    Sleeping Problems  Occasionally patients will have problems sleeping at night after discharge home. Be sure you are not napping too much during the day. Naps should be limited to a total of one hour per day.  Be sure to exercise by walking frequently, especially outside of your house, so you are tired and sleepy by evening. Finally, most people find that just before bedtime taking a warm bath and drinking some milk products (milk, milkshake, ice cream, etc.) will relax them, making them drowsy and promote a good night sleep. Taking a dose of pain medication just before bedtime also helps, especially if the reason for sleeping poorly is incisional pain. Fever  Take your temperature in the evening if you feel feverish especially in the first couple of weeks you are home. Call us if you have a temperature over 100.4°F degrees. Many patients experience night sweats early after surgery that produce enough perspiration to dampen your bedclothes. They are not usually caused by a fever. Although frustrating and worrisome, these sweats are of no significance and will resolve on their own; however, check your temperature if they occur. Leg Swelling  For the first 4?6 weeks following surgery, many patients notice swelling of their ankles and sometimes their lower legs. This is usually the result of sitting for longer periods of time with their legs hanging down. This problem is quite preventable if you sit with your legs elevated on a footstool or recliner. If you have swelling, it will also tend to resolve by sleeping at night with your legs elevated on a pillow or two. You also should try wearing the tight?fitting stockings (YOMI hose). Wearing these stocking during the day will usually decrease leg swelling (edema) quite rapidly. Blood Clot in the Leg  Very rarely, a patient will develop a red, swollen, sore leg after surgery. This could be a sign of a blood clot forming. You should call us if you notice this happening. You should also call us if you have increasing cough, pain, or shortness of breath. We must be kept informed of your progress, especially if your condition worsens.      Post?operative Depression  It is relatively common and normal for patients undergoing any type of major surgery to feel blue and slightly depressed in the early period after surgery, especially after they first arrive home. This is a common and very natural reaction generally related to the frustration of not being able to return rapidly to normal activities after surgery. This feeling is natural and passes quickly within a few days or weeks. The best remedy is to not sit in the house and brood, but rather to spend as much time as possible keeping busy, especially outside of the house walking, going to the shopping mall, the movies or dinner. At home, keeping busy with reading or other hobbies will take your mind off your surgery and the inconvenience of your convalescence. Remember this early depression is very common and normal and will pass. Diet  Your doctor will advise on any special diets. While a well?balanced diet does provide all the vitamins and minerals your body needs, you may wish to take a vitamin supplement. Often, many foods may taste bland or even unpleasant after surgery. This is usually caused by medications and will pass with time, especially after you no longer require strong pain medication. In addition, you should drink plenty of fluids like water and juice, to help keep your bowels moving. Activities  A sensible balance of rest and activity is the key to recovery. Keep in mind that you will have good days when you seem to have a lot of energy, and days when you feel as though you are backsliding. This is normal convalescence. Try not to overextend yourself and slowly increase your activities every day. Household Tasks and Lifting  Although you may perform light tasks around the house, you should stop if you tire or have pain. You should lift no more than 10 pounds for about 6 weeks following surgery. Lifting may also include such tasks as vacuuming and lawn?mowing, so be sensible. Exercise  Walking is the single best exercise for anyone.  Beginning upon your discharge from the hospital, we recommend that you do it daily, preferably two to three times per day. You should start by going short distances and gradually work your way up to a mile or more per day. If the weather outside is rainy or too hot, most communities have air conditioned shopping malls that are perfect for walking. Going for walks outside of your home is preferable as it will improve your self?confidence, feeling of well?being, and generally hasten recovery. We encourage an early return to normal activity. Sports  You should not engage in any strenuous sports such as weight?lifting, tennis or jogging, for the first 6 weeks after surgery. Then, restart these sports gradually. Other activities such as walking or riding a stationary bicycle are accepted and encouraged. Golf is initially restricted to some extent after surgery. Putting practice may be started as soon as you are comfortable. After 2?3 weeks you may start chipping practice. However, you should wait 6 weeks before beginning drives, and then start at a driving range. It may be 8 weeks before you are playing a full 9 or 18 holes of golf. Driving  Driving should be left to someone else if you are taking narcotic pain medicine. You may, however, take car trips as a passenger. If the trip will last for over an hour, get out and walk every hour to stretch your legs. Always wear your seatbelt. Your driving restriction should only last 2?3 weeks. Social Activity  Resume your social and recreational activity slowly at first. Fatigue is the best gauge of overdoing. When you first return home, limit the number of visits by well?meaning, enthusiastic friends, but then gradually ease up these restrictions as you gain back your strength. Sex  Sexual activity is like exercise. Your capacity to tolerate it will return as you continue your recovery. There is no set time when you may resume sexual activity.  Touching, hugging and massage are all wonderful for your mood and morale and are good alternatives until you feel ready for intercourse. Certain medications may affect sexual performance, so be patient. If you have questions, discuss this with your surgeon. Travel  Depending on the type of surgery you had, there may be restrictions on air travel for a short time after surgery. Ask your surgeon before making reservations. Generally, do not travel further than 250 miles until you have returned for your initial first post?op clinic visit 2 weeks after hospital discharge. Work  Most people can return to work approximately 6?8 weeks after surgery. An earlier return may be possible depending on the type of surgery you had and your type of work. If your job is particularly strenuous, light duty work should be done until at least 8 weeks after surgery. Alcohol  Alcohol consumption is discouraged but not prohibited DO NOT DRINK alcohol while on narcotic pain medications since they interact and cause bad side effects. Use alcohol in moderation, and NEVER together with narcotic pain medication. Smoking  Tobacco is strictly forbidden. It is necessary to avoid all smoke while you are healing, and it is very strongly recommended that you stop smoking entirely. You must avoid tobacco use to preserve lung function. Avoid second hand smoke also. If you have had surgical removal of a lung cancer, continued smoking substantially increases the risk of a second lung cancer developing later. We will be very happy to refer you to a smoking cessation program to help you remain tobacco free. Cold and Flu Exposure  You should avoid large crowds or anyone with a known cold or flu until 6?8 weeks following your surgery. Your immune system is minimally depressed for a few weeks after surgery, but will return to normal by 4?6 weeks. If you develop a cough with significant phlegm production during this period, contact your surgeon. Flu Shot/ Pneumonia Shot  Many people ask about getting a flu shot. We recommend that you obtain a flu shot yearly. However, you should ask your family doctor or oncologist first. Also, inquire about a pneumonia shot (Pneumovax®), which is given once every five years. Emergencies  If you develop a sudden, serious and apparently life-threatening problem such as severe shortness of breath, chest pain suggesting angina or a heart attack, immediately call 911 for an ambulance and go directly to the nearest emergency room. After you are stabilized, your emergency room doctor may call your surgeon to inquire about more information about your medical condition. JUST FOR WOMEN  Elsy Shaw (if you have an incision on your chest or back)  Wearing support undergarments for the first few weeks can put an uncomfortable amount of pressure on the incision. Many women have found that using an athletic support bra is very comfortable. You should buy the bra two sizes larger around the chest than your normally wear. The cup size will remain the same. Look for a bra that hooks in the front for ease of application. Menses  Women who have not reached menopause may find their periods disrupted by the stress of surgery. This is not uncommon, and should resolve on its own within a month or two. If it has not returned to normal by that time, you should see your gynecologist for an evaluation. Hormone Replacement  If you are on a hormone replacement regimen, you should continue to take them as directed unless otherwise instructed by your surgeon.

## 2022-11-23 NOTE — PROGRESS NOTES
Occupational Therapy  OCCUPATIONAL THERAPY INITIAL EVALUATION     [address] [address] [facility]  [address] [place] [address]      LZTN:                                                Patient Name: Jacinda Blackman  MRN: [id number]  : 1955  Room: [address] [place] [address] [id number]    Referring Provider: Kevin Snellen, MD  Specific Provider Orders/Date: OT eval and treat 22    Diagnosis: Intra-abdominal free air of unknown etiology [K66.8]     Surgery / Procedure: EXPLORATORY LAPAROTOMY, MIKE'S  PROCEDURE on      Pertinent Medical History:  has a past medical history of Hyperlipidemia, Hypertension, and Vitamin D deficiency.        Precautions:  Fall Risk, abdominal, pedroza    Assessment of current deficits    [x] Functional mobility  [x]ADLs  [x] Strength               []Cognition    [x] Functional transfers   [x] IADLs         [x] Safety Awareness   [x]Endurance    [] Fine Coordination              [x] Balance      [] Vision/perception   []Sensation     []Gross Motor Coordination  [] ROM  [] Delirium                   [] Motor Control     OT PLAN OF CARE   OT POC based on physician orders, patient diagnosis and results of clinical assessment    Frequency/Duration 1-3 days/wk for 2 weeks PRN   Specific OT Treatment Interventions to include:   * Instruction/training on adapted ADL techniques and AE recommendations to increase functional independence within precautions       * Training on energy conservation strategies, correct breathing pattern and techniques to improve independence/tolerance for self-care routine  * Functional transfer/mobility training/DME recommendations for increased independence, safety, and fall prevention  * Patient/Family education to increase follow through with safety techniques and functional independence  * Recommendation of environmental modifications for increased safety with functional transfers/mobility and ADLs  * Therapeutic exercise to improve motor endurance, ROM, and functional strength for ADLs/functional transfers  * Therapeutic activities to facilitate/challenge dynamic balance, stand tolerance for increased safety and independence with ADLs  * Therapeutic activities to facilitate gross/fine motor skills for increased independence with ADLs  * Positioning to improve skin integrity, interaction with environment and functional independence    Recommended Adaptive Equipment: AE for LB self-care tasks, TBD for additional AE    Home Living: Pt lives alone in 2 floor home. 4 ALAN, 0 handrail  Bathroom on 1st and 2nd floors, bedroom on 2nd floor - flight of stairs    Bathroom setup: tub only    Equipment owned: shower chair, [facility]    Prior Level of Function: independent with ADLs , independent with IADLs; ambulated independently w/ SPC   Driving: yes    Pain Level: Pt c/o 7/10 abdominal pain at rest, increased in severity to 10/10 w/ activity this session ; RN notified. Pt medicated while at EOB. Frequent, EXTENSIVE rest breaks provided during, following all functional tasks in order to manage pain    Cognition: A&O: 4/4; Follows 1 step directions   Memory:  good    Sequencing:  good    Problem solving:  fair    Judgement/safety:  fair      Functional Assessment:  AM-PAC Daily Activity Raw Score: 12/24   Initial Eval Status  Date: 11/23/22 Treatment Status  Date: STGs = LTGs  Time frame: 10-14 days   Feeding NPO  -    Grooming Minimal Assist   Modified [place]    UB Dressing Moderate Assist   Supervision    LB Dressing Dependent   Minimal Assist    Bathing Maximal Assist  Minimal Assist    Toileting Dependent   Minimal Assist    Bed Mobility  Rolling:  Max A  Supine to sit: Maximal Assist   Sit to supine: Maximal Assist x2  Supine to sit: Minimal Assist   Sit to supine: Minimal Assist    Functional Transfers Moderate Assist   Supervision    Functional Mobility Moderate Assist w/ HHA  1 step to [facility]  Supervision    Balance Sitting:     Static:  Min>SBA    Dynamic:Min A  Standing: Mod A      Activity Tolerance Fair-  Limited by severe pain  Good   Visual/  Perceptual Glasses: yes                  Hand Dominance R   AROM (PROM) Strength Additional Info:    RUE  WFL Deferred s/p abdominal sx good  and wfl FMC/dexterity noted during ADL tasks       LUE WFL Deferred good  and wfl FMC/dexterity noted during ADL tasks       Hearing: WFL   Sensation: c/o numbness/tingling B hands  Tone: WFL   Edema: none noted    Comments: Upon arrival patient lying in bed. Therapist educated pt on role of OT. RN clearance. At end of session, patient lying in bed with call light and phone within reach, all lines and tubes intact. Overall patient demonstrated decreased independence and safety during completion of ADL/functional transfer/mobility tasks. Pt would benefit from continued skilled OT to increase safety and independence with completion of ADL/IADL tasks for functional independence and quality of life. Treatment: OT treatment provided this date includes: Prior to and during activity, thorough education provided on abdominal precautions and importance of splinting abdomen for pain management. Facilitation of bed mobility (education/cues for body mechanics, logroll technique) and unsupported sitting balance (addressing posture, weight shifting and safety to prep for ADL's. Pt required EXTENSIVE rest breaks upon sitting EOB (18+ minutes) d/t increased pain. Facilitated light repositioning to manage pain. Call light rang for RN - pt medicated during session. Therapist then facilitated functional transfers (w/ education/cues for safety/hand placement) and standing balance/tolerance tasks (addressing posture, balance and activity tolerance impacting ADLs and functional activity. Increased time (10+ minutes) in static standing position d/t pain).  Facilitated 1 lateral sidestep to [facility] w/ HHA (w/ education/cuing on posture, sequencing and safety). Therapist facilitated self-care retraining: UB self-care tasks and seated grooming tasks while educating pt on modified techniques, posture, safety and energy conservation techniques. Therapist then facilitated sit>supine while maintaining abdominal precautions. Increased time for repositioning for comfort. Skilled monitoring of HR, O2 sats and pts response to treatment. Rehab Potential: Good for established goals     Patient / Family Goal: not stated      Patient and/or family were instructed on functional diagnosis, prognosis/goals and OT plan of care. Demonstrated fair understanding. Eval Complexity: Low    Time In: 08:35  Time Out: 09:30  Total Treatment Time: 39 minutes    Min Units   OT Eval Low 97165  X  1   OT Eval Medium 45219      OT Eval High 83040      OT Re-Eval [id number]       Therapeutic Ex 00156       Therapeutic Activities 60653  29  2   ADL/Self Care 31980  10  1   Orthotic Management 18523       Manual 49982     Neuro Re-Ed 91457       Non-Billable Time          Evaluation Time additionally includes thorough review of current medical information, gathering information on past medical history/social history and prior level of function, interpretation of standardized testing/informal observation of tasks, assessment of data and development of plan of care and goals.         Radha OTR/L #[id number]

## 2022-11-23 NOTE — ED NOTES
Pt arrived from home, c/o lower abd pain since 1pm. Pain persistently getting worse. Pt unable to complete full bowel movement x2 days.       Porter Keller  11/22/22 1911       Porter Keller  11/22/22 1933

## 2022-11-23 NOTE — ED NOTES
Surgical consent signed and sent with soft chart and patient to surgery.       Mary Alice Garcia  11/22/22 7933

## 2022-11-23 NOTE — PLAN OF CARE
Problem: Discharge Planning  Goal: Discharge to home or other facility with appropriate resources  11/23/2022 1044 by Karuna Licona RN  Outcome: Progressing  11/23/2022 0054 by Kristen Campos RN  Outcome: Progressing  Flowsheets (Taken 11/23/2022 420)  Discharge to home or other facility with appropriate resources: Identify barriers to discharge with patient and caregiver     Problem: Pain  Goal: Verbalizes/displays adequate comfort level or baseline comfort level  11/23/2022 1044 by Karuna Licona RN  Outcome: Progressing  11/23/2022 0054 by Kristen Campos RN  Outcome: Progressing     Problem: Safety - Adult  Goal: Free from fall injury  11/23/2022 1044 by Karuna Licona RN  Outcome: Progressing  11/23/2022 0054 by Kristen Campos RN  Outcome: Progressing     Problem: ABCDS Injury Assessment  Goal: Absence of physical injury  11/23/2022 1044 by Karuna Licona RN  Outcome: Progressing  11/23/2022 0054 by Kristen Campos RN  Outcome: Progressing

## 2022-11-23 NOTE — ANESTHESIA PRE PROCEDURE
Department of Anesthesiology  Preprocedure Note       Name:  Donna Contreras   Age:  75 y.o.  :  1955                                          MRN:  [id number]         Date:  2022      Surgeon: Gonzalo Flynn):  Mily Lewis MD    Procedure: Procedure(s):  EXPLORATORY LAPAROTOMY; POSSIBLE BOWEL RESECTION; POSSIBLE OSTOMY    Medications prior to admission:   Prior to Admission medications    Medication Sig Start Date End Date Taking?  Authorizing Provider   LORazepam (ATIVAN) 0.5 MG tablet TAKE ONE TABLET BY MOUTH TWO TIMES A DAY for 30 days 10/24/22 11/23/22  Aleta Pritchard DO   meloxicam (MOBIC) 7.5 MG tablet Take 7.5 mg by mouth 22  Historical Provider, MD   DULoxetine (CYMBALTA) 60 MG extended release capsule TAKE ONE CAPSULE BY MOUTH ONCE A DAY 22   Aleta Pritchard DO   atenolol-chlorthalidone (TENORETIC) 50-25 MG per tablet TAKE 1 TABLET DAILY 21   Aleta Pritchard, DO   pantoprazole (PROTONIX) 40 MG tablet TAKE 1 TABLET DAILY 21   Aleta Pritchard, DO   rosuvastatin (CRESTOR) 20 MG tablet TAKE 1 TABLET DAILY 21   Aleta Pritchard DO   meclizine (ANTIVERT) 12.5 MG tablet Take 1 tablet by mouth 3 times daily as needed for Dizziness 21   Aleta Pritchard DO   Probiotic Product (PROBIOTIC MULTI-ENZYME PO) Take by mouth    Historical Provider, MD   Multiple Vitamins-Minerals (THERAPEUTIC MULTIVITAMIN-MINERALS) tablet Take 1 tablet by mouth daily    Historical Provider, MD   cycloSPORINE (RESTASIS) 0.05 % ophthalmic emulsion Place 1 drop into both eyes 2 times daily    Historical Provider, MD   Cholecalciferol (VITAMIN D) 2000 units TABS tablet Take 1 tablet by mouth daily 18   Aleta Pritchard DO       Current medications:    Current Facility-Administered Medications   Medication Dose Route Frequency Provider Last Rate Last Admin    potassium chloride 10 mEq/100 mL IVPB (Peripheral Line)  10 mEq IntraVENous Q1H Lesley Bernal  mL/hr at 22 10 mEq at 22 2048    lactated ringers bolus  1,000 mL IntraVENous Once Madison Dennis .9 mL/hr at 11/22/22 2056 1,000 mL at 11/22/22 2056    lactated ringers infusion   IntraVENous Continuous Madison Dennis MD         Facility-Administered Medications Ordered in Other Encounters   Medication Dose Route Frequency Provider Last Rate Last Admin    rocuronium (Edwinna Bury) injection   IntraVENous PRN Zunilda Dacosta RN   30 mg at 11/22/22 2143    lidocaine (cardiac) (XYLOCAINE) injection   IntraVENous PRN Zunilda Dacosta RN   100 mg at 11/22/22 2138    fentaNYL (SUBLIMAZE) injection   IntraVENous PRN Zunilda Dacosta RN   150 mcg at 11/22/22 2138    succinylcholine (ANECTINE) injection   IntraVENous PRN Zunilda Dacosta RN   160 mg at 11/22/22 2138    etomidate (AMIDATE) injection   IntraVENous PRN Zunilda Dacosta RN   180 mg at 11/22/22 2138    lactated ringers infusion   IntraVENous Continuous PRN Zunilda Dacosta RN   New Bag at 11/22/22 2146    midazolam (VERSED) injection   IntraVENous PRN Zunilda Dacosta RN   2 mg at 11/22/22 2134       Allergies:  No Known Allergies    Problem List:    Patient Active Problem List   Diagnosis Code    Hyperlipidemia E78.5    Essential hypertension, benign I10    Vitamin D deficiency E55.9    Intra-abdominal free air of unknown etiology K66.8    Sepsis without acute organ dysfunction ([facility] [address] A41.9    Peritonitis ([facility] [address] K65.9       Past Medical History:        Diagnosis Date    Hyperlipidemia     Hypertension     Vitamin D deficiency        Past Surgical History:        Procedure Laterality Date    APPENDECTOMY      BREAST SURGERY Bilateral     cancer    HYSTERECTOMY      OVARY SURGERY      TONSILLECTOMY         Social History:    Social History     Tobacco Use    Smoking status: Never    Smokeless tobacco: Never   Substance Use Topics    Alcohol use:  No                                Counseling given: Not Answered      Vital Signs (Current):   Vitals:    11/22/22 1859   BP: (!) 150/109   Pulse: 84   Resp: 18   Temp: 97.9 °F (36.6 °C)   SpO2: 93%                                              BP Readings from Last 3 Encounters:   11/22/22 (!) 150/109   06/20/22 136/82   12/20/21 136/76       NPO Status:                                                                                 BMI:   Wt Readings from Last 3 Encounters:   06/20/22 187 lb (84.8 kg)   12/20/21 180 lb (81.6 kg)   03/10/21 180 lb (81.6 kg)     There is no height or weight on file to calculate BMI.    CBC:   Lab Results   Component Value Date/Time    WBC 13.3 11/22/2022 07:38 PM    RBC 5.74 11/22/2022 07:38 PM    HGB 14.3 11/22/2022 07:38 PM    HCT 43.8 11/22/2022 07:38 PM    MCV 76.3 11/22/2022 07:38 PM    RDW 14.2 11/22/2022 07:38 PM     11/22/2022 07:38 PM       CMP:   Lab Results   Component Value Date/Time     11/22/2022 07:38 PM    K 2.9 11/22/2022 07:38 PM    CL 92 11/22/2022 07:38 PM    CO2 22 11/22/2022 07:38 PM    BUN 12 11/22/2022 07:38 PM    CREATININE 0.7 11/22/2022 07:38 PM    GFRAA >60 12/20/2021 11:14 AM    AGRATIO 1.5 03/23/2021 07:14 AM    LABGLOM >60 11/22/2022 07:38 PM    GLUCOSE 122 11/22/2022 07:38 PM    PROT 7.8 11/22/2022 07:38 PM    CALCIUM 10.4 11/22/2022 07:38 PM    BILITOT 0.7 11/22/2022 07:38 PM    ALKPHOS 81 11/22/2022 07:38 PM    AST 22 11/22/2022 07:38 PM    ALT 19 11/22/2022 07:38 PM       POC Tests: No results for input(s): POCGLU, POCNA, POCK, POCCL, POCBUN, POCHEMO, POCHCT in the last 72 hours.     Coags:   Lab Results   Component Value Date/Time    PROTIME 12.2 11/22/2022 07:38 PM    PROTIME 11.2 03/23/2021 07:14 AM    PROTIME 11.2 03/23/2021 12:00 AM    PROTIME 11.2 03/23/2021 12:00 AM    INR 1.1 11/22/2022 07:38 PM    APTT 25.5 11/22/2022 07:38 PM       HCG (If Applicable): No results found for: PREGTESTUR, PREGSERUM, HCG, HCGQUANT     ABGs: No results found for: PHART, PO2ART, ANN4SCI, DZM7DLG, BEART, F8FVSPYZ     Type & Screen (If Applicable):  No results found for: LABABO, [address]    Drug/Infectious Status (If Applicable):  No results found for: HIV, HEPCAB    COVID-19 Screening (If Applicable):   Lab Results   Component Value Date/Time    COVID19 Not Detected 12/17/2020 07:52 AM           Anesthesia Evaluation  Patient summary reviewed and Nursing notes reviewed no history of anesthetic complications:   Airway: Mallampati: III  TM distance: <3 FB   Neck ROM: full  Mouth opening: > = 3 FB   Dental:          Pulmonary:Negative Pulmonary ROS breath sounds clear to auscultation      (-) not a current smoker                          ROS comment: H/o tonsillectomy   Cardiovascular:    (+) hypertension:, hyperlipidemia      ECG reviewed  Rhythm: regular  Rate: abnormal  Echocardiogram reviewed         Beta Blocker:  Dose within 24 Hrs      ROS comment: ECHO 6/26/04:  Benign with      Neuro/Psych:   Negative Neuro/Psych ROS              GI/Hepatic/Renal:   (+) GERD:,          ROS comment: H/o appendectomy  CT abd/pelvis 11/22/22:  Impression  1. Pneumoperitoneum and ascites, most likely due to perforated sigmoid diverticulitis.    Critical results were called by Dr. Gwenith Peabody, MD to Dr. Lisa Philip MD on 11/22/2022 at 20:17.  2. Incidental findings given above with note of mesenteric adenopathy which is more conspicuous and abnormality such as lymphoma is a consideration. .   Endo/Other:    (+) electrolyte abnormalities, . ROS comment: H/o hysterectomy  H/o breast surgery Abdominal:   (+) obese,     Abdomen: soft and tender. Vascular: negative vascular ROS. Other Findings:           Anesthesia Plan      general     ASA 3 - emergent     (PIV)  Induction: intravenous and rapid sequence. BIS  MIPS: Postoperative opioids intended, Prophylactic antiemetics administered and Postoperative trial extubation. Anesthetic plan and risks discussed with patient. Use of blood products discussed with patient whom consented to blood products.    Plan discussed with CRNA and attending. DOS STAFF ADDENDUM:    Patient seen and chart reviewed. Physical exam and history updated as indicated. NPO status confirmed. Anesthesia options and plan discussed including risks benefits with patient/legal guardian and family as available. Concerns and questions addressed. Consent verbalized to proceed.   Anesthesia plan, options and intraoperative/postoperative concerns discussed with care team.    Jacky Singh MD, MD  11/22/2022  9:48 PM        Jacky Singh MD   11/22/2022

## 2022-11-24 LAB
ANION GAP SERPL CALCULATED.3IONS-SCNC: 7 MMOL/L (ref 7–16)
BASOPHILS ABSOLUTE: 0.02 E9/L (ref 0–0.2)
BASOPHILS RELATIVE PERCENT: 0.2 % (ref 0–2)
BUN BLDV-MCNC: 13 MG/DL (ref 6–23)
CALCIUM SERPL-MCNC: 9.3 MG/DL (ref 8.6–10.2)
CHLORIDE BLD-SCNC: 98 MMOL/L (ref 98–107)
CO2: 31 MMOL/L (ref 22–29)
CREAT SERPL-MCNC: 0.6 MG/DL (ref 0.5–1)
EOSINOPHILS ABSOLUTE: 0 E9/L (ref 0.05–0.5)
EOSINOPHILS RELATIVE PERCENT: 0 % (ref 0–6)
GFR SERPL CREATININE-BSD FRML MDRD: >60 ML/MIN/1.73
GLUCOSE BLD-MCNC: 118 MG/DL (ref 74–99)
HCT VFR BLD CALC: 30.2 % (ref 34–48)
HEMOGLOBIN: 9.8 G/DL (ref 11.5–15.5)
IMMATURE GRANULOCYTES #: 0.04 E9/L
IMMATURE GRANULOCYTES %: 0.3 % (ref 0–5)
LYMPHOCYTES ABSOLUTE: 0.9 E9/L (ref 1.5–4)
LYMPHOCYTES RELATIVE PERCENT: 7.3 % (ref 20–42)
MCH RBC QN AUTO: 25.3 PG (ref 26–35)
MCHC RBC AUTO-ENTMCNC: 32.5 % (ref 32–34.5)
MCV RBC AUTO: 77.8 FL (ref 80–99.9)
MONOCYTES ABSOLUTE: 0.61 E9/L (ref 0.1–0.95)
MONOCYTES RELATIVE PERCENT: 4.9 % (ref 2–12)
NEUTROPHILS ABSOLUTE: 10.84 E9/L (ref 1.8–7.3)
NEUTROPHILS RELATIVE PERCENT: 87.3 % (ref 43–80)
PDW BLD-RTO: 14.6 FL (ref 11.5–15)
PLATELET # BLD: 262 E9/L (ref 130–450)
PMV BLD AUTO: 10.7 FL (ref 7–12)
POTASSIUM REFLEX MAGNESIUM: 3.9 MMOL/L (ref 3.5–5)
RBC # BLD: 3.88 E12/L (ref 3.5–5.5)
SODIUM BLD-SCNC: 136 MMOL/L (ref 132–146)
WBC # BLD: 12.4 E9/L (ref 4.5–11.5)

## 2022-11-24 PROCEDURE — 85025 COMPLETE CBC W/AUTO DIFF WBC: CPT

## 2022-11-24 PROCEDURE — 6360000002 HC RX W HCPCS: Performed by: STUDENT IN AN ORGANIZED HEALTH CARE EDUCATION/TRAINING PROGRAM

## 2022-11-24 PROCEDURE — 2580000003 HC RX 258: Performed by: STUDENT IN AN ORGANIZED HEALTH CARE EDUCATION/TRAINING PROGRAM

## 2022-11-24 PROCEDURE — 80048 BASIC METABOLIC PNL TOTAL CA: CPT

## 2022-11-24 PROCEDURE — 6360000002 HC RX W HCPCS

## 2022-11-24 PROCEDURE — 6370000000 HC RX 637 (ALT 250 FOR IP)

## 2022-11-24 PROCEDURE — 99024 POSTOP FOLLOW-UP VISIT: CPT | Performed by: SURGERY

## 2022-11-24 PROCEDURE — 36415 COLL VENOUS BLD VENIPUNCTURE: CPT

## 2022-11-24 PROCEDURE — 6370000000 HC RX 637 (ALT 250 FOR IP): Performed by: STUDENT IN AN ORGANIZED HEALTH CARE EDUCATION/TRAINING PROGRAM

## 2022-11-24 PROCEDURE — 2700000000 HC OXYGEN THERAPY PER DAY

## 2022-11-24 PROCEDURE — 1200000000 HC SEMI PRIVATE

## 2022-11-24 RX ORDER — CALCIUM CARBONATE 200(500)MG
500 TABLET,CHEWABLE ORAL 3 TIMES DAILY PRN
Status: DISCONTINUED | OUTPATIENT
Start: 2022-11-24 | End: 2022-12-01 | Stop reason: HOSPADM

## 2022-11-24 RX ADMIN — OXYCODONE HYDROCHLORIDE 10 MG: 10 TABLET ORAL at 06:00

## 2022-11-24 RX ADMIN — PANTOPRAZOLE SODIUM 40 MG: 40 TABLET, DELAYED RELEASE ORAL at 05:58

## 2022-11-24 RX ADMIN — ACETAMINOPHEN 650 MG: 325 TABLET, FILM COATED ORAL at 05:58

## 2022-11-24 RX ADMIN — ONDANSETRON 4 MG: 2 INJECTION INTRAMUSCULAR; INTRAVENOUS at 14:34

## 2022-11-24 RX ADMIN — PIPERACILLIN AND TAZOBACTAM 3375 MG: 3; .375 INJECTION, POWDER, FOR SOLUTION INTRAVENOUS at 13:26

## 2022-11-24 RX ADMIN — SODIUM CHLORIDE, POTASSIUM CHLORIDE, SODIUM LACTATE AND CALCIUM CHLORIDE: 600; 310; 30; 20 INJECTION, SOLUTION INTRAVENOUS at 14:08

## 2022-11-24 RX ADMIN — DULOXETINE HYDROCHLORIDE 60 MG: 60 CAPSULE, DELAYED RELEASE ORAL at 19:11

## 2022-11-24 RX ADMIN — CHLORTHALIDONE 25 MG: 25 TABLET ORAL at 19:12

## 2022-11-24 RX ADMIN — ROSUVASTATIN 20 MG: 20 TABLET, FILM COATED ORAL at 19:12

## 2022-11-24 RX ADMIN — ENOXAPARIN SODIUM 40 MG: 100 INJECTION SUBCUTANEOUS at 11:13

## 2022-11-24 RX ADMIN — HYDROMORPHONE HYDROCHLORIDE 0.5 MG: 1 INJECTION, SOLUTION INTRAMUSCULAR; INTRAVENOUS; SUBCUTANEOUS at 14:34

## 2022-11-24 RX ADMIN — ACETAMINOPHEN 650 MG: 325 TABLET, FILM COATED ORAL at 19:11

## 2022-11-24 RX ADMIN — CALCIUM CARBONATE 500 MG: 500 TABLET, CHEWABLE ORAL at 00:36

## 2022-11-24 RX ADMIN — PIPERACILLIN AND TAZOBACTAM 3375 MG: 3; .375 INJECTION, POWDER, FOR SOLUTION INTRAVENOUS at 21:02

## 2022-11-24 RX ADMIN — TRIMETHOBENZAMIDE HYDROCHLORIDE 200 MG: 100 INJECTION INTRAMUSCULAR at 11:14

## 2022-11-24 RX ADMIN — SODIUM CHLORIDE, PRESERVATIVE FREE 10 ML: 5 INJECTION INTRAVENOUS at 20:57

## 2022-11-24 RX ADMIN — SODIUM CHLORIDE, POTASSIUM CHLORIDE, SODIUM LACTATE AND CALCIUM CHLORIDE: 600; 310; 30; 20 INJECTION, SOLUTION INTRAVENOUS at 22:20

## 2022-11-24 RX ADMIN — TRIMETHOBENZAMIDE HYDROCHLORIDE 200 MG: 100 INJECTION INTRAMUSCULAR at 22:17

## 2022-11-24 RX ADMIN — ATENOLOL 50 MG: 50 TABLET ORAL at 19:12

## 2022-11-24 RX ADMIN — LORAZEPAM 0.5 MG: 0.5 TABLET ORAL at 19:24

## 2022-11-24 RX ADMIN — ONDANSETRON 4 MG: 2 INJECTION INTRAMUSCULAR; INTRAVENOUS at 20:57

## 2022-11-24 RX ADMIN — ONDANSETRON 4 MG: 2 INJECTION INTRAMUSCULAR; INTRAVENOUS at 08:24

## 2022-11-24 RX ADMIN — HYDROMORPHONE HYDROCHLORIDE 0.5 MG: 1 INJECTION, SOLUTION INTRAMUSCULAR; INTRAVENOUS; SUBCUTANEOUS at 23:40

## 2022-11-24 RX ADMIN — PIPERACILLIN AND TAZOBACTAM 3375 MG: 3; .375 INJECTION, POWDER, FOR SOLUTION INTRAVENOUS at 06:01

## 2022-11-24 ASSESSMENT — PAIN SCALES - GENERAL
PAINLEVEL_OUTOF10: 7
PAINLEVEL_OUTOF10: 7
PAINLEVEL_OUTOF10: 8
PAINLEVEL_OUTOF10: 9
PAINLEVEL_OUTOF10: 2

## 2022-11-24 ASSESSMENT — PAIN DESCRIPTION - DESCRIPTORS
DESCRIPTORS: ACHING;DISCOMFORT
DESCRIPTORS: ACHING;SORE

## 2022-11-24 ASSESSMENT — PAIN DESCRIPTION - LOCATION
LOCATION: ABDOMEN

## 2022-11-24 NOTE — PROGRESS NOTES
[facility]   ATTENDING PHYSICIAN PROGRESS NOTE     I have examined the patient, reviewed the record, and discussed the case with the APN/ Resident. I have reviewed all relevant labs and imaging data. Please refer to the APN/ resident's note. I agree with the assessment and plan with the following corrections/ additions. The following summarizes my clinical findings and independent assessment. CC: perforated stercoral colitis    Patient reports some nausea due to acid reflux. Awake and alert  Follows commands  Heart: Regular rate/rhythm; no murmur  Lungs: Fairly clear bilaterally  Abdomen: Soft; expected postop tenderness; hypoactive bowel sounds; ostomy pink/viable; dressing to midline incision  Skin: Warm/dry    Labs personally reviewed    Patient Active Problem List    Diagnosis Date Noted    Intra-abdominal free air of unknown etiology 11/22/2022    Sepsis without acute organ dysfunction ([facility] [address] 11/22/2022    Peritonitis ([facility] [address] 11/22/2022    Hyperlipidemia 12/02/2016    Essential hypertension, benign 12/02/2016    Vitamin D deficiency 12/02/2016     S/p ex lap with sigmoid colon resection/colostomy for perforated stercoral colitis    Pain control  Monitor abdominal exam  Monitor bowel function  OOB to chair/ambulate  IS/cough/deep breathing  Cont Zosyn for intra-abd process  DVT risk--PCDs/lovenox    Severiano Sox, MD, FACS  11/24/2022  12:03 PM    NOTE: This report was transcribed using voice recognition software. Every effort was made to ensure accuracy; however, inadvertent computerized transcription errors may be present.

## 2022-11-24 NOTE — PROGRESS NOTES
[facility]   ATTENDING PHYSICIAN PROGRESS NOTE     I have examined the patient, reviewed the record, and discussed the case with the APN/ Resident. I have reviewed all relevant labs and imaging data. Please refer to the APN/ resident's note. I agree with the assessment and plan with the following corrections/ additions. The following summarizes my clinical findings and independent assessment. CC: perforated stercoral colitis    Pt seen/evaluated early this AM.    Patient reports some ongoing abdominal pain. States pain meds are helping somewhat. Awake and alert  Follows commands  Heart: Regular rate/rhythm; no murmur  Lungs: Fairly clear bilaterally  Abdomen: Soft; expected postop tenderness; hypoactive bowel sounds; ostomy pink/viable; dressing to midline incision  Skin: Warm/dry    Labs personally reviewed    Patient Active Problem List    Diagnosis Date Noted    Intra-abdominal free air of unknown etiology 11/22/2022    Sepsis without acute organ dysfunction ([facility] [address] 11/22/2022    Peritonitis ([facility] [address] 11/22/2022    Hyperlipidemia 12/02/2016    Essential hypertension, benign 12/02/2016    Vitamin D deficiency 12/02/2016     S/p ex lap with sigmoid colon resection/colostomy for perforated stercoral colitis    Pain control  Monitor abdominal exam  Monitor bowel function  OOB to chair/ambulate  IS/cough/deep breathing  Cont Zosyn for intra-abd process  DVT risk--PCDs/lovenox    Subhash Chaves MD, FACS  11/23/2022  8:18 PM    NOTE: This report was transcribed using voice recognition software. Every effort was made to ensure accuracy; however, inadvertent computerized transcription errors may be present.

## 2022-11-25 ENCOUNTER — APPOINTMENT (OUTPATIENT)
Dept: GENERAL RADIOLOGY | Age: 67
DRG: 853 | End: 2022-11-25
Payer: MEDICARE

## 2022-11-25 LAB
ANION GAP SERPL CALCULATED.3IONS-SCNC: 8 MMOL/L (ref 7–16)
BACTERIA: ABNORMAL /HPF
BASOPHILS ABSOLUTE: 0.01 E9/L (ref 0–0.2)
BASOPHILS RELATIVE PERCENT: 0.1 % (ref 0–2)
BILIRUBIN URINE: NEGATIVE
BLOOD, URINE: ABNORMAL
BUN BLDV-MCNC: 13 MG/DL (ref 6–23)
CALCIUM SERPL-MCNC: 9.2 MG/DL (ref 8.6–10.2)
CHLORIDE BLD-SCNC: 99 MMOL/L (ref 98–107)
CLARITY: ABNORMAL
CO2: 32 MMOL/L (ref 22–29)
COLOR: YELLOW
CREAT SERPL-MCNC: 0.6 MG/DL (ref 0.5–1)
EKG ATRIAL RATE: 72 BPM
EKG P AXIS: 22 DEGREES
EKG P-R INTERVAL: 154 MS
EKG Q-T INTERVAL: 404 MS
EKG QRS DURATION: 88 MS
EKG QTC CALCULATION (BAZETT): 442 MS
EKG R AXIS: 25 DEGREES
EKG T AXIS: 31 DEGREES
EKG VENTRICULAR RATE: 72 BPM
EOSINOPHILS ABSOLUTE: 0.01 E9/L (ref 0.05–0.5)
EOSINOPHILS RELATIVE PERCENT: 0.1 % (ref 0–6)
GFR SERPL CREATININE-BSD FRML MDRD: >60 ML/MIN/1.73
GLUCOSE BLD-MCNC: 97 MG/DL (ref 74–99)
GLUCOSE URINE: NEGATIVE MG/DL
HCT VFR BLD CALC: 33.1 % (ref 34–48)
HEMOGLOBIN: 10.4 G/DL (ref 11.5–15.5)
IMMATURE GRANULOCYTES #: 0.06 E9/L
IMMATURE GRANULOCYTES %: 0.5 % (ref 0–5)
KETONES, URINE: 15 MG/DL
LEUKOCYTE ESTERASE, URINE: NEGATIVE
LYMPHOCYTES ABSOLUTE: 1.57 E9/L (ref 1.5–4)
LYMPHOCYTES RELATIVE PERCENT: 13.4 % (ref 20–42)
MCH RBC QN AUTO: 24.6 PG (ref 26–35)
MCHC RBC AUTO-ENTMCNC: 31.4 % (ref 32–34.5)
MCV RBC AUTO: 78.4 FL (ref 80–99.9)
MONOCYTES ABSOLUTE: 0.62 E9/L (ref 0.1–0.95)
MONOCYTES RELATIVE PERCENT: 5.3 % (ref 2–12)
NEUTROPHILS ABSOLUTE: 9.41 E9/L (ref 1.8–7.3)
NEUTROPHILS RELATIVE PERCENT: 80.6 % (ref 43–80)
NITRITE, URINE: NEGATIVE
PDW BLD-RTO: 14.5 FL (ref 11.5–15)
PH UA: 8 (ref 5–9)
PLATELET # BLD: 279 E9/L (ref 130–450)
PMV BLD AUTO: 10.6 FL (ref 7–12)
POTASSIUM REFLEX MAGNESIUM: 3.6 MMOL/L (ref 3.5–5)
PROTEIN UA: 30 MG/DL
RBC # BLD: 4.22 E12/L (ref 3.5–5.5)
RBC UA: ABNORMAL /HPF (ref 0–2)
SODIUM BLD-SCNC: 139 MMOL/L (ref 132–146)
SPECIFIC GRAVITY UA: 1.02 (ref 1–1.03)
UROBILINOGEN, URINE: 1 E.U./DL
WBC # BLD: 11.7 E9/L (ref 4.5–11.5)
WBC UA: ABNORMAL /HPF (ref 0–5)

## 2022-11-25 PROCEDURE — 93010 ELECTROCARDIOGRAM REPORT: CPT | Performed by: INTERNAL MEDICINE

## 2022-11-25 PROCEDURE — 74018 RADEX ABDOMEN 1 VIEW: CPT

## 2022-11-25 PROCEDURE — 36415 COLL VENOUS BLD VENIPUNCTURE: CPT

## 2022-11-25 PROCEDURE — 97161 PT EVAL LOW COMPLEX 20 MIN: CPT

## 2022-11-25 PROCEDURE — 93005 ELECTROCARDIOGRAM TRACING: CPT | Performed by: SURGERY

## 2022-11-25 PROCEDURE — 2580000003 HC RX 258

## 2022-11-25 PROCEDURE — 51701 INSERT BLADDER CATHETER: CPT

## 2022-11-25 PROCEDURE — 85025 COMPLETE CBC W/AUTO DIFF WBC: CPT

## 2022-11-25 PROCEDURE — C9113 INJ PANTOPRAZOLE SODIUM, VIA: HCPCS

## 2022-11-25 PROCEDURE — 6370000000 HC RX 637 (ALT 250 FOR IP): Performed by: STUDENT IN AN ORGANIZED HEALTH CARE EDUCATION/TRAINING PROGRAM

## 2022-11-25 PROCEDURE — 81001 URINALYSIS AUTO W/SCOPE: CPT

## 2022-11-25 PROCEDURE — 6360000002 HC RX W HCPCS: Performed by: STUDENT IN AN ORGANIZED HEALTH CARE EDUCATION/TRAINING PROGRAM

## 2022-11-25 PROCEDURE — 6370000000 HC RX 637 (ALT 250 FOR IP)

## 2022-11-25 PROCEDURE — 80048 BASIC METABOLIC PNL TOTAL CA: CPT

## 2022-11-25 PROCEDURE — 1200000000 HC SEMI PRIVATE

## 2022-11-25 PROCEDURE — 2580000003 HC RX 258: Performed by: STUDENT IN AN ORGANIZED HEALTH CARE EDUCATION/TRAINING PROGRAM

## 2022-11-25 PROCEDURE — A4216 STERILE WATER/SALINE, 10 ML: HCPCS

## 2022-11-25 PROCEDURE — 99024 POSTOP FOLLOW-UP VISIT: CPT | Performed by: SURGERY

## 2022-11-25 PROCEDURE — 97530 THERAPEUTIC ACTIVITIES: CPT

## 2022-11-25 PROCEDURE — 2700000000 HC OXYGEN THERAPY PER DAY

## 2022-11-25 PROCEDURE — 6360000002 HC RX W HCPCS

## 2022-11-25 RX ADMIN — ATENOLOL 50 MG: 50 TABLET ORAL at 08:48

## 2022-11-25 RX ADMIN — LORAZEPAM 0.5 MG: 0.5 TABLET ORAL at 14:27

## 2022-11-25 RX ADMIN — ACETAMINOPHEN 650 MG: 325 TABLET, FILM COATED ORAL at 21:49

## 2022-11-25 RX ADMIN — PIPERACILLIN AND TAZOBACTAM 3375 MG: 3; .375 INJECTION, POWDER, FOR SOLUTION INTRAVENOUS at 14:29

## 2022-11-25 RX ADMIN — CHLORTHALIDONE 25 MG: 25 TABLET ORAL at 08:47

## 2022-11-25 RX ADMIN — ROSUVASTATIN 20 MG: 20 TABLET, FILM COATED ORAL at 08:49

## 2022-11-25 RX ADMIN — OXYCODONE HYDROCHLORIDE 10 MG: 10 TABLET ORAL at 09:18

## 2022-11-25 RX ADMIN — SODIUM CHLORIDE 40 MG: 9 INJECTION, SOLUTION INTRAMUSCULAR; INTRAVENOUS; SUBCUTANEOUS at 08:47

## 2022-11-25 RX ADMIN — SODIUM CHLORIDE, POTASSIUM CHLORIDE, SODIUM LACTATE AND CALCIUM CHLORIDE: 600; 310; 30; 20 INJECTION, SOLUTION INTRAVENOUS at 14:28

## 2022-11-25 RX ADMIN — ENOXAPARIN SODIUM 40 MG: 100 INJECTION SUBCUTANEOUS at 08:49

## 2022-11-25 RX ADMIN — SODIUM CHLORIDE, POTASSIUM CHLORIDE, SODIUM LACTATE AND CALCIUM CHLORIDE: 600; 310; 30; 20 INJECTION, SOLUTION INTRAVENOUS at 22:26

## 2022-11-25 RX ADMIN — SODIUM CHLORIDE, POTASSIUM CHLORIDE, SODIUM LACTATE AND CALCIUM CHLORIDE: 600; 310; 30; 20 INJECTION, SOLUTION INTRAVENOUS at 06:25

## 2022-11-25 RX ADMIN — DULOXETINE HYDROCHLORIDE 60 MG: 60 CAPSULE, DELAYED RELEASE ORAL at 08:48

## 2022-11-25 RX ADMIN — ACETAMINOPHEN 650 MG: 325 TABLET, FILM COATED ORAL at 14:23

## 2022-11-25 RX ADMIN — PIPERACILLIN AND TAZOBACTAM 3375 MG: 3; .375 INJECTION, POWDER, FOR SOLUTION INTRAVENOUS at 05:14

## 2022-11-25 RX ADMIN — OXYCODONE 5 MG: 5 TABLET ORAL at 05:14

## 2022-11-25 RX ADMIN — PIPERACILLIN AND TAZOBACTAM 3375 MG: 3; .375 INJECTION, POWDER, FOR SOLUTION INTRAVENOUS at 21:47

## 2022-11-25 RX ADMIN — OXYCODONE HYDROCHLORIDE 10 MG: 10 TABLET ORAL at 21:50

## 2022-11-25 ASSESSMENT — PAIN SCALES - GENERAL
PAINLEVEL_OUTOF10: 6
PAINLEVEL_OUTOF10: 10
PAINLEVEL_OUTOF10: 7
PAINLEVEL_OUTOF10: 7
PAINLEVEL_OUTOF10: 8

## 2022-11-25 ASSESSMENT — PAIN DESCRIPTION - LOCATION
LOCATION: ABDOMEN
LOCATION: CHEST

## 2022-11-25 ASSESSMENT — PAIN DESCRIPTION - DESCRIPTORS
DESCRIPTORS: SQUEEZING
DESCRIPTORS: CRAMPING;ACHING;SORE
DESCRIPTORS: ACHING;CRAMPING

## 2022-11-25 ASSESSMENT — PAIN - FUNCTIONAL ASSESSMENT: PAIN_FUNCTIONAL_ASSESSMENT: PREVENTS OR INTERFERES SOME ACTIVE ACTIVITIES AND ADLS

## 2022-11-25 ASSESSMENT — PAIN DESCRIPTION - ORIENTATION: ORIENTATION: MID

## 2022-11-25 NOTE — PROGRESS NOTES
Physical Therapy  Physical Therapy Initial Assessment     Name: Ron Ledezma  : 1955  MRN: [id number]      Date of Service: 2022    Evaluating PT:  Lizzeth Rose, PT, DPT [id number]    Room #:  5153/5461-O  Diagnosis:  Intra-abdominal free air of unknown etiology [K66.8]  PMHx/PSHx:    Past Medical History:   Diagnosis Date    Hyperlipidemia     Hypertension     Vitamin D deficiency      Procedure/Surgery:  Ex Lap   Precautions:  Fall risk, Alarms, Abdominal precautions, NG with suction  Equipment Needs:  TBD    SUBJECTIVE:   Pt lives alone in 2 floor home. 4 ALAN, 0 handrail  Bathroom on 1st and 2nd floors, bedroom on 2nd floor - flight of stairs   Equipment Owned: [facility], Shower chair     OBJECTIVE:   Initial Evaluation  Date: 22 Treatment Short Term/ Long Term   Goals   AM-PAC 6 Clicks 93/16     Was pt agreeable to Eval/treatment? Yes     Does pt have pain?  Yes abdominal /10, chest pain 6/10     Bed Mobility  Rolling: NT  Supine to sit: Mod A  Sit to supine: NT  Scooting: SBA   Supervision   Transfers Sit to stand: Min A   Stand to sit: Min A  Stand pivot: Min A with FWW  Mod I with AAD   Ambulation    2 feet with FWW to chair Min A   >50 feet with AAD Supervision   Stair negotiation: ascended and descended  NT  >12 steps with unilateral rail SBA   ROM BUE:  WFL  BLE:  AROM WFL     Strength BUE:  Refer to OT  BLE:  4-/5  4/5   Balance Sitting EOB:  SBA static and dynamic   Dynamic Standing:  Min to Mod A with FWW  Sitting EOB:  Independent  Dynamic Standing:  Supervision with AAD     Pt is A & O x 4  Sensation:  Pt denies numbness and tingling to extremities  Edema:  B LE mild  Vitals:  Blood Pressure at rest - Blood Pressure post session -   Heart Rate at rest 88 Heart Rate post session 89   SPO2 at rest 91 RA, 94 with PLB SPO2 post session 93 RA 95 with PLB     Therapeutic Exercises:  NA    Patient education  Pt educated on role and benefits of physical therapy, safety and technique for mobility    Patient response to education:   Pt verbalized understanding Pt demonstrated skill Pt requires further education in this area   x x x     ASSESSMENT:    Conditions Requiring Skilled Therapeutic Intervention:    [x]Decreased strength     []Decreased ROM  []Decreased functional mobility  [x]Decreased balance   [x]Decreased endurance   []Decreased posture  [x]Decreased sensation  []Decreased coordination   []Decreased vision  [x]Decreased safety awareness   [x]Increased pain       Comments:  Patient deemed medically stable prior to therapy evaluation. Patient was semi supine resting upon therapy arrival and provided consent to evaluation. Patient states her nausea persists and that she had severe emesis yesterday and will need increased time for all movement throughout session. NG removed from suction to proceed. Assist for both trunk and LE management to complete bed mobility. Once sitting increased symptoms leading to pause for PLB and reduction of nausea. Once standing B LE visibly were weak and shaking, standing weight shift to acclimate to weight acceptance. Increased time to side step and pivot to chair. Once sitting patient states she would like to void, bed pan placed for sitting where she was successful. Additional stand to remove. Patient left upright in chair with cushion and neptali placed, with all needs met and call light in reach for safety. NG off suction as RN arrived for meds and aware. She will continue to benefit from skilled PT post DC in order to improve mobility and safety.     Treatment:  Patient practiced and was instructed in the following treatment:    Bed mobility training - pt given verbal and tactile cues to facilitate proper sequencing and safety during rolling and supine>sit as well as provided with physical assistance to complete task   Sitting EOB for >15 minutes for upright tolerance, postural awareness and BLE ROM, hygiene   Transfer training - pt was given verbal and tactile cues to facilitate proper hand placement, technique and safety during sit to stand and stand to sit as well as provided with physical assistance. Gait training- pt was given verbal and tactile cues to facilitate safe use of FWW and improved step length during ambulation as well as provided with physical assistance. Education- AROM seated to reduce risk of atrophy, seated upright goal for 45 min for cardiopulmonary optimization and facilitation of BM and urinary voids. Pt's/ family goals   1. Return to PLOF    Prognosis is fair+ for reaching above PT goals. Patient and or family understand(s) diagnosis, prognosis, and plan of care.   Yes    PHYSICAL THERAPY PLAN OF CARE:    PT POC is established based on physician order and patient diagnosis     Referring provider/PT Order:     PT evaluation and treat  Start:  11/23/22 0100,   End:  11/23/22 0100,   ONE TIME,   Standing Count:  1 Occurrences,   Anais Stewart MD      Diagnosis:  Intra-abdominal free air of unknown etiology [K66.8]  Specific instructions for next treatment:  increase ambulatory distance, increase upright tolerance    Current Treatment Recommendations:     [x] Strengthening to improve independence with functional mobility   [x] ROM to improve independence with functional mobility   [x] Balance Training to improve static/dynamic balance and to reduce fall risk  [x] Endurance Training to improve activity tolerance during functional mobility   [x] Transfer Training to improve safety and independence with all functional transfers   [x] Gait Training to improve gait mechanics, endurance and assess need for appropriate assistive device  [] Stair Training in preparation for safe discharge home and/or into the community   [x] Positioning to prevent skin breakdown and contractures  [x] Safety and Education Training   [x] Patient/Caregiver Education   [] HEP  [] Other     PT long term treatment goals are located in above grid    Frequency of treatments: 2-5x/week x 1-2 weeks. Time in  0822  Time out  0900    Total Treatment Time  23 minutes     Evaluation Time includes thorough review of current medical information, gathering information on past medical history/social history and prior level of function, completion of standardized testing/informal observation of tasks, assessment of data and education on plan of care and goals.     CPT codes:  [x] Low Complexity PT evaluation 85386  [] Moderate Complexity PT evaluation 63549  [] High Complexity PT evaluation 31804  [] PT Re-evaluation 45405  [] Gait training 63513 - minutes  [] Manual therapy 36583 - minutes  [x] Therapeutic activities 04397 23 minutes  [] Therapeutic exercises 21356 - minutes  [] Neuromuscular reeducation 14859 - minutes     Bjorn Walters, PT, DPT [id number]

## 2022-11-25 NOTE — PROGRESS NOTES
GENERAL SURGERY  DAILY PROGRESS NOTE  11/25/2022    CHIEF COMPLAINT:  Chief Complaint   Patient presents with    Abdominal Pain     Abd pain denies N/V last bowel movement today       SUBJECTIVE:  Vomited and NGT placed overnight. Also required straight cath. Feels better after 900cc removed  No gas or stool from ostomy yet    OBJECTIVE:  BP (!) 168/84   Pulse 80   Temp 98.3 °F (36.8 °C) (Oral)   Resp 16   Ht 5' 2\" (1.575 m)   Wt 190 lb 8 oz (86.4 kg)   SpO2 93%   BMI 34.84 kg/m²     GENERAL:  NAD. A&Ox3. LUNGS:  No increased work of breathing. CARDIOVASCULAR: RR  ABDOMEN:  Soft, non-distended, appropriately tender. Midline incision packing & dressing replaced,  wound clean. SToma pink & viable, no gas/stool in bag. No guarding, rigidity, rebound.     ASSESSMENT/PLAN:  75 y.o. female with perforated stercoral colitis s/p ex lap carol's 11/22, post-op ileus    Continue NGT LIS, IVF, and NPO  Await return of bowel function  Urinary retention requiring straight cath overnight  IV Abx  DVT PPx, SMI, Ambulate, [facility]  Surgery Resident PGY-5  11/25/2022  7:07 AM

## 2022-11-25 NOTE — CARE COORDINATION
11/25/22  Spoke with patient regarding transition of Care. Patient admitted for perforated stercoral colitis and is s/p ex lap carol's from 11/22. Patient currently has an NG tube in place and awaiting bowel function with new colostomy. Discussed with patient a skilled rehab stay as therapy AM-PAC scores were a 13/24 for PT and 12/24 for OT. Patient receptive and would like calls placed to 1, [place] [address], 2. [facility], 1. [facility] and 4. [facility] [place]. Referrals called to [facility] and [facility] . [facility] admission coordinator is gone until Monday. Referral left via voicemail for [facility] and pending. Referral also given to liastephanie Alfonso for [facility] who is reviewing. Patient is from home where she lives alone in a 2 story house. Patient states she was independent prior to admit. Patient is not active with any home care but does have a shower chair and SPC. PCP is Dr. Thais Verdugo and pharmacy choice is [facility] or [facility] order. SW/CM to follow for discharge planning once accepting facility is determined     3:30 [facility] is able to accept will await response from [facility].     Electronically signed by EMILY Aldrich on 11/25/2022 at 3:21 PM

## 2022-11-25 NOTE — FLOWSHEET NOTE
ET Nurse (Follow up) 8409A  Admit Date: 11/22/2022  6:57 PM    Reason for consult:  New Colostomy      Stoma assessment:     11/25/22 1116   Colostomy LLQ   Placement Date/Time: 11/22/22 2255   Present on Admission/Arrival: No  Location: LLQ   Stomal Appliance 1 piece; Changed   Stoma  Assessment Red;Moist;Protrudes   Peristomal Assessment Intact   Treatment Bag change;Site care   Stool Appearance Bloody   Stool Color Red   Stool Amount Smear       Plan:   Pouch change  Education and teaching for pouch change and care. Will continued to follow.     Randie Cogan, RN 11/25/2022 11:17 AM

## 2022-11-25 NOTE — PROGRESS NOTES
[facility]   ATTENDING PHYSICIAN PROGRESS NOTE     I have examined the patient, reviewed the record, and discussed the case with the APN/ Resident. I have reviewed all relevant labs and imaging data. Please refer to the APN/ resident's note. I agree with the assessment and plan with the following corrections/ additions. The following summarizes my clinical findings and independent assessment. CC: perforated stercoral colitis    Patient had emesis overnight; NG tube placed--pt states she is feeling better this AM.    Awake and alert  Follows commands  Heart: Regular rate/rhythm; no murmur  Lungs: Fairly clear bilaterally  Abdomen: Soft; expected postop tenderness; hypoactive bowel sounds; ostomy pink/viable; dressing to midline incision  Skin: Warm/dry    Labs personally reviewed    Patient Active Problem List    Diagnosis Date Noted    Intra-abdominal free air of unknown etiology 11/22/2022    Sepsis without acute organ dysfunction ([facility] [address] 11/22/2022    Peritonitis ([facility] [address] 11/22/2022    Hyperlipidemia 12/02/2016    Essential hypertension, benign 12/02/2016    Vitamin D deficiency 12/02/2016     S/p ex lap with sigmoid colon resection/colostomy for perforated stercoral colitis    Maintain NG tube and monitor bowel function  Pain control  Monitor abdominal exam  OOB to chair/ambulate  IS/cough/deep breathing  Cont Zosyn for intra-abd process  DVT risk--PCDs/lovenox    Adina Schroeder MD, FACS  11/25/2022  9:52 AM    NOTE: This report was transcribed using voice recognition software. Every effort was made to ensure accuracy; however, inadvertent computerized transcription errors may be present.

## 2022-11-26 LAB
ANAEROBIC CULTURE: ABNORMAL
ANION GAP SERPL CALCULATED.3IONS-SCNC: 12 MMOL/L (ref 7–16)
BASOPHILS ABSOLUTE: 0.02 E9/L (ref 0–0.2)
BASOPHILS RELATIVE PERCENT: 0.2 % (ref 0–2)
BODY FLUID CULTURE, STERILE: NORMAL
BUN BLDV-MCNC: 6 MG/DL (ref 6–23)
CALCIUM SERPL-MCNC: 9.6 MG/DL (ref 8.6–10.2)
CHLORIDE BLD-SCNC: 90 MMOL/L (ref 98–107)
CO2: 30 MMOL/L (ref 22–29)
CREAT SERPL-MCNC: 0.5 MG/DL (ref 0.5–1)
EOSINOPHILS ABSOLUTE: 0.04 E9/L (ref 0.05–0.5)
EOSINOPHILS RELATIVE PERCENT: 0.3 % (ref 0–6)
GFR SERPL CREATININE-BSD FRML MDRD: >60 ML/MIN/1.73
GLUCOSE BLD-MCNC: 85 MG/DL (ref 74–99)
GRAM STAIN RESULT: NORMAL
HCT VFR BLD CALC: 37 % (ref 34–48)
HEMOGLOBIN: 11.8 G/DL (ref 11.5–15.5)
IMMATURE GRANULOCYTES #: 0.08 E9/L
IMMATURE GRANULOCYTES %: 0.7 % (ref 0–5)
LYMPHOCYTES ABSOLUTE: 1.88 E9/L (ref 1.5–4)
LYMPHOCYTES RELATIVE PERCENT: 15.9 % (ref 20–42)
MAGNESIUM: 1.6 MG/DL (ref 1.6–2.6)
MCH RBC QN AUTO: 24.5 PG (ref 26–35)
MCHC RBC AUTO-ENTMCNC: 31.9 % (ref 32–34.5)
MCV RBC AUTO: 76.8 FL (ref 80–99.9)
MONOCYTES ABSOLUTE: 0.7 E9/L (ref 0.1–0.95)
MONOCYTES RELATIVE PERCENT: 5.9 % (ref 2–12)
NEUTROPHILS ABSOLUTE: 9.14 E9/L (ref 1.8–7.3)
NEUTROPHILS RELATIVE PERCENT: 77 % (ref 43–80)
ORGANISM: ABNORMAL
PDW BLD-RTO: 14.3 FL (ref 11.5–15)
PLATELET # BLD: 338 E9/L (ref 130–450)
PMV BLD AUTO: 10.4 FL (ref 7–12)
POTASSIUM REFLEX MAGNESIUM: 3.2 MMOL/L (ref 3.5–5)
RBC # BLD: 4.82 E12/L (ref 3.5–5.5)
SODIUM BLD-SCNC: 132 MMOL/L (ref 132–146)
WBC # BLD: 11.9 E9/L (ref 4.5–11.5)

## 2022-11-26 PROCEDURE — 36415 COLL VENOUS BLD VENIPUNCTURE: CPT

## 2022-11-26 PROCEDURE — 2580000003 HC RX 258: Performed by: STUDENT IN AN ORGANIZED HEALTH CARE EDUCATION/TRAINING PROGRAM

## 2022-11-26 PROCEDURE — 6360000002 HC RX W HCPCS

## 2022-11-26 PROCEDURE — 83735 ASSAY OF MAGNESIUM: CPT

## 2022-11-26 PROCEDURE — 1200000000 HC SEMI PRIVATE

## 2022-11-26 PROCEDURE — 99024 POSTOP FOLLOW-UP VISIT: CPT | Performed by: SURGERY

## 2022-11-26 PROCEDURE — 85025 COMPLETE CBC W/AUTO DIFF WBC: CPT

## 2022-11-26 PROCEDURE — 2580000003 HC RX 258

## 2022-11-26 PROCEDURE — C9113 INJ PANTOPRAZOLE SODIUM, VIA: HCPCS

## 2022-11-26 PROCEDURE — 80048 BASIC METABOLIC PNL TOTAL CA: CPT

## 2022-11-26 PROCEDURE — 6370000000 HC RX 637 (ALT 250 FOR IP)

## 2022-11-26 PROCEDURE — 6360000002 HC RX W HCPCS: Performed by: STUDENT IN AN ORGANIZED HEALTH CARE EDUCATION/TRAINING PROGRAM

## 2022-11-26 PROCEDURE — A4216 STERILE WATER/SALINE, 10 ML: HCPCS

## 2022-11-26 PROCEDURE — 6370000000 HC RX 637 (ALT 250 FOR IP): Performed by: STUDENT IN AN ORGANIZED HEALTH CARE EDUCATION/TRAINING PROGRAM

## 2022-11-26 RX ORDER — METOCLOPRAMIDE HYDROCHLORIDE 5 MG/ML
10 INJECTION INTRAMUSCULAR; INTRAVENOUS EVERY 8 HOURS
Status: DISCONTINUED | OUTPATIENT
Start: 2022-11-26 | End: 2022-12-01 | Stop reason: HOSPADM

## 2022-11-26 RX ORDER — POTASSIUM CHLORIDE 20 MEQ/1
40 TABLET, EXTENDED RELEASE ORAL ONCE
Status: COMPLETED | OUTPATIENT
Start: 2022-11-26 | End: 2022-11-26

## 2022-11-26 RX ADMIN — SODIUM CHLORIDE 40 MG: 9 INJECTION, SOLUTION INTRAMUSCULAR; INTRAVENOUS; SUBCUTANEOUS at 10:02

## 2022-11-26 RX ADMIN — PIPERACILLIN AND TAZOBACTAM 3375 MG: 3; .375 INJECTION, POWDER, FOR SOLUTION INTRAVENOUS at 13:59

## 2022-11-26 RX ADMIN — ATENOLOL 50 MG: 50 TABLET ORAL at 10:02

## 2022-11-26 RX ADMIN — CHLORTHALIDONE 25 MG: 25 TABLET ORAL at 10:02

## 2022-11-26 RX ADMIN — ACETAMINOPHEN 650 MG: 325 TABLET, FILM COATED ORAL at 13:55

## 2022-11-26 RX ADMIN — ONDANSETRON 4 MG: 2 INJECTION INTRAMUSCULAR; INTRAVENOUS at 02:01

## 2022-11-26 RX ADMIN — ACETAMINOPHEN 650 MG: 325 TABLET, FILM COATED ORAL at 18:44

## 2022-11-26 RX ADMIN — ROSUVASTATIN 20 MG: 20 TABLET, FILM COATED ORAL at 10:02

## 2022-11-26 RX ADMIN — METOCLOPRAMIDE HYDROCHLORIDE 10 MG: 5 INJECTION INTRAMUSCULAR; INTRAVENOUS at 10:02

## 2022-11-26 RX ADMIN — METOCLOPRAMIDE HYDROCHLORIDE 10 MG: 5 INJECTION INTRAMUSCULAR; INTRAVENOUS at 16:51

## 2022-11-26 RX ADMIN — POTASSIUM CHLORIDE 40 MEQ: 20 TABLET, EXTENDED RELEASE ORAL at 10:02

## 2022-11-26 RX ADMIN — SODIUM CHLORIDE, POTASSIUM CHLORIDE, SODIUM LACTATE AND CALCIUM CHLORIDE: 600; 310; 30; 20 INJECTION, SOLUTION INTRAVENOUS at 05:59

## 2022-11-26 RX ADMIN — SODIUM CHLORIDE, PRESERVATIVE FREE 10 ML: 5 INJECTION INTRAVENOUS at 10:03

## 2022-11-26 RX ADMIN — TRIMETHOBENZAMIDE HYDROCHLORIDE 200 MG: 100 INJECTION INTRAMUSCULAR at 05:59

## 2022-11-26 RX ADMIN — DULOXETINE HYDROCHLORIDE 60 MG: 60 CAPSULE, DELAYED RELEASE ORAL at 10:02

## 2022-11-26 RX ADMIN — SODIUM CHLORIDE, POTASSIUM CHLORIDE, SODIUM LACTATE AND CALCIUM CHLORIDE: 600; 310; 30; 20 INJECTION, SOLUTION INTRAVENOUS at 21:47

## 2022-11-26 RX ADMIN — OXYCODONE HYDROCHLORIDE 10 MG: 10 TABLET ORAL at 05:55

## 2022-11-26 RX ADMIN — OXYCODONE HYDROCHLORIDE 10 MG: 10 TABLET ORAL at 21:14

## 2022-11-26 RX ADMIN — OXYCODONE HYDROCHLORIDE 10 MG: 10 TABLET ORAL at 16:51

## 2022-11-26 RX ADMIN — PIPERACILLIN AND TAZOBACTAM 3375 MG: 3; .375 INJECTION, POWDER, FOR SOLUTION INTRAVENOUS at 21:13

## 2022-11-26 RX ADMIN — ONDANSETRON 4 MG: 2 INJECTION INTRAMUSCULAR; INTRAVENOUS at 21:14

## 2022-11-26 RX ADMIN — PIPERACILLIN AND TAZOBACTAM 3375 MG: 3; .375 INJECTION, POWDER, FOR SOLUTION INTRAVENOUS at 05:42

## 2022-11-26 RX ADMIN — OXYCODONE HYDROCHLORIDE 10 MG: 10 TABLET ORAL at 10:02

## 2022-11-26 RX ADMIN — ENOXAPARIN SODIUM 40 MG: 100 INJECTION SUBCUTANEOUS at 10:02

## 2022-11-26 ASSESSMENT — PAIN SCALES - GENERAL
PAINLEVEL_OUTOF10: 10
PAINLEVEL_OUTOF10: 7
PAINLEVEL_OUTOF10: 6
PAINLEVEL_OUTOF10: 6

## 2022-11-26 ASSESSMENT — PAIN DESCRIPTION - DESCRIPTORS
DESCRIPTORS: ACHING;SORE;DISCOMFORT
DESCRIPTORS: ACHING;CRAMPING;DISCOMFORT
DESCRIPTORS: ACHING;DISCOMFORT;SORE
DESCRIPTORS: ACHING;DISCOMFORT;CRAMPING
DESCRIPTORS: ACHING;DISCOMFORT;SORE

## 2022-11-26 ASSESSMENT — PAIN - FUNCTIONAL ASSESSMENT: PAIN_FUNCTIONAL_ASSESSMENT: PREVENTS OR INTERFERES SOME ACTIVE ACTIVITIES AND ADLS

## 2022-11-26 ASSESSMENT — PAIN DESCRIPTION - LOCATION
LOCATION: ABDOMEN
LOCATION: ABDOMEN;EAR
LOCATION: ABDOMEN

## 2022-11-26 ASSESSMENT — PAIN DESCRIPTION - PAIN TYPE: TYPE: SURGICAL PAIN

## 2022-11-26 ASSESSMENT — PAIN DESCRIPTION - ORIENTATION
ORIENTATION: MID

## 2022-11-26 NOTE — PROGRESS NOTES
[facility]   ATTENDING PHYSICIAN PROGRESS NOTE     I have examined the patient, reviewed the record, and discussed the case with the APN/ Resident. I have reviewed all relevant labs and imaging data. Please refer to the APN/ resident's note. I agree with the assessment and plan with the following corrections/ additions. The following summarizes my clinical findings and independent assessment. CC: perforated stercoral colitis    Patient reports some abdominal bloating today. Awake and alert  Follows commands  Heart: Regular rate/rhythm; no murmur  Lungs: Fairly clear bilaterally  Abdomen: Soft; expected postop tenderness; hypoactive bowel sounds; ostomy pink/viable; dressing to midline incision  Skin: Warm/dry    Labs personally reviewed    Patient Active Problem List    Diagnosis Date Noted    Intra-abdominal free air of unknown etiology 11/22/2022    Sepsis without acute organ dysfunction ([facility] [address] 11/22/2022    Peritonitis ([facility] [address] 11/22/2022    Hyperlipidemia 12/02/2016    Essential hypertension, benign 12/02/2016    Vitamin D deficiency 12/02/2016     S/p ex lap with sigmoid colon resection/colostomy for perforated stercoral colitis    Maintain NG tube and monitor bowel function--add reglan  Pain control  Monitor abdominal exam  OOB to chair/ambulate  IS/cough/deep breathing  Cont Zosyn for intra-abd process  DVT risk--PCDs/lovenox    Gia Velasco MD, FACS  11/26/2022  9:10 AM    NOTE: This report was transcribed using voice recognition software. Every effort was made to ensure accuracy; however, inadvertent computerized transcription errors may be present.

## 2022-11-26 NOTE — PROGRESS NOTES
GENERAL SURGERY  DAILY PROGRESS NOTE  11/26/2022    CHIEF COMPLAINT:  Chief Complaint   Patient presents with    Abdominal Pain     Abd pain denies N/V last bowel movement today       SUBJECTIVE:  Patient voiding without need for additional straight cath overnight. Good urine output. NG tube in place with bilious output currently to suction. OBJECTIVE:  BP (!) 163/85   Pulse 73   Temp 98.4 °F (36.9 °C) (Oral)   Resp 18   Ht 5' 2\" (1.575 m)   Wt 190 lb 8 oz (86.4 kg)   SpO2 96%   BMI 34.84 kg/m²     GENERAL:  NAD. A&Ox3. LUNGS:  No increased work of breathing. CARDIOVASCULAR: RR  ABDOMEN:  Soft, non-distended, appropriately tender. Midline incision packing & dressing replaced,  wound clean. SToma pink & viable, no gas/stool in bag. No guarding, rigidity, rebound. ASSESSMENT/PLAN:  75 y.o. female with perforated stercoral colitis s/p ex lap carol's 11/22, post-op ileus    Continue NGT LIS, IVF, and NPO with gum/hard candy.   Encouraged ambulation  Await return of bowel function  Urinary retention requiring straight cath overnight  IV Abx  DVT PPx, SMI, Ambulate, PTOT    Mickeal DO Ray  Surgery Resident PGY-1  11/26/2022  5:35 AM

## 2022-11-27 LAB
ANION GAP SERPL CALCULATED.3IONS-SCNC: 18 MMOL/L (ref 7–16)
BASOPHILS ABSOLUTE: 0.03 E9/L (ref 0–0.2)
BASOPHILS RELATIVE PERCENT: 0.2 % (ref 0–2)
BUN BLDV-MCNC: 5 MG/DL (ref 6–23)
CALCIUM SERPL-MCNC: 9.6 MG/DL (ref 8.6–10.2)
CHLORIDE BLD-SCNC: 91 MMOL/L (ref 98–107)
CO2: 25 MMOL/L (ref 22–29)
CREAT SERPL-MCNC: 0.5 MG/DL (ref 0.5–1)
EOSINOPHILS ABSOLUTE: 0.03 E9/L (ref 0.05–0.5)
EOSINOPHILS RELATIVE PERCENT: 0.2 % (ref 0–6)
GFR SERPL CREATININE-BSD FRML MDRD: >60 ML/MIN/1.73
GLUCOSE BLD-MCNC: 89 MG/DL (ref 74–99)
HCT VFR BLD CALC: 39.9 % (ref 34–48)
HEMOGLOBIN: 13.3 G/DL (ref 11.5–15.5)
IMMATURE GRANULOCYTES #: 0.12 E9/L
IMMATURE GRANULOCYTES %: 1 % (ref 0–5)
LYMPHOCYTES ABSOLUTE: 1.57 E9/L (ref 1.5–4)
LYMPHOCYTES RELATIVE PERCENT: 12.7 % (ref 20–42)
MAGNESIUM: 1.8 MG/DL (ref 1.6–2.6)
MCH RBC QN AUTO: 25 PG (ref 26–35)
MCHC RBC AUTO-ENTMCNC: 33.3 % (ref 32–34.5)
MCV RBC AUTO: 74.9 FL (ref 80–99.9)
MONOCYTES ABSOLUTE: 0.78 E9/L (ref 0.1–0.95)
MONOCYTES RELATIVE PERCENT: 6.3 % (ref 2–12)
NEUTROPHILS ABSOLUTE: 9.85 E9/L (ref 1.8–7.3)
NEUTROPHILS RELATIVE PERCENT: 79.6 % (ref 43–80)
PDW BLD-RTO: 14.2 FL (ref 11.5–15)
PLATELET # BLD: 428 E9/L (ref 130–450)
PMV BLD AUTO: 10 FL (ref 7–12)
POTASSIUM REFLEX MAGNESIUM: 2.9 MMOL/L (ref 3.5–5)
RBC # BLD: 5.33 E12/L (ref 3.5–5.5)
REASON FOR REJECTION: NORMAL
REJECTED TEST: NORMAL
SODIUM BLD-SCNC: 134 MMOL/L (ref 132–146)
WBC # BLD: 12.4 E9/L (ref 4.5–11.5)

## 2022-11-27 PROCEDURE — 6360000002 HC RX W HCPCS

## 2022-11-27 PROCEDURE — 2580000003 HC RX 258

## 2022-11-27 PROCEDURE — 6360000002 HC RX W HCPCS: Performed by: STUDENT IN AN ORGANIZED HEALTH CARE EDUCATION/TRAINING PROGRAM

## 2022-11-27 PROCEDURE — 99024 POSTOP FOLLOW-UP VISIT: CPT | Performed by: SURGERY

## 2022-11-27 PROCEDURE — A4216 STERILE WATER/SALINE, 10 ML: HCPCS

## 2022-11-27 PROCEDURE — 80048 BASIC METABOLIC PNL TOTAL CA: CPT

## 2022-11-27 PROCEDURE — 6370000000 HC RX 637 (ALT 250 FOR IP): Performed by: STUDENT IN AN ORGANIZED HEALTH CARE EDUCATION/TRAINING PROGRAM

## 2022-11-27 PROCEDURE — C9113 INJ PANTOPRAZOLE SODIUM, VIA: HCPCS

## 2022-11-27 PROCEDURE — 36415 COLL VENOUS BLD VENIPUNCTURE: CPT

## 2022-11-27 PROCEDURE — 6360000002 HC RX W HCPCS: Performed by: SURGERY

## 2022-11-27 PROCEDURE — 1200000000 HC SEMI PRIVATE

## 2022-11-27 PROCEDURE — 83735 ASSAY OF MAGNESIUM: CPT

## 2022-11-27 PROCEDURE — 85025 COMPLETE CBC W/AUTO DIFF WBC: CPT

## 2022-11-27 PROCEDURE — 2580000003 HC RX 258: Performed by: STUDENT IN AN ORGANIZED HEALTH CARE EDUCATION/TRAINING PROGRAM

## 2022-11-27 RX ORDER — POTASSIUM CHLORIDE 7.45 MG/ML
10 INJECTION INTRAVENOUS
Status: COMPLETED | OUTPATIENT
Start: 2022-11-27 | End: 2022-11-27

## 2022-11-27 RX ORDER — HYDRALAZINE HYDROCHLORIDE 20 MG/ML
10 INJECTION INTRAMUSCULAR; INTRAVENOUS EVERY 4 HOURS PRN
Status: DISCONTINUED | OUTPATIENT
Start: 2022-11-27 | End: 2022-12-01 | Stop reason: HOSPADM

## 2022-11-27 RX ORDER — LABETALOL HYDROCHLORIDE 5 MG/ML
10 INJECTION, SOLUTION INTRAVENOUS EVERY 4 HOURS PRN
Status: DISCONTINUED | OUTPATIENT
Start: 2022-11-27 | End: 2022-12-01 | Stop reason: HOSPADM

## 2022-11-27 RX ORDER — FUROSEMIDE 10 MG/ML
20 INJECTION INTRAMUSCULAR; INTRAVENOUS ONCE
Status: COMPLETED | OUTPATIENT
Start: 2022-11-27 | End: 2022-11-27

## 2022-11-27 RX ADMIN — POTASSIUM CHLORIDE 10 MEQ: 10 INJECTION, SOLUTION INTRAVENOUS at 15:17

## 2022-11-27 RX ADMIN — FUROSEMIDE 20 MG: 10 INJECTION, SOLUTION INTRAMUSCULAR; INTRAVENOUS at 10:18

## 2022-11-27 RX ADMIN — HYDROMORPHONE HYDROCHLORIDE 0.5 MG: 1 INJECTION, SOLUTION INTRAMUSCULAR; INTRAVENOUS; SUBCUTANEOUS at 06:30

## 2022-11-27 RX ADMIN — SODIUM CHLORIDE, POTASSIUM CHLORIDE, SODIUM LACTATE AND CALCIUM CHLORIDE: 600; 310; 30; 20 INJECTION, SOLUTION INTRAVENOUS at 05:30

## 2022-11-27 RX ADMIN — ROSUVASTATIN 20 MG: 20 TABLET, FILM COATED ORAL at 10:05

## 2022-11-27 RX ADMIN — SODIUM CHLORIDE 40 MG: 9 INJECTION, SOLUTION INTRAMUSCULAR; INTRAVENOUS; SUBCUTANEOUS at 10:05

## 2022-11-27 RX ADMIN — POTASSIUM CHLORIDE 10 MEQ: 10 INJECTION, SOLUTION INTRAVENOUS at 16:55

## 2022-11-27 RX ADMIN — ATENOLOL 50 MG: 50 TABLET ORAL at 10:06

## 2022-11-27 RX ADMIN — CHLORTHALIDONE 25 MG: 25 TABLET ORAL at 10:06

## 2022-11-27 RX ADMIN — PIPERACILLIN AND TAZOBACTAM 3375 MG: 3; .375 INJECTION, POWDER, FOR SOLUTION INTRAVENOUS at 05:23

## 2022-11-27 RX ADMIN — POTASSIUM CHLORIDE 10 MEQ: 10 INJECTION, SOLUTION INTRAVENOUS at 14:05

## 2022-11-27 RX ADMIN — OXYCODONE HYDROCHLORIDE 10 MG: 10 TABLET ORAL at 18:18

## 2022-11-27 RX ADMIN — OXYCODONE HYDROCHLORIDE 10 MG: 10 TABLET ORAL at 10:05

## 2022-11-27 RX ADMIN — ENOXAPARIN SODIUM 40 MG: 100 INJECTION SUBCUTANEOUS at 10:04

## 2022-11-27 RX ADMIN — ACETAMINOPHEN 650 MG: 325 TABLET, FILM COATED ORAL at 00:42

## 2022-11-27 RX ADMIN — ACETAMINOPHEN 650 MG: 325 TABLET, FILM COATED ORAL at 13:00

## 2022-11-27 RX ADMIN — OXYCODONE HYDROCHLORIDE 10 MG: 10 TABLET ORAL at 05:28

## 2022-11-27 RX ADMIN — ACETAMINOPHEN 650 MG: 325 TABLET, FILM COATED ORAL at 18:18

## 2022-11-27 RX ADMIN — POTASSIUM CHLORIDE 10 MEQ: 10 INJECTION, SOLUTION INTRAVENOUS at 18:33

## 2022-11-27 RX ADMIN — METOCLOPRAMIDE HYDROCHLORIDE 10 MG: 5 INJECTION INTRAMUSCULAR; INTRAVENOUS at 17:34

## 2022-11-27 RX ADMIN — DULOXETINE HYDROCHLORIDE 60 MG: 60 CAPSULE, DELAYED RELEASE ORAL at 10:05

## 2022-11-27 RX ADMIN — METOCLOPRAMIDE HYDROCHLORIDE 10 MG: 5 INJECTION INTRAMUSCULAR; INTRAVENOUS at 10:05

## 2022-11-27 RX ADMIN — HYDRALAZINE HYDROCHLORIDE 10 MG: 20 INJECTION INTRAMUSCULAR; INTRAVENOUS at 18:31

## 2022-11-27 RX ADMIN — METOCLOPRAMIDE HYDROCHLORIDE 10 MG: 5 INJECTION INTRAMUSCULAR; INTRAVENOUS at 00:43

## 2022-11-27 RX ADMIN — HYDRALAZINE HYDROCHLORIDE 10 MG: 20 INJECTION INTRAMUSCULAR; INTRAVENOUS at 06:29

## 2022-11-27 ASSESSMENT — PAIN SCALES - GENERAL
PAINLEVEL_OUTOF10: 10
PAINLEVEL_OUTOF10: 8
PAINLEVEL_OUTOF10: 7
PAINLEVEL_OUTOF10: 6
PAINLEVEL_OUTOF10: 0
PAINLEVEL_OUTOF10: 6
PAINLEVEL_OUTOF10: 6

## 2022-11-27 ASSESSMENT — PAIN DESCRIPTION - ONSET: ONSET: ON-GOING

## 2022-11-27 ASSESSMENT — PAIN DESCRIPTION - LOCATION
LOCATION: ABDOMEN

## 2022-11-27 ASSESSMENT — PAIN DESCRIPTION - DESCRIPTORS
DESCRIPTORS: ACHING;DISCOMFORT;SORE
DESCRIPTORS: ACHING;HEAVINESS
DESCRIPTORS: ACHING;HEAVINESS
DESCRIPTORS: ACHING;CRAMPING;SORE

## 2022-11-27 ASSESSMENT — PAIN DESCRIPTION - ORIENTATION
ORIENTATION: MID;LOWER;UPPER
ORIENTATION: MID

## 2022-11-27 ASSESSMENT — PAIN DESCRIPTION - PAIN TYPE: TYPE: SURGICAL PAIN

## 2022-11-27 ASSESSMENT — PAIN - FUNCTIONAL ASSESSMENT: PAIN_FUNCTIONAL_ASSESSMENT: PREVENTS OR INTERFERES SOME ACTIVE ACTIVITIES AND ADLS

## 2022-11-27 ASSESSMENT — PAIN DESCRIPTION - FREQUENCY: FREQUENCY: CONTINUOUS

## 2022-11-27 NOTE — PROGRESS NOTES
GENERAL SURGERY  DAILY PROGRESS NOTE  11/27/2022    CHIEF COMPLAINT:  Chief Complaint   Patient presents with    Abdominal Pain     Abd pain denies N/V last bowel movement today       SUBJECTIVE:  Patient voiding. Good urine output. NG tube in place with bilious output currently to suction. OBJECTIVE:  BP (!) 169/82   Pulse 81   Temp 98.1 °F (36.7 °C) (Oral)   Resp 22   Ht 5' 2\" (1.575 m)   Wt 190 lb 8 oz (86.4 kg)   SpO2 95%   BMI 34.84 kg/m²     GENERAL:  NAD. A&Ox3. LUNGS:  No increased work of breathing. CARDIOVASCULAR: RR  ABDOMEN:  Soft, non-distended, appropriately tender. Midline incision packing & dressing replaced,  wound clean. SToma pink & viable, no gas/stool in bag. No guarding, rigidity, rebound. ASSESSMENT/PLAN:  75 y.o. female with perforated stercoral colitis s/p ex lap carol's 11/22, post-op ileus    Continue NGT LIS, IVF, and NPO with gum/hard candy.   Encouraged ambulation  Await return of bowel function  Urinary retention requiring straight cath overnight  IV Abx  DVT PPx, SMI, Ambulate, PTOT    Yelena Akins DO  Surgery Resident PGY-4  11/27/2022  7:48 AM

## 2022-11-27 NOTE — PROGRESS NOTES
Patient seen and evaluated due to report of \"10/10\" pain from nursing staff this morning. Patient states her abdominal pain is much improved. Dressing taken down, C/D/I with fascia intact.        Belinda Rodriguez MD PGY 2

## 2022-11-27 NOTE — PROGRESS NOTES
[facility]   ATTENDING PHYSICIAN PROGRESS NOTE     I have examined the patient, reviewed the record, and discussed the case with the APN/ Resident. I have reviewed all relevant labs and imaging data. Please refer to the APN/ resident's note. I agree with the assessment and plan with the following corrections/ additions. The following summarizes my clinical findings and independent assessment. CC: perforated stercoral colitis    Patient states she had some bloating and pain overnight; reports she is feeling better this AM.    Awake and alert  Follows commands  Heart: Regular rate/rhythm; no murmur  Lungs: Fairly clear bilaterally  Abdomen: Soft; expected postop tenderness; hypoactive bowel sounds; ostomy pink/viable; dressing to midline incision  Skin: Warm/dry    Patient Active Problem List    Diagnosis Date Noted    Intra-abdominal free air of unknown etiology 11/22/2022    Sepsis without acute organ dysfunction ([facility] [address] 11/22/2022    Peritonitis ([facility] [address] 11/22/2022    Hyperlipidemia 12/02/2016    Essential hypertension, benign 12/02/2016    Vitamin D deficiency 12/02/2016     S/p ex lap with sigmoid colon resection/colostomy for perforated stercoral colitis    Maintain NG tube and monitor bowel function--cont reglan  Pain control  Monitor abdominal exam  OOB to chair/ambulate  IS/cough/deep breathing  Completed course of Zosyn  + fluid balance--diurese  DVT risk--PCDs/lovenox    Nieves Daniel MD, FACS  11/27/2022  10:04 AM    NOTE: This report was transcribed using voice recognition software. Every effort was made to ensure accuracy; however, inadvertent computerized transcription errors may be present.

## 2022-11-27 NOTE — FLOWSHEET NOTE
11/27/22 1624   Mobility   Activity Ambulate in room; Return to bed   Level of Assistance Modified independent, requires aide device or extra time   Assistive Device [facility] Ambulated (ft)   (to patient room door and back)

## 2022-11-28 ENCOUNTER — APPOINTMENT (OUTPATIENT)
Dept: CT IMAGING | Age: 67
DRG: 853 | End: 2022-11-28
Payer: MEDICARE

## 2022-11-28 LAB
ANION GAP SERPL CALCULATED.3IONS-SCNC: 21 MMOL/L (ref 7–16)
BUN BLDV-MCNC: 5 MG/DL (ref 6–23)
CALCIUM SERPL-MCNC: 9.4 MG/DL (ref 8.6–10.2)
CHLORIDE BLD-SCNC: 93 MMOL/L (ref 98–107)
CO2: 18 MMOL/L (ref 22–29)
CREAT SERPL-MCNC: 0.4 MG/DL (ref 0.5–1)
GFR SERPL CREATININE-BSD FRML MDRD: >60 ML/MIN/1.73
GLUCOSE BLD-MCNC: 96 MG/DL (ref 74–99)
POTASSIUM REFLEX MAGNESIUM: 3.7 MMOL/L (ref 3.5–5)
SODIUM BLD-SCNC: 132 MMOL/L (ref 132–146)

## 2022-11-28 PROCEDURE — 2580000003 HC RX 258: Performed by: STUDENT IN AN ORGANIZED HEALTH CARE EDUCATION/TRAINING PROGRAM

## 2022-11-28 PROCEDURE — 6360000004 HC RX CONTRAST MEDICATION: Performed by: RADIOLOGY

## 2022-11-28 PROCEDURE — 97535 SELF CARE MNGMENT TRAINING: CPT

## 2022-11-28 PROCEDURE — 6360000002 HC RX W HCPCS

## 2022-11-28 PROCEDURE — 6370000000 HC RX 637 (ALT 250 FOR IP): Performed by: SURGERY

## 2022-11-28 PROCEDURE — 2580000003 HC RX 258

## 2022-11-28 PROCEDURE — 80048 BASIC METABOLIC PNL TOTAL CA: CPT

## 2022-11-28 PROCEDURE — 1200000000 HC SEMI PRIVATE

## 2022-11-28 PROCEDURE — C9113 INJ PANTOPRAZOLE SODIUM, VIA: HCPCS

## 2022-11-28 PROCEDURE — 6370000000 HC RX 637 (ALT 250 FOR IP)

## 2022-11-28 PROCEDURE — 97530 THERAPEUTIC ACTIVITIES: CPT

## 2022-11-28 PROCEDURE — 6360000002 HC RX W HCPCS: Performed by: STUDENT IN AN ORGANIZED HEALTH CARE EDUCATION/TRAINING PROGRAM

## 2022-11-28 PROCEDURE — 36415 COLL VENOUS BLD VENIPUNCTURE: CPT

## 2022-11-28 PROCEDURE — 6370000000 HC RX 637 (ALT 250 FOR IP): Performed by: STUDENT IN AN ORGANIZED HEALTH CARE EDUCATION/TRAINING PROGRAM

## 2022-11-28 PROCEDURE — 74177 CT ABD & PELVIS W/CONTRAST: CPT

## 2022-11-28 PROCEDURE — A4216 STERILE WATER/SALINE, 10 ML: HCPCS

## 2022-11-28 RX ORDER — OXYCODONE HCL 5 MG/5 ML
10 SOLUTION, ORAL ORAL EVERY 4 HOURS PRN
Status: DISCONTINUED | OUTPATIENT
Start: 2022-11-28 | End: 2022-12-01 | Stop reason: HOSPADM

## 2022-11-28 RX ORDER — OXYCODONE HCL 5 MG/5 ML
5 SOLUTION, ORAL ORAL EVERY 4 HOURS PRN
Status: DISCONTINUED | OUTPATIENT
Start: 2022-11-28 | End: 2022-12-01 | Stop reason: HOSPADM

## 2022-11-28 RX ORDER — LACTULOSE 10 G/15ML
20 SOLUTION ORAL 3 TIMES DAILY
Status: COMPLETED | OUTPATIENT
Start: 2022-11-28 | End: 2022-11-29

## 2022-11-28 RX ORDER — ACETAMINOPHEN 160 MG/5ML
650 SOLUTION ORAL EVERY 6 HOURS
Status: DISCONTINUED | OUTPATIENT
Start: 2022-11-28 | End: 2022-12-01 | Stop reason: HOSPADM

## 2022-11-28 RX ADMIN — LORAZEPAM 0.5 MG: 0.5 TABLET ORAL at 00:32

## 2022-11-28 RX ADMIN — CHLORTHALIDONE 25 MG: 25 TABLET ORAL at 08:48

## 2022-11-28 RX ADMIN — ACETAMINOPHEN 650 MG: 325 TABLET, FILM COATED ORAL at 08:48

## 2022-11-28 RX ADMIN — HYDRALAZINE HYDROCHLORIDE 10 MG: 20 INJECTION INTRAMUSCULAR; INTRAVENOUS at 21:03

## 2022-11-28 RX ADMIN — IOPAMIDOL 50 ML: 612 INJECTION, SOLUTION INTRAVENOUS at 09:07

## 2022-11-28 RX ADMIN — IOPAMIDOL 75 ML: 755 INJECTION, SOLUTION INTRAVENOUS at 11:22

## 2022-11-28 RX ADMIN — ACETAMINOPHEN 650 MG: 160 SOLUTION ORAL at 14:10

## 2022-11-28 RX ADMIN — METOCLOPRAMIDE HYDROCHLORIDE 10 MG: 5 INJECTION INTRAMUSCULAR; INTRAVENOUS at 23:35

## 2022-11-28 RX ADMIN — SODIUM CHLORIDE 40 MG: 9 INJECTION, SOLUTION INTRAMUSCULAR; INTRAVENOUS; SUBCUTANEOUS at 08:49

## 2022-11-28 RX ADMIN — ACETAMINOPHEN 650 MG: 325 TABLET, FILM COATED ORAL at 00:28

## 2022-11-28 RX ADMIN — SODIUM CHLORIDE, PRESERVATIVE FREE 10 ML: 5 INJECTION INTRAVENOUS at 08:56

## 2022-11-28 RX ADMIN — LACTULOSE 20 G: 20 SOLUTION ORAL at 21:03

## 2022-11-28 RX ADMIN — METOCLOPRAMIDE HYDROCHLORIDE 10 MG: 5 INJECTION INTRAMUSCULAR; INTRAVENOUS at 16:12

## 2022-11-28 RX ADMIN — ACETAMINOPHEN 650 MG: 160 SOLUTION ORAL at 21:03

## 2022-11-28 RX ADMIN — OXYCODONE HYDROCHLORIDE 10 MG: 5 SOLUTION ORAL at 18:23

## 2022-11-28 RX ADMIN — METOCLOPRAMIDE HYDROCHLORIDE 10 MG: 5 INJECTION INTRAMUSCULAR; INTRAVENOUS at 00:28

## 2022-11-28 RX ADMIN — ROSUVASTATIN 20 MG: 20 TABLET, FILM COATED ORAL at 08:47

## 2022-11-28 RX ADMIN — POTASSIUM BICARBONATE 40 MEQ: 782 TABLET, EFFERVESCENT ORAL at 21:03

## 2022-11-28 RX ADMIN — ATENOLOL 50 MG: 50 TABLET ORAL at 08:47

## 2022-11-28 RX ADMIN — OXYCODONE HYDROCHLORIDE 5 MG: 5 SOLUTION ORAL at 23:38

## 2022-11-28 RX ADMIN — METOCLOPRAMIDE HYDROCHLORIDE 10 MG: 5 INJECTION INTRAMUSCULAR; INTRAVENOUS at 08:49

## 2022-11-28 RX ADMIN — LACTULOSE 20 G: 20 SOLUTION ORAL at 14:11

## 2022-11-28 RX ADMIN — POTASSIUM BICARBONATE 40 MEQ: 782 TABLET, EFFERVESCENT ORAL at 08:55

## 2022-11-28 RX ADMIN — SODIUM CHLORIDE, POTASSIUM CHLORIDE, SODIUM LACTATE AND CALCIUM CHLORIDE: 600; 310; 30; 20 INJECTION, SOLUTION INTRAVENOUS at 17:46

## 2022-11-28 RX ADMIN — ENOXAPARIN SODIUM 40 MG: 100 INJECTION SUBCUTANEOUS at 08:48

## 2022-11-28 RX ADMIN — DULOXETINE HYDROCHLORIDE 60 MG: 60 CAPSULE, DELAYED RELEASE ORAL at 08:47

## 2022-11-28 ASSESSMENT — PAIN SCALES - GENERAL
PAINLEVEL_OUTOF10: 5
PAINLEVEL_OUTOF10: 7
PAINLEVEL_OUTOF10: 5
PAINLEVEL_OUTOF10: 6
PAINLEVEL_OUTOF10: 7

## 2022-11-28 ASSESSMENT — PAIN DESCRIPTION - DESCRIPTORS
DESCRIPTORS: DISCOMFORT
DESCRIPTORS: ACHING
DESCRIPTORS: DISCOMFORT
DESCRIPTORS: THROBBING;ACHING;SHARP
DESCRIPTORS: ACHING;DISCOMFORT

## 2022-11-28 ASSESSMENT — PAIN DESCRIPTION - ORIENTATION
ORIENTATION: LEFT;RIGHT;MID
ORIENTATION: MID

## 2022-11-28 ASSESSMENT — PAIN DESCRIPTION - LOCATION
LOCATION: ABDOMEN

## 2022-11-28 NOTE — FLOWSHEET NOTE
ET Nurse (Follow up) 8403A  Admit Date: 11/22/2022  6:57 PM    Reason for consult:  New Colostomy    Stoma assessment:     11/28/22 1346   Colostomy LLQ   Placement Date/Time: 11/22/22 2255   Present on Admission/Arrival: No  Location: LLQ   Stomal Appliance 1 piece; Intact   Treatment   (Pouch not changed)   Stool Appearance Watery   Stool Color Brown;Red   Stool Amount Small       Plan:   Pouch not changed today  Patient sitting on side of bed working with therapy. Will follow up tomorrow.     John Gonzalez RN 11/28/2022 1:46 PM

## 2022-11-28 NOTE — PROGRESS NOTES
Physical Therapy  Physical Therapy Treatment    Name: Rose Lema  : 1955  MRN: [id number]      Date of Service: 2022    Evaluating PT:  Delmar Rojas PT, DPT [id number]    Room #:  1004/5206-R  Diagnosis:  Intra-abdominal free air of unknown etiology [K66.8]  PMHx/PSHx:    Past Medical History:   Diagnosis Date    Hyperlipidemia     Hypertension     Vitamin D deficiency      Procedure/Surgery:  Ex Lap   Precautions:  Fall risk, Alarms, Abdominal precautions, NG with suction  Equipment Needs:  TBD    SUBJECTIVE:   Pt lives alone in 2 floor home. 4 ALAN, 0 handrail  Bathroom on 1st and 2nd floors, bedroom on 2nd floor - flight of stairs   Equipment Owned: [facility], Shower chair     OBJECTIVE:   Initial Evaluation  Date: 22 Treatment  22 Short Term/ Long Term   Goals   AM-PAC 6 Clicks 55/03 39/11    Was pt agreeable to Eval/treatment? Yes Yes    Does pt have pain? Yes abdominal 4/10, chest pain 6/10 Yes abdominal 4/10    Bed Mobility  Rolling: NT  Supine to sit: Mod A  Sit to supine: NT  Scooting: SBA  Supine to sit: SBA with HOB elevated   Sit to supine: NT  Scooting: SBA  Supervision   Transfers Sit to stand: Min A   Stand to sit: Min A  Stand pivot: Min A with FWW Sit to stand: SBA   Stand to sit: SBA  Stand pivot: SBA with FWW Mod I with AAD   Ambulation    2 feet with FWW to chair Min A  30 feet with FWW SBA >50 feet with AAD Supervision   Stair negotiation: ascended and descended  NT  >12 steps with unilateral rail SBA   ROM BUE:  WFL  BLE:  AROM WFL     Strength BUE:  Refer to OT  BLE:  4-/5 BLE 4/5 4/5   Balance Sitting EOB:  SBA static and dynamic   Dynamic Standing:  Min to Mod A with FWW Sitting EOB:  SBA static and dynamic   Dynamic Standing:  Min to Mod A with FWW Sitting EOB:  Independent  Dynamic Standing:  Supervision with AAD     Pt is A & O x 4  Sensation:  Pt denies numbness and tingling to extremities  Edema:  B LE mild  Vitals:  WNLs    Therapeutic Exercises: NA    Patient education  Pt educated on role and benefits of physical therapy, safety and technique for mobility    Patient response to education:   Pt verbalized understanding Pt demonstrated skill Pt requires further education in this area   x x x     ASSESSMENT:    Conditions Requiring Skilled Therapeutic Intervention:    [x]Decreased strength     []Decreased ROM  []Decreased functional mobility  [x]Decreased balance   [x]Decreased endurance   []Decreased posture  [x]Decreased sensation  []Decreased coordination   []Decreased vision  [x]Decreased safety awareness   [x]Increased pain       Comments:  Patient deemed medically stable prior to therapy treatment. Patient was semi supine resting upon therapy arrival with daughter and provided consent to treatment. Mild abdominal pressure and pain this date, but no nausea. Patient additionally states she has been up utilizing bedside commode at this time. All transfers, bed mobility and gait demonstrated good effort of independence with assist for safety. Dynamic activities additionally revealed fair+ endurance comparatively to last treat. Education to patient and daughter on benefits of varying environments and frequency of therapy for optimized outcomes. She will continue to benefit from skilled PT post DC in order to improve mobility and safety. Treatment:  Patient practiced and was instructed in the following treatment:    Bed mobility training - pt given verbal and tactile cues to facilitate proper sequencing and safety during rolling and supine>sit as well as provided with physical assistance to complete task   Sitting EOB for >5 minutes for upright tolerance, postural awareness and BLE ROM, hygiene   Transfer training - pt was given verbal and tactile cues to facilitate proper hand placement, technique and safety during sit to stand and stand to sit as well as provided with physical assistance.   Gait training- pt was given verbal and tactile cues to facilitate safe use of FWW and improved step length during ambulation as well as provided with physical assistance. Education- AROM seated to reduce risk of atrophy, seated upright goal for 45 min for cardiopulmonary optimization and facilitation of BM and urinary voids. PHYSICAL THERAPY PLAN OF CARE:    Pt slowly progressing toward goals. POC to maintain as previously noted.      Time in  1335  Time out  1400    Total Treatment Time  25 minutes     CPT codes:  [] Low Complexity PT evaluation 10332  [] Moderate Complexity PT evaluation 61780  [] High Complexity PT evaluation 34426  [] PT Re-evaluation 15240  [] Gait training 52350 - minutes  [] Manual therapy 21765 - minutes  [x] Therapeutic activities 33942 25 minutes  [] Therapeutic exercises 19626 - minutes  [] Neuromuscular reeducation 45298 - minutes     Raúl Lopez, PT, DPT [id number]

## 2022-11-28 NOTE — CARE COORDINATION
Care Coordination  Spoke to the patient and the patients dgt in the room about discharge planning. Therapy's worked with the patient and she did much better. Awaiting therapy assessments. The patints wants to now go home with home care and they have used [facility] in the past and would like to use again. Referral made to [address] at [place] await if accepted. The patient has a wheeled walker and extented tuber bench already. She will need a 3 in 1 bedside commode upon discharge. Went over dme list they chose [facility]. Dme order in referral made Maryjo Alvarez. Back up plan [facility] also can accept as well as [facility] if needed. The Plan for Transition of Care is related to the following treatment goals: dme    The Patient and/or patient representative the patient was provided with a choice of provider and agrees   with the discharge plan. [x] Yes [] No    Freedom of choice list was provided with basic dialogue that supports the patient's individualized plan of care/goals, treatment preferences and shares the quality data associated with the providers.  [x] Yes [] No

## 2022-11-28 NOTE — CARE COORDINATION
Care Coordiantion   [facility] has accepted the patient and can accept the patient once medically discharged.

## 2022-11-28 NOTE — CARE COORDINATION
Care Coordination  The patient was originally admitted for a perforated stercoral colitis and is s/p ex lap carol's from 11/22. The patient continues with ngt and monitor bowel function. A ct of the abdomen was ordered for today and is pending. Have called for updated therapies. Last pt eval done on 11/25 [facility] was 13/24 and  and walked 2 feet with a front wheeled walker. Abdomen soft with hypoactive bowels sounds. She has a post op ileus with a midline incision. Vivian Hurst made a referral to [address] [place] [address] Samir as admission coordinator is gone until today. 2nd choice Called brian at [facility] to see if acceppted that referral was made last week. 3 choice is Kwaku dang called [facility] and left a voice message. 4 th choice is [facility]. Will follow        Received a call from kwaku [facility] can accept but would need plan for ngt once she more stable.

## 2022-11-28 NOTE — PROGRESS NOTES
[address] [place] [address],[address] [place] [address] [address], [place]      [facility]:  Patient Name: Jaxon Herring  MRN: [id number]  : 1955  Room: [address] [place] [address]     Per OT Eval:     Evaluating [facility], OTR/L #[id number]     Referring Provider: Michele Jimenez MD  Specific Provider Orders/Date: OT eval and treat 22     Diagnosis: Intra-abdominal free air of unknown etiology [K66.8]      Surgery / Procedure: EXPLORATORY LAPAROTOMY, MIKE'S  PROCEDURE on       Pertinent Medical History:  has a past medical history of Hyperlipidemia, Hypertension, and Vitamin D deficiency.          Precautions:  Fall Risk, abdominal, pedroza, Ileus      Assessment of current deficits    [x] Functional mobility         [x]ADLs           [x] Strength                  []Cognition    [x] Functional transfers       [x] IADLs         [x] Safety Awareness   [x]Endurance    [] Fine Coordination                      [x] Balance      [] Vision/perception   []Sensation      []Gross Motor Coordination          [] ROM           [] Delirium                   [] Motor Control      OT PLAN OF CARE   OT POC based on physician orders, patient diagnosis and results of clinical assessment     Frequency/Duration 1-3 days/wk for 2 weeks PRN   Specific OT Treatment Interventions to include:   * Instruction/training on adapted ADL techniques and AE recommendations to increase functional independence within precautions       * Training on energy conservation strategies, correct breathing pattern and techniques to improve independence/tolerance for self-care routine  * Functional transfer/mobility training/DME recommendations for increased independence, safety, and fall prevention  * Patient/Family education to increase follow through with safety techniques and functional independence  * Recommendation of environmental modifications for increased safety with functional transfers/mobility and ADLs  * Therapeutic exercise to improve motor endurance, ROM, and functional strength for ADLs/functional transfers  * Therapeutic activities to facilitate/challenge dynamic balance, stand tolerance for increased safety and independence with ADLs  * Therapeutic activities to facilitate gross/fine motor skills for increased independence with ADLs  * Positioning to improve skin integrity, interaction with environment and functional independence     Recommended Adaptive Equipment: AE for LB self-care tasks, TBD for additional AE     Home Living: Pt lives alone in 2 floor home with 4 ALAN 0 HR  Bathroom on 1st and 2nd floors, bedroom on 2nd floor - with full flight of steps to access   Bathroom setup: tub     Equipment owned: extended tub bench, SPC     Prior Level of Function: independent with ADLs , independent with IADLs; ambulated independently w/ SPC  Driving: yes     Pain Level: Pt c/o 7/10 abdominal pain at rest, increased in severity to 10/10 w/ activity this session ; RN notified. Pt medicated while at EOB.  Frequent, EXTENSIVE rest breaks provided during, following all functional tasks in order to manage pain     Cognition: A&O: 4/4; Follows 1 step directions           Memory:  good            Sequencing:  good            Problem solving:  fair            Judgement/safety:  fair              Functional Assessment:  AM-PAC Daily Activity Raw Score: 17/24    Initial Eval Status  Date: 11/23/22 Treatment Status  Date: 11/28/22 STGs = LTGs  Time frame: 10-14 days   Feeding NPO  SBA   Tray set up -    Grooming Minimal Assist   Min A  Combing hair seated EOB  Oral care standing sink side Modified [place]    UB Dressing Moderate Assist   Min A  Nando/doff gown seated  Applying deodorant seated Supervision    LB Dressing Dependent   Max A  Nando socks seated EOB  (History of hip replacement June 2022 with use of sock aide however declines to use) Minimal Assist    Bathing Maximal Assist  Min A  Completed UB bathing tasks seated in bathroom Minimal Assist    Toileting Dependent  Min A   Lower surface transfer with use of grab bars  Completed pericare Minimal Assist    Bed Mobility  Rolling: Max A  Supine to sit: Maximal Assist   Sit to supine: Maximal Assist x2 Rolling: SBA  Supine to sit: SBA  Sit to supine: NT - seated EOB at end of session  Supine to sit: Minimal Assist   Sit to supine: Minimal Assist    Functional Transfers Moderate Assist   Sit <> Stand: Min A with ww Supervision    Functional Mobility Moderate Assist w/ HHA  1 step to [facility] Min A with ww   From EOB <> bathroom Supervision    Balance Sitting:     Static:  Min>SBA    Dynamic:Min A  Standing: Mod A  Sitting:     Static:  SBA    Dynamic: SBA  Standing: Min A with ww       Activity Tolerance Fair-  Limited by severe pain  Fair   Limited d/t abdominal pain 7/10 and increased fatigue with functional mobility  Flat affect Good   Visual/  Perceptual Glasses: yes                          Hand Dominance R    AROM (PROM) Strength Additional Info:    RUE  WFL Deferred s/p abdominal sx good  and wfl FMC/dexterity noted during ADL tasks         LUE WFL Deferred good  and wfl FMC/dexterity noted during ADL tasks         Hearing: WFL   Sensation: c/o numbness/tingling B hands  Tone: WFL   Edema: none noted    Comments: Upon arrival pt lying supine in bed. Pt educated  through out treatment regarding proper technique & safety with bed mobility, functional transfers & mobility, walker safety & completion of ADL tasks with modified techniques to improve independence, safety, prevent falls and allow pt to return to prior level of function. OT will continue with POC with focus on increasing independence on ADLs. Pt completed log roll to EOB with use of bed rail. Functional mobility with use of ww from EOB <> bathroom. Pt completed UB bathing task seated with oral care standing sink side. Pt returned to EOB reported increased fatigue/pain.  pt seated EOB with daughter assisting with grooming tasks with all lines and tubes intact, call light within reach. Pt has made FAIR progress towards set goals.    Continue with current plan of care      Treatment Time In: 1335            Treatment Time Out: 1410               Treatment Charges: Mins Units   Ther Ex  [id number]     Manual Therapy [id number]     Thera Activities [id number] 12 1   ADL/Home Mgt [id number] 23 2   Neuro Re-ed [id number]     Group Therapy      Orthotic manage/training  [id number]     Non-Billable Time     Total Timed Treatment 35 [address] [place] [address], [address] [facility]

## 2022-11-28 NOTE — ANESTHESIA POSTPROCEDURE EVALUATION
Department of Anesthesiology  Postprocedure Note    Patient: Carley Gonzalez  MRN: [id number]  YOB: 1955  Date of evaluation: 11/28/2022      Procedure Summary     Date: 11/22/22 Room / Location: [facility] OR 10 / [facility]    Anesthesia Start: 2133 Anesthesia Stop: 2315    Procedure: EXPLORATORY LAPAROTOMY, PARTIAL DISTAL COLECTOMY, CREATION OF END-DISTAL COLOSTOMY, MIKE'S  PROCEDURE (Abdomen) Diagnosis:       Ischemic bowel disease ([address]      (BOWEL OBSTRUCTION)    Surgeons: Steve Hernandez MD Responsible Provider: Kimi Hall MD    Anesthesia Type: General ASA Status: 3 - Emergent          Anesthesia Type: General    Corby Phase I: Corby Score: 8    Corby Phase II:        Anesthesia Post Evaluation    Patient location during evaluation: PACU  Patient participation: complete - patient participated  Level of consciousness: awake and alert  Airway patency: patent  Nausea & Vomiting: no nausea and no vomiting  Complications: no  Cardiovascular status: blood pressure returned to baseline and hemodynamically stable  Respiratory status: acceptable and spontaneous ventilation  Hydration status: euvolemic  Multimodal analgesia pain management approach

## 2022-11-29 PROBLEM — E87.20 LACTIC ACIDOSIS: Status: ACTIVE | Noted: 2022-11-29

## 2022-11-29 LAB
AFB SMEAR: NORMAL
ANION GAP SERPL CALCULATED.3IONS-SCNC: 17 MMOL/L (ref 7–16)
BASOPHILS ABSOLUTE: 0.03 E9/L (ref 0–0.2)
BASOPHILS RELATIVE PERCENT: 0.3 % (ref 0–2)
BUN BLDV-MCNC: 4 MG/DL (ref 6–23)
CALCIUM SERPL-MCNC: 9.4 MG/DL (ref 8.6–10.2)
CHLORIDE BLD-SCNC: 91 MMOL/L (ref 98–107)
CO2: 25 MMOL/L (ref 22–29)
CREAT SERPL-MCNC: 0.3 MG/DL (ref 0.5–1)
EOSINOPHILS ABSOLUTE: 0.02 E9/L (ref 0.05–0.5)
EOSINOPHILS RELATIVE PERCENT: 0.2 % (ref 0–6)
GFR SERPL CREATININE-BSD FRML MDRD: >60 ML/MIN/1.73
GLUCOSE BLD-MCNC: 113 MG/DL (ref 74–99)
HCT VFR BLD CALC: 38.1 % (ref 34–48)
HEMOGLOBIN: 12.6 G/DL (ref 11.5–15.5)
IMMATURE GRANULOCYTES #: 0.12 E9/L
IMMATURE GRANULOCYTES %: 1 % (ref 0–5)
LYMPHOCYTES ABSOLUTE: 1.17 E9/L (ref 1.5–4)
LYMPHOCYTES RELATIVE PERCENT: 9.9 % (ref 20–42)
MAGNESIUM: 1.8 MG/DL (ref 1.6–2.6)
MCH RBC QN AUTO: 24.8 PG (ref 26–35)
MCHC RBC AUTO-ENTMCNC: 33.1 % (ref 32–34.5)
MCV RBC AUTO: 75 FL (ref 80–99.9)
MONOCYTES ABSOLUTE: 0.81 E9/L (ref 0.1–0.95)
MONOCYTES RELATIVE PERCENT: 6.8 % (ref 2–12)
NEUTROPHILS ABSOLUTE: 9.72 E9/L (ref 1.8–7.3)
NEUTROPHILS RELATIVE PERCENT: 81.8 % (ref 43–80)
PDW BLD-RTO: 14.3 FL (ref 11.5–15)
PLATELET # BLD: 378 E9/L (ref 130–450)
PMV BLD AUTO: 10.4 FL (ref 7–12)
POTASSIUM REFLEX MAGNESIUM: 3.3 MMOL/L (ref 3.5–5)
RBC # BLD: 5.08 E12/L (ref 3.5–5.5)
SODIUM BLD-SCNC: 133 MMOL/L (ref 132–146)
WBC # BLD: 11.9 E9/L (ref 4.5–11.5)

## 2022-11-29 PROCEDURE — C9113 INJ PANTOPRAZOLE SODIUM, VIA: HCPCS

## 2022-11-29 PROCEDURE — 6360000002 HC RX W HCPCS: Performed by: STUDENT IN AN ORGANIZED HEALTH CARE EDUCATION/TRAINING PROGRAM

## 2022-11-29 PROCEDURE — 99024 POSTOP FOLLOW-UP VISIT: CPT | Performed by: SURGERY

## 2022-11-29 PROCEDURE — 83735 ASSAY OF MAGNESIUM: CPT

## 2022-11-29 PROCEDURE — 2580000003 HC RX 258

## 2022-11-29 PROCEDURE — 6370000000 HC RX 637 (ALT 250 FOR IP): Performed by: SURGERY

## 2022-11-29 PROCEDURE — 6370000000 HC RX 637 (ALT 250 FOR IP)

## 2022-11-29 PROCEDURE — 6360000002 HC RX W HCPCS

## 2022-11-29 PROCEDURE — 80048 BASIC METABOLIC PNL TOTAL CA: CPT

## 2022-11-29 PROCEDURE — 36415 COLL VENOUS BLD VENIPUNCTURE: CPT

## 2022-11-29 PROCEDURE — 6370000000 HC RX 637 (ALT 250 FOR IP): Performed by: STUDENT IN AN ORGANIZED HEALTH CARE EDUCATION/TRAINING PROGRAM

## 2022-11-29 PROCEDURE — 1200000000 HC SEMI PRIVATE

## 2022-11-29 PROCEDURE — 2580000003 HC RX 258: Performed by: STUDENT IN AN ORGANIZED HEALTH CARE EDUCATION/TRAINING PROGRAM

## 2022-11-29 PROCEDURE — A4216 STERILE WATER/SALINE, 10 ML: HCPCS

## 2022-11-29 PROCEDURE — 85025 COMPLETE CBC W/AUTO DIFF WBC: CPT

## 2022-11-29 RX ORDER — FLUTICASONE PROPIONATE 50 MCG
2 SPRAY, SUSPENSION (ML) NASAL DAILY
Status: DISCONTINUED | OUTPATIENT
Start: 2022-11-29 | End: 2022-12-01 | Stop reason: HOSPADM

## 2022-11-29 RX ADMIN — LACTULOSE 20 G: 20 SOLUTION ORAL at 09:49

## 2022-11-29 RX ADMIN — POTASSIUM BICARBONATE 40 MEQ: 782 TABLET, EFFERVESCENT ORAL at 20:47

## 2022-11-29 RX ADMIN — ACETAMINOPHEN 650 MG: 160 SOLUTION ORAL at 20:47

## 2022-11-29 RX ADMIN — ROSUVASTATIN 20 MG: 20 TABLET, FILM COATED ORAL at 09:50

## 2022-11-29 RX ADMIN — ACETAMINOPHEN 650 MG: 160 SOLUTION ORAL at 09:49

## 2022-11-29 RX ADMIN — OXYCODONE HYDROCHLORIDE 10 MG: 5 SOLUTION ORAL at 04:23

## 2022-11-29 RX ADMIN — ENOXAPARIN SODIUM 40 MG: 100 INJECTION SUBCUTANEOUS at 09:50

## 2022-11-29 RX ADMIN — LORAZEPAM 0.5 MG: 0.5 TABLET ORAL at 04:28

## 2022-11-29 RX ADMIN — SODIUM CHLORIDE, PRESERVATIVE FREE 10 ML: 5 INJECTION INTRAVENOUS at 10:07

## 2022-11-29 RX ADMIN — OXYCODONE HYDROCHLORIDE 10 MG: 5 SOLUTION ORAL at 15:15

## 2022-11-29 RX ADMIN — FLUTICASONE PROPIONATE 2 SPRAY: 50 SPRAY, METERED NASAL at 23:53

## 2022-11-29 RX ADMIN — ATENOLOL 50 MG: 50 TABLET ORAL at 09:50

## 2022-11-29 RX ADMIN — POTASSIUM BICARBONATE 40 MEQ: 782 TABLET, EFFERVESCENT ORAL at 09:49

## 2022-11-29 RX ADMIN — METOCLOPRAMIDE HYDROCHLORIDE 10 MG: 5 INJECTION INTRAMUSCULAR; INTRAVENOUS at 23:53

## 2022-11-29 RX ADMIN — DULOXETINE HYDROCHLORIDE 60 MG: 60 CAPSULE, DELAYED RELEASE ORAL at 09:52

## 2022-11-29 RX ADMIN — SODIUM CHLORIDE 40 MG: 9 INJECTION, SOLUTION INTRAMUSCULAR; INTRAVENOUS; SUBCUTANEOUS at 09:51

## 2022-11-29 RX ADMIN — ACETAMINOPHEN 650 MG: 160 SOLUTION ORAL at 13:39

## 2022-11-29 RX ADMIN — METOCLOPRAMIDE HYDROCHLORIDE 10 MG: 5 INJECTION INTRAMUSCULAR; INTRAVENOUS at 16:27

## 2022-11-29 RX ADMIN — CHLORTHALIDONE 25 MG: 25 TABLET ORAL at 09:50

## 2022-11-29 RX ADMIN — METOCLOPRAMIDE HYDROCHLORIDE 10 MG: 5 INJECTION INTRAMUSCULAR; INTRAVENOUS at 09:51

## 2022-11-29 ASSESSMENT — PAIN DESCRIPTION - LOCATION
LOCATION: ABDOMEN

## 2022-11-29 ASSESSMENT — PAIN SCALES - GENERAL
PAINLEVEL_OUTOF10: 8

## 2022-11-29 ASSESSMENT — PAIN DESCRIPTION - DESCRIPTORS
DESCRIPTORS: PRESSURE;CRAMPING
DESCRIPTORS: DISCOMFORT
DESCRIPTORS: DISCOMFORT

## 2022-11-29 ASSESSMENT — PAIN DESCRIPTION - ORIENTATION
ORIENTATION: MID
ORIENTATION: LOWER;MID

## 2022-11-29 NOTE — PROGRESS NOTES
GENERAL SURGERY  DAILY PROGRESS NOTE  11/29/2022    Subjective:  Ostomy now with stool overnight. States ostomy appliance was emptied 2x yesterday. Endorsing flatus. Denies any nausea or emesis. Some abdominal discomfort with bowel function. Ambulating. Asking for diet. Objective:  /80   Pulse 82   Temp 96.8 °F (36 °C) (Temporal)   Resp 18   Ht 5' 2\" (1.575 m)   Wt 190 lb 8 oz (86.4 kg)   SpO2 93%   BMI 34.84 kg/m²     GENERAL:  Laying in bed, awake, alert, cooperative, no apparent distress  HEAD: Normocephalic, atraumatic. NG tube with bilious output  EYES: No sclera icterus, pupils equal  LUNGS:  No increased work of breathing. B/l breath sounds   CARDIOVASCULAR:  regular rate and rhythm   ABDOMEN:  Soft, appropriately tender. Midline incision pink with health granulation tissue. Stoma pink stool now present in ostomy appliance   EXTREMITIES: No edema or swelling  SKIN: Warm and dry    Assessment/Plan:  75 y.o. female with perforated stercoral colitis s/p ex lap carol's 11/22 now with post op ileus       - start CLD   - OOB; encourage ambulation   - pulmonary hygiene   - DVT prophylaxis w PCDs and Lovenox     Electronically signed by Pedro Mobley MD on 11/29/2022 at 6:20 AM     Agree  Adat  OR for delayed closure midline tomorrow. PTOT d/c planning as able.     Anna Hopper MD

## 2022-11-29 NOTE — FLOWSHEET NOTE
ET Nurse (Follow Up) [id number]  Admit Date: 11/22/2022  6:57 PM    Reason for consult:  New Colostomy      Stoma assessment:     11/29/22 1230   Colostomy LLQ   Placement Date/Time: 11/22/22 2255   Present on Admission/Arrival: No  Location: LLQ   Stomal Appliance 1 piece; Changed   Stoma  Assessment Red;Moist;Protrudes   Peristomal Assessment Intact   Treatment Bag change;Site care  (1 piece flat, barrier strips)   Stool Appearance Formed   Stool Color Brown   Stool Amount Small       Plan:  Pouch change  Teaching for pouch change and care  Will follow.     Jean Marie Rubio RN 11/29/2022 12:48 PM

## 2022-11-29 NOTE — PROGRESS NOTES
Comprehensive Nutrition Assessment    Type and Reason for Visit:  Initial, RD Nutrition Re-Screen/LOS (LOS 7)    Nutrition Recommendations/Plan:   Continue current diet. Recommend and start Ensure Clear ONS BID to optimize intake/nutrition status. Pt. Is NPO/CL status x 7 days. Recommend POC to consider nutrition support if PO intake not medically feasible. Will continue to monitor for nutrition progression and provide recs as appropriate. Consult RD as needed. Malnutrition Assessment:  Malnutrition Status: At risk for malnutrition (Comment) (11/29/22 1127)    Context:  Acute Illness     Findings of the 6 clinical characteristics of malnutrition:  Energy Intake:  75% or less of estimated energy requirements for 7 or more days  Weight Loss:  Unable to assess (2/2 lack of recent/measured wt hx on file to assess)     Body Fat Loss:  No significant body fat loss     Muscle Mass Loss:  No significant muscle mass loss    Fluid Accumulation:  No significant fluid accumulation     Strength:  Not Performed    Nutrition Assessment:    Pt. admit with perforated stercoral colitis s/p ex lap carol's 11/22 now with post op ileus. Per GS-OR for delayed closure midline tomorrow. Hx of ulcerative colitis, Crohn's disease, colon CA, and IBS. Pt. at risk d/t NPO/CL status x 7 days. Pt. currently on CL diet. Will start ONS and monitor for nutrition progression. Nutrition Related Findings:    +I/O (27L), hypokalemia, A&Ox4, +BS, soft/distended/tender abd, colostomy LLQ, no edema, No signs of muscle/fat wasting Wound Type: Surgical Incision       Current Nutrition Intake & Therapies:    Average Meal Intake: Unable to assess (CL)  Average Supplements Intake: None Ordered  ADULT DIET;  Clear Liquid  Diet NPO Exceptions are: Sips of Water with Meds    Anthropometric Measures:  Height: 5' 2\" (157.5 cm)  Ideal Body Weight (IBW): 110 lbs (50 kg)    Admission Body Weight: 190 lb 8 oz (86.4 kg) (11/23/22 first measured)  Current Body Weight: 190 lb 8 oz (86.4 kg) (11/23/22 no method), 173.2 % IBW. Weight Source: Not Specified  Current BMI (kg/m2): 34.8  Usual Body Weight:  (MARTHA d/t lack of recent/measured wt hx on file to assess)                       BMI Categories: Obese Class 1 (BMI 30.0-34. 9)    Estimated Daily Nutrient Needs:  Energy Requirements Based On: Formula  Weight Used for Energy Requirements: Current  Energy (kcal/day): 1700-1800kcal (MSJ x1.2-1. 3SF)  Weight Used for Protein Requirements: Ideal  Protein (g/day): 75-90g (1.5-1.8g/kgIBW)  Method Used for Fluid Requirements: 1 ml/kcal  Fluid (ml/day):     Nutrition Diagnosis:   Inadequate oral intake related to altered GI function as evidenced by NPO or clear liquid status due to medical condition, poor intake prior to admission    Nutrition Interventions:   Food and/or Nutrient Delivery: Continue Current Diet, Start Oral Nutrition Supplement (Ensure Clear BID)  Nutrition Education/Counseling: Education not indicated  Coordination of Nutrition Care: Continue to monitor while inpatient       Goals:     Goals: other (specify)  Specify Other Goals: nutrition progression    Nutrition Monitoring and Evaluation:   Behavioral-Environmental Outcomes: None Identified  Food/Nutrient Intake Outcomes: Diet Advancement/Tolerance, Supplement Intake  Physical Signs/Symptoms Outcomes: Biochemical Data, GI Status, Fluid Status or Edema, Nutrition Focused Physical Findings, Weight, Skin    Discharge Planning:     Too soon to determine     Lashawn Gomez RD  Contact: ext [phone]

## 2022-11-29 NOTE — CARE COORDINATION
Care Coordination  Patient is post op perforated stercoral colitis and is s/p ex lap carol's from 11/22. Ngt is now out and the patient is tolerating a clear l;liquid diet. The patient has a midline wet to dry dressing . The plan is for the patient to go to surgury tomorrow for a delayed closure Midline. Pt [facility] 16/24 and she walked 30 feet with a front wheeled walker and she has one at home and an extended tub bench. Referral was made to Rashawn Nuno at [address] [place] [address] , [facility] order in for a bedside commode. Per Rashawn Nuno at [address] [place] [address] has not been delivered to the patients room as of yet. Her Ostomy now has stool in it overnight. States ostomy appliance was emptied 2x yesterday. The patient has some abdominal discomfort. Patient has been up walking. Dietary consulted for nutritional needs. Daughter will be following the patient at home upon discharge. The daughter is her ride home.  Will follow

## 2022-11-29 NOTE — PLAN OF CARE
Problem: Discharge Planning  Goal: Discharge to home or other facility with appropriate resources  11/29/2022 1201 by Kaitlin Rodriguez RN  Outcome: Progressing  11/29/2022 1151 by Kaitlin Rodriguez RN  Outcome: Progressing     Problem: Pain  Goal: Verbalizes/displays adequate comfort level or baseline comfort level  11/29/2022 1201 by Kaitlin Rodriguez RN  Outcome: Progressing  11/29/2022 1151 by Kaitlin Rodriguez RN  Outcome: Progressing     Problem: Safety - Adult  Goal: Free from fall injury  11/29/2022 1201 by Kaitlin Rodriguez RN  Outcome: Progressing  11/29/2022 1151 by Kaitlin Rodriguez RN  Outcome: Progressing     Problem: ABCDS Injury Assessment  Goal: Absence of physical injury  11/29/2022 1201 by Kaitlin Rodriguez RN  Outcome: Progressing  11/29/2022 1151 by Kaitlin Rodriguez RN  Outcome: Progressing     Problem: Nutrition Deficit:  Goal: Optimize nutritional status  11/29/2022 1201 by Kaitlin Rodriguez RN  Outcome: Progressing  11/29/2022 1151 by Kaitlin Rodriguez RN  Outcome: Progressing

## 2022-11-30 ENCOUNTER — ANESTHESIA EVENT (OUTPATIENT)
Dept: OPERATING ROOM | Age: 67
DRG: 853 | End: 2022-11-30
Payer: MEDICARE

## 2022-11-30 ENCOUNTER — ANESTHESIA (OUTPATIENT)
Dept: OPERATING ROOM | Age: 67
DRG: 853 | End: 2022-11-30
Payer: MEDICARE

## 2022-11-30 LAB
ANION GAP SERPL CALCULATED.3IONS-SCNC: 15 MMOL/L (ref 7–16)
BASOPHILS ABSOLUTE: 0.01 E9/L (ref 0–0.2)
BASOPHILS RELATIVE PERCENT: 0.1 % (ref 0–2)
BUN BLDV-MCNC: 4 MG/DL (ref 6–23)
CALCIUM SERPL-MCNC: 9.2 MG/DL (ref 8.6–10.2)
CHLORIDE BLD-SCNC: 96 MMOL/L (ref 98–107)
CO2: 24 MMOL/L (ref 22–29)
CREAT SERPL-MCNC: 0.4 MG/DL (ref 0.5–1)
EOSINOPHILS ABSOLUTE: 0.03 E9/L (ref 0.05–0.5)
EOSINOPHILS RELATIVE PERCENT: 0.4 % (ref 0–6)
FUNGUS STAIN: NORMAL
GFR SERPL CREATININE-BSD FRML MDRD: >60 ML/MIN/1.73
GLUCOSE BLD-MCNC: 108 MG/DL (ref 74–99)
HCT VFR BLD CALC: 33.2 % (ref 34–48)
HEMOGLOBIN: 10.9 G/DL (ref 11.5–15.5)
IMMATURE GRANULOCYTES #: 0.13 E9/L
IMMATURE GRANULOCYTES %: 1.6 % (ref 0–5)
INR BLD: 1.2
LYMPHOCYTES ABSOLUTE: 1.34 E9/L (ref 1.5–4)
LYMPHOCYTES RELATIVE PERCENT: 16.6 % (ref 20–42)
MAGNESIUM: 1.9 MG/DL (ref 1.6–2.6)
MCH RBC QN AUTO: 24.7 PG (ref 26–35)
MCHC RBC AUTO-ENTMCNC: 32.8 % (ref 32–34.5)
MCV RBC AUTO: 75.1 FL (ref 80–99.9)
MONOCYTES ABSOLUTE: 0.72 E9/L (ref 0.1–0.95)
MONOCYTES RELATIVE PERCENT: 8.9 % (ref 2–12)
NEUTROPHILS ABSOLUTE: 5.86 E9/L (ref 1.8–7.3)
NEUTROPHILS RELATIVE PERCENT: 72.4 % (ref 43–80)
PDW BLD-RTO: 14.4 FL (ref 11.5–15)
PLATELET # BLD: 412 E9/L (ref 130–450)
PMV BLD AUTO: 9.7 FL (ref 7–12)
POTASSIUM REFLEX MAGNESIUM: 3.4 MMOL/L (ref 3.5–5)
PROTHROMBIN TIME: 12.6 SEC (ref 9.3–12.4)
RBC # BLD: 4.42 E12/L (ref 3.5–5.5)
SODIUM BLD-SCNC: 135 MMOL/L (ref 132–146)
WBC # BLD: 8.1 E9/L (ref 4.5–11.5)

## 2022-11-30 PROCEDURE — 2709999900 HC NON-CHARGEABLE SUPPLY: Performed by: SURGERY

## 2022-11-30 PROCEDURE — 2580000003 HC RX 258

## 2022-11-30 PROCEDURE — 2580000003 HC RX 258: Performed by: STUDENT IN AN ORGANIZED HEALTH CARE EDUCATION/TRAINING PROGRAM

## 2022-11-30 PROCEDURE — 3600000016 HC SURGERY LEVEL 6 ADDTL 15MIN: Performed by: SURGERY

## 2022-11-30 PROCEDURE — 36415 COLL VENOUS BLD VENIPUNCTURE: CPT

## 2022-11-30 PROCEDURE — 3600000006 HC SURGERY LEVEL 6 BASE: Performed by: SURGERY

## 2022-11-30 PROCEDURE — 2500000003 HC RX 250 WO HCPCS: Performed by: SURGERY

## 2022-11-30 PROCEDURE — 1200000000 HC SEMI PRIVATE

## 2022-11-30 PROCEDURE — 3700000000 HC ANESTHESIA ATTENDED CARE: Performed by: SURGERY

## 2022-11-30 PROCEDURE — 85610 PROTHROMBIN TIME: CPT

## 2022-11-30 PROCEDURE — 6360000002 HC RX W HCPCS: Performed by: ANESTHESIOLOGY

## 2022-11-30 PROCEDURE — 6370000000 HC RX 637 (ALT 250 FOR IP): Performed by: STUDENT IN AN ORGANIZED HEALTH CARE EDUCATION/TRAINING PROGRAM

## 2022-11-30 PROCEDURE — 6360000002 HC RX W HCPCS: Performed by: STUDENT IN AN ORGANIZED HEALTH CARE EDUCATION/TRAINING PROGRAM

## 2022-11-30 PROCEDURE — A4216 STERILE WATER/SALINE, 10 ML: HCPCS

## 2022-11-30 PROCEDURE — C9113 INJ PANTOPRAZOLE SODIUM, VIA: HCPCS

## 2022-11-30 PROCEDURE — 80048 BASIC METABOLIC PNL TOTAL CA: CPT

## 2022-11-30 PROCEDURE — 6360000002 HC RX W HCPCS

## 2022-11-30 PROCEDURE — 83735 ASSAY OF MAGNESIUM: CPT

## 2022-11-30 PROCEDURE — 6370000000 HC RX 637 (ALT 250 FOR IP)

## 2022-11-30 PROCEDURE — 3700000001 HC ADD 15 MINUTES (ANESTHESIA): Performed by: SURGERY

## 2022-11-30 PROCEDURE — 13160 SEC CLSR SURG WND/DEHSN XTN: CPT | Performed by: SURGERY

## 2022-11-30 PROCEDURE — 85025 COMPLETE CBC W/AUTO DIFF WBC: CPT

## 2022-11-30 PROCEDURE — 7100000000 HC PACU RECOVERY - FIRST 15 MIN: Performed by: SURGERY

## 2022-11-30 PROCEDURE — 7100000001 HC PACU RECOVERY - ADDTL 15 MIN: Performed by: SURGERY

## 2022-11-30 PROCEDURE — 6370000000 HC RX 637 (ALT 250 FOR IP): Performed by: SURGERY

## 2022-11-30 PROCEDURE — 0WQFXZZ REPAIR ABDOMINAL WALL, EXTERNAL APPROACH: ICD-10-PCS | Performed by: STUDENT IN AN ORGANIZED HEALTH CARE EDUCATION/TRAINING PROGRAM

## 2022-11-30 RX ORDER — GUAIFENESIN 400 MG/1
400 TABLET ORAL EVERY 8 HOURS PRN
Status: DISCONTINUED | OUTPATIENT
Start: 2022-11-30 | End: 2022-12-01 | Stop reason: HOSPADM

## 2022-11-30 RX ORDER — HYDRALAZINE HYDROCHLORIDE 20 MG/ML
5 INJECTION INTRAMUSCULAR; INTRAVENOUS
Status: DISCONTINUED | OUTPATIENT
Start: 2022-11-30 | End: 2022-11-30 | Stop reason: HOSPADM

## 2022-11-30 RX ORDER — LABETALOL HYDROCHLORIDE 5 MG/ML
5 INJECTION, SOLUTION INTRAVENOUS
Status: DISCONTINUED | OUTPATIENT
Start: 2022-11-30 | End: 2022-11-30 | Stop reason: HOSPADM

## 2022-11-30 RX ORDER — SODIUM CHLORIDE 0.9 % (FLUSH) 0.9 %
5-40 SYRINGE (ML) INJECTION EVERY 12 HOURS SCHEDULED
Status: DISCONTINUED | OUTPATIENT
Start: 2022-11-30 | End: 2022-11-30 | Stop reason: HOSPADM

## 2022-11-30 RX ORDER — MIDAZOLAM HYDROCHLORIDE 1 MG/ML
INJECTION INTRAMUSCULAR; INTRAVENOUS PRN
Status: DISCONTINUED | OUTPATIENT
Start: 2022-11-30 | End: 2022-11-30 | Stop reason: SDUPTHER

## 2022-11-30 RX ORDER — DIPHENHYDRAMINE HYDROCHLORIDE 50 MG/ML
12.5 INJECTION INTRAMUSCULAR; INTRAVENOUS
Status: DISCONTINUED | OUTPATIENT
Start: 2022-11-30 | End: 2022-11-30 | Stop reason: HOSPADM

## 2022-11-30 RX ORDER — DROPERIDOL 2.5 MG/ML
0.62 INJECTION, SOLUTION INTRAMUSCULAR; INTRAVENOUS
Status: DISCONTINUED | OUTPATIENT
Start: 2022-11-30 | End: 2022-11-30 | Stop reason: HOSPADM

## 2022-11-30 RX ORDER — LIDOCAINE HYDROCHLORIDE AND EPINEPHRINE 10; 10 MG/ML; UG/ML
INJECTION, SOLUTION INFILTRATION; PERINEURAL PRN
Status: DISCONTINUED | OUTPATIENT
Start: 2022-11-30 | End: 2022-11-30 | Stop reason: ALTCHOICE

## 2022-11-30 RX ORDER — PROPOFOL 10 MG/ML
INJECTION, EMULSION INTRAVENOUS CONTINUOUS PRN
Status: DISCONTINUED | OUTPATIENT
Start: 2022-11-30 | End: 2022-11-30 | Stop reason: SDUPTHER

## 2022-11-30 RX ORDER — SODIUM CHLORIDE 9 MG/ML
25 INJECTION, SOLUTION INTRAVENOUS PRN
Status: DISCONTINUED | OUTPATIENT
Start: 2022-11-30 | End: 2022-11-30 | Stop reason: HOSPADM

## 2022-11-30 RX ORDER — FENTANYL CITRATE 50 UG/ML
INJECTION, SOLUTION INTRAMUSCULAR; INTRAVENOUS PRN
Status: DISCONTINUED | OUTPATIENT
Start: 2022-11-30 | End: 2022-11-30 | Stop reason: SDUPTHER

## 2022-11-30 RX ORDER — ONDANSETRON 2 MG/ML
4 INJECTION INTRAMUSCULAR; INTRAVENOUS
Status: COMPLETED | OUTPATIENT
Start: 2022-11-30 | End: 2022-11-30

## 2022-11-30 RX ORDER — TRAMADOL HYDROCHLORIDE 50 MG/1
50 TABLET ORAL
Status: DISCONTINUED | OUTPATIENT
Start: 2022-11-30 | End: 2022-11-30 | Stop reason: HOSPADM

## 2022-11-30 RX ORDER — SODIUM CHLORIDE 0.9 % (FLUSH) 0.9 %
5-40 SYRINGE (ML) INJECTION PRN
Status: DISCONTINUED | OUTPATIENT
Start: 2022-11-30 | End: 2022-11-30 | Stop reason: HOSPADM

## 2022-11-30 RX ORDER — SODIUM CHLORIDE 9 MG/ML
INJECTION, SOLUTION INTRAVENOUS CONTINUOUS PRN
Status: DISCONTINUED | OUTPATIENT
Start: 2022-11-30 | End: 2022-11-30 | Stop reason: SDUPTHER

## 2022-11-30 RX ORDER — ACETAMINOPHEN 325 MG/1
650 TABLET ORAL
Status: DISCONTINUED | OUTPATIENT
Start: 2022-11-30 | End: 2022-11-30 | Stop reason: HOSPADM

## 2022-11-30 RX ORDER — GUAIFENESIN 600 MG/1
600 TABLET, EXTENDED RELEASE ORAL 2 TIMES DAILY PRN
Status: DISCONTINUED | OUTPATIENT
Start: 2022-11-30 | End: 2022-11-30

## 2022-11-30 RX ORDER — MIDAZOLAM HYDROCHLORIDE 2 MG/2ML
2 INJECTION, SOLUTION INTRAMUSCULAR; INTRAVENOUS
Status: DISCONTINUED | OUTPATIENT
Start: 2022-11-30 | End: 2022-11-30 | Stop reason: HOSPADM

## 2022-11-30 RX ORDER — IPRATROPIUM BROMIDE AND ALBUTEROL SULFATE 2.5; .5 MG/3ML; MG/3ML
1 SOLUTION RESPIRATORY (INHALATION)
Status: DISCONTINUED | OUTPATIENT
Start: 2022-11-30 | End: 2022-11-30 | Stop reason: HOSPADM

## 2022-11-30 RX ADMIN — DULOXETINE HYDROCHLORIDE 60 MG: 60 CAPSULE, DELAYED RELEASE ORAL at 08:42

## 2022-11-30 RX ADMIN — SODIUM CHLORIDE, PRESERVATIVE FREE 10 ML: 5 INJECTION INTRAVENOUS at 08:47

## 2022-11-30 RX ADMIN — LORAZEPAM 0.5 MG: 0.5 TABLET ORAL at 06:00

## 2022-11-30 RX ADMIN — METOCLOPRAMIDE HYDROCHLORIDE 10 MG: 5 INJECTION INTRAMUSCULAR; INTRAVENOUS at 08:43

## 2022-11-30 RX ADMIN — ACETAMINOPHEN 650 MG: 160 SOLUTION ORAL at 03:15

## 2022-11-30 RX ADMIN — ENOXAPARIN SODIUM 40 MG: 100 INJECTION SUBCUTANEOUS at 18:01

## 2022-11-30 RX ADMIN — ONDANSETRON 4 MG: 2 INJECTION INTRAMUSCULAR; INTRAVENOUS at 14:22

## 2022-11-30 RX ADMIN — ACETAMINOPHEN 650 MG: 160 SOLUTION ORAL at 20:47

## 2022-11-30 RX ADMIN — SODIUM CHLORIDE 40 MG: 9 INJECTION, SOLUTION INTRAMUSCULAR; INTRAVENOUS; SUBCUTANEOUS at 08:43

## 2022-11-30 RX ADMIN — ROSUVASTATIN 20 MG: 20 TABLET, FILM COATED ORAL at 08:42

## 2022-11-30 RX ADMIN — CHLORTHALIDONE 25 MG: 25 TABLET ORAL at 08:46

## 2022-11-30 RX ADMIN — FLUTICASONE PROPIONATE 2 SPRAY: 50 SPRAY, METERED NASAL at 08:50

## 2022-11-30 RX ADMIN — ACETAMINOPHEN 650 MG: 160 SOLUTION ORAL at 08:43

## 2022-11-30 RX ADMIN — HYDROMORPHONE HYDROCHLORIDE 0.25 MG: 1 INJECTION, SOLUTION INTRAMUSCULAR; INTRAVENOUS; SUBCUTANEOUS at 14:17

## 2022-11-30 RX ADMIN — METOCLOPRAMIDE HYDROCHLORIDE 10 MG: 5 INJECTION INTRAMUSCULAR; INTRAVENOUS at 18:00

## 2022-11-30 RX ADMIN — ATENOLOL 50 MG: 50 TABLET ORAL at 08:46

## 2022-11-30 RX ADMIN — GUAIFENESIN 400 MG: 400 TABLET ORAL at 18:02

## 2022-11-30 RX ADMIN — MIDAZOLAM 1 MG: 1 INJECTION INTRAMUSCULAR; INTRAVENOUS at 13:06

## 2022-11-30 RX ADMIN — ACETAMINOPHEN 650 MG: 160 SOLUTION ORAL at 18:00

## 2022-11-30 RX ADMIN — PROPOFOL 100 MCG/KG/MIN: 10 INJECTION, EMULSION INTRAVENOUS at 13:15

## 2022-11-30 RX ADMIN — SODIUM CHLORIDE: 9 INJECTION, SOLUTION INTRAVENOUS at 13:08

## 2022-11-30 RX ADMIN — FENTANYL CITRATE 50 MCG: 50 INJECTION, SOLUTION INTRAMUSCULAR; INTRAVENOUS at 13:11

## 2022-11-30 ASSESSMENT — PAIN DESCRIPTION - DESCRIPTORS
DESCRIPTORS: DULL;ACHING;DISCOMFORT
DESCRIPTORS: DISCOMFORT

## 2022-11-30 ASSESSMENT — PAIN DESCRIPTION - LOCATION
LOCATION: ABDOMEN

## 2022-11-30 ASSESSMENT — LIFESTYLE VARIABLES: SMOKING_STATUS: 0

## 2022-11-30 ASSESSMENT — PAIN - FUNCTIONAL ASSESSMENT: PAIN_FUNCTIONAL_ASSESSMENT: ACTIVITIES ARE NOT PREVENTED

## 2022-11-30 ASSESSMENT — PAIN SCALES - GENERAL
PAINLEVEL_OUTOF10: 6
PAINLEVEL_OUTOF10: 6
PAINLEVEL_OUTOF10: 0
PAINLEVEL_OUTOF10: 5
PAINLEVEL_OUTOF10: 6

## 2022-11-30 ASSESSMENT — PAIN DESCRIPTION - PAIN TYPE: TYPE: SURGICAL PAIN

## 2022-11-30 NOTE — ANESTHESIA PRE PROCEDURE
Department of Anesthesiology  Preprocedure Note       Name:  Jaxon Herring   Age:  75 y.o.  :  1955                                          MRN:  [id number]         Date:  2022      Surgeon: Raquel Spears):  Joanne Olvera MD    Procedure: Procedure(s):  ABDOMEN DEBRIDEMENT WOUND CLOSURE    Medications prior to admission:   Prior to Admission medications    Medication Sig Start Date End Date Taking? Authorizing Provider   DULoxetine (CYMBALTA) 60 MG extended release capsule TAKE ONE CAPSULE BY MOUTH ONCE A DAY 22   John Story, DO   atenolol-chlorthalidone (TENORETIC) 50-25 MG per tablet TAKE 1 TABLET DAILY 21   John Story, DO   pantoprazole (PROTONIX) 40 MG tablet TAKE 1 TABLET DAILY 21   John Story, DO   rosuvastatin (CRESTOR) 20 MG tablet TAKE 1 TABLET DAILY 21   John Story, DO   meclizine (ANTIVERT) 12.5 MG tablet Take 1 tablet by mouth 3 times daily as needed for Dizziness 21   John Story, DO   Cholecalciferol (VITAMIN D) 2000 units TABS tablet Take 1 tablet by mouth daily 18   John Story, DO       Current medications:    No current facility-administered medications for this visit. No current outpatient medications on file.      Facility-Administered Medications Ordered in Other Visits   Medication Dose Route Frequency Provider Last Rate Last Admin    guaiFENesin tablet 400 mg  400 mg Oral Q8H PRN Jensen Tomlin MD        fluticasone [facility]) 50 MCG/ACT nasal spray 2 spray  2 spray Each Nostril Daily Tamiko Kong DO   2 spray at 22 0850    acetaminophen (TYLENOL) 160 MG/5ML solution 650 mg  650 mg Oral Q6H Domenico Mi MD   650 mg at 22 0843    oxyCODONE (ROXICODONE) 5 MG/5ML solution 5 mg  5 mg Oral Q4H PRN Kimberly Holden MD   5 mg at 22 2338    Or    oxyCODONE (ROXICODONE) 5 MG/5ML solution 10 mg  10 mg Oral Q4H PRN Kimberly Holden MD   10 mg at 22 1515    labetalol (NORMODYNE;TRANDATE) injection 10 mg  10 mg IntraVENous Q4H PRN Bekah Blackwell MD        hydrALAZINE (APRESOLINE) injection 10 mg  10 mg IntraVENous Q4H PRN Bekah Blackwell MD   10 mg at 11/28/22 2103    metoclopramide (REGLAN) injection 10 mg  10 mg IntraVENous q8h Ezequiel Nava, DO   10 mg at 11/30/22 0843    calcium carbonate (TUMS) chewable tablet 500 mg  500 mg Oral TID PRN Ezequiel Nava DO   500 mg at 11/24/22 0036    trimethobenzamide (TIGAN) injection 200 mg  200 mg IntraMUSCular Q6H PRN Mari Hernandez MD   200 mg at 11/26/22 0559    pantoprazole (PROTONIX) 40 mg in sodium chloride (PF) 0.9 % 10 mL injection  40 mg IntraVENous Daily Mari Hernandez MD   40 mg at 11/30/22 0843    sodium chloride flush 0.9 % injection 10 mL  10 mL IntraVENous 2 times per day Jacquie Amos MD   10 mL at 11/30/22 0847    sodium chloride flush 0.9 % injection 10 mL  10 mL IntraVENous PRN Jacquie Amos MD   10 mL at 11/23/22 1548    0.9 % sodium chloride infusion   IntraVENous PRN Jacquie Amos MD        ondansetron (ZOFRAN-ODT) disintegrating tablet 4 mg  4 mg Oral Q8H PRN Jacquie Amos MD        Or    ondansetron [facility]) injection 4 mg  4 mg IntraVENous Q6H PRN Jacquie Amos MD   4 mg at 11/26/22 2114    DULoxetine (CYMBALTA) extended release capsule 60 mg  60 mg Oral Daily Jacquie Amos MD   60 mg at 11/30/22 0842    rosuvastatin (CRESTOR) tablet 20 mg  20 mg Oral Daily Jacquie Amos MD   20 mg at 11/30/22 0842    atenolol (TENORMIN) tablet 50 mg  50 mg Oral Daily Jacquie Amos MD   50 mg at 11/30/22 0846    And    chlorthalidone (HYGROTON) tablet 25 mg  25 mg Oral Daily Jacquie Amos MD   25 mg at 11/30/22 0846    enoxaparin (LOVENOX) injection 40 mg  40 mg SubCUTAneous Daily Chon B Capal, DO   40 mg at 11/29/22 0950    LORazepam (ATIVAN) tablet 0.5 mg  0.5 mg Oral Daily PRN Mari Hernandez MD   0.5 mg at 11/30/22 0600    benzocaine-menthol (CEPACOL SORE THROAT) lozenge 1 lozenge  1 lozenge Oral Q2H PRN Mari Hernandez MD   1 lozenge at 11/23/22 9501    lactated ringers infusion   IntraVENous Continuous Oliva Schulte MD 75 mL/hr at 11/30/22 0438 Rate Verify at 11/30/22 0438       Allergies:  No Known Allergies    Problem List:    Patient Active Problem List   Diagnosis Code    Hyperlipidemia E78.5    Essential hypertension, benign I10    Vitamin D deficiency E55.9    Intra-abdominal free air of unknown etiology K66.8    Sepsis without acute organ dysfunction ([facility] [address] A41.9    Peritonitis ([facility] [address] K65.9    Lactic acidosis E87.20       Past Medical History:        Diagnosis Date    Hyperlipidemia     Hypertension     Vitamin D deficiency        Past Surgical History:        Procedure Laterality Date    APPENDECTOMY      BREAST SURGERY Bilateral     cancer    HYSTERECTOMY (CERVIX STATUS UNKNOWN)      LAPAROTOMY N/A 11/22/2022    EXPLORATORY LAPAROTOMY, PARTIAL DISTAL COLECTOMY, CREATION OF END-DISTAL COLOSTOMY, MIKE'S  PROCEDURE performed by Scott Butcher MD at [place] [id number]         Social History:    Social History     Tobacco Use    Smoking status: Never    Smokeless tobacco: Never   Substance Use Topics    Alcohol use: No                                Counseling given: Not Answered      Vital Signs (Current): There were no vitals filed for this visit.                                            BP Readings from Last 3 Encounters:   11/30/22 (!) 147/67   06/20/22 136/82   12/20/21 136/76       NPO Status:                                                                                 BMI:   Wt Readings from Last 3 Encounters:   11/23/22 190 lb 8 oz (86.4 kg)   06/20/22 187 lb (84.8 kg)   12/20/21 180 lb (81.6 kg)     There is no height or weight on file to calculate BMI.    CBC:   Lab Results   Component Value Date/Time    WBC 8.1 11/30/2022 05:58 AM    RBC 4.42 11/30/2022 05:58 AM    HGB 10.9 11/30/2022 05:58 AM    HCT 33.2 11/30/2022 05:58 AM    MCV 75.1 11/30/2022 05:58 AM    RDW 14.4 11/30/2022 05:58 AM     11/30/2022 05:58 AM       CMP:   Lab Results   Component Value Date/Time     11/30/2022 05:58 AM    K 3.4 11/30/2022 05:58 AM    CL 96 11/30/2022 05:58 AM    CO2 24 11/30/2022 05:58 AM    BUN 4 11/30/2022 05:58 AM    CREATININE 0.4 11/30/2022 05:58 AM    GFRAA >60 12/20/2021 11:14 AM    AGRATIO 1.5 03/23/2021 07:14 AM    LABGLOM >60 11/30/2022 05:58 AM    GLUCOSE 108 11/30/2022 05:58 AM    PROT 7.8 11/22/2022 07:38 PM    CALCIUM 9.2 11/30/2022 05:58 AM    BILITOT 0.7 11/22/2022 07:38 PM    ALKPHOS 81 11/22/2022 07:38 PM    AST 22 11/22/2022 07:38 PM    ALT 19 11/22/2022 07:38 PM       POC Tests: No results for input(s): POCGLU, POCNA, POCK, POCCL, POCBUN, POCHEMO, POCHCT in the last 72 hours.     Coags:   Lab Results   Component Value Date/Time    PROTIME 12.6 11/30/2022 05:58 AM    PROTIME 11.2 03/23/2021 07:14 AM    PROTIME 11.2 03/23/2021 12:00 AM    PROTIME 11.2 03/23/2021 12:00 AM    INR 1.2 11/30/2022 05:58 AM    APTT 25.5 11/22/2022 07:38 PM       HCG (If Applicable): No results found for: PREGTESTUR, PREGSERUM, HCG, HCGQUANT     ABGs: No results found for: PHART, PO2ART, VCD1VAZ, XOI0LNU, BEART, I5NONGEO     Type & Screen (If Applicable):  No results found for: LABABO, LABRH    Drug/Infectious Status (If Applicable):  No results found for: HIV, HEPCAB    COVID-19 Screening (If Applicable):   Lab Results   Component Value Date/Time    COVID19 Not Detected 12/17/2020 07:52 AM           Anesthesia Evaluation  Patient summary reviewed and Nursing notes reviewed no history of anesthetic complications:   Airway: Mallampati: III  TM distance: <3 FB   Neck ROM: full  Mouth opening: > = 3 FB   Dental:          Pulmonary:Negative Pulmonary ROS breath sounds clear to auscultation      (-) not a current smoker                          ROS comment: H/o tonsillectomy   Cardiovascular:    (+) hypertension:, hyperlipidemia      ECG reviewed  Rhythm: regular  Rate: abnormal  Echocardiogram reviewed         Beta Blocker: Dose within 24 Hrs      ROS comment: ECHO 6/26/04:  Benign with      Neuro/Psych:   Negative Neuro/Psych ROS              GI/Hepatic/Renal:   (+) GERD:,          ROS comment: H/o appendectomy  CT abd/pelvis 11/22/22:  Impression  1. Pneumoperitoneum and ascites, most likely due to perforated sigmoid diverticulitis.    Critical results were called by Dr. Quinton Duenas MD to Dr. Danisha Cortes MD on 11/22/2022 at 20:17.  2. Incidental findings given above with note of mesenteric adenopathy which is more conspicuous and abnormality such as lymphoma is a consideration. .   Endo/Other:    (+) electrolyte abnormalities ( ), .                  ROS comment: H/o hysterectomy  H/o breast surgery Abdominal:             Vascular: negative vascular ROS. Other Findings:             Anesthesia Plan      MAC     ASA 3     (PIV)  Induction: intravenous. Anesthetic plan and risks discussed with patient. Plan discussed with CRNA.                     Allen Cabello MD   11/30/2022

## 2022-11-30 NOTE — OP NOTE
Operative Note      Patient: Ron Ledezma  YOB: 1955  MRN: [id number]    Date of Procedure: 11/30/2022    Pre-Op Diagnosis: Surgical abdominal wound, perforated stercoral colitis     Post-Op Diagnosis: Same       Procedure(s):  DELAYED PRIMARY CLOSURE OF ABDOMINAL WOUND    Surgeon(s):  Samantha Schofield MD    Assistant:   Resident: Reymundo Shukla MD; Matthieu Soto MD    Anesthesia: Monitor Anesthesia Care    Estimated Blood Loss (mL): Minimal    Complications: None    Specimens:   * No specimens in log *    Implants:  * No implants in log *      Drains:   Colostomy LLQ (Active)   Stomal Appliance Intact;Dry 11/30/22 0830   Stoma  Assessment Pink;Protrudes 11/30/22 0830   Peristomal Assessment Intact 11/29/22 2155   Treatment Bag change;Site care 11/29/22 1230   Stool Appearance Soft 11/29/22 1524   Stool Color Brown 11/29/22 1524   Stool Amount Small 11/29/22 1230   Output (mL) 100 ml 11/30/22 0830       [REMOVED] NG/OG/NJ/NE Tube Nasogastric 16 fr Left nostril (Removed)   Surrounding Skin Clean, dry & intact 11/27/22 2152   Securement device Tape 11/27/22 2152   Status Suction-low intermittent 11/27/22 2152   Placement Verified X-Ray (Initial) 11/25/22 0050   NG/OG/NJ/NE External Measurement (cm) 55 cm 11/27/22 2152   Drainage Appearance Brown 11/28/22 0450   Free Water/Flush (mL) 30 mL 11/27/22 2327   Output (mL) 400 ml 11/28/22 0450       [REMOVED] Urinary Catheter 11/22/22 Reyes (Removed)   Catheter Indications Perioperative use for selected surgical procedures 11/24/22 1003   Site Assessment No urethral drainage 11/23/22 0930   Urine Color Mary 11/24/22 1003   Urine Appearance Clear 11/24/22 1003   Status Draining 11/24/22 1003   Output (mL) 1200 mL 11/24/22 0541       Findings: Washout of abdominal wound, delayed primary closure. PROCEDURE IN DETAIL:   The patient was brought to the operating room and positioned supine on the OR table.  Sequential compression devices were placed on the patient's lower extremities and functioning. Preoperative antibiotics were administered. Anesthesia was obtained without complication as per the anesthesia record. Immediately prior to the procedure a time-out was called and the surgical checklist was reviewed and agreed upon by all present. The patient was prepped and draped in the usual sterile fashion. Patient's abdominal wound was then examined for any areas of necrotic appearing or fibrinous tissue however all tissue appeared healthy without any signs of infection. We then irrigated the wound with 300 cc of normal saline and suctioned dry. We reexamined the wound and it appeared hemostatic. We then injected 20 cc of local anesthetic and 2-0 nylon sutures were then used to close the wound in a simple interrupted fashion. Needle, sponge, and instrument counts were reported as correct x2. The patient tolerated the procedure well without complications and was transferred to the recovery area in good condition. Dr. Rickie Flores was present throughout the case.     Electronically signed by Ana Barbosa MD on 11/30/2022 at 1:54 PM

## 2022-11-30 NOTE — DISCHARGE SUMMARY
Physician Discharge Summary     Patient ID:  Quin Mathew  [id number]  83 y.o.  1955    Admit date: 11/22/2022    Discharge date and time: No discharge date for patient encounter. Admitting Physician: Rockney Severin, MD     Admission Diagnoses: Intra-abdominal free air of unknown etiology [K66.8]    Discharge Diagnoses: Principal Problem:    Intra-abdominal free air of unknown etiology  Active Problems:    Sepsis without acute organ dysfunction (HCC)    Peritonitis ([address]    Lactic acidosis  Resolved Problems:    * No resolved hospital problems. *      Admission Condition: good    Discharged Condition: stable    Indication for Admission: Perforated Sigmoid     Hospital Course/Procedures/Operation/treatments:     11/30: Resolved post-op ileus. Having ostomy function, tolerated liquids. For OR today for delayed closure of midline wound. 12/1: Doing well, having bowel function, tolerating diet. Likely discharge today -- take aquacell off Saturday          Consults:   IP CONSULT TO GENERAL SURGERY  IP CONSULT TO SOCIAL WORK    Significant Diagnostic Studies:   CT ABDOMEN PELVIS W IV CONTRAST Additional Contrast? None    Result Date: 11/22/2022  EXAMINATION: CT OF THE ABDOMEN AND PELVIS WITH CONTRAST 11/22/2022 7:15 pm TECHNIQUE: CT of the abdomen and pelvis was performed with the administration of intravenous contrast. Multiplanar reformatted images are provided for review. Automated exposure control, iterative reconstruction, and/or weight based adjustment of the mA/kV was utilized to reduce the radiation dose to as low as reasonably achievable.  COMPARISON: 4-17 HISTORY: ORDERING SYSTEM PROVIDED HISTORY: suddne onset lower abdominal pain, hisotry of hysterectomy and prior appendectomy TECHNOLOGIST PROVIDED HISTORY: Reason for exam:->suddne onset lower abdominal pain, hisotry of hysterectomy and prior appendectomy Additional Contrast?->None Decision Support Exception - unselect if not a suspected or confirmed emergency medical condition->Emergency Medical Condition (MA) What reading provider will be dictating this exam?->CRC FINDINGS: Lower Chest:   Stable tiny nodule lateral image 3 right-side. No routine follow-up imaging is recommended. .  No infiltrate or effusion in the visible lower chest.  Minimal hiatal hernia Organs: Low-attenuation hepatic lesion on image 55 is subcentimeter about 8 mm maximum size, not able to be characterized. Not clearly seen previously but noncontrast exam.  Subtle calcification right renal hilus around image 62 suspicious for small renal artery aneurysm. There are subcentimeter low-attenuation renal lesions which are too small to characterize but most likely represent cysts. GI/Bowel: Large amount of colonic stool suggests constipation. Diverticulosis of the sigmoid colon. Free air most conspicuous at the sigmoid mesentery along with fluid but not limited to this location also seen in the abdomen including into the subdiaphragmatic location, along with mild ascites mainly pericolic gutter position. Also mildly prominent fluid-filled small bowel suggesting reactive Pelvis: Post hysterectomy. Right hip replacement Peritoneum/Retroperitoneum: In addition to the findings described above there are prominent mesenteric lymph nodes most conspicuous at the root, present previously but appearing to be worse for example image 93 on the right is approximately 2.7 x 1.4 cm which may be an aggregate of nodes and along the retroperitoneum the nodes are mildly prominent although not technically enlarged. Consider PET-CT or tissue sampling of mesenteric lymph nodes as clinically warranted Bones/Soft Tissues: Upper left acetabulum sclerotic area probably a relatively large bone island but has increased compared to previously such that other etiology is not excluded. Scoliosis, lumbar degenerative changes, probable osteoporosis and chronic thoracolumbar compression deformities     1.  Pneumoperitoneum and ascites, most likely due to perforated sigmoid diverticulitis. Critical results were called by Dr. Stormy Burkitt, MD to Dr. Lisa Jonas MD on 11/22/2022 at 20:17. 2. Incidental findings given above with note of mesenteric adenopathy which is more conspicuous and abnormality such as lymphoma is a consideration. Discharge Exam:  See last PN    Disposition: home    In process/preliminary results:  Outstanding Order Results       Date and Time Order Name Status Description    11/22/2022  9:54 PM Culture with Smear, Acid Fast Bacillius In process     11/22/2022  9:54 PM Culture, Fungus In process     11/22/2022 12:00 AM Surgical Pathology In process             Patient Instructions:   Current Discharge Medication List             Details   oxyCODONE (ROXICODONE) 5 MG/5ML solution Take 5 mLs by mouth every 8 hours as needed for Pain for up to 3 days.   Qty: 15 mL, Refills: 0    Comments: Reduce doses taken as pain becomes manageable  Associated Diagnoses: Peritonitis ([address]                Details   LORazepam (ATIVAN) 0.5 MG tablet TAKE ONE TABLET BY MOUTH TWO TIMES A DAY for 30 days  Qty: 60 tablet, Refills: 0    Associated Diagnoses: Anxiety                Details   DULoxetine (CYMBALTA) 60 MG extended release capsule TAKE ONE CAPSULE BY MOUTH ONCE A DAY  Qty: 90 capsule, Refills: 3    Associated Diagnoses: Chronic pain syndrome      atenolol-chlorthalidone (TENORETIC) 50-25 MG per tablet TAKE 1 TABLET DAILY  Qty: 90 tablet, Refills: 3    Associated Diagnoses: Essential hypertension; Essential hypertension, benign      pantoprazole (PROTONIX) 40 MG tablet TAKE 1 TABLET DAILY  Qty: 90 tablet, Refills: 3      rosuvastatin (CRESTOR) 20 MG tablet TAKE 1 TABLET DAILY  Qty: 90 tablet, Refills: 3    Associated Diagnoses: Other hyperlipidemia      meclizine (ANTIVERT) 12.5 MG tablet Take 1 tablet by mouth 3 times daily as needed for Dizziness  Qty: 90 tablet, Refills: 1      Cholecalciferol (VITAMIN D) 2000 units TABS tablet Take 1 tablet by mouth daily  Qty: 90 tablet, Refills: 3             INFORMATION ABOUT OSTOMIES    LEAVE ABDOMINAL DRESSING ON UNTIL SUNDAY  You should have received instructions on how to care for your ostomy in the hospital.  If you did not please contact your surgeon or the [address] [place] [address]. You most likely will have a home health nurse helping you at home for the first few days as well. Instructions   Belt application. If a belt is used, attach to the tabs on the pouch's flange and adjust to a comfortable fit. Pouch replacement and gas release. Remove the pouch by pulling the tab at the top of the pouch while maintaining gentle pressure on the wafer. To release accumulated gas, use same tab and then reseal pouch. If you have a pouch with a filter system the gas should release naturally on its own. Tail closure attachment and removal: Tail closure may be attached before pouch application. Fold pouch tail over knife edge of closure only once. Hold in place. Press firmly on raised bar of closure (especially at center) until it snaps securely closed. To remove closure, separate knife edge from bar. If you have been given a pouch without a plastic clamp, roll the bottom of the pouch 3 times and push closed the Velcro system to lock in place. Stoma Problems  Discoloration: It should be pink to red in color. Should the stoma show color changes (gray/black), notify your surgeon. Retraction: The surface of the stoma will be at the same level as the surface of the abdomen or protrude ½ to 1 inch. If the stoma appears to be disappearing below the surface of your abdomen, then notify your surgeon. Prolapse: The stoma will project ½ to 1 inch from the surface of your abdomen. If the stoma becomes prolapsed it will look much longer than this. Herniation: A bulge in the skin around the stoma. Stenosis: Stoma becomes narrow.  Laceration: Small cuts to the soma surface due to scraping from the ostomy appliance or being too rough when cleansing stoma. Bleeding: Small pinpoint droplets of blood are normal. If bleeding doesn't stop, your surgeon should be notified. Nutrition  You will eventually be able to return to most of your normal dietary habits. You may need to modify your diet. You will learn which foods produce gas or odor, which cause diarrhea, and which produce constipation. The dietitian will give you information about your dietary regime. Ensure to drink plenty of fluids (6-8 glasses/day). Drinking cranberry juice twice a day is encouraged to maintain adequate output and lessen the occurrence of infection and odor in the urine. Activity   Once you have recovered your health, you may continue a normal daily routine as you did before the operation. You are advised not to do heavy lifting or do strenuous activity for at least six weeks unless ordered by your surgeon. Walking is a good form of exercise and helps to tone up muscles. Special Instructions  You will be shown how to care for your ostomy by the ET/Ostomy Nurse. Your incision may have a special dressing. You will be shown how to care for this and a Home Health Nurse will visit your home. Your sutures/staples are usually removed within 1-2 weeks following your surgery. You are advised to continue to do your deep breathing and coughing exercises to prevent chest congestion and to do your toe, leg, and ankle exercises to prevent blood clots in the legs and for good circulation. Take regular pain medications as ordered by your surgeon. Alcoholic beverages are not recommended while taking pain medications. Certain medications may affect the color and consistency of your ostomy. You may be given information on this, or your pharmacist can advise you also. When You Should Call a Doctor or a Nurse   Fever - this may be a sign of infection. Pain - prolonged unusual pain in the operative and stoma area.  Increased tenderness, swelling, bleeding, redness, or drainage from incision. Unusual change in stoma size and appearance (may become narrow, may grow outward, or may protrude). Obstruction at the stoma site and/or prolapse. Excessive bleeding from the stoma opening or a moderate amount in the pouch over several emptying of the pouch. (Note: The eating of beets will lead to some red discoloration). Injury to the stoma or a cut in the stoma. Continuous bleeding at the junction between the stoma and the skin. Severe watery discharge lasting more than 5-6 hours. Any other unusual occurrences regarding the ostomy. How to Take Care of Minor Skin Irritations   Wash affected area with water only. Soap leaves residue that may irritate the skin. Make sure the appliance is secure and doesn't leak. If it does leak, do not patch the leak. Change the appliance. Do not use ointment or cream unless recommended by your physician or ET Nurse. Do not use any barrier wipes containing alcohol to skin that is already irritated. If skin irritation persists, notify your ET nurse or doctor. Stoma Hints   Stoma will get smaller over a 6-8-week period. This is normal. Stoma should be pink to red in color and moist.   Stoma surface will be at the same level as the surface of the abdomen or protrude ½ to 1 inch. At times, your stoma may bleed slightly when you change the appliance or clean the surrounding skin. Do not be alarmed, this is not a cause for concern. POSTOPERATIVE TOPICS  After you go home, you may wish to have someone at home with you at least part of each day for the first one or two weeks to help with meals, bathing, etc. Often it is possible for you to stay with a relative or friend for several weeks until you are back on your feet.  If no one is available, let us know so that we can begin planning for a home health aide or home healthcare referral. Sometimes placement in a temporary rehabilitation facility is an option. Constipation  Many people get constipated following surgery from the pain medications. This is the most common problem experienced after discharge. Increase fluid intake and fiber in your diet. We recommend all patients start taking stool softeners (such as Metamucil®) two to three times per day as directed on the label while you are still taking narcotic pain medicine. Don't wait until you are constipated to start. Also, increase physical activity such as walking. Over the counter stool softeners, laxatives, etc:  Miralax  Milk of magnesia  Benefiber, Fibercon, citrucel  Colace (docusate)  Dulcolax  Senokot (senna)    Sleeping Problems  Occasionally patients will have problems sleeping at night after discharge home. Be sure you are not napping too much during the day. Naps should be limited to a total of one hour per day. Be sure to exercise by walking frequently, especially outside of your house, so you are tired and sleepy by evening. Finally, most people find that just before bedtime taking a warm bath and drinking some milk products (milk, milkshake, ice cream, etc.) will relax them, making them drowsy and promote a good night sleep. Taking a dose of pain medication just before bedtime also helps, especially if the reason for sleeping poorly is incisional pain. Fever  Take your temperature in the evening if you feel feverish especially in the first couple of weeks you are home. Call us if you have a temperature over 100.4°F degrees. Many patients experience night sweats early after surgery that produce enough perspiration to dampen your bedclothes. They are not usually caused by a fever. Although frustrating and worrisome, these sweats are of no significance and will resolve on their own; however, check your temperature if they occur. Leg Swelling  For the first 4?6 weeks following surgery, many patients notice swelling of their ankles and sometimes their lower legs.  This is usually the result of sitting for longer periods of time with their legs hanging down. This problem is quite preventable if you sit with your legs elevated on a footstool or recliner. If you have swelling, it will also tend to resolve by sleeping at night with your legs elevated on a pillow or two. You also should try wearing the tight?fitting stockings (YOMI hose). Wearing these stocking during the day will usually decrease leg swelling (edema) quite rapidly. Blood Clot in the Leg  Very rarely, a patient will develop a red, swollen, sore leg after surgery. This could be a sign of a blood clot forming. You should call us if you notice this happening. You should also call us if you have increasing cough, pain, or shortness of breath. We must be kept informed of your progress, especially if your condition worsens. Post?operative Depression  It is relatively common and normal for patients undergoing any type of major surgery to feel blue and slightly depressed in the early period after surgery, especially after they first arrive home. This is a common and very natural reaction generally related to the frustration of not being able to return rapidly to normal activities after surgery. This feeling is natural and passes quickly within a few days or weeks. The best remedy is to not sit in the house and brood, but rather to spend as much time as possible keeping busy, especially outside of the house walking, going to the shopping mall, the movies or dinner. At home, keeping busy with reading or other hobbies will take your mind off your surgery and the inconvenience of your convalescence. Remember this early depression is very common and normal and will pass. Diet  Your doctor will advise on any special diets. While a well?balanced diet does provide all the vitamins and minerals your body needs, you may wish to take a vitamin supplement. Often, many foods may taste bland or even unpleasant after surgery.  This is usually caused by medications and will pass with time, especially after you no longer require strong pain medication. In addition, you should drink plenty of fluids like water and juice, to help keep your bowels moving. Activities  A sensible balance of rest and activity is the key to recovery. Keep in mind that you will have good days when you seem to have a lot of energy, and days when you feel as though you are backsliding. This is normal convalescence. Try not to overextend yourself and slowly increase your activities every day. Household Tasks and Lifting  Although you may perform light tasks around the house, you should stop if you tire or have pain. You should lift no more than 10 pounds for about 6 weeks following surgery. Lifting may also include such tasks as vacuuming and lawn?mowing, so be sensible. Exercise  Walking is the single best exercise for anyone. Beginning upon your discharge from the hospital, we recommend that you do it daily, preferably two to three times per day. You should start by going short distances and gradually work your way up to a mile or more per day. If the weather outside is rainy or too hot, most [facility] have air conditioned shopping malls that are perfect for walking. Going for walks outside of your home is preferable as it will improve your self?confidence, feeling of well?being, and generally hasten recovery. We encourage an early return to normal activity. Sports  You should not engage in any strenuous sports such as weight?lifting, tennis or jogging, for the first 6 weeks after surgery. Then, restart these sports gradually. Other activities such as walking or riding a stationary bicycle are accepted and encouraged. Golf is initially restricted to some extent after surgery. Putting practice may be started as soon as you are comfortable. After 2?3 weeks you may start chipping practice.  However, you should wait 6 weeks before beginning drives, and then start at a driving range. It may be 8 weeks before you are playing a full 9 or 18 holes of golf. Driving  Driving should be left to someone else if you are taking narcotic pain medicine. You may, however, take car trips as a passenger. If the trip will last for over an hour, get out and walk every hour to stretch your legs. Always wear your seatbelt. Your driving restriction should only last 2?3 weeks. Social Activity  Resume your social and recreational activity slowly at first. Fatigue is the best gauge of overdoing. When you first return home, limit the number of visits by well?meaning, enthusiastic friends, but then gradually ease up these restrictions as you gain back your strength. Sex  Sexual activity is like exercise. Your capacity to tolerate it will return as you continue your recovery. There is no set time when you may resume sexual activity. Touching, hugging and massage are all wonderful for your mood and morale and are good alternatives until you feel ready for intercourse. Certain medications may affect sexual performance, so be patient. If you have questions, discuss this with your surgeon. Travel  Depending on the type of surgery you had, there may be restrictions on air travel for a short time after surgery. Ask your surgeon before making reservations. Generally, do not travel further than 250 miles until you have returned for your initial first post?op clinic visit 2 weeks after hospital discharge. Work  Most people can return to work approximately 6?8 weeks after surgery. An earlier return may be possible depending on the type of surgery you had and your type of work. If your job is particularly strenuous, light duty work should be done until at least 8 weeks after surgery. Alcohol  Alcohol consumption is discouraged but not prohibited DO NOT DRINK alcohol while on narcotic pain medications since they interact and cause bad side effects.  Use alcohol in moderation, and NEVER together with narcotic pain medication. Smoking  Tobacco is strictly forbidden. It is necessary to avoid all smoke while you are healing, and it is very strongly recommended that you stop smoking entirely. You must avoid tobacco use to preserve lung function. Avoid second hand smoke also. If you have had surgical removal of a lung cancer, continued smoking substantially increases the risk of a second lung cancer developing later. We will be very happy to refer you to a smoking cessation program to help you remain tobacco free. Cold and Flu Exposure  You should avoid large crowds or anyone with a known cold or flu until 6?8 weeks following your surgery. Your immune system is minimally depressed for a few weeks after surgery, but will return to normal by 4?6 weeks. If you develop a cough with significant phlegm production during this period, contact your surgeon. Flu Shot/ Pneumonia Shot  Many people ask about getting a flu shot. We recommend that you obtain a flu shot yearly. However, you should ask your family doctor or oncologist first. Also, inquire about a pneumonia shot (Pneumovax®), which is given once every five years. Emergencies  If you develop a sudden, serious and apparently life-threatening problem such as severe shortness of breath, chest pain suggesting angina or a heart attack, immediately call 911 for an ambulance and go directly to the nearest emergency room. After you are stabilized, your emergency room doctor may call your surgeon to inquire about more information about your medical condition. JUST FOR WOMEN  Lavona Cleverly (if you have an incision on your chest or back)  Wearing support undergarments for the first few weeks can put an uncomfortable amount of pressure on the incision. Many women have found that using an athletic support bra is very comfortable. You should buy the bra two sizes larger around the chest than your normally wear. The cup size will remain the same.  Look for a bra that hooks in the front for ease of application. Menses  Women who have not reached menopause may find their periods disrupted by the stress of surgery. This is not uncommon, and should resolve on its own within a month or two. If it has not returned to normal by that time, you should see your gynecologist for an evaluation. Hormone Replacement  If you are on a hormone replacement regimen, you should continue to take them as directed unless otherwise instructed by your surgeon.     Follow up:   [address], [address] [facility] [phone]    Follow up in 2 week(s)       Signed:  IVAN Carrillo CNP  12/1/2022  10:03 AM

## 2022-11-30 NOTE — PROGRESS NOTES
GENERAL SURGERY  DAILY PROGRESS NOTE  11/30/2022    Subjective:  No issues overnight. Having ostomy output, tolerated liquid diet. No nausea or vomiting. Objective:  BP (!) 145/73   Pulse 77   Temp 98.1 °F (36.7 °C) (Temporal)   Resp 18   Ht 5' 2\" (1.575 m)   Wt 190 lb 8 oz (86.4 kg)   SpO2 93%   BMI 34.84 kg/m²     GENERAL:  Laying in bed, awake, alert, cooperative, no apparent distress  HEAD: Normocephalic, atraumatic. LUNGS:  No increased work of breathing. CARDIOVASCULAR:  regular rate and rhythm   ABDOMEN:  Soft, appropriately tender. Midline incision packed & dressed.  Stoma pink stool now present in ostomy appliance     Assessment/Plan:  75 y.o. female with perforated stercoral colitis s/p ex lap carol's 11/22, resolving post-op ileus    OR today for delayed closure of midline wound  Advance diet post-op  Continue ambulate, PTOT, SMI  DVT PPx  All questions answered    Marquis Marx DO  Surgery Resident PGY-5  11/30/2022  6:21 AM

## 2022-11-30 NOTE — CARE COORDINATION
Social Work/Case Management Transition of Care Planning (Valeri Figueroa [place] [phone]): Per report, patient is scheduled for OR today for delayed primary closure of the abdominal wound. Diet will be advanced post op. Discharge plan at this time is for patient to return home with [facility]. Home care orders will need to be entered. Daughter to transport. CM/SW will follow for any discharge needs.   EMILY Panchal  11/30/2022

## 2022-12-01 VITALS
BODY MASS INDEX: 35.06 KG/M2 | RESPIRATION RATE: 16 BRPM | OXYGEN SATURATION: 93 % | HEART RATE: 81 BPM | DIASTOLIC BLOOD PRESSURE: 81 MMHG | WEIGHT: 190.5 LBS | HEIGHT: 62 IN | SYSTOLIC BLOOD PRESSURE: 130 MMHG | TEMPERATURE: 97.2 F

## 2022-12-01 LAB
ANION GAP SERPL CALCULATED.3IONS-SCNC: 12 MMOL/L (ref 7–16)
BASOPHILS ABSOLUTE: 0.02 E9/L (ref 0–0.2)
BASOPHILS RELATIVE PERCENT: 0.2 % (ref 0–2)
BUN BLDV-MCNC: 5 MG/DL (ref 6–23)
CALCIUM SERPL-MCNC: 9.6 MG/DL (ref 8.6–10.2)
CHLORIDE BLD-SCNC: 95 MMOL/L (ref 98–107)
CO2: 27 MMOL/L (ref 22–29)
CREAT SERPL-MCNC: 0.4 MG/DL (ref 0.5–1)
EOSINOPHILS ABSOLUTE: 0.03 E9/L (ref 0.05–0.5)
EOSINOPHILS RELATIVE PERCENT: 0.4 % (ref 0–6)
GFR SERPL CREATININE-BSD FRML MDRD: >60 ML/MIN/1.73
GLUCOSE BLD-MCNC: 111 MG/DL (ref 74–99)
HCT VFR BLD CALC: 39.3 % (ref 34–48)
HEMOGLOBIN: 12.4 G/DL (ref 11.5–15.5)
IMMATURE GRANULOCYTES #: 0.1 E9/L
IMMATURE GRANULOCYTES %: 1.2 % (ref 0–5)
LYMPHOCYTES ABSOLUTE: 2.43 E9/L (ref 1.5–4)
LYMPHOCYTES RELATIVE PERCENT: 29.3 % (ref 20–42)
MAGNESIUM: 1.9 MG/DL (ref 1.6–2.6)
MCH RBC QN AUTO: 24.2 PG (ref 26–35)
MCHC RBC AUTO-ENTMCNC: 31.6 % (ref 32–34.5)
MCV RBC AUTO: 76.8 FL (ref 80–99.9)
MONOCYTES ABSOLUTE: 0.81 E9/L (ref 0.1–0.95)
MONOCYTES RELATIVE PERCENT: 9.8 % (ref 2–12)
NEUTROPHILS ABSOLUTE: 4.89 E9/L (ref 1.8–7.3)
NEUTROPHILS RELATIVE PERCENT: 59.1 % (ref 43–80)
PDW BLD-RTO: 14.4 FL (ref 11.5–15)
PLATELET # BLD: 377 E9/L (ref 130–450)
PMV BLD AUTO: 10.4 FL (ref 7–12)
POTASSIUM REFLEX MAGNESIUM: 3.3 MMOL/L (ref 3.5–5)
RBC # BLD: 5.12 E12/L (ref 3.5–5.5)
SODIUM BLD-SCNC: 134 MMOL/L (ref 132–146)
WBC # BLD: 8.3 E9/L (ref 4.5–11.5)

## 2022-12-01 PROCEDURE — A4216 STERILE WATER/SALINE, 10 ML: HCPCS

## 2022-12-01 PROCEDURE — 6360000002 HC RX W HCPCS: Performed by: STUDENT IN AN ORGANIZED HEALTH CARE EDUCATION/TRAINING PROGRAM

## 2022-12-01 PROCEDURE — 83735 ASSAY OF MAGNESIUM: CPT

## 2022-12-01 PROCEDURE — 6360000002 HC RX W HCPCS

## 2022-12-01 PROCEDURE — 6370000000 HC RX 637 (ALT 250 FOR IP): Performed by: STUDENT IN AN ORGANIZED HEALTH CARE EDUCATION/TRAINING PROGRAM

## 2022-12-01 PROCEDURE — 36415 COLL VENOUS BLD VENIPUNCTURE: CPT

## 2022-12-01 PROCEDURE — 6370000000 HC RX 637 (ALT 250 FOR IP)

## 2022-12-01 PROCEDURE — 85025 COMPLETE CBC W/AUTO DIFF WBC: CPT

## 2022-12-01 PROCEDURE — 6370000000 HC RX 637 (ALT 250 FOR IP): Performed by: SURGERY

## 2022-12-01 PROCEDURE — C9113 INJ PANTOPRAZOLE SODIUM, VIA: HCPCS

## 2022-12-01 PROCEDURE — 80048 BASIC METABOLIC PNL TOTAL CA: CPT

## 2022-12-01 PROCEDURE — 2580000003 HC RX 258

## 2022-12-01 RX ORDER — OXYCODONE HCL 5 MG/5 ML
5 SOLUTION, ORAL ORAL EVERY 8 HOURS PRN
Qty: 15 ML | Refills: 0 | Status: SHIPPED | OUTPATIENT
Start: 2022-12-01 | End: 2022-12-04

## 2022-12-01 RX ORDER — LORAZEPAM 0.5 MG/1
TABLET ORAL
Qty: 60 TABLET | Refills: 0 | Status: SHIPPED
Start: 2022-12-01 | End: 2022-12-05

## 2022-12-01 RX ORDER — POTASSIUM CHLORIDE 20 MEQ/1
40 TABLET, EXTENDED RELEASE ORAL ONCE
Status: COMPLETED | OUTPATIENT
Start: 2022-12-01 | End: 2022-12-01

## 2022-12-01 RX ADMIN — POTASSIUM CHLORIDE 40 MEQ: 1500 TABLET, EXTENDED RELEASE ORAL at 06:20

## 2022-12-01 RX ADMIN — METOCLOPRAMIDE HYDROCHLORIDE 10 MG: 5 INJECTION INTRAMUSCULAR; INTRAVENOUS at 00:57

## 2022-12-01 RX ADMIN — ACETAMINOPHEN 650 MG: 160 SOLUTION ORAL at 07:52

## 2022-12-01 RX ADMIN — ATENOLOL 50 MG: 50 TABLET ORAL at 07:50

## 2022-12-01 RX ADMIN — OXYCODONE HYDROCHLORIDE 10 MG: 5 SOLUTION ORAL at 06:17

## 2022-12-01 RX ADMIN — FLUTICASONE PROPIONATE 2 SPRAY: 50 SPRAY, METERED NASAL at 07:52

## 2022-12-01 RX ADMIN — ENOXAPARIN SODIUM 40 MG: 100 INJECTION SUBCUTANEOUS at 07:51

## 2022-12-01 RX ADMIN — DULOXETINE HYDROCHLORIDE 60 MG: 60 CAPSULE, DELAYED RELEASE ORAL at 07:50

## 2022-12-01 RX ADMIN — ROSUVASTATIN 20 MG: 20 TABLET, FILM COATED ORAL at 07:50

## 2022-12-01 RX ADMIN — GUAIFENESIN 400 MG: 400 TABLET ORAL at 08:01

## 2022-12-01 RX ADMIN — METOCLOPRAMIDE HYDROCHLORIDE 10 MG: 5 INJECTION INTRAMUSCULAR; INTRAVENOUS at 07:51

## 2022-12-01 RX ADMIN — OXYCODONE HYDROCHLORIDE 5 MG: 5 SOLUTION ORAL at 00:57

## 2022-12-01 RX ADMIN — METOCLOPRAMIDE HYDROCHLORIDE 10 MG: 5 INJECTION INTRAMUSCULAR; INTRAVENOUS at 14:39

## 2022-12-01 RX ADMIN — CHLORTHALIDONE 25 MG: 25 TABLET ORAL at 07:50

## 2022-12-01 RX ADMIN — LORAZEPAM 0.5 MG: 0.5 TABLET ORAL at 06:20

## 2022-12-01 RX ADMIN — OXYCODONE HYDROCHLORIDE 10 MG: 5 SOLUTION ORAL at 13:17

## 2022-12-01 RX ADMIN — ACETAMINOPHEN 650 MG: 160 SOLUTION ORAL at 14:39

## 2022-12-01 RX ADMIN — SODIUM CHLORIDE 40 MG: 9 INJECTION, SOLUTION INTRAMUSCULAR; INTRAVENOUS; SUBCUTANEOUS at 07:52

## 2022-12-01 ASSESSMENT — PAIN SCALES - GENERAL
PAINLEVEL_OUTOF10: 8
PAINLEVEL_OUTOF10: 3
PAINLEVEL_OUTOF10: 6
PAINLEVEL_OUTOF10: 6
PAINLEVEL_OUTOF10: 7

## 2022-12-01 ASSESSMENT — PAIN DESCRIPTION - DESCRIPTORS
DESCRIPTORS: DISCOMFORT
DESCRIPTORS: DISCOMFORT

## 2022-12-01 ASSESSMENT — PAIN DESCRIPTION - LOCATION
LOCATION: ABDOMEN
LOCATION: ABDOMEN

## 2022-12-01 NOTE — CARE COORDINATION
Social Work/Case Management Transition of Care Planning ([place]liv[place] [phone]): Met with patient and daughter at bedside to discuss order for bedside commode. They do not have any preference for DME company. Phone call to [facility]. They stated insurance will cover the cost for [facility] as long as it is not used over the commode and patient is confined to one room. They can deliver tomorrow morning. Updated patient and her daughter. They are agreeable to this.     EMILY Castillo  12/1/2022

## 2022-12-01 NOTE — ACP (ADVANCE CARE PLANNING)
Advance Care Planning   Healthcare Decision Maker:    Primary Decision Maker: Mary Harrington Child - [phone]    Secondary Decision Maker: Sarah Sol - Brother/Sister - [phone]  Per conversation with patient at bedside

## 2022-12-01 NOTE — ANESTHESIA POSTPROCEDURE EVALUATION
Department of Anesthesiology  Postprocedure Note    Patient: Jose Wilkes  MRN: [id number]  YOB: 1955  Date of evaluation: 11/30/2022      Procedure Summary     Date: 11/30/22 Room / Location: [facility] OR 07 / [facility]    Anesthesia Start: 8146 Anesthesia Stop: 3414    Procedure: DELAYED PRIMARY CLOSURE OF ABDOMINAL WOUND (Abdomen) Diagnosis:       Colitis      (/)    Surgeons: Denise Ahmadi MD Responsible Provider: Ramiro Hu MD    Anesthesia Type: MAC ASA Status: 3          Anesthesia Type: No value filed.     Corby Phase I: Corby Score: 9    Corby Phase II:        Anesthesia Post Evaluation    Patient location during evaluation: PACU  Patient participation: complete - patient participated  Level of consciousness: awake and alert  Airway patency: patent  Nausea & Vomiting: no nausea and no vomiting  Complications: no  Cardiovascular status: hemodynamically stable  Respiratory status: acceptable  Hydration status: euvolemic

## 2022-12-01 NOTE — FLOWSHEET NOTE
ET Nurse (Follow up) 8403A  Admit Date: 11/22/2022  6:57 PM    Reason for consult:  New Colostomy    Stoma assessment:     12/01/22 1339   Colostomy LLQ   Placement Date/Time: 11/22/22 7925   Present on Admission/Arrival: No  Location: LLQ   Stomal Appliance 1 piece; Changed   Stoma  Assessment Red;Moist;Protrudes   Peristomal Assessment Intact   Treatment Bag change;Site care  (1 piece flat, barrier strips)   Stool Appearance Loose   Stool Color Brown   Stool Amount Small       Plan:  Pouch change  Patient educated on pouch change and ostomy care  Home going education material reviewed with patient and home going supplies left at bedside. Patient to have home care for ostomy teaching on discharge.     Kt Nieves RN 12/1/2022 1:41 PM

## 2022-12-01 NOTE — PROGRESS NOTES
GENERAL SURGERY  DAILY PROGRESS NOTE  12/1/2022    Subjective:  No issues overnight. Tolerating diet, having ostomy function. Pain well controlled. Wants to go home. Objective:  /63   Pulse 87   Temp 97.5 °F (36.4 °C) (Temporal)   Resp 18   Ht 5' 2\" (1.575 m)   Wt 190 lb 8 oz (86.4 kg)   SpO2 95%   BMI 34.84 kg/m²     GENERAL:  Laying in bed, awake, alert, cooperative, no apparent distress  HEAD: Normocephalic, atraumatic. LUNGS:  No increased work of breathing. CARDIOVASCULAR:  regular rate and rhythm   ABDOMEN:  Soft, appropriately tender. Midline incision closed & dressed.  Stoma pink stool now present in ostomy appliance     Assessment/Plan:  75 y.o. female with perforated stercoral colitis s/p ex lap carol's 11/22, resolved post-op ileus, delayed primary closure w nylons 11/30    Doing well, having bowel function, tolerating diet  Continue ambulate, PTOT, SMI  DVT PPx  Likely discharge today -- take aquacell off Saturday    Raul Souza DO  Surgery Resident PGY-5  12/1/2022  6:11 AM      Agree  Dc home  FU with Dr Carito Walters MD

## 2022-12-01 NOTE — PROGRESS NOTES
Physician Progress Note      PATIENTLilly Day  CSN #:                  [id number]  :                       1955  ADMIT DATE:       2022 6:57 PM  [address] [place] DATE:  RESPONDING  PROVIDER #:        Mahesh IQBAL DO          QUERY TEXT:    Pt admitted with sepsis, peritonitis and underwent exploratory laparotomy,   Mike's procedure on , noted documentation of \"Post op Ileus\"    GS. ? If possible, please document in progress notes and discharge summary the   relationship if any between the Ileus and the surgery: The medical record reflects the following:  Risk Factors:  Underwent \"EXPLORATORY LAPAROTOMY, MIKE'S PROCEDURE\" . Clinical Indicators: H&P  assessment \"free air and peritonitis. CT   imaging and clinical presentation concerning for sigmoid perforation\".    to  GS PN assessment \"post-op ileus\",  GS PN assessment \"s/p ex lap   Mike's , resolving post-op ileus\".  GS PN \"Prolonged post op   ileus\".  CT A&P \"Distended and dilated fluid-filled small bowel  segments with enteral contrast present throughout the small bowel segments   however without colonic progression most consistent with postoperative ileus\". Treatment: NG tube, repeated CT Abdomen Pelvis, Reglan  Options provided:  -- Post op Ileus?is due to surgery  -- Post op Ileus unrelated to surgery, as an expected outcome  -- Other - I will add my own diagnosis  -- Disagree - Not applicable / Not valid  -- Disagree - Clinically unable to determine / Unknown  -- Refer to Clinical Documentation Reviewer    PROVIDER RESPONSE TEXT:    Post op Ileus unrelated to the surgery, as an expected outcome.     Query created by: Shazia Goss on 2022 8:13 AM      Electronically signed by:  Halie Byers DO 2022 5:26 AM

## 2022-12-01 NOTE — CARE COORDINATION
POD #1 DELAYED PRIMARY CLOSURE OF ABDOMINAL WOUND. Patient plans to discharge to home with [facility] and will need LUCIO orders. Daughter will transport when patient is medically cleared. CM/SW will continue to follow.

## 2022-12-05 DIAGNOSIS — F41.9 ANXIETY: ICD-10-CM

## 2022-12-05 RX ORDER — LORAZEPAM 0.5 MG/1
TABLET ORAL
Qty: 60 TABLET | Refills: 1 | Status: SHIPPED
Start: 2022-12-05 | End: 2022-12-07 | Stop reason: SDUPTHER

## 2022-12-07 ENCOUNTER — OFFICE VISIT (OUTPATIENT)
Dept: PRIMARY CARE CLINIC | Age: 67
End: 2022-12-07
Payer: MEDICARE

## 2022-12-07 VITALS
OXYGEN SATURATION: 98 % | SYSTOLIC BLOOD PRESSURE: 126 MMHG | TEMPERATURE: 97.7 F | HEIGHT: 62 IN | WEIGHT: 169 LBS | BODY MASS INDEX: 31.1 KG/M2 | HEART RATE: 71 BPM | DIASTOLIC BLOOD PRESSURE: 64 MMHG

## 2022-12-07 DIAGNOSIS — R10.32 LEFT LOWER QUADRANT ABDOMINAL PAIN: ICD-10-CM

## 2022-12-07 DIAGNOSIS — L08.9 WOUND INFECTION: ICD-10-CM

## 2022-12-07 DIAGNOSIS — T14.8XXA WOUND INFECTION: ICD-10-CM

## 2022-12-07 DIAGNOSIS — G89.4 CHRONIC PAIN SYNDROME: ICD-10-CM

## 2022-12-07 DIAGNOSIS — F41.9 ANXIETY: ICD-10-CM

## 2022-12-07 DIAGNOSIS — K57.80 PERFORATED DIVERTICULUM: ICD-10-CM

## 2022-12-07 DIAGNOSIS — R05.1 ACUTE COUGH: Primary | ICD-10-CM

## 2022-12-07 DIAGNOSIS — Z09 HOSPITAL DISCHARGE FOLLOW-UP: ICD-10-CM

## 2022-12-07 DIAGNOSIS — U07.1 COVID-19 VIRUS INFECTION: ICD-10-CM

## 2022-12-07 DIAGNOSIS — E78.49 OTHER HYPERLIPIDEMIA: ICD-10-CM

## 2022-12-07 DIAGNOSIS — I10 ESSENTIAL HYPERTENSION, BENIGN: ICD-10-CM

## 2022-12-07 LAB
INFLUENZA A ANTIBODY: NEGATIVE
INFLUENZA B ANTIBODY: NEGATIVE
Lab: ABNORMAL
PERFORMING INSTRUMENT: ABNORMAL
QC PASS/FAIL: ABNORMAL
SARS-COV-2, POC: DETECTED

## 2022-12-07 PROCEDURE — 1111F DSCHRG MED/CURRENT MED MERGE: CPT | Performed by: FAMILY MEDICINE

## 2022-12-07 PROCEDURE — G8427 DOCREV CUR MEDS BY ELIG CLIN: HCPCS | Performed by: FAMILY MEDICINE

## 2022-12-07 PROCEDURE — 87426 SARSCOV CORONAVIRUS AG IA: CPT | Performed by: FAMILY MEDICINE

## 2022-12-07 PROCEDURE — G8484 FLU IMMUNIZE NO ADMIN: HCPCS | Performed by: FAMILY MEDICINE

## 2022-12-07 PROCEDURE — G8400 PT W/DXA NO RESULTS DOC: HCPCS | Performed by: FAMILY MEDICINE

## 2022-12-07 PROCEDURE — 3074F SYST BP LT 130 MM HG: CPT | Performed by: FAMILY MEDICINE

## 2022-12-07 PROCEDURE — G8417 CALC BMI ABV UP PARAM F/U: HCPCS | Performed by: FAMILY MEDICINE

## 2022-12-07 PROCEDURE — 87804 INFLUENZA ASSAY W/OPTIC: CPT | Performed by: FAMILY MEDICINE

## 2022-12-07 PROCEDURE — 99214 OFFICE O/P EST MOD 30 MIN: CPT | Performed by: FAMILY MEDICINE

## 2022-12-07 PROCEDURE — 3078F DIAST BP <80 MM HG: CPT | Performed by: FAMILY MEDICINE

## 2022-12-07 PROCEDURE — 3017F COLORECTAL CA SCREEN DOC REV: CPT | Performed by: FAMILY MEDICINE

## 2022-12-07 PROCEDURE — 1090F PRES/ABSN URINE INCON ASSESS: CPT | Performed by: FAMILY MEDICINE

## 2022-12-07 PROCEDURE — 1123F ACP DISCUSS/DSCN MKR DOCD: CPT | Performed by: FAMILY MEDICINE

## 2022-12-07 PROCEDURE — 1036F TOBACCO NON-USER: CPT | Performed by: FAMILY MEDICINE

## 2022-12-07 RX ORDER — CHOLECALCIFEROL (VITAMIN D3) 50 MCG
2000 TABLET ORAL DAILY
Qty: 90 TABLET | Refills: 3 | Status: SHIPPED | OUTPATIENT
Start: 2022-12-07

## 2022-12-07 RX ORDER — LORAZEPAM 0.5 MG/1
0.5 TABLET ORAL 2 TIMES DAILY
Qty: 60 TABLET | Refills: 1 | Status: SHIPPED | OUTPATIENT
Start: 2022-12-07 | End: 2023-01-06

## 2022-12-07 RX ORDER — HYDROCODONE BITARTRATE AND ACETAMINOPHEN 5; 325 MG/1; MG/1
1 TABLET ORAL EVERY 6 HOURS PRN
Qty: 28 TABLET | Refills: 0 | Status: SHIPPED | OUTPATIENT
Start: 2022-12-07 | End: 2022-12-14

## 2022-12-07 RX ORDER — ROSUVASTATIN CALCIUM 20 MG/1
TABLET, COATED ORAL
Qty: 90 TABLET | Refills: 3 | Status: SHIPPED | OUTPATIENT
Start: 2022-12-07

## 2022-12-07 RX ORDER — ATENOLOL AND CHLORTHALIDONE TABLET 50; 25 MG/1; MG/1
TABLET ORAL
Qty: 90 TABLET | Refills: 3 | Status: SHIPPED | OUTPATIENT
Start: 2022-12-07

## 2022-12-07 RX ORDER — DULOXETIN HYDROCHLORIDE 60 MG/1
CAPSULE, DELAYED RELEASE ORAL
Qty: 90 CAPSULE | Refills: 3 | Status: SHIPPED | OUTPATIENT
Start: 2022-12-07

## 2022-12-07 RX ORDER — CEPHALEXIN 500 MG/1
500 CAPSULE ORAL 3 TIMES DAILY
Qty: 30 CAPSULE | Refills: 0 | Status: SHIPPED | OUTPATIENT
Start: 2022-12-07 | End: 2022-12-17

## 2022-12-07 RX ORDER — PANTOPRAZOLE SODIUM 40 MG/1
TABLET, DELAYED RELEASE ORAL
Qty: 90 TABLET | Refills: 3 | Status: SHIPPED | OUTPATIENT
Start: 2022-12-07

## 2022-12-07 RX ORDER — MECLIZINE HCL 12.5 MG/1
12.5 TABLET ORAL 3 TIMES DAILY PRN
Qty: 90 TABLET | Refills: 1 | Status: SHIPPED | OUTPATIENT
Start: 2022-12-07

## 2022-12-07 ASSESSMENT — ENCOUNTER SYMPTOMS
ABDOMINAL PAIN: 1
COLOR CHANGE: 0
BLOOD IN STOOL: 0
APNEA: 0
COUGH: 1
DIARRHEA: 0
VOMITING: 0
SINUS PRESSURE: 0
BACK PAIN: 0
SHORTNESS OF BREATH: 0
CHEST TIGHTNESS: 0
PHOTOPHOBIA: 0
SORE THROAT: 0
WHEEZING: 0
FACIAL SWELLING: 0
ALLERGIC/IMMUNOLOGIC NEGATIVE: 1
NAUSEA: 0

## 2022-12-07 NOTE — PROGRESS NOTES
Post-Discharge Transitional Care Follow Up      Simba Mclean   YOB: 1955    Date of Office Visit:  12/7/2022  Date of Hospital Admission: 11/22/22  Date of Hospital Discharge: 12/1/22  Readmission Risk Score (high >=14%. Medium >=10%):Readmission Risk Score: 9.6      Care management risk score Rising risk (score 2-5) and Complex Care (Scores >=6): No Risk Score On File     Non face to face  following discharge, date last encounter closed (first attempt may have been earlier): *No documented post hospital discharge outreach found in the last 14 days     Call initiated 2 business days of discharge: *No response recorded in the last 14 days     Acute cough  -     POCT COVID-19, Antigen  -     POCT Influenza A/B  -     XR CHEST (2 VW); Future  Essential hypertension, benign  -     atenolol-chlorthalidone (TENORETIC) 50-25 MG per tablet; TAKE 1 TABLET DAILY, Disp-90 tablet, R-3Normal  Chronic pain syndrome  -     DULoxetine (CYMBALTA) 60 MG extended release capsule; TAKE ONE CAPSULE BY MOUTH ONCE A DAY, Disp-90 capsule, R-3Normal  Anxiety  -     LORazepam (ATIVAN) 0.5 MG tablet; Take 1 tablet by mouth 2 times daily for 30 days. , Disp-60 tablet, R-1Normal  Other hyperlipidemia  -     rosuvastatin (CRESTOR) 20 MG tablet; TAKE 1 TABLET DAILY, Disp-90 tablet, R-3Normal  Perforated diverticulum  -     HYDROcodone-acetaminophen (LORCET) 5-325 MG per tablet; Take 1 tablet by mouth every 6 hours as needed for Pain for up to 7 days. Intended supply: 7 days. Take lowest dose possible to manage pain, Disp-28 tablet, R-0Normal  Left lower quadrant abdominal pain  -     HYDROcodone-acetaminophen (LORCET) 5-325 MG per tablet; Take 1 tablet by mouth every 6 hours as needed for Pain for up to 7 days. Intended supply: 7 days.  Take lowest dose possible to manage pain, Disp-28 tablet, R-0Normal  Hospital discharge follow-up  -     NE DISCHARGE MEDS RECONCILED W/ CURRENT OUTPATIENT MED LIST  Wound infection  - cephALEXin (KEFLEX) 500 MG capsule; Take 1 capsule by mouth 3 times daily for 10 days, Disp-30 capsule, R-0Normal  COVID-19 virus infection      Medical Decision Making: high complexity  Return in 4 weeks (on 1/4/2023) for FOLLOW UP. Subjective:   Abdominal Pain  This is a new problem. The current episode started in the past 7 days. The onset quality is sudden. The problem occurs daily. The pain is located in the LLQ. The pain is at a severity of 10/10. The pain is severe. Pertinent negatives include no arthralgias, diarrhea, frequency, headaches, myalgias, nausea or vomiting. She has tried antibiotics for the symptoms. The treatment provided significant relief. Cough  This is a new problem. The problem has been waxing and waning. The problem occurs constantly. The cough is Productive of sputum. Pertinent negatives include no chest pain, headaches, myalgias, rash, sore throat, shortness of breath or wheezing. She has tried nothing for the symptoms. Dx perforated diverticulitis  Inpatient course: Discharge summary reviewed- see chart. Interval history/Current status: improved    Patient Active Problem List   Diagnosis    Hyperlipidemia    Essential hypertension, benign    Vitamin D deficiency    Intra-abdominal free air of unknown etiology    Sepsis without acute organ dysfunction ([address]    Peritonitis ([address]    Lactic acidosis       Medications listed as ordered at the time of discharge from hospital     Medication List            Accurate as of December 7, 2022  2:25 PM. If you have any questions, ask your nurse or doctor. START taking these medications      cephALEXin 500 MG capsule  Commonly known as: KEFLEX  Take 1 capsule by mouth 3 times daily for 10 days  Started by: Ana Jade,      HYDROcodone-acetaminophen 5-325 MG per tablet  Commonly known as: Lorcet  Take 1 tablet by mouth every 6 hours as needed for Pain for up to 7 days. Intended supply: 7 days.  Take lowest dose possible to manage pain  Started by: Amari Friday, DO            CHANGE how you take these medications      LORazepam 0.5 MG tablet  Commonly known as: ATIVAN  Take 1 tablet by mouth 2 times daily for 30 days. What changed: See the new instructions.   Changed by: Amari Friday, DO            CONTINUE taking these medications      atenolol-chlorthalidone 50-25 MG per tablet  Commonly known as: TENORETIC  TAKE 1 TABLET DAILY     DULoxetine 60 MG extended release capsule  Commonly known as: CYMBALTA  TAKE ONE CAPSULE BY MOUTH ONCE A DAY     meclizine 12.5 MG tablet  Commonly known as: ANTIVERT  Take 1 tablet by mouth 3 times daily as needed for Dizziness     pantoprazole 40 MG tablet  Commonly known as: PROTONIX  TAKE 1 TABLET DAILY     rosuvastatin 20 MG tablet  Commonly known as: CRESTOR  TAKE 1 TABLET DAILY     vitamin D 50 MCG (2000 UT) Tabs tablet  Commonly known as: CHOLECALCIFEROL  Take 1 tablet by mouth daily               Where to Get Your Medications        These medications were sent to [place] [address]      Phone: [phone]   atenolol-chlorthalidone 50-25 MG per tablet  DULoxetine 60 MG extended release capsule  LORazepam 0.5 MG tablet  meclizine 12.5 MG tablet  pantoprazole 40 MG tablet  rosuvastatin 20 MG tablet  vitamin D 50 MCG (2000 UT) Tabs tablet       These medications were sent to [facility], [address] [place], [place] [id number]      Phone: [phone]   cephALEXin 500 MG capsule  HYDROcodone-acetaminophen 5-325 MG per tablet          Medications marked \"taking\" at this time  Outpatient Medications Marked as Taking for the 12/7/22 encounter (Office Visit) with Amari Friday, DO   Medication Sig Dispense Refill    atenolol-chlorthalidone (TENORETIC) 50-25 MG per tablet TAKE 1 TABLET DAILY 90 tablet 3    vitamin D (CHOLECALCIFEROL) 50 MCG (2000 UT) TABS tablet Take 1 tablet by mouth daily 90 tablet 3    DULoxetine (CYMBALTA) 60 MG extended release capsule TAKE ONE CAPSULE BY MOUTH ONCE A DAY 90 capsule 3    LORazepam (ATIVAN) 0.5 MG tablet Take 1 tablet by mouth 2 times daily for 30 days. 60 tablet 1    meclizine (ANTIVERT) 12.5 MG tablet Take 1 tablet by mouth 3 times daily as needed for Dizziness 90 tablet 1    pantoprazole (PROTONIX) 40 MG tablet TAKE 1 TABLET DAILY 90 tablet 3    rosuvastatin (CRESTOR) 20 MG tablet TAKE 1 TABLET DAILY 90 tablet 3    HYDROcodone-acetaminophen (LORCET) 5-325 MG per tablet Take 1 tablet by mouth every 6 hours as needed for Pain for up to 7 days. Intended supply: 7 days. Take lowest dose possible to manage pain 28 tablet 0    cephALEXin (KEFLEX) 500 MG capsule Take 1 capsule by mouth 3 times daily for 10 days 30 capsule 0        Medications patient taking as of now reconciled against medications ordered at time of hospital discharge: Yes    Review of Systems   Constitutional: Negative. HENT:  Negative for congestion, facial swelling, hearing loss, nosebleeds, sinus pressure and sore throat. Eyes:  Negative for photophobia and visual disturbance. Respiratory:  Positive for cough. Negative for apnea, chest tightness, shortness of breath and wheezing. Cardiovascular:  Negative for chest pain, palpitations and leg swelling. Gastrointestinal:  Positive for abdominal pain. Negative for blood in stool, diarrhea, nausea and vomiting. Genitourinary:  Negative for difficulty urinating, frequency and urgency. Musculoskeletal:  Negative for arthralgias, back pain, joint swelling and myalgias. Skin:  Negative for color change and rash. Allergic/Immunologic: Negative. Neurological:  Negative for syncope, weakness, light-headedness and headaches. Hematological: Negative.     Psychiatric/Behavioral:  Negative for agitation, behavioral problems, confusion and self-injury. The patient is not nervous/anxious. All other systems reviewed and are negative. Objective:    /64   Pulse 71   Temp 97.7 °F (36.5 °C)   Ht 5' 2\" (1.575 m)   Wt 169 lb (76.7 kg)   SpO2 98%   BMI 30.91 kg/m²   Physical Exam  Vitals and nursing note reviewed. Constitutional:       General: She is not in acute distress. Appearance: She is well-developed. HENT:      Head: Normocephalic and atraumatic. Nose: Nose normal.   Eyes:      Conjunctiva/sclera: Conjunctivae normal.      Pupils: Pupils are equal, round, and reactive to light. Neck:      Thyroid: No thyromegaly. Vascular: No JVD. Cardiovascular:      Rate and Rhythm: Normal rate and regular rhythm. Heart sounds: Normal heart sounds. No murmur heard. No friction rub. No gallop. Pulmonary:      Effort: Pulmonary effort is normal. No respiratory distress. Breath sounds: Normal breath sounds. No wheezing. Abdominal:      General: Bowel sounds are normal. There is no distension. Palpations: Abdomen is soft. Tenderness: There is no abdominal tenderness. There is no guarding or rebound. Comments: Colostomy in place  Drainage at wound site   Musculoskeletal:         General: Normal range of motion. Cervical back: Normal range of motion and neck supple. Lymphadenopathy:      Cervical: No cervical adenopathy. Skin:     General: Skin is warm and dry. Findings: No erythema or rash. Neurological:      Mental Status: She is alert and oriented to person, place, and time. Cranial Nerves: No cranial nerve deficit. Motor: No abnormal muscle tone. Coordination: Coordination normal.      Deep Tendon Reflexes: Reflexes are normal and symmetric. Psychiatric:         Behavior: Behavior normal.         Judgment: Judgment normal.       An electronic signature was used to authenticate this note.   --Boneta Never, DO

## 2022-12-13 ENCOUNTER — OFFICE VISIT (OUTPATIENT)
Dept: SURGERY | Age: 67
End: 2022-12-13

## 2022-12-13 VITALS
BODY MASS INDEX: 30.91 KG/M2 | RESPIRATION RATE: 16 BRPM | HEART RATE: 68 BPM | HEIGHT: 62 IN | WEIGHT: 168 LBS | SYSTOLIC BLOOD PRESSURE: 118 MMHG | DIASTOLIC BLOOD PRESSURE: 70 MMHG

## 2022-12-13 DIAGNOSIS — K63.3 STERCORAL ULCER OF LARGE INTESTINE: ICD-10-CM

## 2022-12-13 DIAGNOSIS — Z93.3 STATUS POST HARTMANN PROCEDURE (HCC): ICD-10-CM

## 2022-12-13 DIAGNOSIS — Z48.02 VISIT FOR SUTURE REMOVAL: ICD-10-CM

## 2022-12-13 DIAGNOSIS — Z09 POSTOP CHECK: Primary | ICD-10-CM

## 2022-12-13 PROCEDURE — 99024 POSTOP FOLLOW-UP VISIT: CPT | Performed by: SURGERY

## 2022-12-13 RX ORDER — SODIUM CHLORIDE 9 MG/ML
INJECTION, SOLUTION INTRAVENOUS CONTINUOUS
OUTPATIENT
Start: 2022-12-13

## 2022-12-13 RX ORDER — SODIUM CHLORIDE 0.9 % (FLUSH) 0.9 %
10 SYRINGE (ML) INJECTION EVERY 12 HOURS SCHEDULED
OUTPATIENT
Start: 2022-12-13

## 2022-12-13 RX ORDER — SODIUM CHLORIDE 9 MG/ML
25 INJECTION, SOLUTION INTRAVENOUS PRN
OUTPATIENT
Start: 2022-12-13

## 2022-12-13 RX ORDER — SODIUM CHLORIDE 0.9 % (FLUSH) 0.9 %
10 SYRINGE (ML) INJECTION PRN
OUTPATIENT
Start: 2022-12-13

## 2022-12-13 RX ORDER — POLYETHYLENE GLYCOL 3350, SODIUM CHLORIDE, POTASSIUM CHLORIDE, SODIUM BICARBONATE, AND SODIUM SULFATE 240; 5.84; 2.98; 6.72; 22.72 G/4L; G/4L; G/4L; G/4L; G/4L
4000 POWDER, FOR SOLUTION ORAL ONCE
Qty: 1 EACH | Refills: 0 | Status: SHIPPED | OUTPATIENT
Start: 2022-12-13 | End: 2022-12-13

## 2022-12-13 NOTE — PROGRESS NOTES
Chief Complaint   Patient presents with    Post-Op Check     Pt is here for post op appointment for ex lap, colectomy and colostomy on 11/22/22. Pt states that she is still having a lot of pain, depending on her daily activities. Pt states that she is eating well and doing okay otherwise. Pt states that the adhesive for ostomy is bothering her. Pt would like to discuss reversal.         Assessment:    ICD-10-CM    1. Postop check  Z09       2. Visit for suture removal  Z48.02       3. Stercoral ulcer of large intestine  K63.3       4. Status post Tomma Cherry procedure [facility])  Z93.3                Plan:    Continue colostomy care  Midline sutures removed in office today  Schedule colonoscopy via stoma and per anus for pouch  Colostomy reversal once cscope clear and recovers from sepsis with good nutrition and stable overall    I discussed the risks, benefits, and alternatives to colonoscopy with possible biopsy/cauterization/polylpectomy with deep sedation with the patient including the risks of deep sedation (hypotension, hypoxia), bleeding, and perforation. The patient understands the above and agrees to proceed.          Subjective:  Here for postop visit   Admitted 11/22/22 - 12/1/22 to [facility] from ED s/p ex lap, partial sigmoid colectomy, end descending/sigmoid colostomy with Jay's pouch for perforated stercoral ulcer; delayed primary closure done prior to hospital d/c  Still having some abdominal pain and soreness  Appetite is fair      Objective:   /70 (Site: Left Upper Arm, Position: Sitting, Cuff Size: Large Adult)   Pulse 68   Resp 16   Ht 5' 2\" (1.575 m)   Wt 168 lb (76.2 kg)   BMI 30.73 kg/m²   General - 76 yo white woman, ambulating with SPC  Abdomen - midline suture line no drainainge  Colostomy with formed, firm, brown stool

## 2022-12-13 NOTE — PATIENT INSTRUCTIONS
DR. Clari Bryant  Patient Information and Instructions for Colonoscopy         Definition of Colonoscopy   A colonoscopy is the visual exam of the rectum and colon (large intestine). The exam is done with a tool called a colonoscope. The colonoscope is a flexible tube with a tiny camera on the end. This instrument allows the doctor to view the inside of your rectum and colon. Sigmoidoscopy is a shorter scope that views only the last one third of the colon. Reasons for Colonoscopy   It is used to examine, diagnose, and treat problems in your large intestine. The procedure is most often done for the following reasons: To determine the cause of abdominal pain, rectal bleeding, or a change in bowel habits   To detect and treat colon cancer or colon polyps   To obtain tissue samples for testing   To stop intestinal bleeding   Monitor response to treatment if you have inflammatory bowel disease     Possible Complications   Complications are rare, but no procedure is completely free of risk. If you are planning to have a colonoscopy, your doctor will review a list of possible complications, which may include:   Bleeding   Reaction to the sedation causing drop in your blood pressure or problems breathing  Perforation or puncture of the bowel     Factors that may increase the risk of complications include:   Pre-existing heart or kidney condition   Treatment with certain medicines, including aspirin and other drugs with anticoagulant or blood-thinning properties   Prior abdominal surgery or radiation treatments   Active colitis , diverticulitis , or other acute bowel disease   Previous treatment with radiation therapy     Be sure to discuss these risks with your doctor before the procedure.      What to Expect   Prior to Procedure   Your doctor will likely do the following:   Physical exam   Health history   Review of medicines   Test your stool for hidden blood (called \"occult blood\")     Your colon must be completely clean before the procedure. Any stool left in the intestine will block the view. This preparation may start several days before the procedure. Follow your doctor's instructions. Leading up to your procedure:   Talk to your doctor about your medicines. You may be asked to stop taking some medicines up to one week before the procedure, like:   Anti-inflammatory drugs (e.g., aspirin )   Blood thinners like clopidogrel (Plavix) or warfarin (Coumadin)   Iron supplements or vitamins containing iron   The day or days before your procedure, go on a clear liquid diet (clear broth, clear juice, clear jello) with no red coloring  Do not eat or drink anything after midnight. Wear comfortable clothing. If you have diabetes, ask your doctor if you need to adjust your diabetes medicine on the day prior to your procedure and the day of your procedure. Arrange for a ride home after the procedure. Anesthesia   You will receive intravenous sedation medicine for the procedure so you will not feel anything during the procedure. Description of the Procedure   You will lie on your left side with knees bent and drawn up toward your chest. The colonoscope will be slowly inserted through the rectum and into the bowel. The colonoscope will inject air into the colon. A small attached video camera will allow the doctor to view the colon's lining on a screen. The doctor will continue guiding the tool through the bowel and assess the lining. A tissue sample or polyps may be removed during the procedure. How Long Will It Take? Usually it takes about 30 to 45 minutes     Will It Hurt? Most people do not feel anything during the procedure and will not remember the procedure. After the procedure, gas pains and cramping are common. These pains should go away with the passing of gas. Post-procedure Care   If any tissue was removed: It will be sent to a lab to be examined. It may take 1-2 weeks for results.  The doctor will usually give an initial report after the scope is removed. Other tests may be recommended. A small amount of bleeding may occur during the first few days after the procedure. When you return home after the procedure, be sure to follow your doctor's instructions, which may include:   Resume medicines as instructed by your doctor. Resume normal diet, unless directed otherwise by your doctor. The sedative will make you drowsy. Avoid driving, operating machinery, or making important decisions for the rest of the day. Rest for the remainder of the day. After arriving home, contact your doctor if any of the following occurs:   Bleeding from your rectum, notify your doctor if you pass a teaspoonful of blood or more. Black, tarry stools   Severe abdominal pain   Hard, swollen abdomen   Signs of infection, including fever or chills   Inability to pass gas or stool   Coughing, shortness of breath, chest pain, severe nausea or vomiting     In case of an emergency, CALL 911 . GOLYTELY®/COLYTE SPLIT DOSE COLONOSCOPY INSTRUCTIONS    Please follow the below instructions only. Do not follow alternative directions provided by the pharmacy or on the preparation bottle itself. ONE WEEK PRIOR TO THE PROCEDURE:  ? Fill your prescription for the Golytely bowel preparation  ? Try to limit fiber intake  ? Avoid taking iron supplements if possible      THE DAY BEFORE THE PROCEDURE:  ? Fill the entire Golytely/colyte jug and place in the refrigerator in the morning. ? Beginning when you wake up, follow a clear liquid diet all day:  Clear liquids include:  Water, lemonade/Gatorade, liquidbroth/bouillon, popsicles, black coffee, sodas, apple/grape/white cranberry juice, gelatin (not red colored), sorbet. ? Starting at 5pm - drink one 8 oz glass of the prep every 15-30 minutes until you have finished half of the preparation.  Make sure you shake the bottle after each glass to make sure you are getting the appropriate amount of medication with each dose. THE MORNING OF THE PROCEDURE:  ? You may continue to have a clear liquid diet as long as you stop at least 3 hours before your procedure. ? Drink the remaining half of the preparation. Complete the full prep even if your stools are watery or clear. You must complete the preparation at least 3 hours before your procedure, so plan accordingly. A FEW POINTS:  ? The better the quality of your preparation, the more likely your physician will be able to see polyps. Some polyps that contain cancer can be small and can hide in any of the numerous folds in the colon. ? An inadequate preparation often results in needing an earlier repeat exam than would normally be required. Sedation for a Medical Procedure: Care Instructions  Your Care Instructions    For a minor procedure or surgery, you will get a sedative to help you relax. This drug will make you sleepy. It is usually given in a vein (by IV). A shot may also be used to numb the area. If you had local anesthesia, you may feel some pain and discomfort as it wears off. If you have pain, don't be afraid to say so. Pain medicine works better if you take it before the pain gets bad. Common side effects from sedation include:  Feeling sleepy. (Your doctors and nurses will make sure you are not too sleepy to go home.)  Nausea and vomiting. This usually does not last long. Feeling tired. Follow-up care is a key part of your treatment and safety. Be sure to make and go to all appointments, and call your doctor if you are having problems. It's also a good idea to know your test results and keep a list of the medicines you take. How can you care for yourself at home? Activity  Don't do anything for 24 hours that requires attention to detail. It takes time for the medicine effects to completely wear off.   For your safety, you should not drive or operate any machinery that could be dangerous until the medicine wears off and you can think clearly and react easily. Rest when you feel tired. Getting enough sleep will help you recover. You must have a reliable adult drive you home and stay with you overnight after your procedure or your procedure may be cancelled. Diet  You can eat your normal diet, unless your doctor gives you other instructions. If your stomach is upset, try clear liquids and bland, low-fat foods like plain toast or rice. Drink plenty of fluids (unless your doctor tells you not to). Don't drink alcohol for 24 hours. Medicines  Be safe with medicines. Read and follow all instructions on the label. If the doctor gave you a prescription medicine for pain, take it as prescribed. If you are not taking a prescription pain medicine, ask your doctor if you can take an over-the-counter medicine. If you think your pain medicine is making you sick to your stomach: Take your medicine after meals (unless your doctor has told you not to). Ask your doctor for a different pain medicine. When should you call for help? Call 911 anytime you think you may need emergency care. For example, call if:  You have severe trouble breathing. You passed out (lost consciousness). Call your doctor now or seek immediate medical care if:  You have trouble breathing. You have ongoing or worsening nausea or vomiting. You have a fever. You have a new or worse headache. The medicine is not wearing off and you can't think clearly. Watch closely for changes in your health, and be sure to contact your doctor if:  You do not get better as expected. Where can you learn more? Go to [email or web address] â€“ [employer] org and sign in to your [employer] account. Enter N420 in the Sma[employer] box to learn more about \"Sedation for a Medical Procedure: Care Instructions. \"     If you do not have an account, please click on the \"Sign Up Now\" link.   Current as of: August 14, 2016  Content Version: 11.2  © 9212-5415 Healthwise, Incorporated. Care instructions adapted under license by [facility] If you have questions about a medical condition or this instruction, always ask your healthcare professional. [address] any warranty or liability for your use of this information.

## 2022-12-20 ENCOUNTER — PREP FOR PROCEDURE (OUTPATIENT)
Dept: SURGERY | Age: 67
End: 2022-12-20

## 2022-12-20 ENCOUNTER — TELEPHONE (OUTPATIENT)
Dept: SURGERY | Age: 67
End: 2022-12-20

## 2022-12-20 NOTE — TELEPHONE ENCOUNTER
Patient is scheduled for colonoscopy through stoma with  Monico Aquino on 23 at 12:00 with an arrival time of 11:00. Pt accepted date and time and verbalized understanding. Procedure letter mailed to patient as well. Prior Authorization Form:      DEMOGRAPHICS:                     Patient Name:  Ji Covarrubias  Patient :  1955            Insurance:  Payor: MEDICARE / Plan: MEDICARE PART A AND B / Product Type: *No Product type* /   Insurance ID Number:    Payer/Plan Subscr  Sex Relation Sub. Ins. ID Effective Group Num   1. [address] [facility] 1955 Female Self [id number]                                    [address] [address]   2.  [facility] 1955 Female Self [id number]                                     [address]         DIAGNOSIS & PROCEDURE:                       Procedure/Operation: colonoscopy through stoma           CPT Code: 35662    Diagnosis:  stercoral ulcer large intestine, status post Roque procedure    ICD10 Code: K63.3, Z93.3    Location:  [facility]    Surgeon:  Dr. Carter Inch INFORMATION:                          Date: 23    Time: 12:00              Anesthesia:  MAC/TIVA                                                       Status:  Outpatient        Special Comments:  N/A       Electronically signed by Faviola Castro MA on 2022 at 11:33 AM

## 2023-01-04 ENCOUNTER — OFFICE VISIT (OUTPATIENT)
Dept: PRIMARY CARE CLINIC | Age: 68
End: 2023-01-04
Payer: MEDICARE

## 2023-01-04 VITALS
OXYGEN SATURATION: 97 % | RESPIRATION RATE: 18 BRPM | DIASTOLIC BLOOD PRESSURE: 82 MMHG | WEIGHT: 168 LBS | BODY MASS INDEX: 30.91 KG/M2 | TEMPERATURE: 97.5 F | HEART RATE: 64 BPM | SYSTOLIC BLOOD PRESSURE: 124 MMHG | HEIGHT: 62 IN

## 2023-01-04 DIAGNOSIS — I10 ESSENTIAL HYPERTENSION, BENIGN: Primary | ICD-10-CM

## 2023-01-04 DIAGNOSIS — M54.16 LUMBAR RADICULOPATHY: ICD-10-CM

## 2023-01-04 PROCEDURE — 3074F SYST BP LT 130 MM HG: CPT | Performed by: FAMILY MEDICINE

## 2023-01-04 PROCEDURE — 1123F ACP DISCUSS/DSCN MKR DOCD: CPT | Performed by: FAMILY MEDICINE

## 2023-01-04 PROCEDURE — G8484 FLU IMMUNIZE NO ADMIN: HCPCS | Performed by: FAMILY MEDICINE

## 2023-01-04 PROCEDURE — 1036F TOBACCO NON-USER: CPT | Performed by: FAMILY MEDICINE

## 2023-01-04 PROCEDURE — 3017F COLORECTAL CA SCREEN DOC REV: CPT | Performed by: FAMILY MEDICINE

## 2023-01-04 PROCEDURE — G8427 DOCREV CUR MEDS BY ELIG CLIN: HCPCS | Performed by: FAMILY MEDICINE

## 2023-01-04 PROCEDURE — G8417 CALC BMI ABV UP PARAM F/U: HCPCS | Performed by: FAMILY MEDICINE

## 2023-01-04 PROCEDURE — 1090F PRES/ABSN URINE INCON ASSESS: CPT | Performed by: FAMILY MEDICINE

## 2023-01-04 PROCEDURE — 3079F DIAST BP 80-89 MM HG: CPT | Performed by: FAMILY MEDICINE

## 2023-01-04 PROCEDURE — G8400 PT W/DXA NO RESULTS DOC: HCPCS | Performed by: FAMILY MEDICINE

## 2023-01-04 PROCEDURE — 99213 OFFICE O/P EST LOW 20 MIN: CPT | Performed by: FAMILY MEDICINE

## 2023-01-04 RX ORDER — POLYETHYLENE GLYCOL-3350 AND ELECTROLYTES WITH FLAVOR PACK 240; 5.84; 2.98; 6.72; 22.72 G/278.26G; G/278.26G; G/278.26G; G/278.26G; G/278.26G
POWDER, FOR SOLUTION ORAL
COMMUNITY
Start: 2022-12-13

## 2023-01-04 ASSESSMENT — ENCOUNTER SYMPTOMS
BLOOD IN STOOL: 0
SINUS PRESSURE: 0
ABDOMINAL PAIN: 0
WHEEZING: 0
CHEST TIGHTNESS: 0
COLOR CHANGE: 0
VOMITING: 0
BACK PAIN: 1
APNEA: 0
COUGH: 0
DIARRHEA: 0
FACIAL SWELLING: 0
NAUSEA: 0
SHORTNESS OF BREATH: 0
ALLERGIC/IMMUNOLOGIC NEGATIVE: 1
PHOTOPHOBIA: 0
SORE THROAT: 0

## 2023-01-04 ASSESSMENT — PATIENT HEALTH QUESTIONNAIRE - PHQ9
SUM OF ALL RESPONSES TO PHQ QUESTIONS 1-9: 0
2. FEELING DOWN, DEPRESSED OR HOPELESS: 0
SUM OF ALL RESPONSES TO PHQ QUESTIONS 1-9: 0
SUM OF ALL RESPONSES TO PHQ QUESTIONS 1-9: 0
SUM OF ALL RESPONSES TO PHQ9 QUESTIONS 1 & 2: 0
1. LITTLE INTEREST OR PLEASURE IN DOING THINGS: 0
SUM OF ALL RESPONSES TO PHQ QUESTIONS 1-9: 0

## 2023-01-04 NOTE — PROGRESS NOTES
Chief Complaint:   Chief Complaint   Patient presents with    1 Month Follow-Up    Hypertension    Toe Pain       Hypertension  This is a chronic problem. The current episode started more than 1 year ago. The problem has been resolved since onset. The problem is controlled. Pertinent negatives include no chest pain, headaches, palpitations or shortness of breath. Past treatments include beta blockers and diuretics. The current treatment provides significant improvement. There are no compliance problems.       Patient Active Problem List   Diagnosis    Hyperlipidemia    Essential hypertension, benign    Vitamin D deficiency    Intra-abdominal free air of unknown etiology    Sepsis without acute organ dysfunction ([address]    Peritonitis ([address]    Lactic acidosis    Stercoral ulcer of large intestine    Status post Mike procedure [facility])       Past Medical History:   Diagnosis Date    Hyperlipidemia     Hypertension     Vitamin D deficiency        Past Surgical History:   Procedure Laterality Date    ABDOMEN SURGERY N/A 11/30/2022    DELAYED PRIMARY CLOSURE OF ABDOMINAL WOUND performed by Neno Dash MD at [address] [facility] Bilateral     cancer    HYSTERECTOMY (CERVIX STATUS UNKNOWN)      LAPAROTOMY N/A 11/22/2022    EXPLORATORY LAPAROTOMY, PARTIAL DISTAL COLECTOMY, CREATION OF END-DISTAL COLOSTOMY, MIKE'S  PROCEDURE performed by Ct Magallon MD at [address] [place] [address]         Current Outpatient Medications   Medication Sig Dispense Refill    GAVILYTE-C 240 g solution TAKE 4 000MLS BY MOUTH ONCE FOR 1 DOSE      atenolol-chlorthalidone (TENORETIC) 50-25 MG per tablet TAKE 1 TABLET DAILY 90 tablet 3    vitamin D (CHOLECALCIFEROL) 50 MCG (2000 UT) TABS tablet Take 1 tablet by mouth daily 90 tablet 3    DULoxetine (CYMBALTA) 60 MG extended release capsule TAKE ONE CAPSULE BY MOUTH ONCE A DAY 90 capsule 3    LORazepam (ATIVAN) 0.5 MG tablet Take 1 tablet by mouth 2 times daily for 30 days. 60 tablet 1    meclizine (ANTIVERT) 12.5 MG tablet Take 1 tablet by mouth 3 times daily as needed for Dizziness 90 tablet 1    pantoprazole (PROTONIX) 40 MG tablet TAKE 1 TABLET DAILY 90 tablet 3    rosuvastatin (CRESTOR) 20 MG tablet TAKE 1 TABLET DAILY 90 tablet 3     No current facility-administered medications for this visit. No Known Allergies    Social History     Socioeconomic History    Marital status: Single     Spouse name: None    Number of children: None    Years of education: None    Highest education level: None   Occupational History    Occupation: counselor     Employer: [employer] [address] [place] [address]   Tobacco Use    Smoking status: Never    Smokeless tobacco: Never   Vaping Use    Vaping Use: Never used   Substance and Sexual Activity    Alcohol use: No    Drug use: No       No family history on file. Review of Systems   Constitutional: Negative. HENT:  Negative for congestion, facial swelling, hearing loss, nosebleeds, sinus pressure and sore throat. Eyes:  Negative for photophobia and visual disturbance. Respiratory:  Negative for apnea, cough, chest tightness, shortness of breath and wheezing. Cardiovascular:  Negative for chest pain, palpitations and leg swelling. Gastrointestinal:  Negative for abdominal pain, blood in stool, diarrhea, nausea and vomiting. Genitourinary:  Negative for difficulty urinating, frequency and urgency. Musculoskeletal:  Positive for back pain. Negative for arthralgias, joint swelling and myalgias. Skin:  Negative for color change and rash. Allergic/Immunologic: Negative. Neurological:  Negative for syncope, weakness, light-headedness and headaches. Hematological: Negative. Psychiatric/Behavioral:  Negative for agitation, behavioral problems, confusion and self-injury. The patient is not nervous/anxious. All other systems reviewed and are negative. Physical Exam  Vitals and nursing note reviewed. Constitutional:       General: She is not in acute distress. Appearance: She is well-developed. HENT:      Head: Normocephalic and atraumatic. Nose: Nose normal.   Eyes:      Conjunctiva/sclera: Conjunctivae normal.      Pupils: Pupils are equal, round, and reactive to light. Neck:      Thyroid: No thyromegaly. Vascular: No JVD. Cardiovascular:      Rate and Rhythm: Normal rate and regular rhythm. Heart sounds: Normal heart sounds. No murmur heard. No friction rub. No gallop. Pulmonary:      Effort: Pulmonary effort is normal. No respiratory distress. Breath sounds: Normal breath sounds. No wheezing. Abdominal:      General: Bowel sounds are normal. There is no distension. Palpations: Abdomen is soft. Tenderness: There is no abdominal tenderness. There is no guarding or rebound. Musculoskeletal:         General: Normal range of motion. Cervical back: Normal range of motion and neck supple. Lumbar back: Tenderness present. Lymphadenopathy:      Cervical: No cervical adenopathy. Skin:     General: Skin is warm and dry. Findings: No erythema or rash. Neurological:      Mental Status: She is alert and oriented to person, place, and time. Cranial Nerves: No cranial nerve deficit. Motor: No abnormal muscle tone. Coordination: Coordination normal.      Deep Tendon Reflexes: Reflexes are normal and symmetric. Psychiatric:         Behavior: Behavior normal.         Judgment: Judgment normal.                             ASSESSMENT/PLAN:    Kishan Whiteside was seen today for 1 month follow-up, hypertension and toe pain. Diagnoses and all orders for this visit:    Essential hypertension, benign    Lumbar radiculopathy    Tylenol prn  The current medical regimen is effective;  continue present plan and medications.         Jacey Carpenter DO    1/4/2023  2:40 PM

## 2023-02-16 RX ORDER — LORAZEPAM 0.5 MG/1
0.5 TABLET ORAL EVERY 6 HOURS PRN
COMMUNITY

## 2023-02-16 NOTE — PROGRESS NOTES
[facility] PRE-ADMISSION TESTING   ENDOSCOPY/ COLONSCOPY INSTRUCTIONS  PAT- Phone Number: [phone]    ENDOSCOPY/ COLONSCOPY INSTRUCTIONS:     [x] Bowel Prep instructions reviewed. [x] Colonoscopy- The day prior: No solid foods. Clear liquids only. [x] Nothing by mouth after midnight. Including no gum, candy, mints, or water. [x] You may brush your teeth, gargle, but do NOT swallow water. [x] Do not wear makeup, lotions, powders, deodorant. [x] Arrange transportation with a responsible adult  to and from the hospital. If you do not have a responsible adult  to transport you, you will need to make arrangements with a medical transportation company. Arrange for someone to be with you for the remainder of the day and for 24 hours after your procedure due to having had anesthesia. -Who will be your  for transportation? _Lisa______   -Who will be staying with you for 24 hrs after your procedure?__________________    PARKING INSTRUCTIONS:     [x] ARRIVAL DATE & TIME: 2/22  @  1045   [x] Enter into the [employer] [facility] [place]. Two people may accompany you. Masks are not required but are recommended. [x] Parking Lot \"I\" is where you will park. It is located on the corner of [facility] and [address]. The entrance is on [address]. Upon entering the parking lot, a voucher ticket will print. EDUCATION INSTRUCTIONS:    [x] Bring a complete list of your medications, please write the last time you took the medicine, give this list to the nurse in Pre-Op. [x] Take only the following medications the morning of surgery with 1-2 ounces of water: cymbalta, atenolol, protonix, lorazepam   [x] Stop all herbal supplements and vitamins 5 days before surgery. Stop NSAIDS 7 days before surgery. [] DO NOT take any diabetic medicine the morning of surgery. Follow instructions for insulin the day before surgery.   [] If you are diabetic and your blood sugar is low or you feel symptomatic, you may drink 1-2 ounces of apple juice or take a glucose tablet.            -The morning of your procedure, you may call the pre-op area if you have concerns about your blood sugar [phone]. [] Use your inhalers the morning of surgery. Bring your emergency inhaler with you day of surgery. [x] Follow physician instructions regarding any blood thinners you may be taking. WHAT TO EXPECT:    [x] The day of your procedure you will be greeted and checked in by the [employer].  In addition, you will be registered in the [place] by a Patient Access Representative. Please bring your photo ID and insurance card. A nurse will greet you in accordance to the time you are needed in the pre-op area to prepare you for surgery. Please do not be discouraged if you are not greeted in the order you arrive as there are many variables that are involved in patient preparation. Your patience is greatly appreciated as you wait for your nurse. Please bring in items such as: books, magazines, newspapers, electronics, or any other items  to occupy your time in the waiting area. [x]  Delays may occur. Staff will make a sincere effort to keep you informed of delays. If any delays occur with your procedure, we apologize ahead of time for your inconvenience as we recognize the value of your time.

## 2023-02-22 ENCOUNTER — ANESTHESIA EVENT (OUTPATIENT)
Dept: ENDOSCOPY | Age: 68
End: 2023-02-22
Payer: MEDICARE

## 2023-02-22 ENCOUNTER — ANESTHESIA (OUTPATIENT)
Dept: ENDOSCOPY | Age: 68
End: 2023-02-22
Payer: MEDICARE

## 2023-02-22 ENCOUNTER — HOSPITAL ENCOUNTER (OUTPATIENT)
Age: 68
Setting detail: OUTPATIENT SURGERY
Discharge: HOME | End: 2023-02-22
Attending: SURGERY | Admitting: SURGERY
Payer: MEDICARE

## 2023-02-22 VITALS
HEIGHT: 62 IN | WEIGHT: 159 LBS | HEART RATE: 71 BPM | SYSTOLIC BLOOD PRESSURE: 129 MMHG | TEMPERATURE: 97 F | OXYGEN SATURATION: 98 % | RESPIRATION RATE: 16 BRPM | BODY MASS INDEX: 29.26 KG/M2 | DIASTOLIC BLOOD PRESSURE: 67 MMHG

## 2023-02-22 PROBLEM — K57.30 SIGMOID DIVERTICULOSIS: Status: ACTIVE | Noted: 2023-02-22

## 2023-02-22 PROBLEM — K57.30 SIGMOID DIVERTICULOSIS: Chronic | Status: ACTIVE | Noted: 2023-02-22

## 2023-02-22 PROCEDURE — 6360000002 HC RX W HCPCS: Performed by: NURSE ANESTHETIST, CERTIFIED REGISTERED

## 2023-02-22 PROCEDURE — 2709999900 HC NON-CHARGEABLE SUPPLY: Performed by: SURGERY

## 2023-02-22 PROCEDURE — 3700000000 HC ANESTHESIA ATTENDED CARE: Performed by: SURGERY

## 2023-02-22 PROCEDURE — 3700000001 HC ADD 15 MINUTES (ANESTHESIA): Performed by: SURGERY

## 2023-02-22 PROCEDURE — 45330 DIAGNOSTIC SIGMOIDOSCOPY: CPT | Performed by: SURGERY

## 2023-02-22 PROCEDURE — 7100000011 HC PHASE II RECOVERY - ADDTL 15 MIN: Performed by: SURGERY

## 2023-02-22 PROCEDURE — 7100000010 HC PHASE II RECOVERY - FIRST 15 MIN: Performed by: SURGERY

## 2023-02-22 PROCEDURE — 3609027000 HC COLONOSCOPY: Performed by: SURGERY

## 2023-02-22 PROCEDURE — 44388 COLONOSCOPY THRU STOMA SPX: CPT | Performed by: SURGERY

## 2023-02-22 PROCEDURE — 2580000003 HC RX 258: Performed by: SURGERY

## 2023-02-22 RX ORDER — SODIUM CHLORIDE 9 MG/ML
25 INJECTION, SOLUTION INTRAVENOUS PRN
Status: DISCONTINUED | OUTPATIENT
Start: 2023-02-22 | End: 2023-02-22 | Stop reason: HOSPADM

## 2023-02-22 RX ORDER — SODIUM CHLORIDE 9 MG/ML
INJECTION, SOLUTION INTRAVENOUS CONTINUOUS
Status: DISCONTINUED | OUTPATIENT
Start: 2023-02-22 | End: 2023-02-22 | Stop reason: HOSPADM

## 2023-02-22 RX ORDER — SODIUM CHLORIDE 0.9 % (FLUSH) 0.9 %
10 SYRINGE (ML) INJECTION PRN
Status: DISCONTINUED | OUTPATIENT
Start: 2023-02-22 | End: 2023-02-22 | Stop reason: HOSPADM

## 2023-02-22 RX ORDER — PROPOFOL 10 MG/ML
INJECTION, EMULSION INTRAVENOUS PRN
Status: DISCONTINUED | OUTPATIENT
Start: 2023-02-22 | End: 2023-02-22 | Stop reason: SDUPTHER

## 2023-02-22 RX ORDER — SODIUM CHLORIDE 0.9 % (FLUSH) 0.9 %
10 SYRINGE (ML) INJECTION EVERY 12 HOURS SCHEDULED
Status: DISCONTINUED | OUTPATIENT
Start: 2023-02-22 | End: 2023-02-22 | Stop reason: HOSPADM

## 2023-02-22 RX ADMIN — PROPOFOL 450 MG: 10 INJECTION, EMULSION INTRAVENOUS at 12:07

## 2023-02-22 RX ADMIN — SODIUM CHLORIDE: 9 INJECTION, SOLUTION INTRAVENOUS at 11:12

## 2023-02-22 ASSESSMENT — PAIN - FUNCTIONAL ASSESSMENT: PAIN_FUNCTIONAL_ASSESSMENT: 0-10

## 2023-02-22 ASSESSMENT — LIFESTYLE VARIABLES: SMOKING_STATUS: 0

## 2023-02-22 ASSESSMENT — PAIN SCALES - GENERAL: PAINLEVEL_OUTOF10: 0

## 2023-02-22 NOTE — ANESTHESIA PRE PROCEDURE
Department of Anesthesiology  Preprocedure Note       Name:  Babs Age   Age:  75 y.o.  :  1955                                          MRN:  [id number]         Date:  2023      Surgeon: Rani Clemente):  Bart Madrigal MD    Procedure: Procedure(s):  COLONOSCOPY THROUGH STOMA    Medications prior to admission:   Prior to Admission medications    Medication Sig Start Date End Date Taking? Authorizing Provider   LORazepam (ATIVAN) 0.5 MG tablet Take 0.5 mg by mouth every 6 hours as needed for Anxiety. Historical Provider, MD   GAVILYTE-C 240 g solution TAKE 4 000MLS BY MOUTH ONCE FOR 1 DOSE 22   Historical Provider, MD   atenolol-chlorthalidone (TENORETIC) 50-25 MG per tablet TAKE 1 TABLET DAILY 22   Sylvester Aleman DO   vitamin D (CHOLECALCIFEROL) 50 MCG (2000) TABS tablet Take 1 tablet by mouth daily 22   Sylvester Aleman DO   DULoxetine (CYMBALTA) 60 MG extended release capsule TAKE ONE CAPSULE BY MOUTH ONCE A DAY 22   Sylvester Aleman DO   meclizine (ANTIVERT) 12.5 MG tablet Take 1 tablet by mouth 3 times daily as needed for Dizziness 22   Sylvester Aleman DO   pantoprazole (PROTONIX) 40 MG tablet TAKE 1 TABLET DAILY 22   Sylvester Aleman DO   rosuvastatin (CRESTOR) 20 MG tablet TAKE 1 TABLET DAILY 22   Sylvester Aleman DO       Current medications:    No current facility-administered medications for this visit. No current outpatient medications on file.      Facility-Administered Medications Ordered in Other Visits   Medication Dose Route Frequency Provider Last Rate Last Admin    0.9 % sodium chloride infusion   IntraVENous Continuous Bart Madrigal MD 75 mL/hr at 23 1112 New Bag at 23 1112    sodium chloride flush 0.9 % injection 10 mL  10 mL IntraVENous 2 times per day Bart Madrigal MD        sodium chloride flush 0.9 % injection 10 mL  10 mL IntraVENous PRN Bart Madrigal MD        0.9 % sodium chloride infusion  25 mL IntraVENous PRN Bart Madrigal MD           Allergies:  No Known Allergies    Problem List:    Patient Active Problem List   Diagnosis Code    Hyperlipidemia E78.5    Essential hypertension, benign I10    Vitamin D deficiency E55.9    Intra-abdominal free air of unknown etiology K66.8    Sepsis without acute organ dysfunction ([facility] [address] A41.9    Peritonitis ([facility] [address] K65.9    Lactic acidosis E87.20    Stercoral ulcer of large intestine K63.3    Status post Mike procedure ([facility] [address] Z93.3       Past Medical History:        Diagnosis Date    Hyperlipidemia     Hypertension     Vitamin D deficiency        Past Surgical History:        Procedure Laterality Date    ABDOMEN SURGERY N/A 11/30/2022    DELAYED PRIMARY CLOSURE OF ABDOMINAL WOUND performed by Johana Raya MD at [address] [facility]      BREAST SURGERY Bilateral     cancer    COLONOSCOPY  2016    COLOSTOMY      HYSTERECTOMY (CERVIX STATUS UNKNOWN)      LAPAROTOMY N/A 11/22/2022    EXPLORATORY LAPAROTOMY, PARTIAL DISTAL COLECTOMY, CREATION OF END-DISTAL COLOSTOMY, MIKE'S  PROCEDURE performed by Cordelia Rodríguez MD at [place] [address]         Social History:    Social History     Tobacco Use    Smoking status: Never    Smokeless tobacco: Never   Substance Use Topics    Alcohol use: No                                Counseling given: Not Answered      Vital Signs (Current): There were no vitals filed for this visit.                                            BP Readings from Last 3 Encounters:   02/22/23 (!) 143/84   01/04/23 124/82   12/13/22 118/70       NPO Status:  npo today                                                                               BMI:   Wt Readings from Last 3 Encounters:   02/22/23 159 lb (72.1 kg)   01/04/23 168 lb (76.2 kg)   12/13/22 168 lb (76.2 kg)     There is no height or weight on file to calculate BMI.    CBC:   Lab Results   Component Value Date/Time    WBC 8.3 12/01/2022 04:17 AM    RBC 5.12 12/01/2022 04:17 AM    HGB 12.4 12/01/2022 04:17 AM    HCT 39.3 12/01/2022 04:17 AM    MCV 76.8 12/01/2022 04:17 AM    RDW 14.4 12/01/2022 04:17 AM     12/01/2022 04:17 AM       CMP:   Lab Results   Component Value Date/Time     12/01/2022 04:17 AM    K 3.3 12/01/2022 04:17 AM    CL 95 12/01/2022 04:17 AM    CO2 27 12/01/2022 04:17 AM    BUN 5 12/01/2022 04:17 AM    CREATININE 0.4 12/01/2022 04:17 AM    GFRAA >60 12/20/2021 11:14 AM    AGRATIO 1.5 03/23/2021 07:14 AM    LABGLOM >60 12/01/2022 04:17 AM    GLUCOSE 111 12/01/2022 04:17 AM    PROT 7.8 11/22/2022 07:38 PM    CALCIUM 9.6 12/01/2022 04:17 AM    BILITOT 0.7 11/22/2022 07:38 PM    ALKPHOS 81 11/22/2022 07:38 PM    AST 22 11/22/2022 07:38 PM    ALT 19 11/22/2022 07:38 PM       POC Tests: No results for input(s): POCGLU, POCNA, POCK, POCCL, POCBUN, POCHEMO, POCHCT in the last 72 hours.     Coags:   Lab Results   Component Value Date/Time    PROTIME 12.6 11/30/2022 05:58 AM    PROTIME 11.2 03/23/2021 07:14 AM    PROTIME 11.2 03/23/2021 12:00 AM    PROTIME 11.2 03/23/2021 12:00 AM    INR 1.2 11/30/2022 05:58 AM    APTT 25.5 11/22/2022 07:38 PM       HCG (If Applicable): No results found for: PREGTESTUR, PREGSERUM, HCG, HCGQUANT     ABGs: No results found for: PHART, PO2ART, QZU5TWO, YWR4EAL, BEART, S6BGYDQD     Type & Screen (If Applicable):  No results found for: LABABO, LABRH    Drug/Infectious Status (If Applicable):  No results found for: HIV, HEPCAB    COVID-19 Screening (If Applicable):   Lab Results   Component Value Date/Time    COVID19 Detected 12/07/2022 02:21 PM    COVID19 Not Detected 12/17/2020 07:52 AM           Anesthesia Evaluation  Patient summary reviewed and Nursing notes reviewed no history of anesthetic complications:   Airway: Mallampati: III  TM distance: <3 FB   Neck ROM: full  Mouth opening: > = 3 FB   Dental:          Pulmonary:Negative Pulmonary ROS breath sounds clear to auscultation      (-) not a current smoker                          ROS comment: H/o tonsillectomy   Cardiovascular:    (+) hypertension:, hyperlipidemia      ECG reviewed  Rhythm: regular  Rate: abnormal  Echocardiogram reviewed         Beta Blocker:  Dose within 24 Hrs      ROS comment: ECHO 6/26/04:  Benign with      Neuro/Psych:   Negative Neuro/Psych ROS              GI/Hepatic/Renal:   (+) GERD:,          ROS comment: H/o appendectomy  CT abd/pelvis 11/22/22:  Impression  1. Pneumoperitoneum and ascites, most likely due to perforated sigmoid diverticulitis.    Critical results were called by Dr. Anoop Zaldivar MD to Dr. Sunil Vitale MD on 11/22/2022 at 20:17.  2. Incidental findings given above with note of mesenteric adenopathy which is more conspicuous and abnormality such as lymphoma is a consideration. .   Endo/Other:    (+) electrolyte abnormalities ( ), .                  ROS comment: H/o hysterectomy  H/o breast surgery Abdominal:             Vascular: negative vascular ROS. Other Findings:             Anesthesia Plan      MAC     ASA 3     (PIV)  Induction: intravenous. Anesthetic plan and risks discussed with patient. Plan discussed with CRNA. IVAN Suarez - CRNA   2/22/2023    Pt seen, examined, chart reviewed, plan discussed.   Radha Turner MD  2/22/2023  1:11 PM

## 2023-02-22 NOTE — ANESTHESIA POSTPROCEDURE EVALUATION
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) [Z93.3])    Surgeons: Tu Sanchez MD Responsible Provider: Lam Marcelino MD    Anesthesia Type: MAC ASA Status: 3          Anesthesia Type: No value filed.     Corby Phase I: Corby Score: 10    Corby Phase II: Corby Score: 10      Anesthesia Post Evaluation    Patient location during evaluation: PACU  Patient participation: complete - patient participated  Level of consciousness: awake  Pain score: 3  Airway patency: patent  Nausea & Vomiting: no nausea and no vomiting  Complications: no  Cardiovascular status: blood pressure returned to baseline  Respiratory status: acceptable  Hydration status: euvolemic

## 2023-02-22 NOTE — H&P
GENERAL SURGERY  HISTORY & PHYSICAL    Jeremiah Lowers  75 y.o. female   Ova Benito, DO     CC:  h/o colostomy    HPI  The patient presents for diagnostic colonoscopy today. The patient had an ex lap with aguirre's pouch in November 2022 for a perforated stercoral ulcer. She is being evaluated for colostomy takedown. Past Medical History:   Diagnosis Date    Hyperlipidemia     Hypertension     Vitamin D deficiency        Past Surgical History:   Procedure Laterality Date    ABDOMEN SURGERY N/A 11/30/2022    DELAYED PRIMARY CLOSURE OF ABDOMINAL WOUND performed by Konrad Méndez MD at [address] [facility] Bilateral     cancer    COLONOSCOPY  2016    COLOSTOMY      HYSTERECTOMY (CERVIX STATUS UNKNOWN)      LAPAROTOMY N/A 11/22/2022    EXPLORATORY LAPAROTOMY, PARTIAL DISTAL COLECTOMY, CREATION OF END-DISTAL COLOSTOMY, MIKE'S  PROCEDURE performed by Jose R Prieto MD at [address] [place] [address] History     Socioeconomic History    Marital status: Single     Spouse name: Not on file    Number of children: Not on file    Years of education: Not on file    Highest education level: Not on file   Occupational History    Occupation: counselor     Employer: [employer]   Tobacco Use    Smoking status: Never    Smokeless tobacco: Never   Vaping Use    Vaping Use: Never used   Substance and Sexual Activity    Alcohol use: No    Drug use: No    Sexual activity: Not on file   Other Topics Concern    Not on file   Social History Narrative    Not on file     Social Determinants of Health     Financial Resource Strain: Not on file   Food Insecurity: Not on file   Transportation Needs: Not on file   Physical Activity: Not on file   Stress: Not on file   Social Connections: Not on file   Intimate Partner Violence: Not on file   Housing Stability: Not on file        History reviewed. No pertinent family history.      No current facility-administered medications on file prior to encounter. Current Outpatient Medications on File Prior to Encounter   Medication Sig Dispense Refill    LORazepam (ATIVAN) 0.5 MG tablet Take 0.5 mg by mouth every 6 hours as needed for Anxiety.       atenolol-chlorthalidone (TENORETIC) 50-25 MG per tablet TAKE 1 TABLET DAILY 90 tablet 3    vitamin D (CHOLECALCIFEROL) 50 MCG (2000 UT) TABS tablet Take 1 tablet by mouth daily 90 tablet 3    DULoxetine (CYMBALTA) 60 MG extended release capsule TAKE ONE CAPSULE BY MOUTH ONCE A DAY 90 capsule 3    meclizine (ANTIVERT) 12.5 MG tablet Take 1 tablet by mouth 3 times daily as needed for Dizziness 90 tablet 1    pantoprazole (PROTONIX) 40 MG tablet TAKE 1 TABLET DAILY 90 tablet 3    rosuvastatin (CRESTOR) 20 MG tablet TAKE 1 TABLET DAILY 90 tablet 3       No Known Allergies     Review of Systems  General - no chills, fever, weight gain or weight loss  Respiratory - no cough, shortness of breath, or wheezing  Cardiovascular - no chest pain or dyspnea on exertion  Gastrointestinal - no abdominal pain, blood in stools, change in bowel habits, constipation or diarrhea  Neurological - no headaches, numbness/tingling or weakness    Physical Exam   BP (!) 143/84   Pulse 66   Temp 97.7 °F (36.5 °C) (Temporal)   Resp 16   Ht 5' 2\" (1.575 m)   Wt 159 lb (72.1 kg)   SpO2 96%   BMI 29.08 kg/m²   General - no acute distress, comfortable   Respiratory - normal effort, clear to auscultation  CV - regular rate and rhythm, strong femoral pulses, no pedal edema  GI - colostomy LLQnon distended,  non tender, no hernia, no mass, no hepatosplenomegaly     Assessment    S/p colostomy for perforated stercoral ulcer    Plan  Diagnostic colonoscopy       Dannielle Leo MD, FACS

## 2023-03-07 ENCOUNTER — OFFICE VISIT (OUTPATIENT)
Dept: SURGERY | Age: 68
End: 2023-03-07
Payer: MEDICARE

## 2023-03-07 VITALS
TEMPERATURE: 98 F | HEIGHT: 62 IN | HEART RATE: 66 BPM | WEIGHT: 161 LBS | DIASTOLIC BLOOD PRESSURE: 62 MMHG | SYSTOLIC BLOOD PRESSURE: 130 MMHG | BODY MASS INDEX: 29.63 KG/M2

## 2023-03-07 DIAGNOSIS — Z93.3 STATUS POST HARTMANN PROCEDURE (HCC): Primary | ICD-10-CM

## 2023-03-07 PROCEDURE — 3075F SYST BP GE 130 - 139MM HG: CPT | Performed by: SURGERY

## 2023-03-07 PROCEDURE — G8400 PT W/DXA NO RESULTS DOC: HCPCS | Performed by: SURGERY

## 2023-03-07 PROCEDURE — 1090F PRES/ABSN URINE INCON ASSESS: CPT | Performed by: SURGERY

## 2023-03-07 PROCEDURE — 1123F ACP DISCUSS/DSCN MKR DOCD: CPT | Performed by: SURGERY

## 2023-03-07 PROCEDURE — 1036F TOBACCO NON-USER: CPT | Performed by: SURGERY

## 2023-03-07 PROCEDURE — 99214 OFFICE O/P EST MOD 30 MIN: CPT | Performed by: SURGERY

## 2023-03-07 PROCEDURE — 3078F DIAST BP <80 MM HG: CPT | Performed by: SURGERY

## 2023-03-07 PROCEDURE — 99212 OFFICE O/P EST SF 10 MIN: CPT | Performed by: SURGERY

## 2023-03-07 PROCEDURE — G8427 DOCREV CUR MEDS BY ELIG CLIN: HCPCS | Performed by: SURGERY

## 2023-03-07 PROCEDURE — G8417 CALC BMI ABV UP PARAM F/U: HCPCS | Performed by: SURGERY

## 2023-03-07 PROCEDURE — 3017F COLORECTAL CA SCREEN DOC REV: CPT | Performed by: SURGERY

## 2023-03-07 PROCEDURE — G8484 FLU IMMUNIZE NO ADMIN: HCPCS | Performed by: SURGERY

## 2023-03-07 RX ORDER — NEOMYCIN SULFATE 500 MG/1
TABLET ORAL
Qty: 4 TABLET | Refills: 0 | Status: SHIPPED | OUTPATIENT
Start: 2023-03-07

## 2023-03-07 RX ORDER — POLYETHYLENE GLYCOL 3350, SODIUM CHLORIDE, POTASSIUM CHLORIDE, SODIUM BICARBONATE, AND SODIUM SULFATE 240; 5.84; 2.98; 6.72; 22.72 G/4L; G/4L; G/4L; G/4L; G/4L
4000 POWDER, FOR SOLUTION ORAL ONCE
Qty: 1 EACH | Refills: 0 | Status: SHIPPED | OUTPATIENT
Start: 2023-03-07 | End: 2023-03-07

## 2023-03-07 RX ORDER — ENOXAPARIN SODIUM 100 MG/ML
40 INJECTION SUBCUTANEOUS ONCE
OUTPATIENT
Start: 2023-03-07 | End: 2023-03-07

## 2023-03-07 RX ORDER — CELECOXIB 100 MG/1
100 CAPSULE ORAL ONCE
OUTPATIENT
Start: 2023-03-07 | End: 2023-03-07

## 2023-03-07 RX ORDER — SODIUM CHLORIDE 9 MG/ML
INJECTION, SOLUTION INTRAVENOUS PRN
OUTPATIENT
Start: 2023-03-07

## 2023-03-07 RX ORDER — ACETAMINOPHEN 325 MG/1
1000 TABLET ORAL ONCE
OUTPATIENT
Start: 2023-03-07 | End: 2023-03-07

## 2023-03-07 RX ORDER — METRONIDAZOLE 500 MG/1
TABLET ORAL
Qty: 4 TABLET | Refills: 0 | Status: SHIPPED | OUTPATIENT
Start: 2023-03-07

## 2023-03-07 RX ORDER — SODIUM CHLORIDE 0.9 % (FLUSH) 0.9 %
5-40 SYRINGE (ML) INJECTION PRN
OUTPATIENT
Start: 2023-03-07

## 2023-03-07 RX ORDER — SODIUM CHLORIDE 0.9 % (FLUSH) 0.9 %
5-40 SYRINGE (ML) INJECTION EVERY 12 HOURS SCHEDULED
OUTPATIENT
Start: 2023-03-07

## 2023-03-07 NOTE — H&P
Chief Complaint   Patient presents with    Follow-up     Patient here today to discuss reversal.        Assessment    ICD-10-CM    1. Status post Mike procedure [facility])  Z93.3               Plan    Return for schedule surgery-laparoscopic colostomy reversal, possible open (general/lithotomy). Oral antibiotic and mechanical bowel prep ordered    I discussed risks, benefits, and alternative to surgery with the patient including bleeding, possible need for blood transfusion, infection, anastamotic leak, venous thromboembolism, acute cardiac events, stroke, and death. The patient's questions were answered. The patient understood and agreed to proceed with surgery. We also discussed the options for keeping her colostomy and referring her for a second surgical opinion. History    HPI:  The patient presents today to discuss colostomy reversal.  She has not complaints. She had perforated diverticulitis and underwent open partial sigmoidectomy, end descending colostomy, and Jay's pouch on 11/22/2022. She also had a delayed primary closure of her midline wound during that hospital stay. She had a colonoscopy done 2/22/2023 which showed residual diverticulosis within a long Jay's segment.     Past Medical History:   Diagnosis Date    Hyperlipidemia     Hypertension     Vitamin D deficiency         Past Surgical History:   Procedure Laterality Date    ABDOMEN SURGERY N/A 11/30/2022    DELAYED PRIMARY CLOSURE OF ABDOMINAL WOUND performed by Jorje Flaherty MD at [address] [facility] Bilateral     cancer    COLONOSCOPY  2016    COLONOSCOPY N/A 2/22/2023    COLONOSCOPY THROUGH STOMA performed by Francheska Rose MD at [address] [facility] (CERVIX STATUS UNKNOWN)      LAPAROTOMY N/A 11/22/2022    EXPLORATORY LAPAROTOMY, PARTIAL DISTAL COLECTOMY, CREATION OF END-DISTAL COLOSTOMY, MIKE'S  PROCEDURE performed by Francheska Rose MD at [address] [facility] TONSILLECTOMY          Social History     Socioeconomic History    Marital status: Single     Spouse name: Not on file    Number of children: Not on file    Years of education: Not on file    Highest education level: Not on file   Occupational History    Occupation: counselor     Employer: [employer] [address] [place] [address]   Tobacco Use    Smoking status: Never    Smokeless tobacco: Never   Vaping Use    Vaping Use: Never used   Substance and Sexual Activity    Alcohol use: No    Drug use: No    Sexual activity: Not on file   Other Topics Concern    Not on file   Social History Narrative    Not on file     Social Determinants of Health     Financial Resource Strain: Not on file   Food Insecurity: Not on file   Transportation Needs: Not on file   Physical Activity: Not on file   Stress: Not on file   Social Connections: Not on file   Intimate Partner Violence: Not on file   Housing Stability: Not on file        No family history on file. Current Outpatient Medications on File Prior to Visit   Medication Sig Dispense Refill    LORazepam (ATIVAN) 0.5 MG tablet Take 0.5 mg by mouth every 6 hours as needed for Anxiety. atenolol-chlorthalidone (TENORETIC) 50-25 MG per tablet TAKE 1 TABLET DAILY 90 tablet 3    vitamin D (CHOLECALCIFEROL) 50 MCG (2000 UT) TABS tablet Take 1 tablet by mouth daily 90 tablet 3    DULoxetine (CYMBALTA) 60 MG extended release capsule TAKE ONE CAPSULE BY MOUTH ONCE A DAY 90 capsule 3    meclizine (ANTIVERT) 12.5 MG tablet Take 1 tablet by mouth 3 times daily as needed for Dizziness 90 tablet 1    pantoprazole (PROTONIX) 40 MG tablet TAKE 1 TABLET DAILY 90 tablet 3    rosuvastatin (CRESTOR) 20 MG tablet TAKE 1 TABLET DAILY 90 tablet 3     No current facility-administered medications on file prior to visit.         No Known Allergies     Review of Systems  General - no chills, fever, weight gain or weight loss  Psychological - no anxiety or depression  Ophthalmic - no blurry vision or double vision  ENT - no epistaxis or sore throat  Hematological and Lymphatic - no bleeding problems or blood clots  Endocrine - no temperature intolerance or unexpected weight changes  Respiratory - no cough, shortness of breath, or wheezing  Cardiovascular - no chest pain or dyspnea on exertion  Gastrointestinal - no abdominal pain, blood in stools, change in bowel habits, constipation or diarrhea  Genito-Urinary - no dysuria, trouble voiding, or hematuria  Musculoskeletal - no gait disturbance, joint pain or muscular weakness  Neurological - no headaches, numbness/tingling or weakness  Dermatological - no pruritus or rash    Physical Exam   /62   Pulse 66   Temp 98 °F (36.7 °C)   Ht 5' 2\" (1.575 m)   Wt 161 lb (73 kg)   BMI 29.45 kg/m²    General - no acute distress, comfortable, 74 yo white woman   Head - normocephalic,atraumatic  Eyes - pupils symmetric, conjunctivae pink  ENT - nose/ears appear normal,  gooddentition, moist oral mucosa   Neck - no cervical adenopathy, mass, thyromegaly   Respiratory - normal effort, clear to auscultation  CV - regular rate and rhythm, strong femoral pulses, no pedal edema  GI - midline surgical scars, LLQ end colostomy, non distended,  non tender, no hernia, no mass, no hepatosplenomegaly   Anorectal - deferred  Skin - warm, dry, good turgor; no rash  Musculoskeletal - normal gait, no deformities   Psychiatric - alert and oriented x 3; normal mood, affect, judgement, thought content

## 2023-03-10 ENCOUNTER — PREP FOR PROCEDURE (OUTPATIENT)
Dept: SURGERY | Age: 68
End: 2023-03-10

## 2023-03-10 ENCOUNTER — TELEPHONE (OUTPATIENT)
Dept: SURGERY | Age: 68
End: 2023-03-10

## 2023-03-10 NOTE — TELEPHONE ENCOUNTER
Prior Authorization Form:      DEMOGRAPHICS:                     Patient Name:  Lexx Estrella  Patient :  1955            Insurance:  Payor: MEDICARE / Plan: MEDICARE PART A AND B / Product Type: *No Product type* /   Insurance ID Number:    Payer/Plan Subscr  Sex Relation Sub. Ins. ID Effective Group Num   1. [address] [facility] 1955 Female Self [id number]                                    [address] 1955 Female Self [id number]                                     [id number]         DIAGNOSIS & PROCEDURE:                       Procedure/Operation: Lap colostomy reversal, poss open           CPT Code: 89406    Diagnosis:  S/p Hartmen    ICD10 Code: z93.3    Location:  [facility]      Surgeon:  Dr. Montserrat Patel     SCHEDULING INFORMATION:                          Date: 2023    Time: 730              Anesthesia:  GA                                                       Status:  Outpatient          Electronically signed by Samantha Garcia MA on 3/10/2023 at 2:15 PM

## 2023-03-13 ENCOUNTER — TELEPHONE (OUTPATIENT)
Dept: SURGERY | Age: 68
End: 2023-03-13

## 2023-03-13 NOTE — TELEPHONE ENCOUNTER
Patient is scheduled for laparoscopic colostomy reversal, possible open with   on 04/17/23 at 7:30 with an arrival time of 5:30. Pt accepted date and time and verbalized understanding. Procedure letter mailed to patient as well.

## 2023-03-16 DIAGNOSIS — F41.9 ANXIETY: Primary | ICD-10-CM

## 2023-03-17 RX ORDER — LORAZEPAM 0.5 MG/1
TABLET ORAL
Qty: 60 TABLET | Refills: 1 | Status: SHIPPED | OUTPATIENT
Start: 2023-03-17 | End: 2023-04-16

## 2023-04-06 ENCOUNTER — HOSPITAL ENCOUNTER (OUTPATIENT)
Dept: PREADMISSION TESTING | Age: 68
Discharge: HOME | End: 2023-04-06
Payer: MEDICARE

## 2023-04-06 VITALS
TEMPERATURE: 97 F | HEART RATE: 69 BPM | RESPIRATION RATE: 18 BRPM | SYSTOLIC BLOOD PRESSURE: 131 MMHG | OXYGEN SATURATION: 96 % | BODY MASS INDEX: 30.91 KG/M2 | DIASTOLIC BLOOD PRESSURE: 58 MMHG | WEIGHT: 169 LBS

## 2023-04-06 DIAGNOSIS — Z01.812 PRE-OPERATIVE LABORATORY EXAMINATION: ICD-10-CM

## 2023-04-06 LAB
ABO + RH BLD: NORMAL
ANION GAP SERPL CALCULATED.3IONS-SCNC: 11 MMOL/L (ref 7–16)
BASOPHILS # BLD: 0.03 E9/L (ref 0–0.2)
BASOPHILS NFR BLD: 0.4 % (ref 0–2)
BLD GP AB SCN SERPL QL: NORMAL
BUN SERPL-MCNC: 12 MG/DL (ref 6–23)
CALCIUM SERPL-MCNC: 9.7 MG/DL (ref 8.6–10.2)
CHLORIDE SERPL-SCNC: 97 MMOL/L (ref 98–107)
CO2 SERPL-SCNC: 30 MMOL/L (ref 22–29)
CREAT SERPL-MCNC: 0.6 MG/DL (ref 0.5–1)
EOSINOPHIL # BLD: 0.07 E9/L (ref 0.05–0.5)
EOSINOPHIL NFR BLD: 0.9 % (ref 0–6)
ERYTHROCYTE [DISTWIDTH] IN BLOOD BY AUTOMATED COUNT: 13.9 FL (ref 11.5–15)
GLUCOSE SERPL-MCNC: 100 MG/DL (ref 74–99)
HCT VFR BLD AUTO: 40.7 % (ref 34–48)
HGB BLD-MCNC: 12.8 G/DL (ref 11.5–15.5)
IMM GRANULOCYTES # BLD: 0.02 E9/L
IMM GRANULOCYTES NFR BLD: 0.3 % (ref 0–5)
LYMPHOCYTES # BLD: 2.14 E9/L (ref 1.5–4)
LYMPHOCYTES NFR BLD: 28.8 % (ref 20–42)
MCH RBC QN AUTO: 24 PG (ref 26–35)
MCHC RBC AUTO-ENTMCNC: 31.4 % (ref 32–34.5)
MCV RBC AUTO: 76.4 FL (ref 80–99.9)
MONOCYTES # BLD: 0.67 E9/L (ref 0.1–0.95)
MONOCYTES NFR BLD: 9 % (ref 2–12)
NEUTROPHILS # BLD: 4.51 E9/L (ref 1.8–7.3)
NEUTS SEG NFR BLD: 60.6 % (ref 43–80)
PLATELET # BLD AUTO: 366 E9/L (ref 130–450)
PMV BLD AUTO: 9.5 FL (ref 7–12)
POTASSIUM SERPL-SCNC: 3.1 MMOL/L (ref 3.5–5)
RBC # BLD AUTO: 5.33 E12/L (ref 3.5–5.5)
SODIUM SERPL-SCNC: 138 MMOL/L (ref 132–146)
WBC # BLD: 7.4 E9/L (ref 4.5–11.5)

## 2023-04-06 PROCEDURE — 36415 COLL VENOUS BLD VENIPUNCTURE: CPT

## 2023-04-06 PROCEDURE — 87081 CULTURE SCREEN ONLY: CPT

## 2023-04-06 PROCEDURE — 86901 BLOOD TYPING SEROLOGIC RH(D): CPT

## 2023-04-06 PROCEDURE — 86900 BLOOD TYPING SEROLOGIC ABO: CPT

## 2023-04-06 PROCEDURE — 85025 COMPLETE CBC W/AUTO DIFF WBC: CPT

## 2023-04-06 PROCEDURE — 80048 BASIC METABOLIC PNL TOTAL CA: CPT

## 2023-04-06 PROCEDURE — 86850 RBC ANTIBODY SCREEN: CPT

## 2023-04-07 LAB — MRSA SPEC QL CULT: NORMAL

## 2023-04-16 ENCOUNTER — ANESTHESIA EVENT (OUTPATIENT)
Dept: OPERATING ROOM | Age: 68
DRG: 330 | End: 2023-04-16
Payer: MEDICARE

## 2023-04-17 ENCOUNTER — HOSPITAL ENCOUNTER (INPATIENT)
Age: 68
LOS: 2 days | Discharge: HOME | DRG: 330 | End: 2023-04-19
Attending: SURGERY | Admitting: SURGERY
Payer: MEDICARE

## 2023-04-17 ENCOUNTER — ANESTHESIA (OUTPATIENT)
Dept: OPERATING ROOM | Age: 68
DRG: 330 | End: 2023-04-17
Payer: MEDICARE

## 2023-04-17 DIAGNOSIS — Z93.3: ICD-10-CM

## 2023-04-17 DIAGNOSIS — Z98.890 S/P COLOSTOMY TAKEDOWN: ICD-10-CM

## 2023-04-17 DIAGNOSIS — Z01.812 PRE-OPERATIVE LABORATORY EXAMINATION: Primary | ICD-10-CM

## 2023-04-17 PROCEDURE — 6370000000 HC RX 637 (ALT 250 FOR IP): Performed by: STUDENT IN AN ORGANIZED HEALTH CARE EDUCATION/TRAINING PROGRAM

## 2023-04-17 PROCEDURE — 6370000000 HC RX 637 (ALT 250 FOR IP): Performed by: SURGERY

## 2023-04-17 PROCEDURE — 88305 TISSUE EXAM BY PATHOLOGIST: CPT

## 2023-04-17 PROCEDURE — 88304 TISSUE EXAM BY PATHOLOGIST: CPT

## 2023-04-17 PROCEDURE — 2709999900 HC NON-CHARGEABLE SUPPLY: Performed by: SURGERY

## 2023-04-17 PROCEDURE — 0DQ80ZZ REPAIR SMALL INTESTINE, OPEN APPROACH: ICD-10-PCS | Performed by: SURGERY

## 2023-04-17 PROCEDURE — 2580000003 HC RX 258: Performed by: NURSE ANESTHETIST, CERTIFIED REGISTERED

## 2023-04-17 PROCEDURE — 3700000000 HC ANESTHESIA ATTENDED CARE: Performed by: SURGERY

## 2023-04-17 PROCEDURE — 0DTN0ZZ RESECTION OF SIGMOID COLON, OPEN APPROACH: ICD-10-PCS | Performed by: SURGERY

## 2023-04-17 PROCEDURE — 2720000010 HC SURG SUPPLY STERILE: Performed by: SURGERY

## 2023-04-17 PROCEDURE — 7100000000 HC PACU RECOVERY - FIRST 15 MIN: Performed by: SURGERY

## 2023-04-17 PROCEDURE — 2580000003 HC RX 258: Performed by: SURGERY

## 2023-04-17 PROCEDURE — 2500000003 HC RX 250 WO HCPCS: Performed by: NURSE ANESTHETIST, CERTIFIED REGISTERED

## 2023-04-17 PROCEDURE — 7100000001 HC PACU RECOVERY - ADDTL 15 MIN: Performed by: SURGERY

## 2023-04-17 PROCEDURE — 2500000003 HC RX 250 WO HCPCS: Performed by: SURGERY

## 2023-04-17 PROCEDURE — 6360000002 HC RX W HCPCS: Performed by: STUDENT IN AN ORGANIZED HEALTH CARE EDUCATION/TRAINING PROGRAM

## 2023-04-17 PROCEDURE — 6360000002 HC RX W HCPCS: Performed by: NURSE ANESTHETIST, CERTIFIED REGISTERED

## 2023-04-17 PROCEDURE — 0DJD8ZZ INSPECTION OF LOWER INTESTINAL TRACT, VIA NATURAL OR ARTIFICIAL OPENING ENDOSCOPIC: ICD-10-PCS | Performed by: SURGERY

## 2023-04-17 PROCEDURE — 1200000000 HC SEMI PRIVATE

## 2023-04-17 PROCEDURE — 6360000002 HC RX W HCPCS: Performed by: SURGERY

## 2023-04-17 PROCEDURE — 2580000003 HC RX 258: Performed by: STUDENT IN AN ORGANIZED HEALTH CARE EDUCATION/TRAINING PROGRAM

## 2023-04-17 PROCEDURE — 3600000015 HC SURGERY LEVEL 5 ADDTL 15MIN: Performed by: SURGERY

## 2023-04-17 PROCEDURE — 3600000005 HC SURGERY LEVEL 5 BASE: Performed by: SURGERY

## 2023-04-17 PROCEDURE — 3700000001 HC ADD 15 MINUTES (ANESTHESIA): Performed by: SURGERY

## 2023-04-17 PROCEDURE — 88307 TISSUE EXAM BY PATHOLOGIST: CPT

## 2023-04-17 RX ORDER — SODIUM CHLORIDE, SODIUM LACTATE, POTASSIUM CHLORIDE, CALCIUM CHLORIDE 600; 310; 30; 20 MG/100ML; MG/100ML; MG/100ML; MG/100ML
INJECTION, SOLUTION INTRAVENOUS CONTINUOUS
Status: DISCONTINUED | OUTPATIENT
Start: 2023-04-17 | End: 2023-04-19

## 2023-04-17 RX ORDER — METRONIDAZOLE 500 MG/100ML
500 INJECTION, SOLUTION INTRAVENOUS
Status: COMPLETED | OUTPATIENT
Start: 2023-04-17 | End: 2023-04-17

## 2023-04-17 RX ORDER — PROPOFOL 10 MG/ML
INJECTION, EMULSION INTRAVENOUS PRN
Status: DISCONTINUED | OUTPATIENT
Start: 2023-04-17 | End: 2023-04-17 | Stop reason: SDUPTHER

## 2023-04-17 RX ORDER — METHOCARBAMOL 500 MG/1
500 TABLET, FILM COATED ORAL 4 TIMES DAILY
Status: DISCONTINUED | OUTPATIENT
Start: 2023-04-17 | End: 2023-04-19 | Stop reason: HOSPADM

## 2023-04-17 RX ORDER — SODIUM CHLORIDE 0.9 % (FLUSH) 0.9 %
10 SYRINGE (ML) INJECTION EVERY 12 HOURS SCHEDULED
Status: DISCONTINUED | OUTPATIENT
Start: 2023-04-17 | End: 2023-04-19 | Stop reason: HOSPADM

## 2023-04-17 RX ORDER — SODIUM CHLORIDE 9 MG/ML
INJECTION, SOLUTION INTRAVENOUS PRN
Status: DISCONTINUED | OUTPATIENT
Start: 2023-04-17 | End: 2023-04-17 | Stop reason: HOSPADM

## 2023-04-17 RX ORDER — SODIUM CHLORIDE 0.9 % (FLUSH) 0.9 %
10 SYRINGE (ML) INJECTION PRN
Status: DISCONTINUED | OUTPATIENT
Start: 2023-04-17 | End: 2023-04-19 | Stop reason: HOSPADM

## 2023-04-17 RX ORDER — ACETAMINOPHEN 325 MG/1
650 TABLET ORAL EVERY 6 HOURS
Status: DISCONTINUED | OUTPATIENT
Start: 2023-04-17 | End: 2023-04-19 | Stop reason: HOSPADM

## 2023-04-17 RX ORDER — PHENYLEPHRINE HCL IN 0.9% NACL 1 MG/10 ML
SYRINGE (ML) INTRAVENOUS PRN
Status: DISCONTINUED | OUTPATIENT
Start: 2023-04-17 | End: 2023-04-17 | Stop reason: SDUPTHER

## 2023-04-17 RX ORDER — CELECOXIB 100 MG/1
100 CAPSULE ORAL ONCE
Status: COMPLETED | OUTPATIENT
Start: 2023-04-17 | End: 2023-04-17

## 2023-04-17 RX ORDER — SODIUM CHLORIDE 0.9 % (FLUSH) 0.9 %
5-40 SYRINGE (ML) INJECTION PRN
Status: DISCONTINUED | OUTPATIENT
Start: 2023-04-17 | End: 2023-04-17 | Stop reason: HOSPADM

## 2023-04-17 RX ORDER — FENTANYL CITRATE 50 UG/ML
INJECTION, SOLUTION INTRAMUSCULAR; INTRAVENOUS PRN
Status: DISCONTINUED | OUTPATIENT
Start: 2023-04-17 | End: 2023-04-17 | Stop reason: SDUPTHER

## 2023-04-17 RX ORDER — LIDOCAINE HYDROCHLORIDE 20 MG/ML
INJECTION, SOLUTION INTRAVENOUS PRN
Status: DISCONTINUED | OUTPATIENT
Start: 2023-04-17 | End: 2023-04-17 | Stop reason: SDUPTHER

## 2023-04-17 RX ORDER — DEXAMETHASONE SODIUM PHOSPHATE 10 MG/ML
INJECTION INTRAMUSCULAR; INTRAVENOUS PRN
Status: DISCONTINUED | OUTPATIENT
Start: 2023-04-17 | End: 2023-04-17 | Stop reason: SDUPTHER

## 2023-04-17 RX ORDER — ENOXAPARIN SODIUM 100 MG/ML
40 INJECTION SUBCUTANEOUS ONCE
Status: COMPLETED | OUTPATIENT
Start: 2023-04-17 | End: 2023-04-17

## 2023-04-17 RX ORDER — GLYCOPYRROLATE 0.2 MG/ML
INJECTION INTRAMUSCULAR; INTRAVENOUS PRN
Status: DISCONTINUED | OUTPATIENT
Start: 2023-04-17 | End: 2023-04-17 | Stop reason: SDUPTHER

## 2023-04-17 RX ORDER — OXYCODONE HYDROCHLORIDE 10 MG/1
10 TABLET ORAL EVERY 4 HOURS PRN
Status: DISCONTINUED | OUTPATIENT
Start: 2023-04-17 | End: 2023-04-19 | Stop reason: HOSPADM

## 2023-04-17 RX ORDER — ROCURONIUM BROMIDE 10 MG/ML
INJECTION, SOLUTION INTRAVENOUS PRN
Status: DISCONTINUED | OUTPATIENT
Start: 2023-04-17 | End: 2023-04-17 | Stop reason: SDUPTHER

## 2023-04-17 RX ORDER — KETOROLAC TROMETHAMINE 30 MG/ML
INJECTION, SOLUTION INTRAMUSCULAR; INTRAVENOUS PRN
Status: DISCONTINUED | OUTPATIENT
Start: 2023-04-17 | End: 2023-04-17 | Stop reason: SDUPTHER

## 2023-04-17 RX ORDER — SODIUM CHLORIDE 0.9 % (FLUSH) 0.9 %
5-40 SYRINGE (ML) INJECTION EVERY 12 HOURS SCHEDULED
Status: DISCONTINUED | OUTPATIENT
Start: 2023-04-17 | End: 2023-04-17 | Stop reason: HOSPADM

## 2023-04-17 RX ORDER — ONDANSETRON 2 MG/ML
4 INJECTION INTRAMUSCULAR; INTRAVENOUS
Status: DISCONTINUED | OUTPATIENT
Start: 2023-04-17 | End: 2023-04-17 | Stop reason: HOSPADM

## 2023-04-17 RX ORDER — LORAZEPAM 0.5 MG/1
0.5 TABLET ORAL 2 TIMES DAILY
COMMUNITY

## 2023-04-17 RX ORDER — ACETAMINOPHEN 500 MG
1000 TABLET ORAL ONCE
Status: COMPLETED | OUTPATIENT
Start: 2023-04-17 | End: 2023-04-17

## 2023-04-17 RX ORDER — MIDAZOLAM HYDROCHLORIDE 1 MG/ML
INJECTION INTRAMUSCULAR; INTRAVENOUS PRN
Status: DISCONTINUED | OUTPATIENT
Start: 2023-04-17 | End: 2023-04-17 | Stop reason: SDUPTHER

## 2023-04-17 RX ORDER — HYDROMORPHONE HYDROCHLORIDE 1 MG/ML
0.5 INJECTION, SOLUTION INTRAMUSCULAR; INTRAVENOUS; SUBCUTANEOUS
Status: DISCONTINUED | OUTPATIENT
Start: 2023-04-17 | End: 2023-04-19

## 2023-04-17 RX ORDER — KETOROLAC TROMETHAMINE 30 MG/ML
15 INJECTION, SOLUTION INTRAMUSCULAR; INTRAVENOUS EVERY 6 HOURS
Status: DISCONTINUED | OUTPATIENT
Start: 2023-04-17 | End: 2023-04-19 | Stop reason: HOSPADM

## 2023-04-17 RX ORDER — SODIUM CHLORIDE 9 MG/ML
INJECTION, SOLUTION INTRAVENOUS PRN
Status: DISCONTINUED | OUTPATIENT
Start: 2023-04-17 | End: 2023-04-19 | Stop reason: HOSPADM

## 2023-04-17 RX ORDER — OXYCODONE HYDROCHLORIDE 5 MG/1
5 TABLET ORAL EVERY 4 HOURS PRN
Status: DISCONTINUED | OUTPATIENT
Start: 2023-04-17 | End: 2023-04-19 | Stop reason: HOSPADM

## 2023-04-17 RX ORDER — SODIUM CHLORIDE 9 MG/ML
INJECTION, SOLUTION INTRAVENOUS CONTINUOUS PRN
Status: DISCONTINUED | OUTPATIENT
Start: 2023-04-17 | End: 2023-04-17 | Stop reason: SDUPTHER

## 2023-04-17 RX ORDER — ENOXAPARIN SODIUM 100 MG/ML
40 INJECTION SUBCUTANEOUS DAILY
Status: DISCONTINUED | OUTPATIENT
Start: 2023-04-18 | End: 2023-04-19 | Stop reason: HOSPADM

## 2023-04-17 RX ORDER — ONDANSETRON 4 MG/1
4 TABLET, ORALLY DISINTEGRATING ORAL EVERY 8 HOURS PRN
Status: DISCONTINUED | OUTPATIENT
Start: 2023-04-17 | End: 2023-04-19 | Stop reason: HOSPADM

## 2023-04-17 RX ORDER — ONDANSETRON 2 MG/ML
4 INJECTION INTRAMUSCULAR; INTRAVENOUS EVERY 6 HOURS PRN
Status: DISCONTINUED | OUTPATIENT
Start: 2023-04-17 | End: 2023-04-19 | Stop reason: HOSPADM

## 2023-04-17 RX ORDER — ONDANSETRON 2 MG/ML
INJECTION INTRAMUSCULAR; INTRAVENOUS PRN
Status: DISCONTINUED | OUTPATIENT
Start: 2023-04-17 | End: 2023-04-17 | Stop reason: SDUPTHER

## 2023-04-17 RX ORDER — NALOXONE HYDROCHLORIDE 0.4 MG/ML
INJECTION, SOLUTION INTRAMUSCULAR; INTRAVENOUS; SUBCUTANEOUS PRN
Status: DISCONTINUED | OUTPATIENT
Start: 2023-04-17 | End: 2023-04-17 | Stop reason: SDUPTHER

## 2023-04-17 RX ADMIN — ONDANSETRON 4 MG: 2 INJECTION INTRAMUSCULAR; INTRAVENOUS at 11:06

## 2023-04-17 RX ADMIN — DEXAMETHASONE SODIUM PHOSPHATE 10 MG: 10 INJECTION INTRAMUSCULAR; INTRAVENOUS at 07:30

## 2023-04-17 RX ADMIN — ACETAMINOPHEN 1000 MG: 500 TABLET ORAL at 06:10

## 2023-04-17 RX ADMIN — ACETAMINOPHEN 650 MG: 325 TABLET ORAL at 21:26

## 2023-04-17 RX ADMIN — NALXONE HYDROCHLORIDE 0.1 MG: 0.4 INJECTION INTRAMUSCULAR; INTRAVENOUS; SUBCUTANEOUS at 12:29

## 2023-04-17 RX ADMIN — SODIUM CHLORIDE, PRESERVATIVE FREE 10 ML: 5 INJECTION INTRAVENOUS at 21:28

## 2023-04-17 RX ADMIN — Medication 50 MCG: at 07:46

## 2023-04-17 RX ADMIN — Medication 100 MCG: at 10:57

## 2023-04-17 RX ADMIN — FENTANYL CITRATE 50 MCG: 0.05 INJECTION, SOLUTION INTRAMUSCULAR; INTRAVENOUS at 08:14

## 2023-04-17 RX ADMIN — ACETAMINOPHEN 650 MG: 325 TABLET ORAL at 16:47

## 2023-04-17 RX ADMIN — ROCURONIUM BROMIDE 10 MG: 10 INJECTION, SOLUTION INTRAVENOUS at 11:06

## 2023-04-17 RX ADMIN — METHOCARBAMOL 500 MG: 500 TABLET ORAL at 16:47

## 2023-04-17 RX ADMIN — ROCURONIUM BROMIDE 10 MG: 10 INJECTION, SOLUTION INTRAVENOUS at 08:28

## 2023-04-17 RX ADMIN — ROCURONIUM BROMIDE 20 MG: 10 INJECTION, SOLUTION INTRAVENOUS at 08:33

## 2023-04-17 RX ADMIN — Medication 50 MCG: at 10:11

## 2023-04-17 RX ADMIN — ROCURONIUM BROMIDE 10 MG: 10 INJECTION, SOLUTION INTRAVENOUS at 10:15

## 2023-04-17 RX ADMIN — SODIUM CHLORIDE, PRESERVATIVE FREE 10 ML: 5 INJECTION INTRAVENOUS at 23:04

## 2023-04-17 RX ADMIN — Medication 50 MCG: at 08:22

## 2023-04-17 RX ADMIN — PROPOFOL 20 MG: 10 INJECTION, EMULSION INTRAVENOUS at 11:06

## 2023-04-17 RX ADMIN — PROPOFOL 170 MG: 10 INJECTION, EMULSION INTRAVENOUS at 07:30

## 2023-04-17 RX ADMIN — GLYCOPYRROLATE 0.1 MG: 0.2 INJECTION INTRAMUSCULAR; INTRAVENOUS at 08:23

## 2023-04-17 RX ADMIN — METHOCARBAMOL 500 MG: 500 TABLET ORAL at 21:25

## 2023-04-17 RX ADMIN — SODIUM CHLORIDE: 9 INJECTION, SOLUTION INTRAVENOUS at 05:59

## 2023-04-17 RX ADMIN — KETOROLAC TROMETHAMINE 30 MG: 30 INJECTION, SOLUTION INTRAMUSCULAR at 11:17

## 2023-04-17 RX ADMIN — ROCURONIUM BROMIDE 50 MG: 10 INJECTION, SOLUTION INTRAVENOUS at 07:33

## 2023-04-17 RX ADMIN — METRONIDAZOLE 500 MG: 500 INJECTION, SOLUTION INTRAVENOUS at 07:42

## 2023-04-17 RX ADMIN — KETOROLAC TROMETHAMINE 15 MG: 30 INJECTION, SOLUTION INTRAMUSCULAR at 23:02

## 2023-04-17 RX ADMIN — SODIUM CHLORIDE: 9 INJECTION, SOLUTION INTRAVENOUS at 08:09

## 2023-04-17 RX ADMIN — LIDOCAINE HYDROCHLORIDE 60 MG: 20 INJECTION, SOLUTION INTRAVENOUS at 07:30

## 2023-04-17 RX ADMIN — FENTANYL CITRATE 100 MCG: 0.05 INJECTION, SOLUTION INTRAMUSCULAR; INTRAVENOUS at 07:30

## 2023-04-17 RX ADMIN — Medication 50 MCG: at 10:17

## 2023-04-17 RX ADMIN — Medication 100 MCG: at 10:41

## 2023-04-17 RX ADMIN — FENTANYL CITRATE 75 MCG: 0.05 INJECTION, SOLUTION INTRAMUSCULAR; INTRAVENOUS at 08:34

## 2023-04-17 RX ADMIN — SODIUM CHLORIDE: 9 INJECTION, SOLUTION INTRAVENOUS at 07:28

## 2023-04-17 RX ADMIN — SUGAMMADEX 153 MG: 100 INJECTION, SOLUTION INTRAVENOUS at 11:50

## 2023-04-17 RX ADMIN — MIDAZOLAM 2 MG: 1 INJECTION INTRAMUSCULAR; INTRAVENOUS at 07:28

## 2023-04-17 RX ADMIN — KETOROLAC TROMETHAMINE 15 MG: 30 INJECTION, SOLUTION INTRAMUSCULAR at 16:46

## 2023-04-17 RX ADMIN — FENTANYL CITRATE 50 MCG: 0.05 INJECTION, SOLUTION INTRAMUSCULAR; INTRAVENOUS at 09:23

## 2023-04-17 RX ADMIN — ENOXAPARIN SODIUM 40 MG: 100 INJECTION SUBCUTANEOUS at 06:11

## 2023-04-17 RX ADMIN — CELECOXIB 100 MG: 100 CAPSULE ORAL at 06:10

## 2023-04-17 RX ADMIN — SUGAMMADEX 100 MG: 100 INJECTION, SOLUTION INTRAVENOUS at 12:12

## 2023-04-17 RX ADMIN — FENTANYL CITRATE 25 MCG: 0.05 INJECTION, SOLUTION INTRAMUSCULAR; INTRAVENOUS at 08:26

## 2023-04-17 RX ADMIN — SODIUM CHLORIDE, POTASSIUM CHLORIDE, SODIUM LACTATE AND CALCIUM CHLORIDE: 600; 310; 30; 20 INJECTION, SOLUTION INTRAVENOUS at 15:04

## 2023-04-17 RX ADMIN — SUGAMMADEX 100 MG: 100 INJECTION, SOLUTION INTRAVENOUS at 11:59

## 2023-04-17 RX ADMIN — CEFAZOLIN 2000 MG: 2 INJECTION, POWDER, FOR SOLUTION INTRAMUSCULAR; INTRAVENOUS at 07:36

## 2023-04-17 RX ADMIN — FENTANYL CITRATE 50 MCG: 0.05 INJECTION, SOLUTION INTRAMUSCULAR; INTRAVENOUS at 09:14

## 2023-04-17 ASSESSMENT — PAIN DESCRIPTION - LOCATION
LOCATION: HEAD;ABDOMEN
LOCATION: ABDOMEN
LOCATION: ABDOMEN
LOCATION: ABDOMEN;HEAD
LOCATION: ABDOMEN
LOCATION: ABDOMEN

## 2023-04-17 ASSESSMENT — PAIN - FUNCTIONAL ASSESSMENT
PAIN_FUNCTIONAL_ASSESSMENT: NONE - DENIES PAIN
PAIN_FUNCTIONAL_ASSESSMENT: PREVENTS OR INTERFERES SOME ACTIVE ACTIVITIES AND ADLS
PAIN_FUNCTIONAL_ASSESSMENT: PREVENTS OR INTERFERES SOME ACTIVE ACTIVITIES AND ADLS

## 2023-04-17 ASSESSMENT — PAIN SCALES - GENERAL
PAINLEVEL_OUTOF10: 0
PAINLEVEL_OUTOF10: 6
PAINLEVEL_OUTOF10: 8
PAINLEVEL_OUTOF10: 6
PAINLEVEL_OUTOF10: 0

## 2023-04-17 ASSESSMENT — PAIN DESCRIPTION - DESCRIPTORS
DESCRIPTORS: ACHING;DISCOMFORT;GNAWING;SHOOTING
DESCRIPTORS: SORE
DESCRIPTORS: SORE
DESCRIPTORS: ACHING;BURNING

## 2023-04-17 ASSESSMENT — PAIN DESCRIPTION - ORIENTATION
ORIENTATION: MID

## 2023-04-17 NOTE — ADDENDUM NOTE
Addendum  created 04/17/23 1333 by Katina Maldonado, [id number] recorded in [facility], [address] accepted, [facility] Attestations filed

## 2023-04-17 NOTE — ANESTHESIA POSTPROCEDURE EVALUATION
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     Corby Phase I: Corby Score: 8    Corby Phase II:        Anesthesia Post Evaluation    Patient location during evaluation: PACU  Patient participation: complete - patient participated  Level of consciousness: awake  Pain score: 3  Airway patency: patent  Nausea & Vomiting: no nausea  Complications: no  Cardiovascular status: hemodynamically stable  Respiratory status: acceptable  Hydration status: stable  Multimodal analgesia pain management approach

## 2023-04-17 NOTE — H&P
(CRESTOR) 20 MG tablet TAKE 1 TABLET DAILY 90 tablet 3        No Known Allergies     Review of Systems  General - no chills, fever, weight gain or weight loss  Psychological - no anxiety or depression  Ophthalmic - no blurry vision or double vision  ENT - no epistaxis or sore throat  Hematological and Lymphatic - no bleeding problems or blood clots  Endocrine - no temperature intolerance or unexpected weight changes  Respiratory - no cough, shortness of breath, or wheezing  Cardiovascular - no chest pain or dyspnea on exertion  Gastrointestinal - no abdominal pain, blood in stools, change in bowel habits, constipation or diarrhea  Genito-Urinary - no dysuria, trouble voiding, or hematuria  Musculoskeletal - no gait disturbance, joint pain or muscular weakness  Neurological - no headaches, numbness/tingling or weakness  Dermatological - no pruritus or rash    Physical Exam   BP (!) 157/93   Pulse (!) 109   Temp 97.6 °F (36.4 °C) (Temporal)   Resp 20   Ht 5' 2\" (1.575 m)   Wt 169 lb (76.7 kg)   SpO2 96%   BMI 30.91 kg/m²      General - no acute distress, comfortable, 76 yo white woman   Head - normocephalic,atraumatic  Eyes - pupils symmetric, conjunctivae pink  ENT - nose/ears appear normal,  gooddentition, moist oral mucosa   Neck - no cervical adenopathy, mass, thyromegaly   Respiratory - normal effort, clear to auscultation  CV - regular rate and rhythm, strong femoral pulses, no pedal edema  GI - midline surgical scars, LLQ end colostomy, non distended,  non tender, no hernia, no mass, no hepatosplenomegaly   Anorectal - deferred  Skin - warm, dry, good turgor; no rash  Musculoskeletal - normal gait, no deformities   Psychiatric - alert and oriented x 3; normal mood, affect, judgement, thought content

## 2023-04-17 NOTE — ANESTHESIA PRE PROCEDURE
Department of Anesthesiology  Preprocedure Note       Name:  Asiya Antoine   Age:  75 y.o.  :  1955                                          MRN:  [id number]         Date:  2023      Surgeon: Ladi Garcia):  Gladys Cash MD    Procedure: Procedure(s):  LAPRASCOPIC COLOSTOMY REVERSAL, POSSIBLE OPEN, LITHOTOMY    Medications prior to admission:   Prior to Admission medications    Medication Sig Start Date End Date Taking? Authorizing Provider   LORazepam (ATIVAN) 0.5 MG tablet Take 1 tablet by mouth in the morning and at bedtime.  Max Daily Amount: 1 mg   Yes Historical Provider, MD   metroNIDAZOLE (FLAGYL) 500 MG tablet Take 2 tabs by mouth at 7 pm and 11 pm the evening before surgery 3/7/23   Gladys Cash MD   neomycin (MYCIFRADIN) 500 MG tablet Take 2 tabs by mouth at 7 pm and 11 pm the evening before surgery 3/7/23   Gladsy Cash MD   atenolol-chlorthalidone (TENORETIC) 50-25 MG per tablet TAKE 1 TABLET DAILY 22   Daniella Brownlee DO   vitamin D (CHOLECALCIFEROL) 50 MCG ( UT) TABS tablet Take 1 tablet by mouth daily 22   Daniella Brownlee DO   DULoxetine (CYMBALTA) 60 MG extended release capsule TAKE ONE CAPSULE BY MOUTH ONCE A DAY 22   Daniella Brownlee DO   meclizine (ANTIVERT) 12.5 MG tablet Take 1 tablet by mouth 3 times daily as needed for Dizziness 22   Daniella Brownlee DO   pantoprazole (PROTONIX) 40 MG tablet TAKE 1 TABLET DAILY 22   Daniella Brownlee DO   rosuvastatin (CRESTOR) 20 MG tablet TAKE 1 TABLET DAILY 22   Daniella Brownlee DO       Current medications:    Current Facility-Administered Medications   Medication Dose Route Frequency Provider Last Rate Last Admin    sodium chloride flush 0.9 % injection 5-40 mL  5-40 mL IntraVENous 2 times per day Gladys Cash MD        sodium chloride flush 0.9 % injection 5-40 mL  5-40 mL IntraVENous PRN Gladys Cash MD        0.9 % sodium chloride infusion   IntraVENous PRN Gladys Cash MD 25 mL/hr at 23 0559 New Bag at

## 2023-04-17 NOTE — OP NOTE
the abdomen was prepped with Chloraprep. The areas were draped in the usual sterile fashion. Immediately prior to the procedure a time out was called. A Veress needle was inserted via Odonnell's point and the abdomen was insufflated to 15 mmHg. A mid central, mid abdominal incision was made with a 15 blade for a 5 mm port. 2 additional 5 mm ports were inserted in the left mid abdomen. 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 There were adhesions tethering a short segment of small intestine to the prior midline surgical scar. During sharp adhesiolysis, a 1 cm enterotomy was inadvertently created. Therefore,  we decided to place a hand port. We incised the skin around the colostomy with electrocautery and blunt dissection we were able to release the adhesive bands and reduced the colostomy back into the abdomen. We extended the circumferential colostomy incision horizontally in order to insert a hand assist gelport. We then identified the bowel that had the serosal tear and repaired the enterotomy primarily using 3-0 Silk in a simple interrupted fashion in there transverse orientation. We then identified the distal staple line from our rectal stump. Adhesions were taking down with laparoscopic scissors. The sigmoid colon mesentery was taken down with Ligasure device. One of the left sided 5 mm ports were upgraded to a 10 mm port. A completion sigmoidectomy was performed with a green load stapler. The sigmoid colon was removed via the hand assist port and was sent to pathology. We then used a pursestring clamp and to apply a pursestring stitch using 2-0 Maxon suture. The descending colon was dilated using dilators. The 31-mm anvil was inserted into the proximal end of the bowel and the pursestring suture was tightened down. We then turned our attention to the rectum. The rectum was dilated using trananal dilators.  The EEA circular stapler was to create an end to end stapled

## 2023-04-18 LAB
ANION GAP SERPL CALCULATED.3IONS-SCNC: 12 MMOL/L (ref 7–16)
BASOPHILS # BLD: 0.01 E9/L (ref 0–0.2)
BASOPHILS NFR BLD: 0.1 % (ref 0–2)
BUN SERPL-MCNC: 7 MG/DL (ref 6–23)
CALCIUM SERPL-MCNC: 8 MG/DL (ref 8.6–10.2)
CHLORIDE SERPL-SCNC: 102 MMOL/L (ref 98–107)
CO2 SERPL-SCNC: 25 MMOL/L (ref 22–29)
CREAT SERPL-MCNC: 0.5 MG/DL (ref 0.5–1)
EOSINOPHIL # BLD: 0 E9/L (ref 0.05–0.5)
EOSINOPHIL NFR BLD: 0 % (ref 0–6)
ERYTHROCYTE [DISTWIDTH] IN BLOOD BY AUTOMATED COUNT: 14.3 FL (ref 11.5–15)
GLUCOSE SERPL-MCNC: 132 MG/DL (ref 74–99)
HCT VFR BLD AUTO: 31.3 % (ref 34–48)
HGB BLD-MCNC: 10.1 G/DL (ref 11.5–15.5)
IMM GRANULOCYTES # BLD: 0.06 E9/L
IMM GRANULOCYTES NFR BLD: 0.5 % (ref 0–5)
LYMPHOCYTES # BLD: 1.08 E9/L (ref 1.5–4)
LYMPHOCYTES NFR BLD: 8.9 % (ref 20–42)
MAGNESIUM SERPL-MCNC: 1.4 MG/DL (ref 1.6–2.6)
MCH RBC QN AUTO: 24.7 PG (ref 26–35)
MCHC RBC AUTO-ENTMCNC: 32.3 % (ref 32–34.5)
MCV RBC AUTO: 76.5 FL (ref 80–99.9)
MONOCYTES # BLD: 1.01 E9/L (ref 0.1–0.95)
MONOCYTES NFR BLD: 8.3 % (ref 2–12)
NEUTROPHILS # BLD: 9.96 E9/L (ref 1.8–7.3)
NEUTS SEG NFR BLD: 82.2 % (ref 43–80)
PLATELET # BLD AUTO: 296 E9/L (ref 130–450)
PMV BLD AUTO: 9.9 FL (ref 7–12)
POTASSIUM SERPL-SCNC: 2.9 MMOL/L (ref 3.5–5)
RBC # BLD AUTO: 4.09 E12/L (ref 3.5–5.5)
SODIUM SERPL-SCNC: 139 MMOL/L (ref 132–146)
WBC # BLD: 12.1 E9/L (ref 4.5–11.5)

## 2023-04-18 PROCEDURE — 6360000002 HC RX W HCPCS: Performed by: STUDENT IN AN ORGANIZED HEALTH CARE EDUCATION/TRAINING PROGRAM

## 2023-04-18 PROCEDURE — 83735 ASSAY OF MAGNESIUM: CPT

## 2023-04-18 PROCEDURE — 97530 THERAPEUTIC ACTIVITIES: CPT

## 2023-04-18 PROCEDURE — 2580000003 HC RX 258: Performed by: SURGERY

## 2023-04-18 PROCEDURE — 80048 BASIC METABOLIC PNL TOTAL CA: CPT

## 2023-04-18 PROCEDURE — 97535 SELF CARE MNGMENT TRAINING: CPT

## 2023-04-18 PROCEDURE — 6370000000 HC RX 637 (ALT 250 FOR IP): Performed by: SURGERY

## 2023-04-18 PROCEDURE — 6360000002 HC RX W HCPCS: Performed by: SURGERY

## 2023-04-18 PROCEDURE — 97161 PT EVAL LOW COMPLEX 20 MIN: CPT

## 2023-04-18 PROCEDURE — 1200000000 HC SEMI PRIVATE

## 2023-04-18 PROCEDURE — 6370000000 HC RX 637 (ALT 250 FOR IP)

## 2023-04-18 PROCEDURE — 36415 COLL VENOUS BLD VENIPUNCTURE: CPT

## 2023-04-18 PROCEDURE — 2580000003 HC RX 258: Performed by: STUDENT IN AN ORGANIZED HEALTH CARE EDUCATION/TRAINING PROGRAM

## 2023-04-18 PROCEDURE — 97165 OT EVAL LOW COMPLEX 30 MIN: CPT

## 2023-04-18 PROCEDURE — 6370000000 HC RX 637 (ALT 250 FOR IP): Performed by: STUDENT IN AN ORGANIZED HEALTH CARE EDUCATION/TRAINING PROGRAM

## 2023-04-18 PROCEDURE — 2500000003 HC RX 250 WO HCPCS: Performed by: STUDENT IN AN ORGANIZED HEALTH CARE EDUCATION/TRAINING PROGRAM

## 2023-04-18 PROCEDURE — 85025 COMPLETE CBC W/AUTO DIFF WBC: CPT

## 2023-04-18 RX ORDER — PANTOPRAZOLE SODIUM 40 MG/1
40 TABLET, DELAYED RELEASE ORAL DAILY
Status: DISCONTINUED | OUTPATIENT
Start: 2023-04-18 | End: 2023-04-19 | Stop reason: HOSPADM

## 2023-04-18 RX ORDER — ATENOLOL 50 MG/1
50 TABLET ORAL DAILY
Status: DISCONTINUED | OUTPATIENT
Start: 2023-04-18 | End: 2023-04-18

## 2023-04-18 RX ORDER — POTASSIUM CHLORIDE 20 MEQ/1
40 TABLET, EXTENDED RELEASE ORAL 2 TIMES DAILY WITH MEALS
Status: COMPLETED | OUTPATIENT
Start: 2023-04-18 | End: 2023-04-18

## 2023-04-18 RX ORDER — ROSUVASTATIN CALCIUM 20 MG/1
20 TABLET, COATED ORAL DAILY
Status: DISCONTINUED | OUTPATIENT
Start: 2023-04-18 | End: 2023-04-19 | Stop reason: HOSPADM

## 2023-04-18 RX ORDER — DULOXETIN HYDROCHLORIDE 60 MG/1
60 CAPSULE, DELAYED RELEASE ORAL DAILY
Status: DISCONTINUED | OUTPATIENT
Start: 2023-04-18 | End: 2023-04-19 | Stop reason: HOSPADM

## 2023-04-18 RX ORDER — MAGNESIUM SULFATE IN WATER 40 MG/ML
2000 INJECTION, SOLUTION INTRAVENOUS
Status: COMPLETED | OUTPATIENT
Start: 2023-04-18 | End: 2023-04-18

## 2023-04-18 RX ORDER — POTASSIUM CHLORIDE 7.45 MG/ML
10 INJECTION INTRAVENOUS
Status: DISPENSED | OUTPATIENT
Start: 2023-04-18 | End: 2023-04-18

## 2023-04-18 RX ORDER — ATENOLOL 50 MG/1
50 TABLET ORAL DAILY
Status: DISCONTINUED | OUTPATIENT
Start: 2023-04-18 | End: 2023-04-19 | Stop reason: HOSPADM

## 2023-04-18 RX ADMIN — METHOCARBAMOL 500 MG: 500 TABLET ORAL at 08:30

## 2023-04-18 RX ADMIN — MAGNESIUM SULFATE HEPTAHYDRATE 2000 MG: 40 INJECTION, SOLUTION INTRAVENOUS at 13:33

## 2023-04-18 RX ADMIN — SODIUM CHLORIDE, PRESERVATIVE FREE 10 ML: 5 INJECTION INTRAVENOUS at 02:37

## 2023-04-18 RX ADMIN — ACETAMINOPHEN 650 MG: 325 TABLET ORAL at 15:21

## 2023-04-18 RX ADMIN — PANTOPRAZOLE SODIUM 40 MG: 40 TABLET, DELAYED RELEASE ORAL at 08:30

## 2023-04-18 RX ADMIN — ACETAMINOPHEN 650 MG: 325 TABLET ORAL at 03:23

## 2023-04-18 RX ADMIN — SODIUM CHLORIDE, PRESERVATIVE FREE 10 ML: 5 INJECTION INTRAVENOUS at 05:09

## 2023-04-18 RX ADMIN — DULOXETINE 60 MG: 60 CAPSULE, DELAYED RELEASE ORAL at 08:30

## 2023-04-18 RX ADMIN — OXYCODONE HYDROCHLORIDE 10 MG: 10 TABLET ORAL at 13:37

## 2023-04-18 RX ADMIN — KETOROLAC TROMETHAMINE 15 MG: 30 INJECTION, SOLUTION INTRAMUSCULAR at 17:32

## 2023-04-18 RX ADMIN — SODIUM CHLORIDE, PRESERVATIVE FREE 10 ML: 5 INJECTION INTRAVENOUS at 22:03

## 2023-04-18 RX ADMIN — OXYCODONE HYDROCHLORIDE 10 MG: 10 TABLET ORAL at 06:56

## 2023-04-18 RX ADMIN — ACETAMINOPHEN 650 MG: 325 TABLET ORAL at 08:30

## 2023-04-18 RX ADMIN — METHOCARBAMOL 500 MG: 500 TABLET ORAL at 19:54

## 2023-04-18 RX ADMIN — POTASSIUM CHLORIDE 40 MEQ: 1500 TABLET, EXTENDED RELEASE ORAL at 17:30

## 2023-04-18 RX ADMIN — ACETAMINOPHEN 650 MG: 325 TABLET ORAL at 19:54

## 2023-04-18 RX ADMIN — POTASSIUM CHLORIDE 40 MEQ: 1500 TABLET, EXTENDED RELEASE ORAL at 08:30

## 2023-04-18 RX ADMIN — SODIUM CHLORIDE, POTASSIUM CHLORIDE, SODIUM LACTATE AND CALCIUM CHLORIDE: 600; 310; 30; 20 INJECTION, SOLUTION INTRAVENOUS at 01:12

## 2023-04-18 RX ADMIN — MAGNESIUM SULFATE HEPTAHYDRATE 2000 MG: 40 INJECTION, SOLUTION INTRAVENOUS at 11:33

## 2023-04-18 RX ADMIN — MAGNESIUM SULFATE HEPTAHYDRATE 2000 MG: 40 INJECTION, SOLUTION INTRAVENOUS at 10:03

## 2023-04-18 RX ADMIN — SODIUM CHLORIDE, PRESERVATIVE FREE 10 ML: 5 INJECTION INTRAVENOUS at 08:37

## 2023-04-18 RX ADMIN — CEFOXITIN 2000 MG: 2 INJECTION, POWDER, FOR SOLUTION INTRAVENOUS at 08:37

## 2023-04-18 RX ADMIN — CEFOXITIN 2000 MG: 2 INJECTION, POWDER, FOR SOLUTION INTRAVENOUS at 21:55

## 2023-04-18 RX ADMIN — METHOCARBAMOL 500 MG: 500 TABLET ORAL at 13:37

## 2023-04-18 RX ADMIN — KETOROLAC TROMETHAMINE 15 MG: 30 INJECTION, SOLUTION INTRAMUSCULAR at 21:55

## 2023-04-18 RX ADMIN — HYDROMORPHONE HYDROCHLORIDE 0.5 MG: 1 INJECTION, SOLUTION INTRAMUSCULAR; INTRAVENOUS; SUBCUTANEOUS at 02:36

## 2023-04-18 RX ADMIN — METHOCARBAMOL 500 MG: 500 TABLET ORAL at 17:30

## 2023-04-18 RX ADMIN — ROSUVASTATIN CALCIUM 20 MG: 20 TABLET, FILM COATED ORAL at 08:30

## 2023-04-18 RX ADMIN — ENOXAPARIN SODIUM 40 MG: 100 INJECTION SUBCUTANEOUS at 08:30

## 2023-04-18 RX ADMIN — SODIUM CHLORIDE, POTASSIUM CHLORIDE, SODIUM LACTATE AND CALCIUM CHLORIDE: 600; 310; 30; 20 INJECTION, SOLUTION INTRAVENOUS at 10:01

## 2023-04-18 RX ADMIN — KETOROLAC TROMETHAMINE 15 MG: 30 INJECTION, SOLUTION INTRAMUSCULAR at 12:22

## 2023-04-18 RX ADMIN — CEFOXITIN 2000 MG: 2 INJECTION, POWDER, FOR SOLUTION INTRAVENOUS at 15:25

## 2023-04-18 RX ADMIN — KETOROLAC TROMETHAMINE 15 MG: 30 INJECTION, SOLUTION INTRAMUSCULAR at 05:07

## 2023-04-18 RX ADMIN — POTASSIUM CHLORIDE 10 MEQ: 7.46 INJECTION, SOLUTION INTRAVENOUS at 10:06

## 2023-04-18 RX ADMIN — POTASSIUM CHLORIDE 10 MEQ: 7.46 INJECTION, SOLUTION INTRAVENOUS at 11:32

## 2023-04-18 RX ADMIN — ATENOLOL 50 MG: 50 TABLET ORAL at 06:54

## 2023-04-18 RX ADMIN — OXYCODONE HYDROCHLORIDE 10 MG: 10 TABLET ORAL at 01:06

## 2023-04-18 ASSESSMENT — PAIN DESCRIPTION - LOCATION
LOCATION: ABDOMEN
LOCATION: ABDOMEN;HEAD
LOCATION: ABDOMEN

## 2023-04-18 ASSESSMENT — PAIN DESCRIPTION - DESCRIPTORS
DESCRIPTORS: ACHING;DISCOMFORT;GNAWING;CRAMPING
DESCRIPTORS: SORE
DESCRIPTORS: SORE
DESCRIPTORS: ACHING;DISCOMFORT;GNAWING
DESCRIPTORS: SORE
DESCRIPTORS: DISCOMFORT;ACHING
DESCRIPTORS: SORE
DESCRIPTORS: POUNDING;SORE
DESCRIPTORS: ACHING;DISCOMFORT;CRAMPING;GNAWING
DESCRIPTORS: DISCOMFORT;ACHING;PRESSURE
DESCRIPTORS: ACHING;DISCOMFORT

## 2023-04-18 ASSESSMENT — PAIN SCALES - GENERAL
PAINLEVEL_OUTOF10: 7
PAINLEVEL_OUTOF10: 6
PAINLEVEL_OUTOF10: 7
PAINLEVEL_OUTOF10: 4
PAINLEVEL_OUTOF10: 5
PAINLEVEL_OUTOF10: 7
PAINLEVEL_OUTOF10: 6
PAINLEVEL_OUTOF10: 8
PAINLEVEL_OUTOF10: 7

## 2023-04-18 ASSESSMENT — PAIN DESCRIPTION - ORIENTATION
ORIENTATION: MID;LOWER
ORIENTATION: MID

## 2023-04-18 NOTE — CARE COORDINATION
Transition of Care: Met with patient at bedside to discuss transition of care. She lives alone in a 2 story home. 4 steps to enter. Pt has a cane and bedside commode through [facility]. Hx with [facility]. Pt is planning to return home at discharge with no anticipated needs. Admitted for laprascopic colostomy reversal. Clear liq diet started, tolerating well.  CM will continue to follow (TF)    Lin Liu RN  [phone]

## 2023-04-18 NOTE — PLAN OF CARE
Problem: Discharge Planning  Goal: Discharge to home or other facility with appropriate resources  Outcome: Progressing  Flowsheets (Taken 4/17/2023 2210)  Discharge to home or other facility with appropriate resources: Identify barriers to discharge with patient and caregiver     Problem: Safety - Adult  Goal: Free from fall injury  Outcome: Progressing  Flowsheets (Taken 4/18/2023 0106)  Free From Fall Injury: Instruct family/caregiver on patient safety     Problem: Pain  Goal: Verbalizes/displays adequate comfort level or baseline comfort level  Outcome: Progressing

## 2023-04-18 NOTE — DISCHARGE SUMMARY
physician if you have any questions, to schedule a follow-up appointment, and if you notice any of the following:  Fever (temperature over 101ºF) or chills  Persistent nausea, vomiting, or bloating  Severe pain that is not relieved by the prescribed pain medication  Swelling or redness around your incision(s)  No bowel movement and feeling uncomfortable  Significant change in urination or no urine output for 8 hours  Excess bleeding (from the rectum or incision) - more than ½ cup that does not stop     Follow up:   Clarisse Tanner MD  [address] [place] [address] [phone]    Call in 2 week(s)         Signed:  Dr. Ina Lopez

## 2023-04-18 NOTE — ACP (ADVANCE CARE PLANNING)
Advance Care Planning   The patient has the following advanced directives on file:  Advance Directives       Power of [address] ACP-Advance Directive ACP-Power of     Not on File Filed on 04/09/17 Filed Not on File            The patient has appointed the following active healthcare agents:    Primary Decision Maker: Monico Mena - [phone]    Secondary Decision Maker: Sarah Sol - Brother/Sister - [phone]    The Patient has the following current code status:    Code Status: Full Code    Libby Jackson RN  4/18/2023

## 2023-04-19 VITALS
OXYGEN SATURATION: 97 % | HEIGHT: 62 IN | TEMPERATURE: 97.4 F | HEART RATE: 70 BPM | SYSTOLIC BLOOD PRESSURE: 118 MMHG | BODY MASS INDEX: 31.1 KG/M2 | RESPIRATION RATE: 18 BRPM | WEIGHT: 169 LBS | DIASTOLIC BLOOD PRESSURE: 70 MMHG

## 2023-04-19 LAB
ANION GAP SERPL CALCULATED.3IONS-SCNC: 7 MMOL/L (ref 7–16)
BASOPHILS # BLD: 0.01 E9/L (ref 0–0.2)
BASOPHILS NFR BLD: 0.1 % (ref 0–2)
BUN SERPL-MCNC: 5 MG/DL (ref 6–23)
CALCIUM SERPL-MCNC: 8 MG/DL (ref 8.6–10.2)
CHLORIDE SERPL-SCNC: 106 MMOL/L (ref 98–107)
CO2 SERPL-SCNC: 29 MMOL/L (ref 22–29)
CREAT SERPL-MCNC: 0.5 MG/DL (ref 0.5–1)
EOSINOPHIL # BLD: 0.05 E9/L (ref 0.05–0.5)
EOSINOPHIL NFR BLD: 0.5 % (ref 0–6)
ERYTHROCYTE [DISTWIDTH] IN BLOOD BY AUTOMATED COUNT: 14.7 FL (ref 11.5–15)
GLUCOSE SERPL-MCNC: 96 MG/DL (ref 74–99)
HCT VFR BLD AUTO: 29.4 % (ref 34–48)
HGB BLD-MCNC: 9 G/DL (ref 11.5–15.5)
IMM GRANULOCYTES # BLD: 0.04 E9/L
IMM GRANULOCYTES NFR BLD: 0.4 % (ref 0–5)
LYMPHOCYTES # BLD: 2.34 E9/L (ref 1.5–4)
LYMPHOCYTES NFR BLD: 25.5 % (ref 20–42)
MCH RBC QN AUTO: 24.3 PG (ref 26–35)
MCHC RBC AUTO-ENTMCNC: 30.6 % (ref 32–34.5)
MCV RBC AUTO: 79.5 FL (ref 80–99.9)
MONOCYTES # BLD: 0.72 E9/L (ref 0.1–0.95)
MONOCYTES NFR BLD: 7.9 % (ref 2–12)
NEUTROPHILS # BLD: 6 E9/L (ref 1.8–7.3)
NEUTS SEG NFR BLD: 65.6 % (ref 43–80)
PLATELET # BLD AUTO: 250 E9/L (ref 130–450)
PMV BLD AUTO: 10.1 FL (ref 7–12)
POTASSIUM SERPL-SCNC: 3.9 MMOL/L (ref 3.5–5)
RBC # BLD AUTO: 3.7 E12/L (ref 3.5–5.5)
SODIUM SERPL-SCNC: 142 MMOL/L (ref 132–146)
WBC # BLD: 9.2 E9/L (ref 4.5–11.5)

## 2023-04-19 PROCEDURE — 80048 BASIC METABOLIC PNL TOTAL CA: CPT

## 2023-04-19 PROCEDURE — 6370000000 HC RX 637 (ALT 250 FOR IP)

## 2023-04-19 PROCEDURE — 6370000000 HC RX 637 (ALT 250 FOR IP): Performed by: SURGERY

## 2023-04-19 PROCEDURE — 6370000000 HC RX 637 (ALT 250 FOR IP): Performed by: STUDENT IN AN ORGANIZED HEALTH CARE EDUCATION/TRAINING PROGRAM

## 2023-04-19 PROCEDURE — 99024 POSTOP FOLLOW-UP VISIT: CPT | Performed by: SURGERY

## 2023-04-19 PROCEDURE — 36415 COLL VENOUS BLD VENIPUNCTURE: CPT

## 2023-04-19 PROCEDURE — 6360000002 HC RX W HCPCS: Performed by: STUDENT IN AN ORGANIZED HEALTH CARE EDUCATION/TRAINING PROGRAM

## 2023-04-19 PROCEDURE — 85025 COMPLETE CBC W/AUTO DIFF WBC: CPT

## 2023-04-19 PROCEDURE — 2580000003 HC RX 258: Performed by: STUDENT IN AN ORGANIZED HEALTH CARE EDUCATION/TRAINING PROGRAM

## 2023-04-19 RX ORDER — HYDROMORPHONE HYDROCHLORIDE 1 MG/ML
0.25 INJECTION, SOLUTION INTRAMUSCULAR; INTRAVENOUS; SUBCUTANEOUS
Status: DISCONTINUED | OUTPATIENT
Start: 2023-04-19 | End: 2023-04-19 | Stop reason: HOSPADM

## 2023-04-19 RX ORDER — OXYCODONE HYDROCHLORIDE 5 MG/1
5 TABLET ORAL EVERY 6 HOURS PRN
Qty: 15 TABLET | Refills: 0 | Status: SHIPPED | OUTPATIENT
Start: 2023-04-19 | End: 2023-04-24

## 2023-04-19 RX ORDER — METHOCARBAMOL 500 MG/1
500 TABLET, FILM COATED ORAL 4 TIMES DAILY
Qty: 40 TABLET | Refills: 0 | Status: SHIPPED | OUTPATIENT
Start: 2023-04-19 | End: 2023-04-29

## 2023-04-19 RX ADMIN — ROSUVASTATIN CALCIUM 20 MG: 20 TABLET, FILM COATED ORAL at 08:32

## 2023-04-19 RX ADMIN — OXYCODONE HYDROCHLORIDE 10 MG: 10 TABLET ORAL at 15:06

## 2023-04-19 RX ADMIN — OXYCODONE HYDROCHLORIDE 10 MG: 10 TABLET ORAL at 08:32

## 2023-04-19 RX ADMIN — PANTOPRAZOLE SODIUM 40 MG: 40 TABLET, DELAYED RELEASE ORAL at 08:31

## 2023-04-19 RX ADMIN — DULOXETINE 60 MG: 60 CAPSULE, DELAYED RELEASE ORAL at 08:31

## 2023-04-19 RX ADMIN — KETOROLAC TROMETHAMINE 15 MG: 30 INJECTION, SOLUTION INTRAMUSCULAR at 11:38

## 2023-04-19 RX ADMIN — KETOROLAC TROMETHAMINE 15 MG: 30 INJECTION, SOLUTION INTRAMUSCULAR at 04:26

## 2023-04-19 RX ADMIN — ACETAMINOPHEN 650 MG: 325 TABLET ORAL at 02:50

## 2023-04-19 RX ADMIN — ATENOLOL 50 MG: 50 TABLET ORAL at 08:32

## 2023-04-19 RX ADMIN — METHOCARBAMOL 500 MG: 500 TABLET ORAL at 08:32

## 2023-04-19 RX ADMIN — ENOXAPARIN SODIUM 40 MG: 100 INJECTION SUBCUTANEOUS at 08:30

## 2023-04-19 RX ADMIN — METHOCARBAMOL 500 MG: 500 TABLET ORAL at 15:06

## 2023-04-19 RX ADMIN — SODIUM CHLORIDE, PRESERVATIVE FREE 10 ML: 5 INJECTION INTRAVENOUS at 08:31

## 2023-04-19 RX ADMIN — OXYCODONE HYDROCHLORIDE 10 MG: 10 TABLET ORAL at 01:46

## 2023-04-19 ASSESSMENT — PAIN SCALES - GENERAL
PAINLEVEL_OUTOF10: 8
PAINLEVEL_OUTOF10: 6
PAINLEVEL_OUTOF10: 6
PAINLEVEL_OUTOF10: 4
PAINLEVEL_OUTOF10: 6
PAINLEVEL_OUTOF10: 8
PAINLEVEL_OUTOF10: 7

## 2023-04-19 ASSESSMENT — PAIN DESCRIPTION - DESCRIPTORS
DESCRIPTORS: ACHING;DISCOMFORT
DESCRIPTORS: ACHING;DISCOMFORT;GNAWING
DESCRIPTORS: DISCOMFORT;ACHING
DESCRIPTORS: DISCOMFORT;ACHING
DESCRIPTORS: DISCOMFORT;ACHING;SORE
DESCRIPTORS: ACHING;CRAMPING;DISCOMFORT;GNAWING

## 2023-04-19 ASSESSMENT — PAIN DESCRIPTION - LOCATION
LOCATION: ABDOMEN

## 2023-04-19 ASSESSMENT — PAIN DESCRIPTION - ORIENTATION
ORIENTATION: RIGHT
ORIENTATION: RIGHT

## 2023-04-19 NOTE — PLAN OF CARE
Problem: Discharge Planning  Goal: Discharge to home or other facility with appropriate resources  4/19/2023 1712 by Kallie Riley RN  Outcome: Adequate for Discharge  4/19/2023 1711 by Kallie Riley RN  Outcome: Adequate for Discharge     Problem: Safety - Adult  Goal: Free from fall injury  4/19/2023 1712 by Kallie Riley RN  Outcome: Adequate for Discharge  4/19/2023 1711 by Kallie Riley RN  Outcome: Adequate for Discharge     Problem: Pain  Goal: Verbalizes/displays adequate comfort level or baseline comfort level  4/19/2023 1712 by Kallie Riley RN  Outcome: Adequate for Discharge  4/19/2023 1711 by Kallie Riley RN  Outcome: Adequate for Discharge

## 2023-04-19 NOTE — PROGRESS NOTES
GENERAL SURGERY  DAILY PROGRESS NOTE  4/18/2023  Catheter removed overnight per \"nursing lead protocol\" per nursing staff 250 cc of urine overnight    Subjective:  No new complaints, feels well was able to ambulate to the bathroom    Objective:  /62   Pulse 92   Temp 98.7 °F (37.1 °C) (Temporal)   Resp 18   Ht 5' 2\" (1.575 m)   Wt 169 lb (76.7 kg)   SpO2 95%   BMI 30.91 kg/m²     Gen: alert, oriented, no apparent distress  HEENT: NCAT, anicteric  CV: Tachycardic x1 up to 105 otherwise regular rate vital stable  Pulm: nonlabored breathing on 2 L nasal cannula  Abdomen: soft, appropriately tender, nondistended, left lower quadrant ostomy with strikethrough on dressing, laparoscopic incisions clean dry intact with skin glue  Extremities: moving all extremities, no peripheral edema  Skin: warm and dry    Assessment/Plan:  75 y.o. female s/p laparoscopic colostomy reversal 4/17    As needed dressing change with ABD as needed  Abdominal binder when out of bed  Clear liquid diet  Continue IV fluids due to low urine output overnight  Antibiotics for 24 hours postoperatively  As needed pain and nausea control  DVT prophylaxis  Home meds to be reordered pending a.m. labs  Strict I's and O's are still required despite patient's Reyes catheter previously removed  PT OT  No heavy lifting for 6 weeks  Tenderness spirometer  Plan will be discussed with Dr. Kristina Aggarwal on AM rounds. For questions after 6 AM please reach out to surgical resident on call. Electronically signed by Ysabel Pleitez MD on 4/18/2023 at 7:02 AM     This document is generated, in part, by voice recognition software and thus syntax and grammatical errors are possible. [facility]   ATTENDING PHYSICIAN PROGRESS NOTE     I have examined the patient, reviewed the record, and discussed the case with the APN/ Resident. I have reviewed all relevant labs and imaging data. Please refer to the APN/ resident's note.  I agree with
Patient admitted to PACU and placed on appropriate monitors. Patient on 40% face mask. Airway patent at this time. Report obtained from CRNA. Warm blankets applied.
Patient has had replacement k and Mag Dr. Saji Worthington perfect served do you want a bmp x1 now or do you want to wait for the morning labs?
Patient transferred to floor on bed on 4lnc  in stable condition with ancillary staff.
Pt requesting atenolol, messaged resident via [employer] [facility].
Removed F/C at 2310.
summarizes my clinical findings and independent assessment. CC: s/p lap colostomy reversal    Pt reports only minimal discomfort. States she is passing flatus and had BM.     Incisions healing well; ostomy site with dressing in place    S/p lap colostomy reversal--multimodal pain control  Advance diet as tolerated  Monitor abd exam/bowel function  Perioperative Abx  OOB/ambulate  PCDs/lovenox    Davie Collazo MD, FACS  4/19/2023  10:14 AM
[id number]  [] PT Re-evaluation 07547  [] Gait training 85123 -- minutes  [] Manual therapy [id number] -- minutes  [x] Therapeutic activities 10459 25 minutes  [] Therapeutic exercises 63283 -- minutes  [] Neuromuscular reeducation 17204 -- minutes     Srinivasan Galloway, PT, DPT  [id number]
nursing has been notified to ensure a dietitian consult is ordered. Eval Complexity: Low    Evaluation time includes thorough review of current medical information, gathering information on past medical & social history & PLOF, completion of standardized testing, informal observation of tasks, consultation with other medical professions/disciplines, assessment of data & development of POC/goals. Time In: 1010  Time Out: 1100  Total Treatment Time: 35    Min Units   OT Eval Low 52275  x     OT Eval Medium 45822      OT Eval High 28808      OT Re-Eval [id number]       Therapeutic Ex 53496       Therapeutic Activities 87560  15  1   ADL/Self Care 99436 20  1   Orthotic Management 65298       Manual 98275     Neuro Re-Ed 53382       Non-Billable Time          Evaluation Time additionally includes thorough review of current medical information, gathering information on past medical history/social history and prior level of function, interpretation of standardized testing/informal observation of tasks, assessment of data and development of plan of care and goals.           Severiano Adams, MSOT OTR/L; [id number]

## 2023-04-19 NOTE — DISCHARGE INSTRUCTIONS
directed, limit total amount of acetaminophen (tylenol) to 3 grams per 24-hour period and please note that NSAIDs (ibuprofen) can cause bleeding or stomach upset. You may experience constipation while taking pain medication, strongly recommended to take over-the-counter stool softeners (Docusate/Colace or Senokot-S) while using pain medications - use as directed on bottle. Okay to resume anticoagulant medication after 24hrs.       SPECIAL INSTRUCTIONS:   Call physician if you have any questions, to schedule a follow-up appointment, and if you notice any of the following:  Fever (temperature over 101ºF) or chills  Persistent nausea, vomiting, or bloating  Severe pain that is not relieved by the prescribed pain medication  Swelling or redness around your incision(s)  No bowel movement and feeling uncomfortable  Significant change in urination or no urine output for 8 hours  Excess bleeding (from the rectum or incision) - more than ½ cup that does not stop

## 2023-04-19 NOTE — CARE COORDINATION
Social Work/Case Management Transition of Care Planning Graciella Harada Narka [phone]): Per report and chart review, patient's diet has been advanced to regular low fiber. Monitor tolerance. IVF have been discontinued. Antibiotics have been completed. PT/OT scores noted to 16 & 16. She ambulated 50'x2 with FWW SBA. Discharge plan is to return home with no needs. Family to transport. Anticipate discharge later today if patient is able to tolerate diet and pain is controlled. CM/SW will follow.   Keaton Sevilla, LSW  4/19/2023

## 2023-04-20 ENCOUNTER — TELEPHONE (OUTPATIENT)
Dept: SURGERY | Age: 68
End: 2023-04-20

## 2023-04-20 DIAGNOSIS — L29.0 PERIANAL PRURITUS: Primary | ICD-10-CM

## 2023-04-26 RX ORDER — MECLIZINE HCL 12.5 MG/1
TABLET ORAL
Qty: 90 TABLET | Refills: 1 | Status: SHIPPED | OUTPATIENT
Start: 2023-04-26

## 2023-04-27 NOTE — TELEPHONE ENCOUNTER
MA received call patient states she has nausea. Patient states she has pain on right side at the top incision and it hurts to turn and get up and down. Patient would like to know if Methocarbonal is the reason why she is so dizzy? Patient states her anus is raw and would like to know what she should use to help the irritation? Forwarding to Dr. Long Officer for further advisement.     Electronically signed by Reilly Martinez MA on 4/27/2023 at 10:20 AM

## 2023-04-27 NOTE — TELEPHONE ENCOUNTER
Called patient. Feeling better. \"Turned the corner\" overnight. Temperature now 98. Nausea and dizziness improved. Some pain at RUQ trocar site with twisting/turning. No redness, drainage. States BMs are soft and formed    Advised to stop robaxin. May use ibuprofen and tylenol as needed. Will prescribe hydrocortisone for the perianal irritation. May advance diet to as tolerated. Already scheduled for clinic next week.

## 2023-05-02 ENCOUNTER — OFFICE VISIT (OUTPATIENT)
Dept: SURGERY | Age: 68
End: 2023-05-02

## 2023-05-02 VITALS
WEIGHT: 166.5 LBS | HEIGHT: 62 IN | OXYGEN SATURATION: 99 % | RESPIRATION RATE: 16 BRPM | SYSTOLIC BLOOD PRESSURE: 147 MMHG | DIASTOLIC BLOOD PRESSURE: 66 MMHG | BODY MASS INDEX: 30.64 KG/M2 | TEMPERATURE: 97 F | HEART RATE: 80 BPM

## 2023-05-02 DIAGNOSIS — Z09 POSTOP CHECK: Primary | ICD-10-CM

## 2023-05-02 PROCEDURE — 99024 POSTOP FOLLOW-UP VISIT: CPT | Performed by: SURGERY

## 2023-05-02 RX ORDER — ACETAMINOPHEN 325 MG/1
650 TABLET ORAL EVERY 6 HOURS PRN
COMMUNITY

## 2023-05-02 NOTE — PROGRESS NOTES
Chief Complaint   Patient presents with    Post-Op Check     Lap colostomy reversal DOS 4/17. Denies pain. Discomfort with certain movements. Soft bowel movements. Denies constipation. Nausea denies vomiting. Fever and chills on 4/26. Assessment:    ICD-10-CM    1. Postop check  Z09          At risk for incisional hernia d/t parastomal hernia and midline laxity that was repaired       Plan:    Continue local wound care  Activity ad samantha  Increase dietary fiber  Stool softeners prn  Return in about 6 weeks (around 6/13/2023) for Postoperative visit.       Subjective:  Feeling better  Intermittently crampy abdominal pain and constipation      Objective:   BP (!) 147/66   Pulse 80   Temp 97 °F (36.1 °C) (Temporal)   Resp 16   Ht 5' 2\" (1.575 m)   Wt 166 lb 8 oz (75.5 kg)   SpO2 99%   BMI 30.45 kg/m²   General - no apparent distress   Abdomen - trocar sites healing well, LLQ stoma site with dry eschar

## 2023-05-04 ENCOUNTER — TELEPHONE (OUTPATIENT)
Dept: PRIMARY CARE CLINIC | Age: 68
End: 2023-05-04

## 2023-05-04 NOTE — TELEPHONE ENCOUNTER
Patient calling she has been dealing with vertigo for about one week. Usually meclizine helps within one day but vertigo keeps returning. Wanting to know your recommendations.

## 2023-05-17 PROBLEM — Z01.812 PRE-OPERATIVE LABORATORY EXAMINATION: Status: RESOLVED | Noted: 2023-04-17 | Resolved: 2023-05-17

## 2023-05-17 PROBLEM — Z98.890 POST-OPERATIVE STATE: Status: RESOLVED | Noted: 2023-04-17 | Resolved: 2023-05-17

## 2023-06-13 PROBLEM — E87.20 LACTIC ACIDOSIS: Status: RESOLVED | Noted: 2022-11-29 | Resolved: 2023-06-13

## 2023-06-13 PROBLEM — A41.9 SEPSIS WITHOUT ACUTE ORGAN DYSFUNCTION (HCC): Status: RESOLVED | Noted: 2022-11-22 | Resolved: 2023-06-13

## 2023-06-13 PROBLEM — Z93.3: Status: RESOLVED | Noted: 2023-04-17 | Resolved: 2023-06-13

## 2023-06-13 PROBLEM — K65.9 PERITONITIS (HCC): Status: RESOLVED | Noted: 2022-11-22 | Resolved: 2023-06-13

## 2023-06-13 PROBLEM — K66.8 INTRA-ABDOMINAL FREE AIR OF UNKNOWN ETIOLOGY: Status: RESOLVED | Noted: 2022-11-22 | Resolved: 2023-06-13

## 2023-07-01 DIAGNOSIS — F41.9 ANXIETY: Primary | ICD-10-CM

## 2023-07-03 RX ORDER — LORAZEPAM 0.5 MG/1
TABLET ORAL
Qty: 60 TABLET | Refills: 1 | Status: SHIPPED
Start: 2023-07-03 | End: 2023-07-05 | Stop reason: SDUPTHER

## 2023-07-03 SDOH — ECONOMIC STABILITY: INCOME INSECURITY: HOW HARD IS IT FOR YOU TO PAY FOR THE VERY BASICS LIKE FOOD, HOUSING, MEDICAL CARE, AND HEATING?: SOMEWHAT HARD

## 2023-07-03 SDOH — ECONOMIC STABILITY: HOUSING INSECURITY
IN THE LAST 12 MONTHS, WAS THERE A TIME WHEN YOU DID NOT HAVE A STEADY PLACE TO SLEEP OR SLEPT IN A SHELTER (INCLUDING NOW)?: NO

## 2023-07-03 SDOH — ECONOMIC STABILITY: TRANSPORTATION INSECURITY
IN THE PAST 12 MONTHS, HAS LACK OF TRANSPORTATION KEPT YOU FROM MEETINGS, WORK, OR FROM GETTING THINGS NEEDED FOR DAILY LIVING?: NO

## 2023-07-03 SDOH — ECONOMIC STABILITY: FOOD INSECURITY: WITHIN THE PAST 12 MONTHS, YOU WORRIED THAT YOUR FOOD WOULD RUN OUT BEFORE YOU GOT MONEY TO BUY MORE.: NEVER TRUE

## 2023-07-03 SDOH — ECONOMIC STABILITY: FOOD INSECURITY: WITHIN THE PAST 12 MONTHS, THE FOOD YOU BOUGHT JUST DIDN'T LAST AND YOU DIDN'T HAVE MONEY TO GET MORE.: NEVER TRUE

## 2023-07-05 ENCOUNTER — OFFICE VISIT (OUTPATIENT)
Dept: PRIMARY CARE CLINIC | Age: 68
End: 2023-07-05
Payer: MEDICARE

## 2023-07-05 VITALS
HEIGHT: 62 IN | TEMPERATURE: 97.6 F | DIASTOLIC BLOOD PRESSURE: 86 MMHG | WEIGHT: 178 LBS | OXYGEN SATURATION: 98 % | SYSTOLIC BLOOD PRESSURE: 134 MMHG | BODY MASS INDEX: 32.76 KG/M2 | RESPIRATION RATE: 18 BRPM | HEART RATE: 82 BPM

## 2023-07-05 DIAGNOSIS — Z78.0 ASYMPTOMATIC MENOPAUSAL STATE: ICD-10-CM

## 2023-07-05 DIAGNOSIS — F41.9 ANXIETY: ICD-10-CM

## 2023-07-05 DIAGNOSIS — Z11.59 NEED FOR HEPATITIS C SCREENING TEST: ICD-10-CM

## 2023-07-05 DIAGNOSIS — Z00.00 INITIAL MEDICARE ANNUAL WELLNESS VISIT: Primary | ICD-10-CM

## 2023-07-05 DIAGNOSIS — R14.0 ABDOMINAL DISTENSION: ICD-10-CM

## 2023-07-05 DIAGNOSIS — Z13.220 SCREENING FOR HYPERLIPIDEMIA: ICD-10-CM

## 2023-07-05 DIAGNOSIS — I10 ESSENTIAL HYPERTENSION, BENIGN: ICD-10-CM

## 2023-07-05 DIAGNOSIS — K55.9 ISCHEMIC BOWEL DISEASE (HCC): ICD-10-CM

## 2023-07-05 DIAGNOSIS — Z72.89 OTHER PROBLEMS RELATED TO LIFESTYLE: ICD-10-CM

## 2023-07-05 DIAGNOSIS — E78.49 OTHER HYPERLIPIDEMIA: ICD-10-CM

## 2023-07-05 DIAGNOSIS — E55.9 VITAMIN D DEFICIENCY: ICD-10-CM

## 2023-07-05 DIAGNOSIS — M54.41 ACUTE MIDLINE LOW BACK PAIN WITH RIGHT-SIDED SCIATICA: ICD-10-CM

## 2023-07-05 PROCEDURE — 3017F COLORECTAL CA SCREEN DOC REV: CPT | Performed by: FAMILY MEDICINE

## 2023-07-05 PROCEDURE — 3075F SYST BP GE 130 - 139MM HG: CPT | Performed by: FAMILY MEDICINE

## 2023-07-05 PROCEDURE — 3079F DIAST BP 80-89 MM HG: CPT | Performed by: FAMILY MEDICINE

## 2023-07-05 PROCEDURE — 1124F ACP DISCUSS-NO DSCNMKR DOCD: CPT | Performed by: FAMILY MEDICINE

## 2023-07-05 PROCEDURE — G0438 PPPS, INITIAL VISIT: HCPCS | Performed by: FAMILY MEDICINE

## 2023-07-05 RX ORDER — CHOLECALCIFEROL (VITAMIN D3) 50 MCG
2000 TABLET ORAL DAILY
Qty: 90 TABLET | Refills: 3 | Status: SHIPPED | OUTPATIENT
Start: 2023-07-05

## 2023-07-05 RX ORDER — LORAZEPAM 0.5 MG/1
0.5 TABLET ORAL 2 TIMES DAILY
Qty: 60 TABLET | Refills: 1 | Status: SHIPPED | OUTPATIENT
Start: 2023-07-05 | End: 2023-08-04

## 2023-07-05 ASSESSMENT — LIFESTYLE VARIABLES
HOW MANY STANDARD DRINKS CONTAINING ALCOHOL DO YOU HAVE ON A TYPICAL DAY: PATIENT DOES NOT DRINK
HOW OFTEN DO YOU HAVE A DRINK CONTAINING ALCOHOL: NEVER

## 2023-07-05 ASSESSMENT — PATIENT HEALTH QUESTIONNAIRE - PHQ9
SUM OF ALL RESPONSES TO PHQ9 QUESTIONS 1 & 2: 0
SUM OF ALL RESPONSES TO PHQ QUESTIONS 1-9: 0
2. FEELING DOWN, DEPRESSED OR HOPELESS: 0
SUM OF ALL RESPONSES TO PHQ QUESTIONS 1-9: 0
1. LITTLE INTEREST OR PLEASURE IN DOING THINGS: 0

## 2023-07-05 NOTE — PATIENT INSTRUCTIONS
lifestyle, illnesses that may run in your family, and various assessments and screenings as appropriate. After reviewing your medical record and screening and assessments performed today your provider may have ordered immunizations, labs, imaging, and/or referrals for you. A list of these orders (if applicable) as well as your Preventive Care list are included within your After Visit Summary for your review. Other Preventive Recommendations:    A preventive eye exam performed by an eye specialist is recommended every 1-2 years to screen for glaucoma; cataracts, macular degeneration, and other eye disorders. A preventive dental visit is recommended every 6 months. Try to get at least 150 minutes of exercise per week or 10,000 steps per day on a pedometer . Order or download the FREE \"Exercise & Physical Activity: Your Everyday Guide\" from [employer]. Call 9-879.490.1456 or search [employer] online. You need 6320-8358 mg of calcium and 6587-3570 IU of vitamin D per day. It is possible to meet your calcium requirement with diet alone, but a vitamin D supplement is usually necessary to meet this goal.  When exposed to the sun, use a sunscreen that protects against both UVA and UVB radiation with an SPF of 30 or greater. Reapply every 2 to 3 hours or after sweating, drying off with a towel, or swimming. Always wear a seat belt when traveling in a car. Always wear a helmet when riding a bicycle or motorcycle.

## 2023-07-07 DIAGNOSIS — E55.9 VITAMIN D DEFICIENCY: ICD-10-CM

## 2023-07-07 DIAGNOSIS — Z72.89 OTHER PROBLEMS RELATED TO LIFESTYLE: ICD-10-CM

## 2023-07-07 DIAGNOSIS — Z13.220 SCREENING FOR HYPERLIPIDEMIA: ICD-10-CM

## 2023-07-07 DIAGNOSIS — Z11.59 NEED FOR HEPATITIS C SCREENING TEST: ICD-10-CM

## 2023-07-07 DIAGNOSIS — I10 ESSENTIAL HYPERTENSION, BENIGN: ICD-10-CM

## 2023-07-07 DIAGNOSIS — E78.49 OTHER HYPERLIPIDEMIA: ICD-10-CM

## 2023-07-07 LAB
ALBUMIN SERPL-MCNC: 4.8 G/DL (ref 3.5–5.2)
ALP SERPL-CCNC: 71 U/L (ref 35–104)
ALT SERPL-CCNC: 11 U/L (ref 0–32)
ANION GAP SERPL CALCULATED.3IONS-SCNC: 18 MMOL/L (ref 7–16)
AST SERPL-CCNC: 15 U/L (ref 0–31)
BASOPHILS # BLD: 0.03 E9/L (ref 0–0.2)
BASOPHILS NFR BLD: 0.5 % (ref 0–2)
BILIRUB SERPL-MCNC: 0.6 MG/DL (ref 0–1.2)
BUN SERPL-MCNC: 13 MG/DL (ref 6–23)
CALCIUM SERPL-MCNC: 10.3 MG/DL (ref 8.6–10.2)
CHLORIDE SERPL-SCNC: 98 MMOL/L (ref 98–107)
CHOLESTEROL, TOTAL: 200 MG/DL (ref 0–199)
CO2 SERPL-SCNC: 24 MMOL/L (ref 22–29)
CREAT SERPL-MCNC: 0.6 MG/DL (ref 0.5–1)
EOSINOPHIL # BLD: 0.06 E9/L (ref 0.05–0.5)
EOSINOPHIL NFR BLD: 0.9 % (ref 0–6)
ERYTHROCYTE [DISTWIDTH] IN BLOOD BY AUTOMATED COUNT: 14.3 FL (ref 11.5–15)
GLUCOSE SERPL-MCNC: 130 MG/DL (ref 74–99)
HCT VFR BLD AUTO: 40.1 % (ref 34–48)
HDLC SERPL-MCNC: 54 MG/DL
HGB BLD-MCNC: 12.5 G/DL (ref 11.5–15.5)
IMM GRANULOCYTES # BLD: 0.02 E9/L
IMM GRANULOCYTES NFR BLD: 0.3 % (ref 0–5)
LDLC SERPL CALC-MCNC: 106 MG/DL (ref 0–99)
LYMPHOCYTES # BLD: 1.85 E9/L (ref 1.5–4)
LYMPHOCYTES NFR BLD: 29.2 % (ref 20–42)
MCH RBC QN AUTO: 24.2 PG (ref 26–35)
MCHC RBC AUTO-ENTMCNC: 31.2 % (ref 32–34.5)
MCV RBC AUTO: 77.6 FL (ref 80–99.9)
MONOCYTES # BLD: 0.56 E9/L (ref 0.1–0.95)
MONOCYTES NFR BLD: 8.8 % (ref 2–12)
NEUTROPHILS # BLD: 3.82 E9/L (ref 1.8–7.3)
NEUTS SEG NFR BLD: 60.3 % (ref 43–80)
PLATELET # BLD AUTO: 382 E9/L (ref 130–450)
PMV BLD AUTO: 10.8 FL (ref 7–12)
POTASSIUM SERPL-SCNC: 3.4 MMOL/L (ref 3.5–5)
PROT SERPL-MCNC: 7.7 G/DL (ref 6.4–8.3)
RBC # BLD AUTO: 5.17 E12/L (ref 3.5–5.5)
SODIUM SERPL-SCNC: 140 MMOL/L (ref 132–146)
TRIGL SERPL-MCNC: 200 MG/DL (ref 0–149)
VITAMIN D 25-HYDROXY: 35 NG/ML (ref 30–100)
VLDLC SERPL CALC-MCNC: 40 MG/DL
WBC # BLD: 6.3 E9/L (ref 4.5–11.5)

## 2023-07-10 LAB — HCV AB SERPL QL IA: NORMAL

## 2023-08-10 RX ORDER — ALENDRONATE SODIUM 70 MG/1
70 TABLET ORAL
Qty: 12 TABLET | Refills: 1 | Status: SHIPPED | OUTPATIENT
Start: 2023-08-10

## 2023-09-05 ENCOUNTER — OFFICE VISIT (OUTPATIENT)
Dept: SURGERY | Age: 68
End: 2023-09-05
Payer: MEDICARE

## 2023-09-05 VITALS
SYSTOLIC BLOOD PRESSURE: 132 MMHG | OXYGEN SATURATION: 96 % | TEMPERATURE: 97 F | HEART RATE: 88 BPM | HEIGHT: 62 IN | WEIGHT: 176 LBS | DIASTOLIC BLOOD PRESSURE: 63 MMHG | BODY MASS INDEX: 32.39 KG/M2

## 2023-09-05 DIAGNOSIS — Z93.3 STATUS POST HARTMANN PROCEDURE (HCC): ICD-10-CM

## 2023-09-05 DIAGNOSIS — Z98.890 S/P COLOSTOMY TAKEDOWN: ICD-10-CM

## 2023-09-05 DIAGNOSIS — R10.30 LOWER ABDOMINAL PAIN: Primary | ICD-10-CM

## 2023-09-05 PROBLEM — K63.3 STERCORAL ULCER OF LARGE INTESTINE: Status: RESOLVED | Noted: 2022-12-13 | Resolved: 2023-09-05

## 2023-09-05 PROBLEM — K55.9 ISCHEMIC BOWEL DISEASE (HCC): Status: RESOLVED | Noted: 2023-07-05 | Resolved: 2023-09-05

## 2023-09-05 PROCEDURE — G8417 CALC BMI ABV UP PARAM F/U: HCPCS | Performed by: SURGERY

## 2023-09-05 PROCEDURE — 3075F SYST BP GE 130 - 139MM HG: CPT | Performed by: SURGERY

## 2023-09-05 PROCEDURE — 1090F PRES/ABSN URINE INCON ASSESS: CPT | Performed by: SURGERY

## 2023-09-05 PROCEDURE — 3017F COLORECTAL CA SCREEN DOC REV: CPT | Performed by: SURGERY

## 2023-09-05 PROCEDURE — 99212 OFFICE O/P EST SF 10 MIN: CPT | Performed by: SURGERY

## 2023-09-05 PROCEDURE — 1124F ACP DISCUSS-NO DSCNMKR DOCD: CPT | Performed by: SURGERY

## 2023-09-05 PROCEDURE — 99214 OFFICE O/P EST MOD 30 MIN: CPT | Performed by: SURGERY

## 2023-09-05 PROCEDURE — 1036F TOBACCO NON-USER: CPT | Performed by: SURGERY

## 2023-09-05 PROCEDURE — G8427 DOCREV CUR MEDS BY ELIG CLIN: HCPCS | Performed by: SURGERY

## 2023-09-05 PROCEDURE — 3078F DIAST BP <80 MM HG: CPT | Performed by: SURGERY

## 2023-09-05 PROCEDURE — G8399 PT W/DXA RESULTS DOCUMENT: HCPCS | Performed by: SURGERY

## 2023-09-18 DIAGNOSIS — R10.30 LOWER ABDOMINAL PAIN: Primary | ICD-10-CM

## 2023-09-22 ENCOUNTER — HOSPITAL ENCOUNTER (OUTPATIENT)
Dept: CT IMAGING | Age: 68
End: 2023-09-22
Attending: SURGERY
Payer: MEDICARE

## 2023-09-22 ENCOUNTER — HOSPITAL ENCOUNTER (OUTPATIENT)
Age: 68
Discharge: HOME | End: 2023-09-22
Payer: MEDICARE

## 2023-09-22 DIAGNOSIS — R10.30 LOWER ABDOMINAL PAIN: ICD-10-CM

## 2023-09-22 DIAGNOSIS — Z93.3 STATUS POST HARTMANN PROCEDURE (HCC): ICD-10-CM

## 2023-09-22 DIAGNOSIS — Z98.890 S/P COLOSTOMY TAKEDOWN: ICD-10-CM

## 2023-09-22 LAB
BUN SERPL-MCNC: 11 MG/DL (ref 6–23)
CREAT SERPL-MCNC: 0.5 MG/DL (ref 0.5–1)
GFR SERPL CREATININE-BSD FRML MDRD: >60 ML/MIN/1.73M2

## 2023-09-22 PROCEDURE — 36415 COLL VENOUS BLD VENIPUNCTURE: CPT

## 2023-09-22 PROCEDURE — 6360000004 HC RX CONTRAST MEDICATION: Performed by: RADIOLOGY

## 2023-09-22 PROCEDURE — 82565 ASSAY OF CREATININE: CPT

## 2023-09-22 PROCEDURE — 84520 ASSAY OF UREA NITROGEN: CPT

## 2023-09-22 PROCEDURE — 74177 CT ABD & PELVIS W/CONTRAST: CPT

## 2023-09-22 RX ADMIN — IOPAMIDOL 18 ML: 755 INJECTION, SOLUTION INTRAVENOUS at 16:40

## 2023-09-22 RX ADMIN — IOPAMIDOL 75 ML: 755 INJECTION, SOLUTION INTRAVENOUS at 16:36

## 2023-10-03 ENCOUNTER — OFFICE VISIT (OUTPATIENT)
Dept: SURGERY | Age: 68
End: 2023-10-03
Payer: MEDICARE

## 2023-10-03 VITALS
HEIGHT: 62 IN | HEART RATE: 68 BPM | SYSTOLIC BLOOD PRESSURE: 133 MMHG | WEIGHT: 173 LBS | RESPIRATION RATE: 16 BRPM | BODY MASS INDEX: 31.83 KG/M2 | OXYGEN SATURATION: 96 % | DIASTOLIC BLOOD PRESSURE: 78 MMHG

## 2023-10-03 DIAGNOSIS — R59.0 MESENTERIC LYMPHADENOPATHY: Primary | ICD-10-CM

## 2023-10-03 DIAGNOSIS — K43.2 INCISIONAL HERNIA, WITHOUT OBSTRUCTION OR GANGRENE: ICD-10-CM

## 2023-10-03 DIAGNOSIS — D18.03 HEMANGIOMA OF LIVER: ICD-10-CM

## 2023-10-03 DIAGNOSIS — R22.2 PLEURAL NODULE: ICD-10-CM

## 2023-10-03 DIAGNOSIS — R16.0 LIVER MASS: ICD-10-CM

## 2023-10-03 DIAGNOSIS — Z85.3 HISTORY OF BREAST CANCER: ICD-10-CM

## 2023-10-03 PROCEDURE — 3017F COLORECTAL CA SCREEN DOC REV: CPT | Performed by: SURGERY

## 2023-10-03 PROCEDURE — G8417 CALC BMI ABV UP PARAM F/U: HCPCS | Performed by: SURGERY

## 2023-10-03 PROCEDURE — 1124F ACP DISCUSS-NO DSCNMKR DOCD: CPT | Performed by: SURGERY

## 2023-10-03 PROCEDURE — 1036F TOBACCO NON-USER: CPT | Performed by: SURGERY

## 2023-10-03 PROCEDURE — 99212 OFFICE O/P EST SF 10 MIN: CPT | Performed by: SURGERY

## 2023-10-03 PROCEDURE — 3078F DIAST BP <80 MM HG: CPT | Performed by: SURGERY

## 2023-10-03 PROCEDURE — 1090F PRES/ABSN URINE INCON ASSESS: CPT | Performed by: SURGERY

## 2023-10-03 PROCEDURE — G8484 FLU IMMUNIZE NO ADMIN: HCPCS | Performed by: SURGERY

## 2023-10-03 PROCEDURE — 3075F SYST BP GE 130 - 139MM HG: CPT | Performed by: SURGERY

## 2023-10-03 PROCEDURE — G8427 DOCREV CUR MEDS BY ELIG CLIN: HCPCS | Performed by: SURGERY

## 2023-10-03 PROCEDURE — 99214 OFFICE O/P EST MOD 30 MIN: CPT | Performed by: SURGERY

## 2023-10-03 PROCEDURE — G8399 PT W/DXA RESULTS DOCUMENT: HCPCS | Performed by: SURGERY

## 2023-10-03 RX ORDER — LORAZEPAM 0.5 MG/1
TABLET ORAL
COMMUNITY
Start: 2023-09-24

## 2023-10-04 ENCOUNTER — TELEPHONE (OUTPATIENT)
Dept: SURGERY | Age: 68
End: 2023-10-04

## 2023-10-04 NOTE — TELEPHONE ENCOUNTER
MA contacted pt insurance to inquire if a prior Jun Pineda is needed for PET CT ordered by Dr. Gloria Gudino. MA spoke with Gabe Smith, whom informed MA that a prior auth was not needed for imaging. Per pt request, MA contact surgery scheduling to schedule PET CT. MA got pt scheduled 10/24/23 @ 9:30am. MA informed pt to arrive at [facility] by 9am. MA read all instructions to pt, including:  General Patient Recommendations  * 24 hours before the scan, please eat a high protein, low-carbohydrate diet, no coffee, cream, mints, gum or cough drops. * Nothing to eat 6 hours before the scan. Do not drink caffeine or decaffeinated drinks 6 hours before your scan. * Avoid any strenuous exercise 2 days before the scan. * No exercising, running, swimming, or lifting weights  * You are encouraged to drink three 12 oz. glasses of plain water only, no lemon, the night before and two 12 oz. glasses the day of the exam. NO flavored watered or drinks such as Gatorade, Propel or single flavor packets, etc.  * Please take any regularly scheduled medications that can be tolerated on an empty stomach. * Please consult your doctor if you have any questions or concerns  * Avoid sugar the day of the scan; this includes cream, coffee, gum, candy, cough drops and mints. * Do not smoke any electronic cigarettes that have flavoring. * You should be able to lie flat on your back for about 30 minutes to be able to complete the scan  * Dress warmly and comfortably for your scan. (no underwire bras or zippers)  * Please bring a list of your medications that you are currently taking. * If you have had a PET scan elsewhere, please bring the disc with you the day of your exam.  * If you are also scheduled for a CT exam on the same day, the PET exam must be done BEFORE the CT exam.  * Patients scheduled on the mobile MRI unit( MRIEMOB)  and all PET scans are to park in the [facility] parking lot and [facility] doors.     Pt verbalized

## 2023-10-17 ENCOUNTER — TELEPHONE (OUTPATIENT)
Dept: SURGERY | Age: 68
End: 2023-10-17

## 2023-10-17 DIAGNOSIS — R59.0 MESENTERIC LYMPHADENOPATHY: Primary | ICD-10-CM

## 2023-10-17 NOTE — TELEPHONE ENCOUNTER
Per Jesu Goodman at PET Scan department.  Order for PET CT needs changed to [address] [facility] [address]

## 2023-10-24 ENCOUNTER — HOSPITAL ENCOUNTER (OUTPATIENT)
Dept: NUCLEAR MEDICINE | Age: 68
Discharge: HOME | End: 2023-10-26
Attending: SURGERY
Payer: MEDICARE

## 2023-10-24 DIAGNOSIS — R59.0 MESENTERIC LYMPHADENOPATHY: ICD-10-CM

## 2023-10-24 PROCEDURE — 78815 PET IMAGE W/CT SKULL-THIGH: CPT

## 2023-10-24 PROCEDURE — 3430000000 HC RX DIAGNOSTIC RADIOPHARMACEUTICAL: Performed by: RADIOLOGY

## 2023-10-24 PROCEDURE — A9552 F18 FDG: HCPCS | Performed by: RADIOLOGY

## 2023-10-24 RX ORDER — FLUDEOXYGLUCOSE F 18 200 MCI/ML
14.8 INJECTION, SOLUTION INTRAVENOUS
Status: COMPLETED | OUTPATIENT
Start: 2023-10-24 | End: 2023-10-24

## 2023-10-24 RX ADMIN — FLUDEOXYGLUCOSE F 18 14.8 MILLICURIE: 200 INJECTION, SOLUTION INTRAVENOUS at 09:35

## 2023-10-27 ENCOUNTER — TELEPHONE (OUTPATIENT)
Dept: SURGERY | Age: 68
End: 2023-10-27

## 2023-10-27 NOTE — TELEPHONE ENCOUNTER
MA received a call from pt stating that she had her PET CT and is wondering what the next steps will be as she is not scheduled to follow up in office until December, MA routing to Dr. Ramona Lebron for advisement.     Electronically signed by Rochelle Mayfield on 10/27/2023 at 1:06 PM

## 2023-11-10 NOTE — TELEPHONE ENCOUNTER
Pt called the office again questioning what the next steps are after PET scan, MA routing to Dr. Alexander King for advisement.   Electronically signed by Marino De León on 11/10/2023 at 1:59 PM

## 2023-11-12 DIAGNOSIS — F41.9 ANXIETY: Primary | ICD-10-CM

## 2023-11-13 RX ORDER — LORAZEPAM 0.5 MG/1
0.5 TABLET ORAL 2 TIMES DAILY
Qty: 60 TABLET | Refills: 1 | Status: SHIPPED | OUTPATIENT
Start: 2023-11-13 | End: 2023-12-13

## 2023-11-17 ENCOUNTER — TELEPHONE (OUTPATIENT)
Dept: SURGERY | Age: 68
End: 2023-11-17

## 2023-11-17 DIAGNOSIS — E04.1 THYROID NODULE: Primary | ICD-10-CM

## 2023-11-17 DIAGNOSIS — K43.2 INCISIONAL HERNIA, WITHOUT OBSTRUCTION OR GANGRENE: ICD-10-CM

## 2023-11-17 DIAGNOSIS — R59.0 MESENTERIC LYMPHADENOPATHY: ICD-10-CM

## 2023-11-17 NOTE — TELEPHONE ENCOUNTER
Discussed results with her. Will need to come in for a preop appointment to discuss mesenteric lymph node biopsy and abdominal wall reconstruction. Will also need to f/u with thyroid uptake seen on PET. Labs and thyroid US ordered. Please call patient to be seen on 11/28 in my clinic.

## 2023-11-17 NOTE — TELEPHONE ENCOUNTER
MA received another call from the pt and her daughter. They stated that they have not received the results for CT done 10/27 ordered by Dr. Kaiden Kaur. Pt states they have grown very worrisome, and slightly annoyed. Pt is requesting the CT results ASAP. Routing to Dr. Kaiden Kaur for further advisement.     Electronically signed by Margarita Ferreira MA on 11/17/2023 at 10:37 AM

## 2023-11-17 NOTE — TELEPHONE ENCOUNTER
MA attempted to contact pt to schedule post op appt with Dr. Alexander King. MA left detailed vm requesting return call to schedule, also informing her that Dr. Alexander King had placed orders for an US as well as blood work.      Electronically signed by Maxi Velazquez MA on 11/17/2023 at 1:01 PM

## 2023-11-18 ENCOUNTER — HOSPITAL ENCOUNTER (OUTPATIENT)
Age: 68
Discharge: HOME | End: 2023-11-18
Payer: MEDICARE

## 2023-11-18 ENCOUNTER — HOSPITAL ENCOUNTER (OUTPATIENT)
Dept: ULTRASOUND IMAGING | Age: 68
End: 2023-11-18
Payer: MEDICARE

## 2023-11-18 DIAGNOSIS — E04.1 THYROID NODULE: ICD-10-CM

## 2023-11-18 LAB
T3FREE SERPL-MCNC: 3.12 PG/ML (ref 2–4.4)
TSH SERPL DL<=0.05 MIU/L-ACNC: 1.33 UIU/ML (ref 0.27–4.2)

## 2023-11-18 PROCEDURE — 36415 COLL VENOUS BLD VENIPUNCTURE: CPT

## 2023-11-18 PROCEDURE — 84443 ASSAY THYROID STIM HORMONE: CPT

## 2023-11-18 PROCEDURE — 84481 FREE ASSAY (FT-3): CPT

## 2023-11-18 PROCEDURE — 76536 US EXAM OF HEAD AND NECK: CPT

## 2023-11-18 PROCEDURE — 86376 MICROSOMAL ANTIBODY EACH: CPT

## 2023-11-23 DIAGNOSIS — E78.49 OTHER HYPERLIPIDEMIA: ICD-10-CM

## 2023-11-24 RX ORDER — ROSUVASTATIN CALCIUM 20 MG/1
TABLET, COATED ORAL
Qty: 90 TABLET | Refills: 3 | Status: SHIPPED | OUTPATIENT
Start: 2023-11-24

## 2023-11-25 LAB — THYROPEROXIDASE AB SERPL IA-ACNC: 4.4 IU/ML (ref 0–25)

## 2023-11-27 DIAGNOSIS — I10 ESSENTIAL HYPERTENSION, BENIGN: ICD-10-CM

## 2023-11-28 RX ORDER — ATENOLOL AND CHLORTHALIDONE TABLET 50; 25 MG/1; MG/1
TABLET ORAL
Qty: 90 TABLET | Refills: 3 | Status: SHIPPED | OUTPATIENT
Start: 2023-11-28

## 2023-12-05 ENCOUNTER — OFFICE VISIT (OUTPATIENT)
Dept: SURGERY | Age: 68
End: 2023-12-05
Payer: MEDICARE

## 2023-12-05 VITALS
RESPIRATION RATE: 16 BRPM | HEART RATE: 60 BPM | BODY MASS INDEX: 33.68 KG/M2 | OXYGEN SATURATION: 98 % | SYSTOLIC BLOOD PRESSURE: 146 MMHG | HEIGHT: 62 IN | WEIGHT: 183 LBS | DIASTOLIC BLOOD PRESSURE: 66 MMHG | TEMPERATURE: 97 F

## 2023-12-05 DIAGNOSIS — K43.2 INCISIONAL HERNIA, WITHOUT OBSTRUCTION OR GANGRENE: ICD-10-CM

## 2023-12-05 DIAGNOSIS — R59.0 MESENTERIC LYMPHADENOPATHY: Primary | ICD-10-CM

## 2023-12-05 DIAGNOSIS — Z85.3 HISTORY OF BREAST CANCER: ICD-10-CM

## 2023-12-05 DIAGNOSIS — E04.1 THYROID NODULE: ICD-10-CM

## 2023-12-05 PROCEDURE — G8399 PT W/DXA RESULTS DOCUMENT: HCPCS | Performed by: SURGERY

## 2023-12-05 PROCEDURE — 99215 OFFICE O/P EST HI 40 MIN: CPT | Performed by: SURGERY

## 2023-12-05 PROCEDURE — G8484 FLU IMMUNIZE NO ADMIN: HCPCS | Performed by: SURGERY

## 2023-12-05 PROCEDURE — G8417 CALC BMI ABV UP PARAM F/U: HCPCS | Performed by: SURGERY

## 2023-12-05 PROCEDURE — 3078F DIAST BP <80 MM HG: CPT | Performed by: SURGERY

## 2023-12-05 PROCEDURE — G8427 DOCREV CUR MEDS BY ELIG CLIN: HCPCS | Performed by: SURGERY

## 2023-12-05 PROCEDURE — 3077F SYST BP >= 140 MM HG: CPT | Performed by: SURGERY

## 2023-12-05 PROCEDURE — 1090F PRES/ABSN URINE INCON ASSESS: CPT | Performed by: SURGERY

## 2023-12-05 PROCEDURE — 1124F ACP DISCUSS-NO DSCNMKR DOCD: CPT | Performed by: SURGERY

## 2023-12-05 PROCEDURE — 1036F TOBACCO NON-USER: CPT | Performed by: SURGERY

## 2023-12-05 PROCEDURE — 99212 OFFICE O/P EST SF 10 MIN: CPT | Performed by: SURGERY

## 2023-12-05 PROCEDURE — 3017F COLORECTAL CA SCREEN DOC REV: CPT | Performed by: SURGERY

## 2023-12-05 RX ORDER — SODIUM CHLORIDE 9 MG/ML
INJECTION, SOLUTION INTRAVENOUS CONTINUOUS
OUTPATIENT
Start: 2023-12-05

## 2023-12-05 RX ORDER — SODIUM CHLORIDE 9 MG/ML
INJECTION, SOLUTION INTRAVENOUS PRN
OUTPATIENT
Start: 2023-12-05

## 2023-12-05 RX ORDER — SODIUM CHLORIDE 0.9 % (FLUSH) 0.9 %
5-40 SYRINGE (ML) INJECTION PRN
OUTPATIENT
Start: 2023-12-05

## 2023-12-05 RX ORDER — SODIUM CHLORIDE 0.9 % (FLUSH) 0.9 %
5-40 SYRINGE (ML) INJECTION EVERY 12 HOURS SCHEDULED
OUTPATIENT
Start: 2023-12-05

## 2023-12-05 RX ORDER — ENOXAPARIN SODIUM 100 MG/ML
40 INJECTION SUBCUTANEOUS ONCE
OUTPATIENT
Start: 2023-12-05 | End: 2023-12-05

## 2023-12-05 RX ORDER — ACETAMINOPHEN 325 MG/1
1000 TABLET ORAL ONCE
OUTPATIENT
Start: 2023-12-05 | End: 2023-12-05

## 2023-12-05 ASSESSMENT — ENCOUNTER SYMPTOMS
ABDOMINAL PAIN: 1
RESPIRATORY NEGATIVE: 1
NAUSEA: 0
ABDOMINAL DISTENTION: 1
VOMITING: 0

## 2023-12-05 NOTE — H&P
Chief Complaint   Patient presents with    mesenteric lymphadenopathy     Follow-up     2 month         Assessment:  1. Mesenteric lymphadenopathy    2. Incisional hernia, without obstruction or gangrene    3. Thyroid nodule    4. History of breast cancer       Rule out metastatic breast cancer, inflammatory disorder, lymphoma      Plan:  Recommend exploratory laparotomy, mesenteric lymph node biopsy, incisional hernia repair with mesh, possible component separation    Surveillance thyroid US in one year    I discussed the natural history of hernia with the patient including the risks of worsening pain, enlargement, incarceration, or strangulation. I discussed the options of watchful waiting, open hernia repair, component separation, and laparoscopic hernia repair with mesh. I reviewed the risks of surgery including bleeding, infection, recurrent hernia, chronic pain, inadvertent injury to abdominal or hernia contents, temporary ICU admission or ventilator support, heart attack, stroke, blood clots, and death. I recommended open hernia repair with synthetic or biological mesh and possible component separation techniques. I discussed the expected postoperative recovery period including the need for abdominal drains, activity and work restrictions. The patient understands all of the above. The patient's questions were answered. Surgery will be scheduled in the near future. History    HPI:  Geraldine Rice presents today for follow up of thyroid US and labs. Her thyroid labs were unremarkable. The US showed multiple bilateral small thyroid nodules with a 9 mm right lobe nodule (TR5). She is still having abdominal bulging and discomfort as well as left hip pain.     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

## 2023-12-06 DIAGNOSIS — L29.0 PERIANAL PRURITUS: ICD-10-CM

## 2023-12-12 ENCOUNTER — PREP FOR PROCEDURE (OUTPATIENT)
Dept: SURGERY | Age: 68
End: 2023-12-12

## 2023-12-13 ENCOUNTER — TELEPHONE (OUTPATIENT)
Dept: SURGERY | Age: 68
End: 2023-12-13

## 2023-12-13 NOTE — TELEPHONE ENCOUNTER
Scheduled patient for EXPLORATORY LAPAROTOMY MESENTERIC LYMPH NODE BIOPSY, INCISIONAL HERNIA REPAIR, POSSIBLE COMPONENT SEPERATION 24 at 10:30AM. Patient needs to arrive at [address] [place] [address] at 8:30AM. Patient confirmed date and time, address and directions given via phone. Prep instructions mailed, patient understood. Prior Authorization Form:      DEMOGRAPHICS:                     Patient Name:  Madeline Donaldson  Patient :  1955            Insurance:  Payor: MEDICARE / Plan: MEDICARE PART A AND B / Product Type: *No Product type* /   Insurance ID Number:    Payer/Plan Subscr  Sex Relation Sub. Ins. ID Effective Group Num   1. [id number] [facility] 1955 Female Self [id number]                                    [address] [address]   2.  [address] [facility] 1955 Female Self [id number]                                     [address]         DIAGNOSIS & PROCEDURE:                       Procedure/Operation: EXPLORATORY LAPAROTOMY, MESENTERIC LYMPH NODE BIOPSY, INCISIONAL HERNIA REPAIR, POSSIBLE COMPONENT SEPERATION           CPT Code: 63056, 56578, [id number]    Diagnosis:  THYROID NODULE, INCISIONAL HERNIA, MESENTERIC LYMPHADENOPATHY, HX OF BREAST CANCER    ICD10 Code: E04.1, K43.2, R59.0, Z85.3    Location:  [facility]    Surgeon:  González Fowler    SCHEDULING INFORMATION:                          Date: 24    Time: 10:30AM              Anesthesia:  General                                                       Status:   SAME DAY ADMISSION         Special Comments:  N/A       Electronically signed by Stephy Sanchez MA on 2023 at 3:55 PM

## 2023-12-19 RX ORDER — LORAZEPAM 0.5 MG/1
0.5 TABLET ORAL 2 TIMES DAILY
COMMUNITY

## 2023-12-27 ENCOUNTER — HOSPITAL ENCOUNTER (OUTPATIENT)
Dept: PREADMISSION TESTING | Age: 68
Discharge: HOME | End: 2023-12-27
Payer: MEDICARE

## 2023-12-27 VITALS
SYSTOLIC BLOOD PRESSURE: 139 MMHG | WEIGHT: 186.5 LBS | HEART RATE: 64 BPM | DIASTOLIC BLOOD PRESSURE: 65 MMHG | RESPIRATION RATE: 16 BRPM | TEMPERATURE: 96.8 F | HEIGHT: 62 IN | BODY MASS INDEX: 34.32 KG/M2 | OXYGEN SATURATION: 98 %

## 2023-12-27 DIAGNOSIS — Z01.812 PRE-OPERATIVE LABORATORY EXAMINATION: Primary | ICD-10-CM

## 2023-12-27 DIAGNOSIS — Z01.818 PRE-OP TESTING: ICD-10-CM

## 2023-12-27 LAB
ABO + RH BLD: NORMAL
ANION GAP SERPL CALCULATED.3IONS-SCNC: 12 MMOL/L (ref 7–16)
ARM BAND NUMBER: NORMAL
BLOOD BANK SAMPLE EXPIRATION: NORMAL
BLOOD GROUP ANTIBODIES SERPL: NEGATIVE
BUN SERPL-MCNC: 12 MG/DL (ref 6–23)
CALCIUM SERPL-MCNC: 9.7 MG/DL (ref 8.6–10.2)
CHLORIDE SERPL-SCNC: 94 MMOL/L (ref 98–107)
CO2 SERPL-SCNC: 28 MMOL/L (ref 22–29)
CREAT SERPL-MCNC: 0.5 MG/DL (ref 0.5–1)
ERYTHROCYTE [DISTWIDTH] IN BLOOD BY AUTOMATED COUNT: 14 % (ref 11.5–15)
GFR SERPL CREATININE-BSD FRML MDRD: >60 ML/MIN/1.73M2
GLUCOSE SERPL-MCNC: 101 MG/DL (ref 74–99)
HCT VFR BLD AUTO: 37.7 % (ref 34–48)
HGB BLD-MCNC: 12.1 G/DL (ref 11.5–15.5)
MCH RBC QN AUTO: 24.5 PG (ref 26–35)
MCHC RBC AUTO-ENTMCNC: 32.1 G/DL (ref 32–34.5)
MCV RBC AUTO: 76.3 FL (ref 80–99.9)
PLATELET # BLD AUTO: 347 K/UL (ref 130–450)
PMV BLD AUTO: 10.4 FL (ref 7–12)
POTASSIUM SERPL-SCNC: 3.3 MMOL/L (ref 3.5–5)
RBC # BLD AUTO: 4.94 M/UL (ref 3.5–5.5)
SODIUM SERPL-SCNC: 134 MMOL/L (ref 132–146)
WBC OTHER # BLD: 5.9 K/UL (ref 4.5–11.5)

## 2023-12-27 PROCEDURE — 86900 BLOOD TYPING SEROLOGIC ABO: CPT

## 2023-12-27 PROCEDURE — 86901 BLOOD TYPING SEROLOGIC RH(D): CPT

## 2023-12-27 PROCEDURE — 86850 RBC ANTIBODY SCREEN: CPT

## 2023-12-27 PROCEDURE — 85027 COMPLETE CBC AUTOMATED: CPT

## 2023-12-27 PROCEDURE — 36415 COLL VENOUS BLD VENIPUNCTURE: CPT

## 2023-12-27 PROCEDURE — 80048 BASIC METABOLIC PNL TOTAL CA: CPT

## 2023-12-27 NOTE — PROGRESS NOTES
BMP results routed and sent to Dr. Arnol Nichols through [employer] serve. Left message with Rufus Nielson at Dr. Bryce Stubbs office regarding BMP results. Per Dr. Arnol Nichols, order BMP for morning of surgery. BMP order placed.
surgery. Stop NSAIDS 7 days before surgery. [x] Follow physician instructions regarding any blood thinners you may be taking. WHAT TO EXPECT:    [x] The day of surgery you will be greeted and checked in by the [employer]. In addition, you will be registered in the [facility] by a Patient Access Representative. Please bring your photo ID and insurance card. A nurse will greet you in accordance to the time you are needed in the pre-op area to prepare you for surgery. Please do not be discouraged if you are not greeted in the order you arrive as there are many variables that are involved in patient preparation. Your patience is greatly appreciated as you wait for your nurse. [x] Delays may occur with surgery and staff will make a sincere effort to keep you informed of delays. If any delays occur with your procedure, we apologize ahead of time for your inconvenience as we recognize the value of your time.

## 2023-12-28 LAB
EKG ATRIAL RATE: 58 BPM
EKG P AXIS: 9 DEGREES
EKG P-R INTERVAL: 176 MS
EKG Q-T INTERVAL: 426 MS
EKG QRS DURATION: 90 MS
EKG QTC CALCULATION (BAZETT): 418 MS
EKG R AXIS: 8 DEGREES
EKG T AXIS: 19 DEGREES
EKG VENTRICULAR RATE: 58 BPM

## 2024-01-02 ENCOUNTER — HOSPITAL ENCOUNTER (INPATIENT)
Age: 69
LOS: 6 days | Discharge: HOME HEALTH | DRG: 355 | End: 2024-01-08
Attending: SURGERY | Admitting: SURGERY
Payer: MEDICARE

## 2024-01-02 ENCOUNTER — ANESTHESIA EVENT (OUTPATIENT)
Dept: OPERATING ROOM | Age: 69
End: 2024-01-02
Payer: MEDICARE

## 2024-01-02 ENCOUNTER — ANESTHESIA (OUTPATIENT)
Dept: OPERATING ROOM | Age: 69
End: 2024-01-02
Payer: MEDICARE

## 2024-01-02 DIAGNOSIS — R59.0 MESENTERIC LYMPHADENOPATHY: ICD-10-CM

## 2024-01-02 DIAGNOSIS — Z01.818 PRE-OP TESTING: Primary | ICD-10-CM

## 2024-01-02 DIAGNOSIS — K43.2 INCISIONAL HERNIA, WITHOUT OBSTRUCTION OR GANGRENE: ICD-10-CM

## 2024-01-02 DIAGNOSIS — E04.1 THYROID NODULE: ICD-10-CM

## 2024-01-02 DIAGNOSIS — Z01.812 PRE-OPERATIVE LABORATORY EXAMINATION: ICD-10-CM

## 2024-01-02 DIAGNOSIS — Z98.890 S/P COLOSTOMY TAKEDOWN: ICD-10-CM

## 2024-01-02 LAB
ANION GAP SERPL CALCULATED.3IONS-SCNC: 14 MMOL/L (ref 7–16)
BUN SERPL-MCNC: 15 MG/DL (ref 6–23)
CALCIUM SERPL-MCNC: 10 MG/DL (ref 8.6–10.2)
CHLORIDE SERPL-SCNC: 98 MMOL/L (ref 98–107)
CO2 SERPL-SCNC: 24 MMOL/L (ref 22–29)
CREAT SERPL-MCNC: 0.5 MG/DL (ref 0.5–1)
GFR SERPL CREATININE-BSD FRML MDRD: >60 ML/MIN/1.73M2
GLUCOSE SERPL-MCNC: 115 MG/DL (ref 74–99)
POTASSIUM SERPL-SCNC: 3.6 MMOL/L (ref 3.5–5)
SODIUM SERPL-SCNC: 136 MMOL/L (ref 132–146)

## 2024-01-02 PROCEDURE — 88305 TISSUE EXAM BY PATHOLOGIST: CPT

## 2024-01-02 PROCEDURE — 1200000000 HC SEMI PRIVATE

## 2024-01-02 PROCEDURE — 2500000003 HC RX 250 WO HCPCS: Performed by: NURSE ANESTHETIST, CERTIFIED REGISTERED

## 2024-01-02 PROCEDURE — 6360000002 HC RX W HCPCS: Performed by: STUDENT IN AN ORGANIZED HEALTH CARE EDUCATION/TRAINING PROGRAM

## 2024-01-02 PROCEDURE — 88184 FLOWCYTOMETRY/ TC 1 MARKER: CPT

## 2024-01-02 PROCEDURE — 3600000014 HC SURGERY LEVEL 4 ADDTL 15MIN: Performed by: SURGERY

## 2024-01-02 PROCEDURE — 7100000000 HC PACU RECOVERY - FIRST 15 MIN: Performed by: SURGERY

## 2024-01-02 PROCEDURE — 88307 TISSUE EXAM BY PATHOLOGIST: CPT

## 2024-01-02 PROCEDURE — 0KNK0ZZ RELEASE RIGHT ABDOMEN MUSCLE, OPEN APPROACH: ICD-10-PCS | Performed by: SURGERY

## 2024-01-02 PROCEDURE — 2709999900 HC NON-CHARGEABLE SUPPLY: Performed by: SURGERY

## 2024-01-02 PROCEDURE — 0WUF0JZ SUPPLEMENT ABDOMINAL WALL WITH SYNTHETIC SUBSTITUTE, OPEN APPROACH: ICD-10-PCS | Performed by: SURGERY

## 2024-01-02 PROCEDURE — 2580000003 HC RX 258: Performed by: SURGERY

## 2024-01-02 PROCEDURE — 3600000004 HC SURGERY LEVEL 4 BASE: Performed by: SURGERY

## 2024-01-02 PROCEDURE — 0KNL0ZZ RELEASE LEFT ABDOMEN MUSCLE, OPEN APPROACH: ICD-10-PCS | Performed by: SURGERY

## 2024-01-02 PROCEDURE — 88302 TISSUE EXAM BY PATHOLOGIST: CPT

## 2024-01-02 PROCEDURE — 88342 IMHCHEM/IMCYTCHM 1ST ANTB: CPT

## 2024-01-02 PROCEDURE — 6360000002 HC RX W HCPCS: Performed by: SURGERY

## 2024-01-02 PROCEDURE — 0WBF0ZZ EXCISION OF ABDOMINAL WALL, OPEN APPROACH: ICD-10-PCS | Performed by: SURGERY

## 2024-01-02 PROCEDURE — 2580000003 HC RX 258: Performed by: NURSE ANESTHETIST, CERTIFIED REGISTERED

## 2024-01-02 PROCEDURE — 80048 BASIC METABOLIC PNL TOTAL CA: CPT

## 2024-01-02 PROCEDURE — 07BB0ZX EXCISION OF MESENTERIC LYMPHATIC, OPEN APPROACH, DIAGNOSTIC: ICD-10-PCS | Performed by: SURGERY

## 2024-01-02 PROCEDURE — 3700000001 HC ADD 15 MINUTES (ANESTHESIA): Performed by: SURGERY

## 2024-01-02 PROCEDURE — 6370000000 HC RX 637 (ALT 250 FOR IP): Performed by: STUDENT IN AN ORGANIZED HEALTH CARE EDUCATION/TRAINING PROGRAM

## 2024-01-02 PROCEDURE — 6360000002 HC RX W HCPCS: Performed by: NURSE ANESTHETIST, CERTIFIED REGISTERED

## 2024-01-02 PROCEDURE — 0DBV0ZZ EXCISION OF MESENTERY, OPEN APPROACH: ICD-10-PCS | Performed by: SURGERY

## 2024-01-02 PROCEDURE — A4216 STERILE WATER/SALINE, 10 ML: HCPCS | Performed by: STUDENT IN AN ORGANIZED HEALTH CARE EDUCATION/TRAINING PROGRAM

## 2024-01-02 PROCEDURE — 88341 IMHCHEM/IMCYTCHM EA ADD ANTB: CPT

## 2024-01-02 PROCEDURE — 6370000000 HC RX 637 (ALT 250 FOR IP): Performed by: SURGERY

## 2024-01-02 PROCEDURE — 3700000000 HC ANESTHESIA ATTENDED CARE: Performed by: SURGERY

## 2024-01-02 PROCEDURE — 7100000001 HC PACU RECOVERY - ADDTL 15 MIN: Performed by: SURGERY

## 2024-01-02 PROCEDURE — 88185 FLOWCYTOMETRY/TC ADD-ON: CPT

## 2024-01-02 PROCEDURE — C9113 INJ PANTOPRAZOLE SODIUM, VIA: HCPCS | Performed by: STUDENT IN AN ORGANIZED HEALTH CARE EDUCATION/TRAINING PROGRAM

## 2024-01-02 PROCEDURE — 2580000003 HC RX 258: Performed by: STUDENT IN AN ORGANIZED HEALTH CARE EDUCATION/TRAINING PROGRAM

## 2024-01-02 RX ORDER — HYDRALAZINE HYDROCHLORIDE 20 MG/ML
INJECTION INTRAMUSCULAR; INTRAVENOUS PRN
Status: DISCONTINUED | OUTPATIENT
Start: 2024-01-02 | End: 2024-01-02 | Stop reason: SDUPTHER

## 2024-01-02 RX ORDER — ROSUVASTATIN CALCIUM 20 MG/1
20 TABLET, COATED ORAL NIGHTLY
Status: DISCONTINUED | OUTPATIENT
Start: 2024-01-02 | End: 2024-01-03

## 2024-01-02 RX ORDER — CHLORTHALIDONE 25 MG/1
25 TABLET ORAL DAILY
Status: DISCONTINUED | OUTPATIENT
Start: 2024-01-03 | End: 2024-01-08 | Stop reason: HOSPADM

## 2024-01-02 RX ORDER — GLYCOPYRROLATE 0.2 MG/ML
INJECTION INTRAMUSCULAR; INTRAVENOUS PRN
Status: DISCONTINUED | OUTPATIENT
Start: 2024-01-02 | End: 2024-01-02 | Stop reason: SDUPTHER

## 2024-01-02 RX ORDER — HYDROMORPHONE HYDROCHLORIDE 1 MG/ML
1 INJECTION, SOLUTION INTRAMUSCULAR; INTRAVENOUS; SUBCUTANEOUS
Status: DISCONTINUED | OUTPATIENT
Start: 2024-01-02 | End: 2024-01-04

## 2024-01-02 RX ORDER — VECURONIUM BROMIDE 1 MG/ML
INJECTION, POWDER, LYOPHILIZED, FOR SOLUTION INTRAVENOUS PRN
Status: DISCONTINUED | OUTPATIENT
Start: 2024-01-02 | End: 2024-01-02 | Stop reason: SDUPTHER

## 2024-01-02 RX ORDER — ACETAMINOPHEN 500 MG
1000 TABLET ORAL EVERY 8 HOURS SCHEDULED
Status: DISCONTINUED | OUTPATIENT
Start: 2024-01-02 | End: 2024-01-08 | Stop reason: HOSPADM

## 2024-01-02 RX ORDER — HYDROMORPHONE HYDROCHLORIDE 1 MG/ML
0.5 INJECTION, SOLUTION INTRAMUSCULAR; INTRAVENOUS; SUBCUTANEOUS EVERY 4 HOURS PRN
Status: DISCONTINUED | OUTPATIENT
Start: 2024-01-02 | End: 2024-01-04

## 2024-01-02 RX ORDER — PROPOFOL 10 MG/ML
INJECTION, EMULSION INTRAVENOUS PRN
Status: DISCONTINUED | OUTPATIENT
Start: 2024-01-02 | End: 2024-01-02 | Stop reason: SDUPTHER

## 2024-01-02 RX ORDER — FENTANYL CITRATE 50 UG/ML
INJECTION, SOLUTION INTRAMUSCULAR; INTRAVENOUS PRN
Status: DISCONTINUED | OUTPATIENT
Start: 2024-01-02 | End: 2024-01-02 | Stop reason: SDUPTHER

## 2024-01-02 RX ORDER — HYDROMORPHONE HYDROCHLORIDE 1 MG/ML
0.25 INJECTION, SOLUTION INTRAMUSCULAR; INTRAVENOUS; SUBCUTANEOUS EVERY 5 MIN PRN
Status: DISCONTINUED | OUTPATIENT
Start: 2024-01-02 | End: 2024-01-02 | Stop reason: HOSPADM

## 2024-01-02 RX ORDER — SODIUM CHLORIDE 9 MG/ML
INJECTION, SOLUTION INTRAVENOUS PRN
Status: DISCONTINUED | OUTPATIENT
Start: 2024-01-02 | End: 2024-01-02 | Stop reason: HOSPADM

## 2024-01-02 RX ORDER — ROPIVACAINE HYDROCHLORIDE 5 MG/ML
INJECTION, SOLUTION EPIDURAL; INFILTRATION; PERINEURAL
Status: DISPENSED
Start: 2024-01-02 | End: 2024-01-02

## 2024-01-02 RX ORDER — NEOSTIGMINE METHYLSULFATE 1 MG/ML
INJECTION, SOLUTION INTRAVENOUS PRN
Status: DISCONTINUED | OUTPATIENT
Start: 2024-01-02 | End: 2024-01-02 | Stop reason: SDUPTHER

## 2024-01-02 RX ORDER — SODIUM CHLORIDE 0.9 % (FLUSH) 0.9 %
5-40 SYRINGE (ML) INJECTION EVERY 12 HOURS SCHEDULED
Status: DISCONTINUED | OUTPATIENT
Start: 2024-01-02 | End: 2024-01-08 | Stop reason: HOSPADM

## 2024-01-02 RX ORDER — ACETAMINOPHEN 500 MG
1000 TABLET ORAL ONCE
Status: COMPLETED | OUTPATIENT
Start: 2024-01-02 | End: 2024-01-02

## 2024-01-02 RX ORDER — SODIUM CHLORIDE 9 MG/ML
INJECTION INTRAVENOUS
Status: DISPENSED
Start: 2024-01-02 | End: 2024-01-02

## 2024-01-02 RX ORDER — ONDANSETRON 4 MG/1
4 TABLET, ORALLY DISINTEGRATING ORAL EVERY 8 HOURS PRN
Status: DISCONTINUED | OUTPATIENT
Start: 2024-01-02 | End: 2024-01-08 | Stop reason: HOSPADM

## 2024-01-02 RX ORDER — HYDROMORPHONE HYDROCHLORIDE 1 MG/ML
0.5 INJECTION, SOLUTION INTRAMUSCULAR; INTRAVENOUS; SUBCUTANEOUS EVERY 5 MIN PRN
Status: DISCONTINUED | OUTPATIENT
Start: 2024-01-02 | End: 2024-01-02 | Stop reason: HOSPADM

## 2024-01-02 RX ORDER — ENOXAPARIN SODIUM 100 MG/ML
40 INJECTION SUBCUTANEOUS ONCE
Status: COMPLETED | OUTPATIENT
Start: 2024-01-02 | End: 2024-01-02

## 2024-01-02 RX ORDER — SODIUM CHLORIDE 9 MG/ML
INJECTION, SOLUTION INTRAVENOUS CONTINUOUS PRN
Status: DISCONTINUED | OUTPATIENT
Start: 2024-01-02 | End: 2024-01-02 | Stop reason: SDUPTHER

## 2024-01-02 RX ORDER — ONDANSETRON 2 MG/ML
4 INJECTION INTRAMUSCULAR; INTRAVENOUS EVERY 6 HOURS PRN
Status: DISCONTINUED | OUTPATIENT
Start: 2024-01-02 | End: 2024-01-08 | Stop reason: HOSPADM

## 2024-01-02 RX ORDER — LIDOCAINE HYDROCHLORIDE 20 MG/ML
INJECTION, SOLUTION INTRAVENOUS PRN
Status: DISCONTINUED | OUTPATIENT
Start: 2024-01-02 | End: 2024-01-02 | Stop reason: SDUPTHER

## 2024-01-02 RX ORDER — DEXAMETHASONE SODIUM PHOSPHATE 10 MG/ML
INJECTION INTRAMUSCULAR; INTRAVENOUS PRN
Status: DISCONTINUED | OUTPATIENT
Start: 2024-01-02 | End: 2024-01-02 | Stop reason: SDUPTHER

## 2024-01-02 RX ORDER — SODIUM CHLORIDE 0.9 % (FLUSH) 0.9 %
5-40 SYRINGE (ML) INJECTION PRN
Status: DISCONTINUED | OUTPATIENT
Start: 2024-01-02 | End: 2024-01-02 | Stop reason: HOSPADM

## 2024-01-02 RX ORDER — ATENOLOL 50 MG/1
50 TABLET ORAL DAILY
Status: DISCONTINUED | OUTPATIENT
Start: 2024-01-03 | End: 2024-01-08 | Stop reason: HOSPADM

## 2024-01-02 RX ORDER — SODIUM CHLORIDE 9 MG/ML
INJECTION, SOLUTION INTRAVENOUS PRN
Status: DISCONTINUED | OUTPATIENT
Start: 2024-01-02 | End: 2024-01-08 | Stop reason: HOSPADM

## 2024-01-02 RX ORDER — LORAZEPAM 0.5 MG/1
0.25 TABLET ORAL 2 TIMES DAILY PRN
Status: DISCONTINUED | OUTPATIENT
Start: 2024-01-03 | End: 2024-01-08 | Stop reason: HOSPADM

## 2024-01-02 RX ORDER — KETOROLAC TROMETHAMINE 30 MG/ML
INJECTION, SOLUTION INTRAMUSCULAR; INTRAVENOUS PRN
Status: DISCONTINUED | OUTPATIENT
Start: 2024-01-02 | End: 2024-01-02 | Stop reason: SDUPTHER

## 2024-01-02 RX ORDER — KETAMINE HCL IN NACL, ISO-OSM 100MG/10ML
SYRINGE (ML) INJECTION PRN
Status: DISCONTINUED | OUTPATIENT
Start: 2024-01-02 | End: 2024-01-02 | Stop reason: SDUPTHER

## 2024-01-02 RX ORDER — SODIUM CHLORIDE 0.9 % (FLUSH) 0.9 %
5-40 SYRINGE (ML) INJECTION EVERY 12 HOURS SCHEDULED
Status: DISCONTINUED | OUTPATIENT
Start: 2024-01-02 | End: 2024-01-02 | Stop reason: HOSPADM

## 2024-01-02 RX ORDER — PROCHLORPERAZINE EDISYLATE 5 MG/ML
5 INJECTION INTRAMUSCULAR; INTRAVENOUS ONCE
Status: COMPLETED | OUTPATIENT
Start: 2024-01-02 | End: 2024-01-02

## 2024-01-02 RX ORDER — ONDANSETRON 2 MG/ML
INJECTION INTRAMUSCULAR; INTRAVENOUS PRN
Status: DISCONTINUED | OUTPATIENT
Start: 2024-01-02 | End: 2024-01-02 | Stop reason: SDUPTHER

## 2024-01-02 RX ORDER — SODIUM CHLORIDE 0.9 % (FLUSH) 0.9 %
5-40 SYRINGE (ML) INJECTION PRN
Status: DISCONTINUED | OUTPATIENT
Start: 2024-01-02 | End: 2024-01-08 | Stop reason: HOSPADM

## 2024-01-02 RX ORDER — PROCHLORPERAZINE EDISYLATE 5 MG/ML
5 INJECTION INTRAMUSCULAR; INTRAVENOUS
Status: COMPLETED | OUTPATIENT
Start: 2024-01-02 | End: 2024-01-02

## 2024-01-02 RX ORDER — CEFAZOLIN SODIUM 1 G/3ML
INJECTION, POWDER, FOR SOLUTION INTRAMUSCULAR; INTRAVENOUS PRN
Status: DISCONTINUED | OUTPATIENT
Start: 2024-01-02 | End: 2024-01-02 | Stop reason: SDUPTHER

## 2024-01-02 RX ORDER — SODIUM CHLORIDE, SODIUM LACTATE, POTASSIUM CHLORIDE, CALCIUM CHLORIDE 600; 310; 30; 20 MG/100ML; MG/100ML; MG/100ML; MG/100ML
INJECTION, SOLUTION INTRAVENOUS CONTINUOUS
Status: DISCONTINUED | OUTPATIENT
Start: 2024-01-02 | End: 2024-01-04

## 2024-01-02 RX ORDER — SODIUM CHLORIDE 9 MG/ML
INJECTION, SOLUTION INTRAVENOUS CONTINUOUS
Status: DISCONTINUED | OUTPATIENT
Start: 2024-01-02 | End: 2024-01-02 | Stop reason: HOSPADM

## 2024-01-02 RX ORDER — MIDAZOLAM HYDROCHLORIDE 1 MG/ML
INJECTION INTRAMUSCULAR; INTRAVENOUS PRN
Status: DISCONTINUED | OUTPATIENT
Start: 2024-01-02 | End: 2024-01-02 | Stop reason: SDUPTHER

## 2024-01-02 RX ORDER — DEXMEDETOMIDINE HYDROCHLORIDE 100 UG/ML
INJECTION, SOLUTION INTRAVENOUS PRN
Status: DISCONTINUED | OUTPATIENT
Start: 2024-01-02 | End: 2024-01-02 | Stop reason: SDUPTHER

## 2024-01-02 RX ADMIN — PROCHLORPERAZINE EDISYLATE 5 MG: 5 INJECTION INTRAMUSCULAR; INTRAVENOUS at 18:19

## 2024-01-02 RX ADMIN — FENTANYL CITRATE 100 MCG: 0.05 INJECTION, SOLUTION INTRAMUSCULAR; INTRAVENOUS at 12:40

## 2024-01-02 RX ADMIN — DEXMEDETOMIDINE HCL 8 MCG: 100 INJECTION INTRAVENOUS at 13:43

## 2024-01-02 RX ADMIN — CEFAZOLIN 2 G: 1 INJECTION, POWDER, FOR SOLUTION INTRAMUSCULAR; INTRAVENOUS at 15:14

## 2024-01-02 RX ADMIN — METHOCARBAMOL 500 MG: 100 INJECTION INTRAMUSCULAR; INTRAVENOUS at 22:55

## 2024-01-02 RX ADMIN — VECURONIUM BROMIDE 4 MG: 10 INJECTION, POWDER, LYOPHILIZED, FOR SOLUTION INTRAVENOUS at 11:19

## 2024-01-02 RX ADMIN — ACETAMINOPHEN 1000 MG: 500 TABLET ORAL at 22:54

## 2024-01-02 RX ADMIN — VECURONIUM BROMIDE 1 MG: 10 INJECTION, POWDER, LYOPHILIZED, FOR SOLUTION INTRAVENOUS at 13:48

## 2024-01-02 RX ADMIN — GLYCOPYRROLATE 0.6 MG: 1 INJECTION INTRAMUSCULAR; INTRAVENOUS at 14:59

## 2024-01-02 RX ADMIN — Medication 10 MG: at 13:43

## 2024-01-02 RX ADMIN — Medication 25 MG: at 11:37

## 2024-01-02 RX ADMIN — MIDAZOLAM 2 MG: 1 INJECTION INTRAMUSCULAR; INTRAVENOUS at 11:06

## 2024-01-02 RX ADMIN — SODIUM CHLORIDE, POTASSIUM CHLORIDE, SODIUM LACTATE AND CALCIUM CHLORIDE: 600; 310; 30; 20 INJECTION, SOLUTION INTRAVENOUS at 18:22

## 2024-01-02 RX ADMIN — CEFAZOLIN 2000 MG: 2 INJECTION, POWDER, FOR SOLUTION INTRAMUSCULAR; INTRAVENOUS at 11:19

## 2024-01-02 RX ADMIN — Medication 3 MG: at 14:59

## 2024-01-02 RX ADMIN — SODIUM CHLORIDE: 9 INJECTION, SOLUTION INTRAVENOUS at 08:55

## 2024-01-02 RX ADMIN — VECURONIUM BROMIDE 2 MG: 10 INJECTION, POWDER, LYOPHILIZED, FOR SOLUTION INTRAVENOUS at 11:54

## 2024-01-02 RX ADMIN — PROCHLORPERAZINE EDISYLATE 5 MG: 5 INJECTION INTRAMUSCULAR; INTRAVENOUS at 16:54

## 2024-01-02 RX ADMIN — PANTOPRAZOLE SODIUM 40 MG: 40 INJECTION, POWDER, FOR SOLUTION INTRAVENOUS at 22:48

## 2024-01-02 RX ADMIN — HYDRALAZINE HYDROCHLORIDE 5 MG: 20 INJECTION INTRAMUSCULAR; INTRAVENOUS at 12:03

## 2024-01-02 RX ADMIN — DEXMEDETOMIDINE HCL 4 MCG: 100 INJECTION INTRAVENOUS at 14:30

## 2024-01-02 RX ADMIN — Medication 5 MG: at 12:33

## 2024-01-02 RX ADMIN — DEXAMETHASONE SODIUM PHOSPHATE 10 MG: 10 INJECTION INTRAMUSCULAR; INTRAVENOUS at 11:19

## 2024-01-02 RX ADMIN — ROSUVASTATIN 20 MG: 20 TABLET, FILM COATED ORAL at 22:48

## 2024-01-02 RX ADMIN — SODIUM CHLORIDE: 9 INJECTION, SOLUTION INTRAVENOUS at 12:01

## 2024-01-02 RX ADMIN — VECURONIUM BROMIDE 2 MG: 10 INJECTION, POWDER, LYOPHILIZED, FOR SOLUTION INTRAVENOUS at 13:37

## 2024-01-02 RX ADMIN — Medication 10 MG: at 14:39

## 2024-01-02 RX ADMIN — FENTANYL CITRATE 50 MCG: 0.05 INJECTION, SOLUTION INTRAMUSCULAR; INTRAVENOUS at 12:02

## 2024-01-02 RX ADMIN — KETOROLAC TROMETHAMINE 30 MG: 30 INJECTION, SOLUTION INTRAMUSCULAR; INTRAVENOUS at 13:52

## 2024-01-02 RX ADMIN — VECURONIUM BROMIDE 1 MG: 10 INJECTION, POWDER, LYOPHILIZED, FOR SOLUTION INTRAVENOUS at 12:40

## 2024-01-02 RX ADMIN — FENTANYL CITRATE 100 MCG: 0.05 INJECTION, SOLUTION INTRAMUSCULAR; INTRAVENOUS at 11:19

## 2024-01-02 RX ADMIN — LIDOCAINE HYDROCHLORIDE 60 MG: 20 INJECTION, SOLUTION INTRAVENOUS at 11:19

## 2024-01-02 RX ADMIN — PROPOFOL 150 MG: 10 INJECTION, EMULSION INTRAVENOUS at 11:19

## 2024-01-02 RX ADMIN — SODIUM CHLORIDE: 9 INJECTION, SOLUTION INTRAVENOUS at 13:50

## 2024-01-02 RX ADMIN — ENOXAPARIN SODIUM 40 MG: 100 INJECTION SUBCUTANEOUS at 09:00

## 2024-01-02 RX ADMIN — ONDANSETRON 4 MG: 4 TABLET, ORALLY DISINTEGRATING ORAL at 23:04

## 2024-01-02 RX ADMIN — DEXMEDETOMIDINE HCL 8 MCG: 100 INJECTION INTRAVENOUS at 12:38

## 2024-01-02 RX ADMIN — SODIUM CHLORIDE: 9 INJECTION, SOLUTION INTRAVENOUS at 11:04

## 2024-01-02 RX ADMIN — FENTANYL CITRATE 100 MCG: 0.05 INJECTION, SOLUTION INTRAMUSCULAR; INTRAVENOUS at 11:50

## 2024-01-02 RX ADMIN — ACETAMINOPHEN 1000 MG: 500 TABLET ORAL at 09:01

## 2024-01-02 RX ADMIN — ONDANSETRON HYDROCHLORIDE 4 MG: 2 SOLUTION INTRAMUSCULAR; INTRAVENOUS at 13:52

## 2024-01-02 ASSESSMENT — PAIN SCALES - GENERAL
PAINLEVEL_OUTOF10: 1
PAINLEVEL_OUTOF10: 8
PAINLEVEL_OUTOF10: 0
PAINLEVEL_OUTOF10: 0
PAINLEVEL_OUTOF10: 4
PAINLEVEL_OUTOF10: 2

## 2024-01-02 ASSESSMENT — PAIN DESCRIPTION - LOCATION: LOCATION: ABDOMEN

## 2024-01-02 ASSESSMENT — ENCOUNTER SYMPTOMS
NAUSEA: 0
CHOKING: 0
SINUS PAIN: 0
ABDOMINAL PAIN: 0
VOICE CHANGE: 0
ABDOMINAL DISTENTION: 1
SHORTNESS OF BREATH: 0
BLOOD IN STOOL: 0
CONSTIPATION: 0
RECTAL PAIN: 0
COUGH: 0
COLOR CHANGE: 0
DIARRHEA: 0
VOMITING: 0
SORE THROAT: 0
RHINORRHEA: 0
APNEA: 0
ANAL BLEEDING: 0
CHEST TIGHTNESS: 0

## 2024-01-02 ASSESSMENT — PAIN DESCRIPTION - DESCRIPTORS
DESCRIPTORS: ACHING;SHARP
DESCRIPTORS: ACHING

## 2024-01-02 ASSESSMENT — PAIN SCALES - WONG BAKER
WONGBAKER_NUMERICALRESPONSE: 2
WONGBAKER_NUMERICALRESPONSE: 2

## 2024-01-02 ASSESSMENT — PAIN - FUNCTIONAL ASSESSMENT
PAIN_FUNCTIONAL_ASSESSMENT: 0-10
PAIN_FUNCTIONAL_ASSESSMENT: 0-10
PAIN_FUNCTIONAL_ASSESSMENT: PREVENTS OR INTERFERES SOME ACTIVE ACTIVITIES AND ADLS

## 2024-01-02 ASSESSMENT — PAIN DESCRIPTION - ORIENTATION: ORIENTATION: RIGHT;LEFT

## 2024-01-02 ASSESSMENT — LIFESTYLE VARIABLES: SMOKING_STATUS: 0

## 2024-01-02 NOTE — ANESTHESIA POSTPROCEDURE EVALUATION
Department of Anesthesiology  Postprocedure Note    Patient: Zelda Lowery  MRN: [id number]  YOB: 1955  Date of evaluation: 1/2/2024    Procedure Summary     Date: 01/02/24 Room / Location: [address]    Anesthesia Start: 1104 Anesthesia Stop: 1614    Procedures:       LAPAROTOMY EXPLORATORY, TAP block      mesenteric lymph node biopsy      incisional hernia repair with mesh and  transverses abdominal release bilateral Diagnosis:       Thyroid nodule      Incisional hernia, without obstruction or gangrene      Mesenteric lymphadenopathy      (Thyroid nodule [E04.1])      (Incisional hernia, without obstruction or gangrene [K43.2])      (Mesenteric lymphadenopathy [R59.0])    Surgeons: Domenico Mi MD Responsible Provider: Dorene Johns MD    Anesthesia Type: General ASA Status: 3          Anesthesia Type: General    Corby Phase I: Corby Score: 9    Corby Phase II:      Anesthesia Post Evaluation    Patient location during evaluation: PACU  Patient participation: complete - patient participated  Level of consciousness: awake  Airway patency: patent  Nausea & Vomiting: no nausea and no vomiting  Cardiovascular status: hemodynamically stable  Respiratory status: acceptable  Hydration status: euvolemic  Pain management: adequate        No notable events documented.

## 2024-01-02 NOTE — ANESTHESIA PRE PROCEDURE
84.4 kg (186 lb)   Height: 1.575 m (5' 2\")                                              BP Readings from Last 3 Encounters:   01/02/24 (!) 154/70   12/27/23 139/65   12/05/23 (!) 146/66       NPO Status: Time of last liquid consumption: 0700                        Time of last solid consumption: 1900                        Date of last liquid consumption: 01/02/24                        Date of last solid food consumption: 01/01/24    BMI:   Wt Readings from Last 3 Encounters:   01/02/24 84.4 kg (186 lb)   12/27/23 84.6 kg (186 lb 8 oz)   12/05/23 83 kg (183 lb)     Body mass index is 34.02 kg/m².    CBC:   Lab Results   Component Value Date/Time    WBC 5.9 12/27/2023 10:05 AM    RBC 4.94 12/27/2023 10:05 AM    HGB 12.1 12/27/2023 10:05 AM    HCT 37.7 12/27/2023 10:05 AM    MCV 76.3 12/27/2023 10:05 AM    RDW 14.0 12/27/2023 10:05 AM     12/27/2023 10:05 AM       CMP:   Lab Results   Component Value Date/Time     01/02/2024 08:55 AM    K 3.6 01/02/2024 08:55 AM    K 3.9 04/19/2023 05:07 AM    CL 98 01/02/2024 08:55 AM    CO2 24 01/02/2024 08:55 AM    BUN 15 01/02/2024 08:55 AM    CREATININE 0.5 01/02/2024 08:55 AM    GFRAA >60 12/20/2021 11:14 AM    AGRATIO 1.5 03/23/2021 07:14 AM    LABGLOM >60 01/02/2024 08:55 AM    GLUCOSE 115 01/02/2024 08:55 AM    PROT 7.7 07/07/2023 08:55 AM    CALCIUM 10.0 01/02/2024 08:55 AM    BILITOT 0.6 07/07/2023 08:55 AM    ALKPHOS 71 07/07/2023 08:55 AM    AST 15 07/07/2023 08:55 AM    ALT 11 07/07/2023 08:55 AM       POC Tests: No results for input(s): \"POCGLU\", \"POCNA\", \"POCK\", \"POCCL\", \"POCBUN\", \"POCHEMO\", \"POCHCT\" in the last 72 hours.    Coags:   Lab Results   Component Value Date/Time    PROTIME 12.6 11/30/2022 05:58 AM    PROTIME 11.2 03/23/2021 07:14 AM    PROTIME 11.2 03/23/2021 12:00 AM    PROTIME 11.2 03/23/2021 12:00 AM    INR 1.2 11/30/2022 05:58 AM    APTT 25.5 11/22/2022 07:38 PM       HCG (If Applicable): No results found for: \"PREGTESTUR\", \"PREGSERUM\",

## 2024-01-02 NOTE — H&P
Update History & Physical    The patient's History and Physical of December 5, 2023 was reviewed with the patient and I examined the patient. There was no change. The surgical site was confirmed by the patient and me.       Plan: The risks, benefits, expected outcome, and alternative to the recommended procedure have been discussed with the patient. Patient understands and wants to proceed with the procedure.     Electronically signed by Elmer Bains MD on 1/2/2024 at 10:11 AM      Chief Complaint   Patient presents with    mesenteric lymphadenopathy     Follow-up       2 month          Assessment:  1. Mesenteric lymphadenopathy    2. Incisional hernia, without obstruction or gangrene    3. Thyroid nodule    4. History of breast cancer       Rule out metastatic breast cancer, inflammatory disorder, lymphoma        Plan:  Recommend exploratory laparotomy, mesenteric lymph node biopsy, incisional hernia repair with mesh, possible component separation     Surveillance thyroid US in one year     I discussed the natural history of hernia with the patient including the risks of worsening pain, enlargement, incarceration, or strangulation. I discussed the options of watchful waiting, open hernia repair, component separation, and laparoscopic hernia repair with mesh.       I reviewed the risks of surgery including bleeding, infection, recurrent hernia, chronic pain, inadvertent injury to abdominal or hernia contents, temporary ICU admission or ventilator support, heart attack, stroke, blood clots, and death.      I recommended open hernia repair with synthetic or biological mesh and possible component separation techniques.     I discussed the expected postoperative recovery period including the need for abdominal drains, activity and work restrictions.     The patient understands all of the above.  The patient's questions were answered.  Surgery will be scheduled in the near future.      History     HPI:  Zelda

## 2024-01-02 NOTE — H&P
GENERAL SURGERY  HISTORY AND PHYSICAL  MEDICAL STUDENT      Patient's Name/Date of Birth: Zelda Lowery / 1955    Date: January 2, 2024     PCP: Davian Brooks DO     Chief Complaint: Mesenteric lymphadenopathy     HPI  Zelda Lowery is a 68 y.o. female who presents for evaluation of mesenteric lymphadenopathy and midline incisional hernia without obstruction s/p emergent exploratory laparotomy, end- distal colostomy, aguirre procedure with reversal 11/2022 due to perforated sigmoid colitis. Ms. Lowery felt a midline abdominal bulge 07/2023 that prompted her to come for hernia repair. Pt reports the abdominal bulge moves to LLQ when laying down and moves to RUQ when standing up. Pt denies abdominal pain, N/V/D, or skin changes associated with hernia.       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    Hyperlipidemia     Hypertension     Osteoarthritis 2021    Peritonitis (HCC) 11/22/2022    Vitamin D deficiency        Past Surgical History:   Procedure Laterality Date    ABDOMEN SURGERY N/A 11/30/2022    DELAYED PRIMARY CLOSURE OF ABDOMINAL WOUND performed by Randy Villarreal MD at [facility] OR    APPENDECTOMY      BREAST SURGERY Bilateral     cancer    COLONOSCOPY  2016    COLONOSCOPY N/A 02/22/2023    COLONOSCOPY THROUGH STOMA performed by Domenico Mi MD at [facility] ENDOSCOPY    COLOSTOMY      COLOSTOMY N/A 04/17/2023    LAPRASCOPIC COLOSTOMY REVERSAL, rigid proctoscope performed by Doemnico Mi MD at [facility] OR    HIP SURGERY  2022    [facility]    HYSTERECTOMY (CERVIX STATUS UNKNOWN)      HYSTERECTOMY, TOTAL ABDOMINAL (CERVIX REMOVED)      JOINT REPLACEMENT Right 2021    HIP- [facility]    LAPAROTOMY N/A 11/22/2022    EXPLORATORY LAPAROTOMY, PARTIAL DISTAL COLECTOMY, CREATION OF END-DISTAL COLOSTOMY, MIKE'S  PROCEDURE performed by Domenico Mi MD at [facility] OR    OVARY SURGERY      TONSILLECTOMY    breast cancer and lymphoma   - Midline incisional hernia repair with mesh - possible component separation     Arpita Ellis OMS3  Medical Student     Electronically signed by Arpita Ellis on 1/2/24 at 11:02 AM EST

## 2024-01-02 NOTE — PROGRESS NOTES
Per Dr. Johns he would like RN to prepare TAP block kit for him for after pt surgery.  Per Andreas RN is to have 30 mL of 0.5% ropivacaine ready for him to use

## 2024-01-02 NOTE — OP NOTE
Operative Note      Patient: Zelda Lowery  YOB: 1955  MRN: [id number]    Date of Procedure: 1/2/2024    Pre-Op Diagnosis Codes:     * Incisional hernia, without obstruction or gangrene [K43.2]     * Mesenteric lymphadenopathy [R59.0]    Post-Op Diagnosis:  15cm incisional hernia, mesenteric lymphadenopathy, mesenteric nodule        Procedure(s):  MIDLINE ABDOMINAL WALL SCAR EXCISION, EXPLORATORY LAPAROTOMY, MESENTERIC LYMPH NODE BIOPSY, MID SMALL BOWEL MESENTERIC NODULE BIOPSY, BILATERAL TRANSABDOMINUS MUSCLE RELEASE, BILATERAL TRANSVERSUS ABDOMINAL PLANE BLOCK, JAMI-STOPPA -LIKE INCISIONAL HERNIA REPAIR (15CM) WITH RETRORECTUS MESH PLACEMENT, BILATERAL RETRORECTUS DRAIN PLACEMENT, HERNIA SAC EXCISION    Surgeon(s):  Domenico Mi MD    Assistant:   Resident: Elmer Bains MD    Anesthesia: General    Estimated Blood Loss (mL): 200     Complications: None    Specimens:   ID Type Source Tests Collected by Time Destination   A : midline scar Tissue Tissue SURGICAL PATHOLOGY Domenico Mi MD 1/2/2024 1146    B : mesinteric lymph node rule out cancer Tissue Lymph Node SURGICAL PATHOLOGY Domenico Mi MD 1/2/2024 1205    C : MESINTERIC NODULE Tissue Tissue SURGICAL PATHOLOGY Domenico Mi MD 1/2/2024 1214    D : hernia sac Tissue Tissue SURGICAL PATHOLOGY Domenico Mi MD 1/2/2024 1408        Implants:  * No implants in log *      Drains:   Closed/Suction Drain Right;Ventral Hip Bulb (Active)   Site Description Unable to view;Other (Comment) 01/02/24 1625   Dressing Status Clean, dry & intact 01/02/24 1625   Drainage Appearance Bloody 01/02/24 1625   Drain Status Compressed 01/02/24 1625   Output (ml) 20 ml 01/02/24 1625       Closed/Suction Drain Left;Ventral Hip Bulb (Active)   Site Description Unable to view;Other (Comment) 01/02/24 1625   Dressing Status Clean, dry & intact 01/02/24 1625   Drainage Appearance Bloody 01/02/24 1625   Drain Status Compressed 01/02/24 1625   Output (ml) 5 ml  gaining entry into the intra-abdominal cavity with a gloved finger we performed a finger sweep and noted no adhesions.  We therefore used electrocautery in order to mature the incision cephalad and caudad along its length.  There were no adhesions of bowel to the anterior abdominal wall.  There were multiple Swiss cheeselike defects on both sides of the abdomen, largest at the previous colostomy site at the patient's left abdominal wall.    The greater omentum and transverse colon were lifted superiorly and the ligament of Treitz was identified.  The small bowel was run in a systematic fashion from the ligament of Treitz to the ligament of Treves inspecting the small bowel and associated mesentery.  At the small bowel mesentery at the mid jejunum, there was a white plaque which was approximately 6 cm in diameter.  It had a nodular appearance.  We grasped some of this tissue and cauterized it from the associated mesentery.  Once amputated this was sent this off for pathologic examination.    We continued to run the bowel and there was a large mesenteric lymph node more distally.  This corresponded to the large lymph node noted on CT scan measuring approximately 3 cm.  We proceeded with mesenteric lymph node biopsy.  Using electrocautery we incised the peritoneum overlying the mesentery there.  Using meticulous dissection we were able to free a portion of the large lymph node and excised it using electrocautery after placing clips at the deep aspect.  We passed the lymph node biopsy off the field for pathologic examination.    We inspected the defect of the mesentery and controlled any bleeding with a combination of selective electrocautery and clip placement.  We then proceeded with closure of the peritoneum overlying the mesentery once there was no longer any bleeding using 3-0 Vicryl in a running locking fashion.    We inspected the remainder of the intra-abdominal contents.  There were no other abnormalities noted  The wound was copiously irrigated with warmed saline and suctioned until dry. 3-0 vicryl was used to secure the umbilicus to the anterior rectus sheath closure at the midline. Then interrupted 3-0 vicryl sutures were used to approximate the dermis of the midline incision. Finally, the skin was closed with 4-0 monocryl in a subcuticular fashion. Dermabond was applied. Drain sponges were placed around the bilateral drains and taped to the abdominal wall.    Counts were correct x2. The patient was awoken from anesthesia and brought to the PACU in stable condition.    Dr. Mi was present for this procedure.      Electronically signed by Elmer Bains MD on 1/2/2024 at 4:49 PM

## 2024-01-03 LAB
ANION GAP SERPL CALCULATED.3IONS-SCNC: 15 MMOL/L (ref 7–16)
BUN SERPL-MCNC: 15 MG/DL (ref 6–23)
CALCIUM SERPL-MCNC: 8.5 MG/DL (ref 8.6–10.2)
CHLORIDE SERPL-SCNC: 102 MMOL/L (ref 98–107)
CO2 SERPL-SCNC: 21 MMOL/L (ref 22–29)
CREAT SERPL-MCNC: 0.6 MG/DL (ref 0.5–1)
ERYTHROCYTE [DISTWIDTH] IN BLOOD BY AUTOMATED COUNT: 14.4 % (ref 11.5–15)
GFR SERPL CREATININE-BSD FRML MDRD: >60 ML/MIN/1.73M2
GLUCOSE SERPL-MCNC: 168 MG/DL (ref 74–99)
HCT VFR BLD AUTO: 30.3 % (ref 34–48)
HGB BLD-MCNC: 9.8 G/DL (ref 11.5–15.5)
MAGNESIUM SERPL-MCNC: 1.9 MG/DL (ref 1.6–2.6)
MCH RBC QN AUTO: 24.9 PG (ref 26–35)
MCHC RBC AUTO-ENTMCNC: 32.3 G/DL (ref 32–34.5)
MCV RBC AUTO: 76.9 FL (ref 80–99.9)
PHOSPHATE SERPL-MCNC: 2.9 MG/DL (ref 2.5–4.5)
PLATELET # BLD AUTO: 353 K/UL (ref 130–450)
PMV BLD AUTO: 10.6 FL (ref 7–12)
POTASSIUM SERPL-SCNC: 3.7 MMOL/L (ref 3.5–5)
RBC # BLD AUTO: 3.94 M/UL (ref 3.5–5.5)
SODIUM SERPL-SCNC: 138 MMOL/L (ref 132–146)
WBC OTHER # BLD: 13.7 K/UL (ref 4.5–11.5)

## 2024-01-03 PROCEDURE — 97530 THERAPEUTIC ACTIVITIES: CPT

## 2024-01-03 PROCEDURE — 2500000003 HC RX 250 WO HCPCS: Performed by: STUDENT IN AN ORGANIZED HEALTH CARE EDUCATION/TRAINING PROGRAM

## 2024-01-03 PROCEDURE — C9113 INJ PANTOPRAZOLE SODIUM, VIA: HCPCS | Performed by: STUDENT IN AN ORGANIZED HEALTH CARE EDUCATION/TRAINING PROGRAM

## 2024-01-03 PROCEDURE — 80048 BASIC METABOLIC PNL TOTAL CA: CPT

## 2024-01-03 PROCEDURE — 85027 COMPLETE CBC AUTOMATED: CPT

## 2024-01-03 PROCEDURE — A4216 STERILE WATER/SALINE, 10 ML: HCPCS | Performed by: STUDENT IN AN ORGANIZED HEALTH CARE EDUCATION/TRAINING PROGRAM

## 2024-01-03 PROCEDURE — 97535 SELF CARE MNGMENT TRAINING: CPT

## 2024-01-03 PROCEDURE — 99024 POSTOP FOLLOW-UP VISIT: CPT | Performed by: SURGERY

## 2024-01-03 PROCEDURE — 6370000000 HC RX 637 (ALT 250 FOR IP): Performed by: STUDENT IN AN ORGANIZED HEALTH CARE EDUCATION/TRAINING PROGRAM

## 2024-01-03 PROCEDURE — 2580000003 HC RX 258: Performed by: STUDENT IN AN ORGANIZED HEALTH CARE EDUCATION/TRAINING PROGRAM

## 2024-01-03 PROCEDURE — 6360000002 HC RX W HCPCS: Performed by: STUDENT IN AN ORGANIZED HEALTH CARE EDUCATION/TRAINING PROGRAM

## 2024-01-03 PROCEDURE — 1200000000 HC SEMI PRIVATE

## 2024-01-03 PROCEDURE — 2700000000 HC OXYGEN THERAPY PER DAY

## 2024-01-03 PROCEDURE — 97161 PT EVAL LOW COMPLEX 20 MIN: CPT

## 2024-01-03 PROCEDURE — 84100 ASSAY OF PHOSPHORUS: CPT

## 2024-01-03 PROCEDURE — 97166 OT EVAL MOD COMPLEX 45 MIN: CPT

## 2024-01-03 PROCEDURE — 83735 ASSAY OF MAGNESIUM: CPT

## 2024-01-03 RX ORDER — HEPARIN SODIUM 10000 [USP'U]/ML
5000 INJECTION, SOLUTION INTRAVENOUS; SUBCUTANEOUS 3 TIMES DAILY
Status: DISCONTINUED | OUTPATIENT
Start: 2024-01-03 | End: 2024-01-08 | Stop reason: HOSPADM

## 2024-01-03 RX ORDER — ROSUVASTATIN CALCIUM 20 MG/1
20 TABLET, COATED ORAL DAILY
Status: DISCONTINUED | OUTPATIENT
Start: 2024-01-03 | End: 2024-01-08 | Stop reason: HOSPADM

## 2024-01-03 RX ADMIN — ACETAMINOPHEN 1000 MG: 500 TABLET ORAL at 20:52

## 2024-01-03 RX ADMIN — ACETAMINOPHEN 1000 MG: 500 TABLET ORAL at 14:39

## 2024-01-03 RX ADMIN — HEPARIN SODIUM 5000 UNITS: 10000 INJECTION INTRAVENOUS; SUBCUTANEOUS at 14:39

## 2024-01-03 RX ADMIN — HYDROMORPHONE HYDROCHLORIDE 1 MG: 1 INJECTION, SOLUTION INTRAMUSCULAR; INTRAVENOUS; SUBCUTANEOUS at 14:39

## 2024-01-03 RX ADMIN — HEPARIN SODIUM 5000 UNITS: 10000 INJECTION INTRAVENOUS; SUBCUTANEOUS at 21:00

## 2024-01-03 RX ADMIN — HYDROMORPHONE HYDROCHLORIDE 1 MG: 1 INJECTION, SOLUTION INTRAMUSCULAR; INTRAVENOUS; SUBCUTANEOUS at 11:20

## 2024-01-03 RX ADMIN — PANTOPRAZOLE SODIUM 40 MG: 40 INJECTION, POWDER, FOR SOLUTION INTRAVENOUS at 08:21

## 2024-01-03 RX ADMIN — HYDROMORPHONE HYDROCHLORIDE 1 MG: 1 INJECTION, SOLUTION INTRAMUSCULAR; INTRAVENOUS; SUBCUTANEOUS at 20:47

## 2024-01-03 RX ADMIN — ACETAMINOPHEN 1000 MG: 500 TABLET ORAL at 06:48

## 2024-01-03 RX ADMIN — SODIUM CHLORIDE, PRESERVATIVE FREE 10 ML: 5 INJECTION INTRAVENOUS at 07:57

## 2024-01-03 RX ADMIN — ATENOLOL 50 MG: 50 TABLET ORAL at 07:54

## 2024-01-03 RX ADMIN — LORAZEPAM 0.25 MG: 0.5 TABLET ORAL at 07:55

## 2024-01-03 RX ADMIN — METHOCARBAMOL 500 MG: 100 INJECTION INTRAMUSCULAR; INTRAVENOUS at 08:00

## 2024-01-03 RX ADMIN — ROSUVASTATIN CALCIUM 20 MG: 20 TABLET, FILM COATED ORAL at 07:54

## 2024-01-03 RX ADMIN — METHOCARBAMOL 500 MG: 100 INJECTION INTRAMUSCULAR; INTRAVENOUS at 14:38

## 2024-01-03 RX ADMIN — HYDROMORPHONE HYDROCHLORIDE 1 MG: 1 INJECTION, SOLUTION INTRAMUSCULAR; INTRAVENOUS; SUBCUTANEOUS at 06:43

## 2024-01-03 RX ADMIN — PANTOPRAZOLE SODIUM 40 MG: 40 INJECTION, POWDER, FOR SOLUTION INTRAVENOUS at 20:47

## 2024-01-03 RX ADMIN — HEPARIN SODIUM 5000 UNITS: 10000 INJECTION INTRAVENOUS; SUBCUTANEOUS at 07:53

## 2024-01-03 ASSESSMENT — PAIN SCALES - GENERAL
PAINLEVEL_OUTOF10: 8
PAINLEVEL_OUTOF10: 7
PAINLEVEL_OUTOF10: 9
PAINLEVEL_OUTOF10: 6
PAINLEVEL_OUTOF10: 8

## 2024-01-03 ASSESSMENT — PAIN DESCRIPTION - DESCRIPTORS
DESCRIPTORS: ACHING;SHARP
DESCRIPTORS: ACHING;SHARP

## 2024-01-03 ASSESSMENT — PAIN SCALES - WONG BAKER
WONGBAKER_NUMERICALRESPONSE: 2

## 2024-01-03 ASSESSMENT — PAIN - FUNCTIONAL ASSESSMENT
PAIN_FUNCTIONAL_ASSESSMENT: PREVENTS OR INTERFERES SOME ACTIVE ACTIVITIES AND ADLS
PAIN_FUNCTIONAL_ASSESSMENT: PREVENTS OR INTERFERES SOME ACTIVE ACTIVITIES AND ADLS

## 2024-01-03 ASSESSMENT — PAIN DESCRIPTION - ORIENTATION
ORIENTATION: RIGHT;LEFT;MID
ORIENTATION: RIGHT;LEFT;MID

## 2024-01-03 ASSESSMENT — PAIN DESCRIPTION - LOCATION
LOCATION: ABDOMEN
LOCATION: ABDOMEN

## 2024-01-03 NOTE — PROGRESS NOTES
OCCUPATIONAL THERAPY INITIAL EVALUATION    [facility] [address] [place], [place]       Date:1/3/2024                                                  Patient Name: Zelda Lowery  MRN: [id number]  : 1955  Room: [address] [place] [address]    Evaluating OT: Adis Faulkner OTR/L [id number]    Referring Provider:: Elmer Bains MD      Specific Provider Orders/Date: OT evaluation and treatment 1/3/24 0320    Diagnosis:  Thyroid nodule [E04.1]  Incisional hernia, without obstruction or gangrene [K43.2]  Mesenteric lymphadenopathy [R59.0]      Pertinent Medical History:  has a past medical history of Anxiety, Cancer (HCC), Roque's pouch of intestine (HCC), Hyperlipidemia, Hypertension, Osteoarthritis, Peritonitis (HCC), and Vitamin D deficiency.   Pt admitted to the hospital for exploratory laparotomy. Procedure performed on 24: MIDLINE ABDOMINAL WALL SCAR EXCISION, EXPLORATORY LAPAROTOMY, MESENTERIC LYMPH NODE BIOPSY, MID SMALL BOWEL MESENTERIC NODULE BIOPSY, BILATERAL TRANSABDOMINUS MUSCLE RELEASE, BILATERAL TRANSVERSUS ABDOMINAL PLANE BLOCK, JAMI-STOPPA -LIKE INCISIONAL HERNIA REPAIR (15CM) WITH RETRORECTUS MESH PLACEMENT, BILATERAL RETRORECTUS DRAIN PLACEMENT, HERNIA SAC EXCISION     Precautions:  Fall Risk, GEORGIANA drains, abdominal incision, +alarms     Assessment of current deficits   [x] Functional mobility   [x]ADLs  [x] Strength               []Cognition   [x] Functional transfers   [x] IADLs         [x] Safety Awareness   [x]Endurance   [] Fine Coordination              [x] Balance      [] Vision/perception   []Sensation    []Gross Motor Coordination  [] ROM  [] Delirium                   [] Motor Control     OT PLAN OF CARE   OT POC based on physician orders, patient diagnosis and results of clinical assessment    Frequency/Duration  2-5 days/wk for 2 weeks PRN   Specific OT Treatment to include:   * Instruction/training on adapted ADL

## 2024-01-03 NOTE — CARE COORDINATION
Social Work Discharge Planning:  SW met with patient explained role and discussed transition of care. Patient lives alone in a two story home with 4 steps and bilateral rails. Patient independent prior to admission. She has a hx with [facility], prefers again if needed.  DME owned are a cane and bsc. PCP is Dr. Davian Brooks. Family will transport the patient home. SW will continue to follow and assist.  Electronically signed by EMILY Liao on 1/3/2024 at 6:06 PM

## 2024-01-03 NOTE — DISCHARGE SUMMARY
Physician Discharge Summary     Patient ID:  Zelda Lowery  [id number]  68 y.o.  1955    Admit date: 1/2/2024    Discharge date and time: 1/8/23    Admitting Physician: Domenico Mi MD     Admission Diagnoses: Thyroid nodule [E04.1]  Incisional hernia, without obstruction or gangrene [K43.2]  Mesenteric lymphadenopathy [R59.0]    Discharge Diagnoses: Principal Problem:    Incisional hernia, without obstruction or gangrene  Active Problems:    Pre-op testing  Resolved Problems:    * No resolved hospital problems. *      Admission Condition: good    Discharged Condition: stable    Indication for Admission: Postoperative state    Hospital Course/Procedures/Operation/treatments:   Presented on 1/2 for incisional hernia repair and mesentery lymph node biopsy.  Operative course was relatively uneventful.  Mesenteric lymph node biopsy performed.  There was a mesenteric nodule proximal to our mesenteric node biopsy which was also biopsied.  Bilateral TAR, tap blocks, Perry-Stoppa like hernia repair with retrorectus mesh placement and bilateral retrorectus GEORGIANA drains, excision of prior hypertrophic midline scar.  1/3: Postoperatively she was admitted to the floor.  Overnight she had 1 episode of small emesis because she was drinking water despite being n.p.o. with sips of water with medications.  Urine output has been adequate.  Reyes catheter was removed this morning.  Pain is relatively well-controlled.  Awaiting ordered labs.  PT/OT ordered.  Await return of bowel function.  Continue GEORGIANA drains.  1/4 Pt states pain is reasonably controlled with pain meds.  Belching; no flatus; no BM yet.   1/5 Pain well controlled. No n/v. Minimal burping. F+ no BM. Ambulating in halls. Urinating appropriately. Drains benign  1/6 Patient reports passing large amounts of flatus. No nausea. Tolerating clear liquid diet   1/7: having bowel function, tolerating diet. Will advance today  1/8: Doing well, right-sided GEORGIANA drain was removed at  pathology demonstrated:     CLASSIC FOLLICULAR LYMPHOMA (WHO 2022 CLASSIFICATION).   FOLLICULAR LYMPHOMA, GRADE 1-2, FOLLICULAR PATTERN, (ICC 2022   CLASSIFICATION)

## 2024-01-03 NOTE — PROGRESS NOTES
Physical Therapy  Initial Assessment       Name: Zelda Lowery  : 1955  MRN: [id number]      Date of Service: 1/3/2024    Evaluating PT:  Sumit Eaton PT, DPT  [id number]    Room #:  8410/8410-B  Diagnosis:  Thyroid nodule [E04.1]  Incisional hernia, without obstruction or gangrene [K43.2]  Mesenteric lymphadenopathy [R59.0]  PMHx/PSHx:   has a past medical history of Anxiety, Cancer (HCC), Roque's pouch of intestine (HCC), Hyperlipidemia, Hypertension, Osteoarthritis, Peritonitis (HCC), and Vitamin D deficiency.  Procedure/Surgery:  MIDLINE ABDOMINAL WALL SCAR EXCISION, EXPLORATORY LAPAROTOMY, MESENTERIC LYMPH NODE BIOPSY, MID SMALL BOWEL MESENTERIC NODULE BIOPSY, BILATERAL TRANSABDOMINUS MUSCLE RELEASE, BILATERAL TRANSVERSUS ABDOMINAL PLANE BLOCK, JAMI-STOPPA -LIKE INCISIONAL HERNIA REPAIR (15CM) WITH RETRORECTUS MESH PLACEMENT, BILATERAL RETRORECTUS DRAIN PLACEMENT, HERNIA SAC EXCISION  24    Precautions:  Abdominal, GEORGIANA x 2, Falls, Binder, Reyes  Equipment Needs:  WW     SUBJECTIVE:    Pt lives alone in a 2 story home with 4 stairs to enter and B rails. Pt states family to assist at d/c as needed. Pt ambulated without AD independently PTA.  Equipment Owned:     OBJECTIVE:   Initial Evaluation  Date: 1/3/24 Treatment Short Term/ Long Term   Goals   AM-PAC 6 Clicks      Was pt agreeable to Eval/treatment? yes     Does pt have pain? Moderate abdominal pain     Bed Mobility  Rolling: NT  Supine to sit: NT  Sit to supine: NT  Scooting: SBA  Rolling: Independent    Supine to sit: Independent    Sit to supine: Independent    Scooting: Independent     Transfers Sit to stand: SBA  Stand to sit: SBA  Stand pivot: SBA WW  Sit to stand: Modified Independent    Stand to sit: Modified Independent    Stand pivot: Modified Independent     Ambulation    45 feet with SBA WW   300 feet with Modified Independent   AAD   Stair negotiation: ascended and descended  NT  4 steps with single rail Modified Independent    Standing Count:  1 Occurrences,   R         Elmer Bains MD     Diagnosis:  Thyroid nodule [E04.1]  Incisional hernia, without obstruction or gangrene [K43.2]  Mesenteric lymphadenopathy [R59.0]  Specific instructions for next treatment:  Functional mobility    Current Treatment Recommendations:     [x] Strengthening to improve independence with functional mobility   [x] ROM to improve independence with functional mobility   [x] Balance Training to improve static/dynamic balance and to reduce fall risk  [x] Endurance Training to improve activity tolerance during functional mobility   [x] Transfer Training to improve safety and independence with all functional transfers   [x] Gait Training to improve gait mechanics, endurance and assess need for appropriate assistive device  [x] Stair Training in preparation for safe discharge home and/or into the community   [x] Positioning to prevent skin breakdown and contractures  [x] Safety and Education Training   [x] Patient/Caregiver Education   [x] HEP  [] Other     PT long term treatment goals are located in above grid    Frequency of treatments: 2-5x/week x 1-2 weeks.    Time in  1104  Time out  1129    Total Treatment Time  8 minutes     Evaluation Time includes thorough review of current medical information, gathering information on past medical history/social history and prior level of function, completion of standardized testing/informal observation of tasks, assessment of data and education on plan of care and goals.    CPT codes:  [x] Low Complexity PT evaluation 23059  [] Moderate Complexity PT evaluation 33617  [] High Complexity PT evaluation 82393  [] PT Re-evaluation 47847  [] Gait training 20152 0 minutes  [] Manual therapy 37701 0 minutes  [x] Therapeutic activities 73790 8 minutes  [] Therapeutic exercises 74471 0 minutes  [] Neuromuscular reeducation 81149 0 minutes       Sumit Eaton PT, DPT   [id number]

## 2024-01-03 NOTE — PROGRESS NOTES
GENERAL SURGERY  DAILY PROGRESS NOTE  MEDICAL STUDENT    Patient's Name/Date of Birth: Zelda Lowery / 1955    Date: January 3, 2024     CC:  Mesenteric lymphadenopathy     Subjective:  Pt sleeping, easily awaken. Pt reports sleeping well but is recently feeling diffuse abdominal pain. Pt denies nausea but reports vomiting 1x last night. Pt denies BM and reports last BM was yesterday morning before surgery. Pt denies passing gas. Pt was unaware of ability to ask for PRN meds, says she will now ask for pain and nausea meds when needed. Encouraged ambulation and recommended sitting up majority of day today.      Objective:  Last 24Hrs  Temp  Av.2 °F (36.8 °C)  Min: 96.8 °F (36 °C)  Max: 100.3 °F (37.9 °C)  Resp  Av.2  Min: 16  Max: 26  Pulse  Av.8  Min: 71  Max: 92  Systolic (24hrs), Av , Min:91 , Max:154     Diastolic (24hrs), Av, Min:51, Max:70    SpO2  Av.9 %  Min: 93 %  Max: 100 %    I/O last 3 completed shifts:  In: 2700 [I.V.:2700]  Out: 1420 [Urine:900; Drains:320; Blood:200]      General: In no acute distress, alert and oriented x3  Cardiovascular: Warm throughout, no edema  Respiratory: no respiratory distress, equal chest rise  Abdomen: soft, diffuse tender when abdomen moves, nondistended, midline incision clean and dry - covered in Dermabond abdominal binder wrapped   Skin: no obvious rashes or lesions appreciated, no jaundice  Extremities: atraumatic, no focal motor deficits, no open wounds      Drains: serosanguinous drainage x2. Pt reports epigastric pain when manipulating drains.  Output by Drain (mL) 24 07 - 24 1900 24 190 - 24 0700 24 07 - 24 1900 24 190 - 24 0700 24 07 - 24 0721   Closed/Suction Drain Right;Ventral Hip Bulb   45 160    Closed/Suction Drain Left;Ventral Hip Bulb   5 110          CBC  No results for input(s): \"WBC\", \"RBC\", \"HGB\", \"HCT\", \"MCV\", \"MCH\", \"MCHC\", \"RDW\", \"PLT\", \"MPV\" in

## 2024-01-03 NOTE — PROGRESS NOTES
General Surgery   Daily Progress Note      Patient's Name/Date of Birth: Zelda Lowery / 1955    Date: January 3, 2024     Chief Complaint: Status post elective surgery, abdominal pain    Patient Active Problem List   Diagnosis    Hyperlipidemia    Essential hypertension, benign    Vitamin D deficiency    Status post Roque procedure (HCC)    Sigmoid diverticulosis    S/P colostomy takedown    Pre-op testing    Incisional hernia, without obstruction or gangrene    History of breast cancer    Mesenteric lymphadenopathy    Hemangioma of liver    Thyroid nodule       Subjective: Patient states that she is doing okay this morning.  Pain is moderately well-controlled.  She was drinking water throughout the night not knowing that she was n.p.o. she had some nausea throughout the night which was controlled with as needed antiemetics.  She did have a one-time episode of small spit up but this has not happened again for several hours.    Bilateral GEORGIANA drains with just over 100 mL of sanguineous appearing fluid.  Adequate urine output via Reyes catheter.  Has not yet gotten out of bed since surgery.    Objective:  BP (!) 108/58   Pulse 86   Temp 97 °F (36.1 °C) (Temporal)   Resp 16   Ht 1.575 m (5' 2\")   Wt 84.5 kg (186 lb 4.8 oz)   SpO2 94%   BMI 34.07 kg/m²   Labs:  No results for input(s): \"WBC\", \"HGB\", \"HCT\" in the last 72 hours.    Invalid input(s): \"PLR\"  Recent Labs     01/02/24  0855      K 3.6   CL 98   CO2 24   BUN 15   CREATININE 0.5     No results for input(s): \"ALKPHOS\", \"ALT\", \"AST\", \"BILITOT\", \"BILIDIR\", \"LABALBU\", \"LIPASE\" in the last 72 hours.      General appearance: NAD, responds to questions appropriately  Head: NCAT, PERRL, EOMI, pink conjunctiva  Neck: supple, no masses  Lungs: symmetric chest rise, no audible wheezes, on nasal cannula  Heart: warm throughout, regular rate per peripheral pulse  Abdomen: Midline incision with glue, tender to palpation around the midline incision and  Incisional hernia, without obstruction or gangrene 10/03/2023    History of breast cancer 10/03/2023    Mesenteric lymphadenopathy 10/03/2023    Hemangioma of liver 10/03/2023    S/P colostomy takedown 04/17/2023    Pre-op testing 04/17/2023    Hyperlipidemia 12/02/2016    Essential hypertension, benign 12/02/2016    Vitamin D deficiency 12/02/2016     S/p ex lap with mesenteric lymph node bx/rectorectus hernia repair with bilateral TAR.    Multimodal pain control  Monitor abd exam  Monitor bowel function  OOB/ambulate  DVT risk--PCDs/subQ heparin    Omega Moser MD, FACS  1/3/2024  3:49 PM

## 2024-01-03 NOTE — PLAN OF CARE
Problem: Discharge Planning  Goal: Discharge to home or other facility with appropriate resources  Outcome: Progressing     Problem: Pain  Goal: Verbalizes/displays adequate comfort level or baseline comfort level  Outcome: Progressing     Problem: ABCDS Injury Assessment  Goal: Absence of physical injury  Outcome: Progressing  Flowsheets (Taken 1/2/2024 1825 by Katherine Bonds, RN)  Absence of Physical Injury: Implement safety measures based on patient assessment

## 2024-01-04 LAB
ANION GAP SERPL CALCULATED.3IONS-SCNC: 14 MMOL/L (ref 7–16)
BASOPHILS # BLD: 0.02 K/UL (ref 0–0.2)
BASOPHILS NFR BLD: 0 % (ref 0–2)
BUN SERPL-MCNC: 13 MG/DL (ref 6–23)
CALCIUM SERPL-MCNC: 8.9 MG/DL (ref 8.6–10.2)
CHLORIDE SERPL-SCNC: 100 MMOL/L (ref 98–107)
CO2 SERPL-SCNC: 25 MMOL/L (ref 22–29)
CREAT SERPL-MCNC: 0.6 MG/DL (ref 0.5–1)
EOSINOPHIL # BLD: 0 K/UL (ref 0.05–0.5)
EOSINOPHILS RELATIVE PERCENT: 0 % (ref 0–6)
ERYTHROCYTE [DISTWIDTH] IN BLOOD BY AUTOMATED COUNT: 14.6 % (ref 11.5–15)
GFR SERPL CREATININE-BSD FRML MDRD: >60 ML/MIN/1.73M2
GLUCOSE BLD-MCNC: 102 MG/DL (ref 74–99)
GLUCOSE SERPL-MCNC: 120 MG/DL (ref 74–99)
HCT VFR BLD AUTO: 30 % (ref 34–48)
HGB BLD-MCNC: 9.5 G/DL (ref 11.5–15.5)
IMM GRANULOCYTES # BLD AUTO: 0.08 K/UL (ref 0–0.58)
IMM GRANULOCYTES NFR BLD: 1 % (ref 0–5)
LYMPHOCYTES NFR BLD: 1.85 K/UL (ref 1.5–4)
LYMPHOCYTES RELATIVE PERCENT: 14 % (ref 20–42)
MAGNESIUM SERPL-MCNC: 2 MG/DL (ref 1.6–2.6)
MCH RBC QN AUTO: 25 PG (ref 26–35)
MCHC RBC AUTO-ENTMCNC: 31.7 G/DL (ref 32–34.5)
MCV RBC AUTO: 78.9 FL (ref 80–99.9)
MONOCYTES NFR BLD: 1.2 K/UL (ref 0.1–0.95)
MONOCYTES NFR BLD: 9 % (ref 2–12)
NEUTROPHILS NFR BLD: 76 % (ref 43–80)
NEUTS SEG NFR BLD: 9.99 K/UL (ref 1.8–7.3)
PHOSPHATE SERPL-MCNC: 1.5 MG/DL (ref 2.5–4.5)
PLATELET # BLD AUTO: 305 K/UL (ref 130–450)
PMV BLD AUTO: 10.3 FL (ref 7–12)
POTASSIUM SERPL-SCNC: 3.3 MMOL/L (ref 3.5–5)
RBC # BLD AUTO: 3.8 M/UL (ref 3.5–5.5)
SODIUM SERPL-SCNC: 139 MMOL/L (ref 132–146)
WBC OTHER # BLD: 13.1 K/UL (ref 4.5–11.5)

## 2024-01-04 PROCEDURE — 1200000000 HC SEMI PRIVATE

## 2024-01-04 PROCEDURE — C9113 INJ PANTOPRAZOLE SODIUM, VIA: HCPCS | Performed by: STUDENT IN AN ORGANIZED HEALTH CARE EDUCATION/TRAINING PROGRAM

## 2024-01-04 PROCEDURE — 97535 SELF CARE MNGMENT TRAINING: CPT

## 2024-01-04 PROCEDURE — 80048 BASIC METABOLIC PNL TOTAL CA: CPT

## 2024-01-04 PROCEDURE — 2580000003 HC RX 258: Performed by: STUDENT IN AN ORGANIZED HEALTH CARE EDUCATION/TRAINING PROGRAM

## 2024-01-04 PROCEDURE — 6370000000 HC RX 637 (ALT 250 FOR IP)

## 2024-01-04 PROCEDURE — A4216 STERILE WATER/SALINE, 10 ML: HCPCS | Performed by: STUDENT IN AN ORGANIZED HEALTH CARE EDUCATION/TRAINING PROGRAM

## 2024-01-04 PROCEDURE — 6370000000 HC RX 637 (ALT 250 FOR IP): Performed by: STUDENT IN AN ORGANIZED HEALTH CARE EDUCATION/TRAINING PROGRAM

## 2024-01-04 PROCEDURE — 84100 ASSAY OF PHOSPHORUS: CPT

## 2024-01-04 PROCEDURE — 85025 COMPLETE CBC W/AUTO DIFF WBC: CPT

## 2024-01-04 PROCEDURE — 2500000003 HC RX 250 WO HCPCS: Performed by: STUDENT IN AN ORGANIZED HEALTH CARE EDUCATION/TRAINING PROGRAM

## 2024-01-04 PROCEDURE — 82962 GLUCOSE BLOOD TEST: CPT

## 2024-01-04 PROCEDURE — 97530 THERAPEUTIC ACTIVITIES: CPT

## 2024-01-04 PROCEDURE — 6360000002 HC RX W HCPCS: Performed by: STUDENT IN AN ORGANIZED HEALTH CARE EDUCATION/TRAINING PROGRAM

## 2024-01-04 PROCEDURE — 36415 COLL VENOUS BLD VENIPUNCTURE: CPT

## 2024-01-04 PROCEDURE — 83735 ASSAY OF MAGNESIUM: CPT

## 2024-01-04 PROCEDURE — 99024 POSTOP FOLLOW-UP VISIT: CPT | Performed by: SURGERY

## 2024-01-04 RX ORDER — HEPARIN 100 UNIT/ML
1 SYRINGE INTRAVENOUS EVERY 12 HOURS SCHEDULED
Status: DISCONTINUED | OUTPATIENT
Start: 2024-01-04 | End: 2024-01-08 | Stop reason: HOSPADM

## 2024-01-04 RX ORDER — HEPARIN 100 UNIT/ML
1 SYRINGE INTRAVENOUS PRN
Status: DISCONTINUED | OUTPATIENT
Start: 2024-01-04 | End: 2024-01-08 | Stop reason: HOSPADM

## 2024-01-04 RX ORDER — KETOROLAC TROMETHAMINE 30 MG/ML
15 INJECTION, SOLUTION INTRAMUSCULAR; INTRAVENOUS EVERY 6 HOURS
Status: COMPLETED | OUTPATIENT
Start: 2024-01-04 | End: 2024-01-07

## 2024-01-04 RX ORDER — METHOCARBAMOL 750 MG/1
750 TABLET, FILM COATED ORAL 4 TIMES DAILY
Status: DISCONTINUED | OUTPATIENT
Start: 2024-01-04 | End: 2024-01-04

## 2024-01-04 RX ORDER — OXYCODONE HYDROCHLORIDE 10 MG/1
10 TABLET ORAL EVERY 4 HOURS PRN
Status: DISCONTINUED | OUTPATIENT
Start: 2024-01-04 | End: 2024-01-08 | Stop reason: HOSPADM

## 2024-01-04 RX ORDER — BISACODYL 10 MG
10 SUPPOSITORY, RECTAL RECTAL DAILY
Status: DISCONTINUED | OUTPATIENT
Start: 2024-01-04 | End: 2024-01-08 | Stop reason: HOSPADM

## 2024-01-04 RX ORDER — SODIUM CHLORIDE 9 MG/ML
INJECTION, SOLUTION INTRAVENOUS PRN
Status: DISCONTINUED | OUTPATIENT
Start: 2024-01-04 | End: 2024-01-08 | Stop reason: HOSPADM

## 2024-01-04 RX ORDER — SODIUM CHLORIDE 0.9 % (FLUSH) 0.9 %
5-40 SYRINGE (ML) INJECTION PRN
Status: DISCONTINUED | OUTPATIENT
Start: 2024-01-04 | End: 2024-01-08 | Stop reason: HOSPADM

## 2024-01-04 RX ORDER — OXYCODONE HYDROCHLORIDE 5 MG/1
5 TABLET ORAL EVERY 4 HOURS PRN
Status: DISCONTINUED | OUTPATIENT
Start: 2024-01-04 | End: 2024-01-08 | Stop reason: HOSPADM

## 2024-01-04 RX ORDER — SODIUM CHLORIDE 0.9 % (FLUSH) 0.9 %
5-40 SYRINGE (ML) INJECTION EVERY 12 HOURS SCHEDULED
Status: DISCONTINUED | OUTPATIENT
Start: 2024-01-04 | End: 2024-01-08 | Stop reason: HOSPADM

## 2024-01-04 RX ORDER — DEXTROSE MONOHYDRATE, SODIUM CHLORIDE, AND POTASSIUM CHLORIDE 50; 1.49; 4.5 G/1000ML; G/1000ML; G/1000ML
INJECTION, SOLUTION INTRAVENOUS CONTINUOUS
Status: DISCONTINUED | OUTPATIENT
Start: 2024-01-04 | End: 2024-01-05

## 2024-01-04 RX ADMIN — POTASSIUM BICARBONATE 40 MEQ: 782 TABLET, EFFERVESCENT ORAL at 09:20

## 2024-01-04 RX ADMIN — HEPARIN SODIUM 5000 UNITS: 10000 INJECTION INTRAVENOUS; SUBCUTANEOUS at 21:40

## 2024-01-04 RX ADMIN — PANTOPRAZOLE SODIUM 40 MG: 40 INJECTION, POWDER, FOR SOLUTION INTRAVENOUS at 21:40

## 2024-01-04 RX ADMIN — METHOCARBAMOL 500 MG: 100 INJECTION INTRAMUSCULAR; INTRAVENOUS at 22:15

## 2024-01-04 RX ADMIN — ACETAMINOPHEN 1000 MG: 500 TABLET ORAL at 04:56

## 2024-01-04 RX ADMIN — METHOCARBAMOL 500 MG: 100 INJECTION INTRAMUSCULAR; INTRAVENOUS at 14:18

## 2024-01-04 RX ADMIN — ATENOLOL 50 MG: 50 TABLET ORAL at 09:12

## 2024-01-04 RX ADMIN — ROSUVASTATIN CALCIUM 20 MG: 20 TABLET, FILM COATED ORAL at 09:20

## 2024-01-04 RX ADMIN — KETOROLAC TROMETHAMINE 15 MG: 30 INJECTION, SOLUTION INTRAMUSCULAR; INTRAVENOUS at 11:19

## 2024-01-04 RX ADMIN — POTASSIUM CHLORIDE, DEXTROSE MONOHYDRATE AND SODIUM CHLORIDE: 150; 5; 450 INJECTION, SOLUTION INTRAVENOUS at 14:13

## 2024-01-04 RX ADMIN — OXYCODONE HYDROCHLORIDE 10 MG: 10 TABLET ORAL at 15:11

## 2024-01-04 RX ADMIN — HYDROMORPHONE HYDROCHLORIDE 1 MG: 1 INJECTION, SOLUTION INTRAMUSCULAR; INTRAVENOUS; SUBCUTANEOUS at 00:34

## 2024-01-04 RX ADMIN — METHOCARBAMOL 500 MG: 100 INJECTION INTRAMUSCULAR; INTRAVENOUS at 09:20

## 2024-01-04 RX ADMIN — HYDROMORPHONE HYDROCHLORIDE 1 MG: 1 INJECTION, SOLUTION INTRAMUSCULAR; INTRAVENOUS; SUBCUTANEOUS at 07:31

## 2024-01-04 RX ADMIN — PANTOPRAZOLE SODIUM 40 MG: 40 INJECTION, POWDER, FOR SOLUTION INTRAVENOUS at 09:07

## 2024-01-04 RX ADMIN — BENZOCAINE AND MENTHOL 1 LOZENGE: 15; 3.6 LOZENGE ORAL at 22:20

## 2024-01-04 RX ADMIN — SODIUM PHOSPHATE, MONOBASIC, MONOHYDRATE AND SODIUM PHOSPHATE, DIBASIC, ANHYDROUS 30 MMOL: 142; 276 INJECTION, SOLUTION INTRAVENOUS at 11:28

## 2024-01-04 RX ADMIN — HEPARIN SODIUM 5000 UNITS: 10000 INJECTION INTRAVENOUS; SUBCUTANEOUS at 09:13

## 2024-01-04 RX ADMIN — METHOCARBAMOL 500 MG: 100 INJECTION INTRAMUSCULAR; INTRAVENOUS at 00:13

## 2024-01-04 RX ADMIN — OXYCODONE HYDROCHLORIDE 5 MG: 5 TABLET ORAL at 22:20

## 2024-01-04 RX ADMIN — KETOROLAC TROMETHAMINE 15 MG: 30 INJECTION, SOLUTION INTRAMUSCULAR; INTRAVENOUS at 23:32

## 2024-01-04 RX ADMIN — LORAZEPAM 0.25 MG: 0.5 TABLET ORAL at 09:46

## 2024-01-04 RX ADMIN — BISACODYL 10 MG: 10 SUPPOSITORY RECTAL at 16:58

## 2024-01-04 RX ADMIN — BENZOCAINE AND MENTHOL 1 LOZENGE: 15; 3.6 LOZENGE ORAL at 09:12

## 2024-01-04 RX ADMIN — POTASSIUM PHOSPHATE, MONOBASIC AND POTASSIUM PHOSPHATE, DIBASIC 20 MMOL: 224; 236 INJECTION, SOLUTION, CONCENTRATE INTRAVENOUS at 14:50

## 2024-01-04 RX ADMIN — HEPARIN SODIUM 5000 UNITS: 10000 INJECTION INTRAVENOUS; SUBCUTANEOUS at 14:04

## 2024-01-04 RX ADMIN — ACETAMINOPHEN 1000 MG: 500 TABLET ORAL at 21:40

## 2024-01-04 RX ADMIN — HYDROMORPHONE HYDROCHLORIDE 1 MG: 1 INJECTION, SOLUTION INTRAMUSCULAR; INTRAVENOUS; SUBCUTANEOUS at 03:43

## 2024-01-04 RX ADMIN — ACETAMINOPHEN 1000 MG: 500 TABLET ORAL at 13:15

## 2024-01-04 RX ADMIN — KETOROLAC TROMETHAMINE 15 MG: 30 INJECTION, SOLUTION INTRAMUSCULAR; INTRAVENOUS at 16:55

## 2024-01-04 ASSESSMENT — PAIN SCALES - GENERAL
PAINLEVEL_OUTOF10: 7
PAINLEVEL_OUTOF10: 10
PAINLEVEL_OUTOF10: 7
PAINLEVEL_OUTOF10: 6
PAINLEVEL_OUTOF10: 10
PAINLEVEL_OUTOF10: 7
PAINLEVEL_OUTOF10: 4
PAINLEVEL_OUTOF10: 7
PAINLEVEL_OUTOF10: 1
PAINLEVEL_OUTOF10: 8
PAINLEVEL_OUTOF10: 8
PAINLEVEL_OUTOF10: 7

## 2024-01-04 ASSESSMENT — PAIN SCALES - WONG BAKER
WONGBAKER_NUMERICALRESPONSE: 0
WONGBAKER_NUMERICALRESPONSE: 2
WONGBAKER_NUMERICALRESPONSE: 0
WONGBAKER_NUMERICALRESPONSE: 2

## 2024-01-04 ASSESSMENT — PAIN DESCRIPTION - DESCRIPTORS
DESCRIPTORS: ACHING;SHARP
DESCRIPTORS: SORE
DESCRIPTORS: ACHING;DISCOMFORT;SHARP;SORE
DESCRIPTORS: ACHING;SHARP
DESCRIPTORS: ACHING;DISCOMFORT;SORE
DESCRIPTORS: ACHING;SHARP
DESCRIPTORS: ACHING;SHARP

## 2024-01-04 ASSESSMENT — PAIN - FUNCTIONAL ASSESSMENT
PAIN_FUNCTIONAL_ASSESSMENT: ACTIVITIES ARE NOT PREVENTED
PAIN_FUNCTIONAL_ASSESSMENT: PREVENTS OR INTERFERES SOME ACTIVE ACTIVITIES AND ADLS

## 2024-01-04 ASSESSMENT — PAIN DESCRIPTION - ORIENTATION
ORIENTATION: RIGHT;LEFT;MID
ORIENTATION: ANTERIOR

## 2024-01-04 ASSESSMENT — PAIN DESCRIPTION - LOCATION
LOCATION: ABDOMEN
LOCATION: ABDOMEN
LOCATION: ABDOMEN;HEAD;HIP
LOCATION: ABDOMEN
LOCATION: ABDOMEN;HEAD
LOCATION: HEAD;ABDOMEN;SHOULDER
LOCATION: ABDOMEN

## 2024-01-04 ASSESSMENT — PAIN DESCRIPTION - ONSET: ONSET: ON-GOING

## 2024-01-04 NOTE — CARE COORDINATION
CM update note.  Met with patient.  D/c plan is home with family to stay with around the clock.  Pt agreeable to home care only if needed.  Will ask [facility] to follow.  Pt is S/p ex lap with mesenteric lymph node bx/rectorectus hernia repair with bilateral TAR.  NPO, IVF, GEORGIANA drain.  Pt/Ot Am-pac is 16 ambulated 45 feet with ww SBA.  Pt c/o of Headache today.  Family will provide transport home on day of d/c.  CM/SW to follow.  Demetrio Roach RN [phone].      The Plan for Transition of Care is related to the following treatment goals:  Home     The Patient and/or patient representative  was provided with a choice of provider and agrees   with the discharge plan. [x] Yes [] No    Freedom of choice list was provided with basic dialogue that supports the patient's individualized plan of care/goals, treatment preferences and shares the quality data associated with the providers. [x] Yes [] No

## 2024-01-04 NOTE — ACP (ADVANCE CARE PLANNING)
Advance Care Planning   Healthcare Decision Maker:    Primary Decision Maker: maye ochoa - Child - [phone]    Click here to complete Healthcare Decision Makers including selection of the Healthcare Decision Maker Relationship (ie \"Primary\").  Today we documented Decision Maker(s) consistent with Legal Next of Kin hierarchy.       If the relationship to the patient does NOT follow our state's Next of Kin hierarchy, the patient MUST complete an ACP Document to allow him/her to act on the patient's behalf. :

## 2024-01-04 NOTE — PROGRESS NOTES
GENERAL SURGERY  DAILY PROGRESS NOTE  MEDICAL STUDENT    Patient's Name/Date of Birth: Zelda Lowery / 1955    Date: 2024     CC:  Mesenteric lymphadenopathy     Subjective:  Pt sleeping, easily awaken, states her pain is mild now but was severe around 3am. Pt describes early morning pain as stabbing sensation at R hip and L flank. Pt reports midline incision is moderately painful now but abdominal binder helps. Pt does not have abdominal binder on. Pt reports burping but denies flatulent and bm. Pt states she is urinating without hematuria and dysuria. Pt states getting out of bed is difficult but reports she sat up for 3 hours yesterday.       Objective:  Last 24Hrs  Temp  Av °F (36.7 °C)  Min: 97.8 °F (36.6 °C)  Max: 98.2 °F (36.8 °C)  Resp  Av.6  Min: 1  Max: 16  Pulse  Av.4  Min: 80  Max: 93  Systolic (24hrs), Av , Min:104 , Max:132     Diastolic (24hrs), Av, Min:55, Max:80    SpO2  Av.5 %  Min: 94 %  Max: 97 %    I/O last 3 completed shifts:  In: -   Out: 1265 [Urine:750; Drains:515]      General: In no acute distress, alert and oriented x3  Cardiovascular: Warm throughout, no edema  Respiratory: no respiratory distress  Abdomen: soft, bilateral lower quadrant mild tenderness, nondistended, 2 drains with serosanguinous output, midline incision clean, dry, intact   Skin: no obvious rashes or lesions appreciated, no jaundice  Extremities: atraumatic, no focal motor deficits, no open wounds      CBC  Recent Labs     24  1130 24  0513   WBC 13.7* 13.1*   RBC 3.94 3.80   HGB 9.8* 9.5*   HCT 30.3* 30.0*   MCV 76.9* 78.9*   MCH 24.9* 25.0*   MCHC 32.3 31.7*   RDW 14.4 14.6    305   MPV 10.6 10.3       CMP  Recent Labs     24  0855 24  1130 24  0513    138 139   K 3.6 3.7 3.3*   CL 98 102 100   CO2 24 21* 25   BUN 15 15 13   CREATININE 0.5 0.6 0.6   GLUCOSE 115* 168* 120*   CALCIUM 10.0 8.5* 8.9     Output by Drain (mL) 24

## 2024-01-04 NOTE — PROGRESS NOTES
Occupational Therapy  OT BEDSIDE TREATMENT NOTE   [facility]  [address] [place], [place]        Date:2024  Patient Name: Zelda Lowery  MRN: [id number]  : 1955  Room: [address] [place] [address]     Per OT Eval:    Evaluating OT: Adis Faulkner OTR/L [id number]     Referring Provider:: Elmer Bains MD                               Specific Provider Orders/Date: OT evaluation and treatment 1/3/24 9483     Diagnosis:  Thyroid nodule [E04.1]  Incisional hernia, without obstruction or gangrene [K43.2]  Mesenteric lymphadenopathy [R59.0]       Pertinent Medical History:  has a past medical history of Anxiety, Cancer (HCC), Roque's pouch of intestine (HCC), Hyperlipidemia, Hypertension, Osteoarthritis, Peritonitis (HCC), and Vitamin D deficiency.   Pt admitted to the hospital for exploratory laparotomy. Procedure performed on 24: MIDLINE ABDOMINAL WALL SCAR EXCISION, EXPLORATORY LAPAROTOMY, MESENTERIC LYMPH NODE BIOPSY, MID SMALL BOWEL MESENTERIC NODULE BIOPSY, BILATERAL TRANSABDOMINUS MUSCLE RELEASE, BILATERAL TRANSVERSUS ABDOMINAL PLANE BLOCK, JAMI-STOPPA -LIKE INCISIONAL HERNIA REPAIR (15CM) WITH RETRORECTUS MESH PLACEMENT, BILATERAL RETRORECTUS DRAIN PLACEMENT, HERNIA SAC EXCISION      Precautions:  Fall Risk, GEORGIANA drains, abdominal incision, +alarms      Assessment of current deficits   [x] Functional mobility             [x]ADLs           [x] Strength                  []Cognition   [x] Functional transfers           [x] IADLs         [x] Safety Awareness   [x]Endurance   [] Fine Coordination              [x] Balance      [] Vision/perception   []Sensation     []Gross Motor Coordination  [] ROM           [] Delirium                   [] Motor Control      OT PLAN OF CARE   OT POC based on physician orders, patient diagnosis and results of clinical assessment     Frequency/Duration  2-5 days/wk for 2 weeks PRN   Specific OT Treatment to  include:   * Instruction/training on adapted ADL techniques and AE recommendations to increase functional independence within precautions       * Training on energy conservation strategies, correct breathing pattern and techniques to improve independence/tolerance for self-care routine  * Functional transfer/mobility training/DME recommendations for increased independence, safety, and fall prevention  * Patient/Family education to increase follow through with safety techniques and functional independence  * Recommendation of environmental modifications for increased safety with functional transfers/mobility and ADLs  * Therapeutic exercise to improve motor endurance, ROM, and functional strength for ADLs/functional transfers  * Therapeutic activities to facilitate/challenge dynamic balance, stand tolerance for increased safety and independence with ADLs        Recommended Adaptive Equipment:  TBD      Home Living:  Pt lives in a 2 story house with 4 step(s) to enter and bilateral hand rail(s); bed/bath on 2nd level   Also has an additional bath on the first floor    Bathroom setup: claw tubs with hand held shower    Equipment owned: SPC, FWW, ETB, sock aid      Prior Level of Function:  Independent  with ADLs,  Independent with IADLs. Ambulated with no AD (and occasional use of cane) per report.   Pt takes care of her cat at baseline and orders groceries to be delivered to her home.      Driving: yes  Occupation/leisure: reading, knitting, piano, and spending time with her grandchildren      Pain Level: Pt complained of minimal abdominal pain, Moncho hip pain this session  OT provided edu on compensatory strategies for functional mobility and ADLs in order to manage pain   Diversion and repositioning+ abdominal binder     Cognition: A&O: 4/4;   Follows multi step directions: good               Memory:  good               Sequencing:  good               Problem solving:  good               Judgement/safety:  fair     Contact Guard Assist   Sitting EOB:  Supervision    Standing:  Hugo/CGA Sitting: Independent       Standing: Independent    Activity Tolerance Pt tolerates standing during functional tasks for ~4 min with CGA for balance   Fair- Pt will tolerate standing during functional task for at least 10 min mod I with good safety, no LOB, and DME/AE as needed in order to promote increased strength and activity tolerance for return to PLOF.       Visual/  Perceptual Glasses: yes   Perception: WFL              BUE  ROM/Strength/  Fine motor Coordination Hand dominance: R      RUE: ROM WFL     Strength: grossly 4+/5      Strength:  WFL     Coordination:   WFL     LUE: ROM  WFL     Strength: grossly 4+/5      Strength:  WFL     Coordination: WFL          Hearing: WFL   Sensation:  No c/o numbness or tingling   Tone:  WFL   Edema:  None noted       Education:  Pt was educated on role of OT, goals to be reached, importance of OOB activity, precautions to follow, safety and hand placement with transfers, safety/balance and walker management with functional mobility, and use of AE/DME to assist with ADL dressing/bathing tasks.      Comments: Upon arrival pt supine in bed, agreeable to therapy, nursing present okaying pt to be seen this session. Rest breaks provided throughout session as needed. Spoke to pt in regards to home setup, in which pt stating of already having of shower bench to assist with bathing tasks, declining need for raised commode/3in1 at this time. At end of session, pt seated upright in chair, chair alarm on, all lines and tubes intact, call light within reach, nursing aware.     Pt has made fair progress towards set goals.   Continue with current plan of care      Treatment Time In: 1:15pm           Treatment Time Out: 1:55pm                Treatment Charges: Mins Units   Ther Ex  [id number]     Manual Therapy 01862     Thera Activities 10396 15 1   ADL/Home Mgt [id number] 25 2   Neuro Re-ed 71516     Group Therapy

## 2024-01-04 NOTE — PROGRESS NOTES
[facility]   ATTENDING PHYSICIAN PROGRESS NOTE      I have personally examined the patient, personally reviewed the record, and personally discussed the case with the resident. I have personally reviewed all relevant labs and imaging data. I am actively managing this patient's medications.  Please refer to the resident's note. I agree with the assessment and plan with the following corrections/additions. The following summarizes my clinical findings and independent assessment.      CC: hernia repair     Pt states pain is reasonably controlled with pain meds.  Belching; no flatus; no BM yet.     Awake and alert  Follows commands  Hrt:  regular  Lungs:  clear  Abd:  soft; BS active; expected post-op tenderness; drains with serosang drainage  Skin:  warm/dry  Ext:  moves all 4 ext          Patient Active Problem List     Diagnosis Date Noted    Sigmoid diverticulosis 02/22/2023    Status post Roque procedure (HCC) 12/13/2022    Thyroid nodule 11/17/2023    Incisional hernia, without obstruction or gangrene 10/03/2023    History of breast cancer 10/03/2023    Mesenteric lymphadenopathy 10/03/2023    Hemangioma of liver 10/03/2023    S/P colostomy takedown 04/17/2023    Pre-op testing 04/17/2023    Hyperlipidemia 12/02/2016    Essential hypertension, benign 12/02/2016    Vitamin D deficiency 12/02/2016      S/p ex lap with mesenteric lymph node bx/rectorectus hernia repair with bilateral TAR.     Multimodal pain control  Monitor abd exam  Monitor bowel function  OOB/ambulate  DVT risk--PCDs/subQ heparin    Omega Moser MD, FACS  1/4/2024  7:55 AM

## 2024-01-05 LAB
ANION GAP SERPL CALCULATED.3IONS-SCNC: 9 MMOL/L (ref 7–16)
BASOPHILS # BLD: 0.02 K/UL (ref 0–0.2)
BASOPHILS NFR BLD: 0 % (ref 0–2)
BUN SERPL-MCNC: 8 MG/DL (ref 6–23)
CALCIUM SERPL-MCNC: 8.1 MG/DL (ref 8.6–10.2)
CHLORIDE SERPL-SCNC: 103 MMOL/L (ref 98–107)
CO2 SERPL-SCNC: 26 MMOL/L (ref 22–29)
CREAT SERPL-MCNC: 0.5 MG/DL (ref 0.5–1)
EOSINOPHIL # BLD: 0.02 K/UL (ref 0.05–0.5)
EOSINOPHILS RELATIVE PERCENT: 0 % (ref 0–6)
ERYTHROCYTE [DISTWIDTH] IN BLOOD BY AUTOMATED COUNT: 14.6 % (ref 11.5–15)
GFR SERPL CREATININE-BSD FRML MDRD: >60 ML/MIN/1.73M2
GLUCOSE SERPL-MCNC: 128 MG/DL (ref 74–99)
HCT VFR BLD AUTO: 24.6 % (ref 34–48)
HGB BLD-MCNC: 7.8 G/DL (ref 11.5–15.5)
IMM GRANULOCYTES # BLD AUTO: 0.04 K/UL (ref 0–0.58)
IMM GRANULOCYTES NFR BLD: 0 % (ref 0–5)
LYMPHOCYTES NFR BLD: 2.26 K/UL (ref 1.5–4)
LYMPHOCYTES RELATIVE PERCENT: 21 % (ref 20–42)
MAGNESIUM SERPL-MCNC: 1.9 MG/DL (ref 1.6–2.6)
MCH RBC QN AUTO: 24.8 PG (ref 26–35)
MCHC RBC AUTO-ENTMCNC: 31.7 G/DL (ref 32–34.5)
MCV RBC AUTO: 78.1 FL (ref 80–99.9)
MONOCYTES NFR BLD: 1.11 K/UL (ref 0.1–0.95)
MONOCYTES NFR BLD: 10 % (ref 2–12)
NEUTROPHILS NFR BLD: 68 % (ref 43–80)
NEUTS SEG NFR BLD: 7.26 K/UL (ref 1.8–7.3)
PHOSPHATE SERPL-MCNC: 1.5 MG/DL (ref 2.5–4.5)
PLATELET # BLD AUTO: 245 K/UL (ref 130–450)
PMV BLD AUTO: 10.3 FL (ref 7–12)
POTASSIUM SERPL-SCNC: 3.5 MMOL/L (ref 3.5–5)
RBC # BLD AUTO: 3.15 M/UL (ref 3.5–5.5)
SODIUM SERPL-SCNC: 138 MMOL/L (ref 132–146)
WBC OTHER # BLD: 10.7 K/UL (ref 4.5–11.5)

## 2024-01-05 PROCEDURE — 6370000000 HC RX 637 (ALT 250 FOR IP)

## 2024-01-05 PROCEDURE — 97530 THERAPEUTIC ACTIVITIES: CPT

## 2024-01-05 PROCEDURE — 83735 ASSAY OF MAGNESIUM: CPT

## 2024-01-05 PROCEDURE — 97535 SELF CARE MNGMENT TRAINING: CPT

## 2024-01-05 PROCEDURE — 1200000000 HC SEMI PRIVATE

## 2024-01-05 PROCEDURE — 80048 BASIC METABOLIC PNL TOTAL CA: CPT

## 2024-01-05 PROCEDURE — 99024 POSTOP FOLLOW-UP VISIT: CPT | Performed by: SURGERY

## 2024-01-05 PROCEDURE — 2580000003 HC RX 258: Performed by: STUDENT IN AN ORGANIZED HEALTH CARE EDUCATION/TRAINING PROGRAM

## 2024-01-05 PROCEDURE — 6370000000 HC RX 637 (ALT 250 FOR IP): Performed by: STUDENT IN AN ORGANIZED HEALTH CARE EDUCATION/TRAINING PROGRAM

## 2024-01-05 PROCEDURE — 84100 ASSAY OF PHOSPHORUS: CPT

## 2024-01-05 PROCEDURE — 6360000002 HC RX W HCPCS: Performed by: STUDENT IN AN ORGANIZED HEALTH CARE EDUCATION/TRAINING PROGRAM

## 2024-01-05 PROCEDURE — 36415 COLL VENOUS BLD VENIPUNCTURE: CPT

## 2024-01-05 PROCEDURE — 2500000003 HC RX 250 WO HCPCS: Performed by: STUDENT IN AN ORGANIZED HEALTH CARE EDUCATION/TRAINING PROGRAM

## 2024-01-05 PROCEDURE — 85025 COMPLETE CBC W/AUTO DIFF WBC: CPT

## 2024-01-05 RX ORDER — PANTOPRAZOLE SODIUM 40 MG/1
40 TABLET, DELAYED RELEASE ORAL
Status: DISCONTINUED | OUTPATIENT
Start: 2024-01-05 | End: 2024-01-08 | Stop reason: HOSPADM

## 2024-01-05 RX ORDER — MECLIZINE HCL 12.5 MG/1
12.5 TABLET ORAL PRN
Status: DISCONTINUED | OUTPATIENT
Start: 2024-01-05 | End: 2024-01-08 | Stop reason: HOSPADM

## 2024-01-05 RX ORDER — MAGNESIUM SULFATE IN WATER 40 MG/ML
2000 INJECTION, SOLUTION INTRAVENOUS ONCE
Status: COMPLETED | OUTPATIENT
Start: 2024-01-05 | End: 2024-01-05

## 2024-01-05 RX ORDER — METHOCARBAMOL 750 MG/1
750 TABLET, FILM COATED ORAL 4 TIMES DAILY
Status: DISCONTINUED | OUTPATIENT
Start: 2024-01-05 | End: 2024-01-08 | Stop reason: HOSPADM

## 2024-01-05 RX ADMIN — MECLIZINE 12.5 MG: 12.5 TABLET ORAL at 22:45

## 2024-01-05 RX ADMIN — ROSUVASTATIN CALCIUM 20 MG: 20 TABLET, FILM COATED ORAL at 08:04

## 2024-01-05 RX ADMIN — OXYCODONE HYDROCHLORIDE 5 MG: 5 TABLET ORAL at 19:02

## 2024-01-05 RX ADMIN — HEPARIN SODIUM 5000 UNITS: 10000 INJECTION INTRAVENOUS; SUBCUTANEOUS at 14:48

## 2024-01-05 RX ADMIN — KETOROLAC TROMETHAMINE 15 MG: 30 INJECTION, SOLUTION INTRAMUSCULAR; INTRAVENOUS at 05:58

## 2024-01-05 RX ADMIN — METHOCARBAMOL 750 MG: 750 TABLET ORAL at 17:36

## 2024-01-05 RX ADMIN — OXYCODONE HYDROCHLORIDE 5 MG: 5 TABLET ORAL at 23:08

## 2024-01-05 RX ADMIN — SODIUM CHLORIDE, PRESERVATIVE FREE 10 ML: 5 INJECTION INTRAVENOUS at 08:06

## 2024-01-05 RX ADMIN — ACETAMINOPHEN 1000 MG: 500 TABLET ORAL at 04:36

## 2024-01-05 RX ADMIN — HEPARIN SODIUM 5000 UNITS: 10000 INJECTION INTRAVENOUS; SUBCUTANEOUS at 21:02

## 2024-01-05 RX ADMIN — MAGNESIUM SULFATE HEPTAHYDRATE 2000 MG: 40 INJECTION, SOLUTION INTRAVENOUS at 09:34

## 2024-01-05 RX ADMIN — OXYCODONE HYDROCHLORIDE 5 MG: 5 TABLET ORAL at 13:44

## 2024-01-05 RX ADMIN — ACETAMINOPHEN 1000 MG: 500 TABLET ORAL at 13:57

## 2024-01-05 RX ADMIN — SODIUM CHLORIDE, PRESERVATIVE FREE 10 ML: 5 INJECTION INTRAVENOUS at 21:09

## 2024-01-05 RX ADMIN — CHLORTHALIDONE 25 MG: 25 TABLET ORAL at 08:05

## 2024-01-05 RX ADMIN — KETOROLAC TROMETHAMINE 15 MG: 30 INJECTION, SOLUTION INTRAMUSCULAR; INTRAVENOUS at 17:37

## 2024-01-05 RX ADMIN — METHOCARBAMOL 500 MG: 100 INJECTION INTRAMUSCULAR; INTRAVENOUS at 06:01

## 2024-01-05 RX ADMIN — METHOCARBAMOL 750 MG: 750 TABLET ORAL at 13:58

## 2024-01-05 RX ADMIN — BISACODYL 10 MG: 10 SUPPOSITORY RECTAL at 08:05

## 2024-01-05 RX ADMIN — KETOROLAC TROMETHAMINE 15 MG: 30 INJECTION, SOLUTION INTRAMUSCULAR; INTRAVENOUS at 11:13

## 2024-01-05 RX ADMIN — BENZOCAINE AND MENTHOL 1 LOZENGE: 15; 3.6 LOZENGE ORAL at 14:59

## 2024-01-05 RX ADMIN — PANTOPRAZOLE SODIUM 40 MG: 40 TABLET, DELAYED RELEASE ORAL at 08:09

## 2024-01-05 RX ADMIN — ACETAMINOPHEN 1000 MG: 500 TABLET ORAL at 20:57

## 2024-01-05 RX ADMIN — OXYCODONE HYDROCHLORIDE 5 MG: 5 TABLET ORAL at 09:30

## 2024-01-05 RX ADMIN — METHOCARBAMOL 750 MG: 750 TABLET ORAL at 08:03

## 2024-01-05 RX ADMIN — POTASSIUM PHOSPHATE, MONOBASIC AND POTASSIUM PHOSPHATE, DIBASIC 30 MMOL: 224; 236 INJECTION, SOLUTION, CONCENTRATE INTRAVENOUS at 11:07

## 2024-01-05 RX ADMIN — METHOCARBAMOL 750 MG: 750 TABLET ORAL at 20:57

## 2024-01-05 RX ADMIN — ATENOLOL 50 MG: 50 TABLET ORAL at 08:04

## 2024-01-05 RX ADMIN — HEPARIN SODIUM 5000 UNITS: 10000 INJECTION INTRAVENOUS; SUBCUTANEOUS at 08:15

## 2024-01-05 RX ADMIN — POTASSIUM CHLORIDE, DEXTROSE MONOHYDRATE AND SODIUM CHLORIDE: 150; 5; 450 INJECTION, SOLUTION INTRAVENOUS at 03:17

## 2024-01-05 RX ADMIN — KETOROLAC TROMETHAMINE 15 MG: 30 INJECTION, SOLUTION INTRAMUSCULAR; INTRAVENOUS at 22:45

## 2024-01-05 RX ADMIN — OXYCODONE HYDROCHLORIDE 5 MG: 5 TABLET ORAL at 04:36

## 2024-01-05 ASSESSMENT — PAIN DESCRIPTION - DESCRIPTORS
DESCRIPTORS: ACHING;DISCOMFORT;GNAWING
DESCRIPTORS: ACHING;DISCOMFORT;GNAWING;CRAMPING
DESCRIPTORS: ACHING;DISCOMFORT
DESCRIPTORS: ACHING;DISCOMFORT;GNAWING
DESCRIPTORS: ACHING;DISCOMFORT
DESCRIPTORS: ACHING;DISCOMFORT
DESCRIPTORS: ACHING;DISCOMFORT;GNAWING
DESCRIPTORS: CRAMPING;SHARP
DESCRIPTORS: ACHING;DISCOMFORT;GNAWING
DESCRIPTORS: DISCOMFORT

## 2024-01-05 ASSESSMENT — PAIN DESCRIPTION - ONSET: ONSET: ON-GOING

## 2024-01-05 ASSESSMENT — PAIN SCALES - GENERAL
PAINLEVEL_OUTOF10: 7
PAINLEVEL_OUTOF10: 6
PAINLEVEL_OUTOF10: 5
PAINLEVEL_OUTOF10: 6
PAINLEVEL_OUTOF10: 7
PAINLEVEL_OUTOF10: 7
PAINLEVEL_OUTOF10: 6
PAINLEVEL_OUTOF10: 5
PAINLEVEL_OUTOF10: 6
PAINLEVEL_OUTOF10: 5
PAINLEVEL_OUTOF10: 3
PAINLEVEL_OUTOF10: 6

## 2024-01-05 ASSESSMENT — PAIN DESCRIPTION - LOCATION
LOCATION: ABDOMEN
LOCATION: THROAT
LOCATION: ABDOMEN

## 2024-01-05 ASSESSMENT — PAIN DESCRIPTION - ORIENTATION
ORIENTATION: MID
ORIENTATION: MID

## 2024-01-05 ASSESSMENT — PAIN DESCRIPTION - FREQUENCY: FREQUENCY: CONTINUOUS

## 2024-01-05 ASSESSMENT — PAIN SCALES - WONG BAKER
WONGBAKER_NUMERICALRESPONSE: 0
WONGBAKER_NUMERICALRESPONSE: 0

## 2024-01-05 NOTE — PROGRESS NOTES
Occupational Therapy  OT BEDSIDE TREATMENT NOTE   [facility]  [address] [place], [place]        Date:2024  Patient Name: Zelda Lowery  MRN: [id number]  : 1955  Room: [address] [place] [address]     Per OT Eval:    Evaluating OT: Adis Faulkner OTR/L [id number]     Referring Provider:: Elmer Bains MD                               Specific Provider Orders/Date: OT evaluation and treatment 1/3/24 8641     Diagnosis:  Thyroid nodule [E04.1]  Incisional hernia, without obstruction or gangrene [K43.2]  Mesenteric lymphadenopathy [R59.0]       Pertinent Medical History:  has a past medical history of Anxiety, Cancer (HCC), Roque's pouch of intestine (HCC), Hyperlipidemia, Hypertension, Osteoarthritis, Peritonitis (HCC), and Vitamin D deficiency.   Pt admitted to the hospital for exploratory laparotomy. Procedure performed on 24: MIDLINE ABDOMINAL WALL SCAR EXCISION, EXPLORATORY LAPAROTOMY, MESENTERIC LYMPH NODE BIOPSY, MID SMALL BOWEL MESENTERIC NODULE BIOPSY, BILATERAL TRANSABDOMINUS MUSCLE RELEASE, BILATERAL TRANSVERSUS ABDOMINAL PLANE BLOCK, JAMI-STOPPA -LIKE INCISIONAL HERNIA REPAIR (15CM) WITH RETRORECTUS MESH PLACEMENT, BILATERAL RETRORECTUS DRAIN PLACEMENT, HERNIA SAC EXCISION      Precautions:  Fall Risk, GEORGIANA drains, abdominal incision, +alarms      Assessment of current deficits   [x] Functional mobility             [x]ADLs           [x] Strength                  []Cognition   [x] Functional transfers           [x] IADLs         [x] Safety Awareness   [x]Endurance   [] Fine Coordination              [x] Balance      [] Vision/perception   []Sensation     []Gross Motor Coordination  [] ROM           [] Delirium                   [] Motor Control      OT PLAN OF CARE   OT POC based on physician orders, patient diagnosis and results of clinical assessment     Frequency/Duration  2-5 days/wk for 2 weeks PRN   Specific OT Treatment to  include:   * Instruction/training on adapted ADL techniques and AE recommendations to increase functional independence within precautions       * Training on energy conservation strategies, correct breathing pattern and techniques to improve independence/tolerance for self-care routine  * Functional transfer/mobility training/DME recommendations for increased independence, safety, and fall prevention  * Patient/Family education to increase follow through with safety techniques and functional independence  * Recommendation of environmental modifications for increased safety with functional transfers/mobility and ADLs  * Therapeutic exercise to improve motor endurance, ROM, and functional strength for ADLs/functional transfers  * Therapeutic activities to facilitate/challenge dynamic balance, stand tolerance for increased safety and independence with ADLs        Recommended Adaptive Equipment:  TBD      Home Living:  Pt lives in a 2 story house with 4 step(s) to enter and bilateral hand rail(s); bed/bath on 2nd level   Also has an additional bath on the first floor    Bathroom setup: claw tubs with hand held shower    Equipment owned: SPC, FWW, ETB, sock aid      Prior Level of Function:  Independent  with ADLs,  Independent with IADLs. Ambulated with no AD (and occasional use of cane) per report.   Pt takes care of her cat at baseline and orders groceries to be delivered to her home.      Driving: yes  Occupation/leisure: reading, knitting, piano, and spending time with her grandchildren      Pain Level: Pt complained of minimal abdominal pain this session  OT provided edu on compensatory strategies for functional mobility and ADLs in order to manage pain   Diversion and repositioning+ abdominal binder     Cognition: A&O: 4/4;   Follows multi step directions: good               Memory:  good               Sequencing:  good               Problem solving:  good               Judgement/safety:  fair      Functional

## 2024-01-05 NOTE — CARE COORDINATION
CM update note.  Met with pt.  She plans on going home with [facility].  [facility] has accepted patient.   Will need [facility] orders placed for SN, PT/OT.  Pt reports she will go home with drains.  Not clear in the notes.  If going home with drains this will need included in the [facility] orders.  [facility] will need notified when pt is discharged.  Pt family will be staying with her 24 hours a day when she goes home.  Her family will provide transport home on day of d/c.  CM/SW to follow.  Demetrio Roach RN [phone].

## 2024-01-05 NOTE — PROGRESS NOTES
GENERAL SURGERY  DAILY PROGRESS NOTE    Patient's Name/Date of Birth: Zelda Lowery / 1955    Date: 2024     CC: ventral hernia    Subjective:  Pain well controlled. No n/v. Minimal burping. F+ no BM. Ambulating in halls. Urinating appropriately. Drains benign      Objective:  Last 24Hrs  Temp  Av.7 °F (36.5 °C)  Min: 97 °F (36.1 °C)  Max: 98.3 °F (36.8 °C)  Resp  Av.5  Min: 16  Max: 18  Pulse  Av.2  Min: 78  Max: 93  Systolic (24hrs), Av , Min:128 , Max:146     Diastolic (24hrs), Av, Min:65, Max:76    SpO2  Av.3 %  Min: 94 %  Max: 98 %    I/O last 3 completed shifts:  In: 4135 [I.V.:4135]  Out: 760 [Urine:400; Drains:360]    JPL: 100 ss  JPR: 100 ss    General: In no acute distress, alert and oriented x4  Cardiovascular: Warm throughout, no edema  Respiratory: no respiratory distress, equal chest rise  Abdomen: soft, appropriately TTP, midline cdi with dermabond, GEORGIANA RLQ, LLQ   Skin: no obvious rashes or lesions appreciated, no jaundice  Extremities: atraumatic, no focal motor deficits, no open wounds      CBC  Recent Labs     24  1130 24  0513 24  0546   WBC 13.7* 13.1* 10.7   RBC 3.94 3.80 3.15*   HGB 9.8* 9.5* 7.8*   HCT 30.3* 30.0* 24.6*   MCV 76.9* 78.9* 78.1*   MCH 24.9* 25.0* 24.8*   MCHC 32.3 31.7* 31.7*   RDW 14.4 14.6 14.6    305 245   MPV 10.6 10.3 10.3       CMP  Recent Labs     24  0855 24  1130 24  0513    138 139   K 3.6 3.7 3.3*   CL 98 102 100   CO2 24 21* 25   BUN 15 15 13   CREATININE 0.5 0.6 0.6   GLUCOSE 115* 168* 120*   CALCIUM 10.0 8.5* 8.9         Assessment/Plan:    Patient Active Problem List   Diagnosis    Hyperlipidemia    Essential hypertension, benign    Vitamin D deficiency    Status post Roque procedure (HCC)    Sigmoid diverticulosis    S/P colostomy takedown    Pre-op testing    Incisional hernia, without obstruction or gangrene    History of breast cancer    Mesenteric lymphadenopathy    Multimodal pain control  Monitor abd exam  Monitor bowel function  Clears  OOB/ambulate  DVT risk--PCDs/subQ heparin    Omega Moser MD, FACS  1/5/2024  11:21 PM

## 2024-01-05 NOTE — PROGRESS NOTES
GENERAL SURGERY  DAILY PROGRESS NOTE  MEDICAL STUDENT    Patient's Name/Date of Birth: Zelda Lowery / 1955    Date: 2024     CC: ventral hernia        Subjective:  Pt sleeping, easily awaken. Pt reports 6/10 RUQ pain, diffuse abdominal discomfort, and headache. Pt denies bm and reports gas. Pt reports sitting up in bed for 3 hours yesterday, assisted ambulation to bathroom, and urinating normally. Bilateral drains serosanguinous with few clots.       Objective:  Last 24Hrs  Temp  Av.7 °F (36.5 °C)  Min: 97 °F (36.1 °C)  Max: 98.3 °F (36.8 °C)  Resp  Av.6  Min: 16  Max: 18  Pulse  Av.2  Min: 78  Max: 93  Systolic (24hrs), Av , Min:128 , Max:146     Diastolic (24hrs), Av, Min:65, Max:76    SpO2  Av.3 %  Min: 94 %  Max: 98 %    I/O last 3 completed shifts:  In: 4135 [I.V.:4135]  Out: 760 [Urine:400; Drains:360]    Output by Drain (mL) 24 07 - 24 1900 24 190 - 24 0700 24 07 - 24 1900 24 190 - 24 0700 24 07 - 24 0758   Closed/Suction Drain Right;Ventral Hip Bulb 45 60 80 20    Closed/Suction Drain Left;Ventral Hip Bulb 45 95 45 60         General: In no acute distress, alert and oriented x3  Cardiovascular: Warm throughout, no edema  Respiratory: no respiratory distress  Abdomen: soft, diffuse moderate tenderness to palpation, nondistended, midline incision clean, dry, intact - covered in DermaBond, bilateral GEORGIANA drains at RLQ and LLQ  Skin: no obvious rashes or lesions appreciated, no jaundice  Extremities: atraumatic, no focal motor deficits, no open wounds      CBC  Recent Labs     24  1130 24  0513 24  0546   WBC 13.7* 13.1* 10.7   RBC 3.94 3.80 3.15*   HGB 9.8* 9.5* 7.8*   HCT 30.3* 30.0* 24.6*   MCV 76.9* 78.9* 78.1*   MCH 24.9* 25.0* 24.8*   MCHC 32.3 31.7* 31.7*   RDW 14.4 14.6 14.6    305 245   MPV 10.6 10.3 10.3       CMP  Recent Labs     24  1130 24  0513

## 2024-01-05 NOTE — PROGRESS NOTES
Pt assisted up to restroom to void,  Gasper emptied, binder back on.  Pt brushed her teeth and mouthwash, face washed and dried.  Bed alarm on

## 2024-01-06 LAB
ANION GAP SERPL CALCULATED.3IONS-SCNC: 13 MMOL/L (ref 7–16)
BASOPHILS # BLD: 0.01 K/UL (ref 0–0.2)
BASOPHILS NFR BLD: 0 % (ref 0–2)
BUN SERPL-MCNC: 7 MG/DL (ref 6–23)
CALCIUM SERPL-MCNC: 8.8 MG/DL (ref 8.6–10.2)
CHLORIDE SERPL-SCNC: 97 MMOL/L (ref 98–107)
CO2 SERPL-SCNC: 24 MMOL/L (ref 22–29)
CREAT SERPL-MCNC: 0.5 MG/DL (ref 0.5–1)
EOSINOPHIL # BLD: 0.06 K/UL (ref 0.05–0.5)
EOSINOPHILS RELATIVE PERCENT: 1 % (ref 0–6)
ERYTHROCYTE [DISTWIDTH] IN BLOOD BY AUTOMATED COUNT: 14.6 % (ref 11.5–15)
GFR SERPL CREATININE-BSD FRML MDRD: >60 ML/MIN/1.73M2
GLUCOSE SERPL-MCNC: 97 MG/DL (ref 74–99)
HCT VFR BLD AUTO: 30.7 % (ref 34–48)
HGB BLD-MCNC: 9.3 G/DL (ref 11.5–15.5)
IMM GRANULOCYTES # BLD AUTO: 0.05 K/UL (ref 0–0.58)
IMM GRANULOCYTES NFR BLD: 1 % (ref 0–5)
LYMPHOCYTES NFR BLD: 1.9 K/UL (ref 1.5–4)
LYMPHOCYTES RELATIVE PERCENT: 26 % (ref 20–42)
MAGNESIUM SERPL-MCNC: 2.2 MG/DL (ref 1.6–2.6)
MCH RBC QN AUTO: 25.3 PG (ref 26–35)
MCHC RBC AUTO-ENTMCNC: 30.3 G/DL (ref 32–34.5)
MCV RBC AUTO: 83.4 FL (ref 80–99.9)
MONOCYTES NFR BLD: 0.63 K/UL (ref 0.1–0.95)
MONOCYTES NFR BLD: 9 % (ref 2–12)
NEUTROPHILS NFR BLD: 64 % (ref 43–80)
NEUTS SEG NFR BLD: 4.68 K/UL (ref 1.8–7.3)
PHOSPHATE SERPL-MCNC: 2.5 MG/DL (ref 2.5–4.5)
PLATELET # BLD AUTO: 241 K/UL (ref 130–450)
PMV BLD AUTO: 11.3 FL (ref 7–12)
POTASSIUM SERPL-SCNC: 3.8 MMOL/L (ref 3.5–5)
RBC # BLD AUTO: 3.68 M/UL (ref 3.5–5.5)
SODIUM SERPL-SCNC: 134 MMOL/L (ref 132–146)
WBC OTHER # BLD: 7.3 K/UL (ref 4.5–11.5)

## 2024-01-06 PROCEDURE — 99024 POSTOP FOLLOW-UP VISIT: CPT | Performed by: SURGERY

## 2024-01-06 PROCEDURE — 80048 BASIC METABOLIC PNL TOTAL CA: CPT

## 2024-01-06 PROCEDURE — 1200000000 HC SEMI PRIVATE

## 2024-01-06 PROCEDURE — 84100 ASSAY OF PHOSPHORUS: CPT

## 2024-01-06 PROCEDURE — 83735 ASSAY OF MAGNESIUM: CPT

## 2024-01-06 PROCEDURE — 36415 COLL VENOUS BLD VENIPUNCTURE: CPT

## 2024-01-06 PROCEDURE — 6370000000 HC RX 637 (ALT 250 FOR IP): Performed by: STUDENT IN AN ORGANIZED HEALTH CARE EDUCATION/TRAINING PROGRAM

## 2024-01-06 PROCEDURE — 6360000002 HC RX W HCPCS: Performed by: STUDENT IN AN ORGANIZED HEALTH CARE EDUCATION/TRAINING PROGRAM

## 2024-01-06 PROCEDURE — 85025 COMPLETE CBC W/AUTO DIFF WBC: CPT

## 2024-01-06 PROCEDURE — 2580000003 HC RX 258: Performed by: STUDENT IN AN ORGANIZED HEALTH CARE EDUCATION/TRAINING PROGRAM

## 2024-01-06 PROCEDURE — 6370000000 HC RX 637 (ALT 250 FOR IP)

## 2024-01-06 RX ORDER — POLYETHYLENE GLYCOL 3350 17 G/17G
17 POWDER, FOR SOLUTION ORAL DAILY
Status: DISCONTINUED | OUTPATIENT
Start: 2024-01-06 | End: 2024-01-08 | Stop reason: HOSPADM

## 2024-01-06 RX ADMIN — OXYCODONE HYDROCHLORIDE 5 MG: 5 TABLET ORAL at 04:21

## 2024-01-06 RX ADMIN — CHLORTHALIDONE 25 MG: 25 TABLET ORAL at 09:16

## 2024-01-06 RX ADMIN — LORAZEPAM 0.25 MG: 0.5 TABLET ORAL at 06:31

## 2024-01-06 RX ADMIN — OXYCODONE HYDROCHLORIDE 10 MG: 10 TABLET ORAL at 22:50

## 2024-01-06 RX ADMIN — ATENOLOL 50 MG: 50 TABLET ORAL at 09:16

## 2024-01-06 RX ADMIN — Medication 250 MG: at 18:31

## 2024-01-06 RX ADMIN — ROSUVASTATIN CALCIUM 20 MG: 20 TABLET, FILM COATED ORAL at 09:18

## 2024-01-06 RX ADMIN — ACETAMINOPHEN 1000 MG: 500 TABLET ORAL at 13:15

## 2024-01-06 RX ADMIN — HEPARIN SODIUM 5000 UNITS: 10000 INJECTION INTRAVENOUS; SUBCUTANEOUS at 21:32

## 2024-01-06 RX ADMIN — ACETAMINOPHEN 1000 MG: 500 TABLET ORAL at 06:23

## 2024-01-06 RX ADMIN — SODIUM CHLORIDE, PRESERVATIVE FREE 10 ML: 5 INJECTION INTRAVENOUS at 21:32

## 2024-01-06 RX ADMIN — SODIUM CHLORIDE, PRESERVATIVE FREE 10 ML: 5 INJECTION INTRAVENOUS at 21:47

## 2024-01-06 RX ADMIN — KETOROLAC TROMETHAMINE 15 MG: 30 INJECTION, SOLUTION INTRAMUSCULAR; INTRAVENOUS at 06:22

## 2024-01-06 RX ADMIN — PANTOPRAZOLE SODIUM 40 MG: 40 TABLET, DELAYED RELEASE ORAL at 06:23

## 2024-01-06 RX ADMIN — KETOROLAC TROMETHAMINE 15 MG: 30 INJECTION, SOLUTION INTRAMUSCULAR; INTRAVENOUS at 11:54

## 2024-01-06 RX ADMIN — Medication 250 MG: at 09:16

## 2024-01-06 RX ADMIN — OXYCODONE HYDROCHLORIDE 5 MG: 5 TABLET ORAL at 18:31

## 2024-01-06 RX ADMIN — METHOCARBAMOL 750 MG: 750 TABLET ORAL at 09:19

## 2024-01-06 RX ADMIN — POLYETHYLENE GLYCOL 3350 17 G: 17 POWDER, FOR SOLUTION ORAL at 09:16

## 2024-01-06 RX ADMIN — METHOCARBAMOL 750 MG: 750 TABLET ORAL at 21:31

## 2024-01-06 RX ADMIN — KETOROLAC TROMETHAMINE 15 MG: 30 INJECTION, SOLUTION INTRAMUSCULAR; INTRAVENOUS at 18:31

## 2024-01-06 RX ADMIN — OXYCODONE HYDROCHLORIDE 5 MG: 5 TABLET ORAL at 14:37

## 2024-01-06 RX ADMIN — Medication 250 MG: at 21:31

## 2024-01-06 RX ADMIN — SODIUM CHLORIDE, PRESERVATIVE FREE 10 ML: 5 INJECTION INTRAVENOUS at 09:20

## 2024-01-06 RX ADMIN — SODIUM CHLORIDE, PRESERVATIVE FREE 10 ML: 5 INJECTION INTRAVENOUS at 09:18

## 2024-01-06 RX ADMIN — HEPARIN SODIUM 5000 UNITS: 10000 INJECTION INTRAVENOUS; SUBCUTANEOUS at 09:18

## 2024-01-06 RX ADMIN — ACETAMINOPHEN 1000 MG: 500 TABLET ORAL at 21:31

## 2024-01-06 RX ADMIN — BENZOCAINE AND MENTHOL 1 LOZENGE: 15; 3.6 LOZENGE ORAL at 01:00

## 2024-01-06 RX ADMIN — Medication 250 MG: at 13:15

## 2024-01-06 RX ADMIN — METHOCARBAMOL 750 MG: 750 TABLET ORAL at 13:15

## 2024-01-06 RX ADMIN — HEPARIN SODIUM 5000 UNITS: 10000 INJECTION INTRAVENOUS; SUBCUTANEOUS at 15:44

## 2024-01-06 RX ADMIN — HEPARIN 100 UNITS: 100 SYRINGE at 09:20

## 2024-01-06 RX ADMIN — KETOROLAC TROMETHAMINE 15 MG: 30 INJECTION, SOLUTION INTRAMUSCULAR; INTRAVENOUS at 22:50

## 2024-01-06 RX ADMIN — METHOCARBAMOL 750 MG: 750 TABLET ORAL at 18:31

## 2024-01-06 ASSESSMENT — PAIN SCALES - GENERAL
PAINLEVEL_OUTOF10: 6
PAINLEVEL_OUTOF10: 2
PAINLEVEL_OUTOF10: 6
PAINLEVEL_OUTOF10: 6
PAINLEVEL_OUTOF10: 8
PAINLEVEL_OUTOF10: 6
PAINLEVEL_OUTOF10: 5
PAINLEVEL_OUTOF10: 4
PAINLEVEL_OUTOF10: 6

## 2024-01-06 ASSESSMENT — PAIN SCALES - WONG BAKER
WONGBAKER_NUMERICALRESPONSE: 2
WONGBAKER_NUMERICALRESPONSE: 0

## 2024-01-06 ASSESSMENT — PAIN DESCRIPTION - DESCRIPTORS
DESCRIPTORS: SORE
DESCRIPTORS: ACHING;DISCOMFORT
DESCRIPTORS: ACHING;DISCOMFORT
DESCRIPTORS: DISCOMFORT;ACHING
DESCRIPTORS: ACHING;DISCOMFORT
DESCRIPTORS: ACHING;DISCOMFORT
DESCRIPTORS: ACHING;DISCOMFORT;TIGHTNESS
DESCRIPTORS: ACHING;DISCOMFORT;TIGHTNESS

## 2024-01-06 ASSESSMENT — PAIN DESCRIPTION - LOCATION
LOCATION: ABDOMEN

## 2024-01-06 ASSESSMENT — PAIN - FUNCTIONAL ASSESSMENT
PAIN_FUNCTIONAL_ASSESSMENT: PREVENTS OR INTERFERES SOME ACTIVE ACTIVITIES AND ADLS
PAIN_FUNCTIONAL_ASSESSMENT: PREVENTS OR INTERFERES WITH ALL ACTIVE AND SOME PASSIVE ACTIVITIES

## 2024-01-06 ASSESSMENT — PAIN DESCRIPTION - ORIENTATION
ORIENTATION: MID
ORIENTATION: MID

## 2024-01-06 NOTE — PROGRESS NOTES
GENERAL SURGERY  DAILY PROGRESS NOTE    Patient's Name/Date of Birth: Zelda Lowery / 1955    Date: 2024     CC: ventral hernia    Subjective:  Patient reports passing large amounts of flatus. No nausea. Tolerating clear liquid diet      Objective:  Last 24Hrs  Temp  Av.3 °F (36.3 °C)  Min: 97 °F (36.1 °C)  Max: 97.7 °F (36.5 °C)  Resp  Av.1  Min: 18  Max: 19  Pulse  Av  Min: 68  Max: 76  Systolic (24hrs), Av , Min:117 , Max:137     Diastolic (24hrs), Av, Min:52, Max:69    SpO2  Av.5 %  Min: 93 %  Max: 97 %    I/O last 3 completed shifts:  In: 7144.9 [P.O.:960; I.V.:5590.8; IV Piggyback:594.1]  Out: 185 [Drains:185]    JPL: 90 ss  JPR: 15 ss    General: In no acute distress, alert and oriented x4  Cardiovascular: Warm throughout, no edema  Respiratory: no respiratory distress, equal chest rise  Abdomen: soft, mildly TTP, midline cdi with dermabond, GEORGIANA RLQ, LLQ   Skin: no obvious rashes or lesions appreciated, no jaundice  Extremities: atraumatic, no focal motor deficits, no open wounds      CBC  Recent Labs     24  1130 24  0513 24  0546   WBC 13.7* 13.1* 10.7   RBC 3.94 3.80 3.15*   HGB 9.8* 9.5* 7.8*   HCT 30.3* 30.0* 24.6*   MCV 76.9* 78.9* 78.1*   MCH 24.9* 25.0* 24.8*   MCHC 32.3 31.7* 31.7*   RDW 14.4 14.6 14.6    305 245   MPV 10.6 10.3 10.3         CMP  Recent Labs     24  0513 24  0546 24  0502    138 134   K 3.3* 3.5 3.8    103 97*   CO2 25 26 24   BUN 13 8 7   CREATININE 0.6 0.5 0.5   GLUCOSE 120* 128* 97   CALCIUM 8.9 8.1* 8.8           Assessment/Plan:    Patient Active Problem List   Diagnosis    Hyperlipidemia    Essential hypertension, benign    Vitamin D deficiency    Status post Roque procedure (HCC)    Sigmoid diverticulosis    S/P colostomy takedown    Pre-op testing    Incisional hernia, without obstruction or gangrene    History of breast cancer    Mesenteric lymphadenopathy    Hemangioma of  liver    Thyroid nodule       68 y.o. female s/p exploratory laparotomy, mesenteric node and nodule biopsy, reduced open incisional hernia repair with retrorectus mesh placement with placement of 2 retrorectus drains on 1/2/2024    Liquid diet  Multimodal pain control   Ambulate/PT OT  Bowel regimen: Miralax, suppositories  Continue drains   IS  Dvt ppx: scds, heparin TID    Electronically signed by Dusty Santo DO on 1/6/2024 at 8:16 AM       [facility]   ATTENDING PHYSICIAN PROGRESS NOTE      I have personally examined the patient, personally reviewed the record, and personally discussed the case with the resident. I have personally reviewed all relevant labs and imaging data. I am actively managing this patient's medications.  Please refer to the resident's note. I agree with the assessment and plan with the following corrections/additions. The following summarizes my clinical findings and independent assessment.      CC: hernia repair     Pt states pain is reasonably controlled with pain meds.  Passing flatus; had BM; no nausea.     Awake and alert  Follows commands  Hrt:  regular  Lungs:  clear  Abd:  soft; BS active; expected post-op tenderness; drains with serosang drainage  Skin:  warm/dry  Ext:  moves all 4 ext             Patient Active Problem List     Diagnosis Date Noted    Sigmoid diverticulosis 02/22/2023    Status post Roque procedure (HCC) 12/13/2022    Thyroid nodule 11/17/2023    Incisional hernia, without obstruction or gangrene 10/03/2023    History of breast cancer 10/03/2023    Mesenteric lymphadenopathy 10/03/2023    Hemangioma of liver 10/03/2023    S/P colostomy takedown 04/17/2023    Pre-op testing 04/17/2023    Hyperlipidemia 12/02/2016    Essential hypertension, benign 12/02/2016    Vitamin D deficiency 12/02/2016      S/p ex lap with mesenteric lymph node bx/rectorectus hernia repair with bilateral TAR.     Multimodal pain control  Monitor abd exam  Monitor

## 2024-01-07 LAB
ANION GAP SERPL CALCULATED.3IONS-SCNC: 17 MMOL/L (ref 7–16)
BASOPHILS # BLD: 0.02 K/UL (ref 0–0.2)
BASOPHILS NFR BLD: 0 % (ref 0–2)
BUN SERPL-MCNC: 9 MG/DL (ref 6–23)
CALCIUM SERPL-MCNC: 8.9 MG/DL (ref 8.6–10.2)
CHLORIDE SERPL-SCNC: 96 MMOL/L (ref 98–107)
CO2 SERPL-SCNC: 24 MMOL/L (ref 22–29)
CREAT SERPL-MCNC: 0.6 MG/DL (ref 0.5–1)
EOSINOPHIL # BLD: 0.11 K/UL (ref 0.05–0.5)
EOSINOPHILS RELATIVE PERCENT: 2 % (ref 0–6)
ERYTHROCYTE [DISTWIDTH] IN BLOOD BY AUTOMATED COUNT: 14.2 % (ref 11.5–15)
GFR SERPL CREATININE-BSD FRML MDRD: >60 ML/MIN/1.73M2
GLUCOSE SERPL-MCNC: 134 MG/DL (ref 74–99)
HCT VFR BLD AUTO: 26.8 % (ref 34–48)
HGB BLD-MCNC: 8.4 G/DL (ref 11.5–15.5)
IMM GRANULOCYTES # BLD AUTO: 0.06 K/UL (ref 0–0.58)
IMM GRANULOCYTES NFR BLD: 1 % (ref 0–5)
LYMPHOCYTES NFR BLD: 1.98 K/UL (ref 1.5–4)
LYMPHOCYTES RELATIVE PERCENT: 28 % (ref 20–42)
MAGNESIUM SERPL-MCNC: 2 MG/DL (ref 1.6–2.6)
MCH RBC QN AUTO: 24.1 PG (ref 26–35)
MCHC RBC AUTO-ENTMCNC: 31.3 G/DL (ref 32–34.5)
MCV RBC AUTO: 77 FL (ref 80–99.9)
MONOCYTES NFR BLD: 0.54 K/UL (ref 0.1–0.95)
MONOCYTES NFR BLD: 8 % (ref 2–12)
NEUTROPHILS NFR BLD: 62 % (ref 43–80)
NEUTS SEG NFR BLD: 4.38 K/UL (ref 1.8–7.3)
PHOSPHATE SERPL-MCNC: 3.7 MG/DL (ref 2.5–4.5)
PLATELET # BLD AUTO: 321 K/UL (ref 130–450)
PMV BLD AUTO: 10.2 FL (ref 7–12)
POTASSIUM SERPL-SCNC: 3.3 MMOL/L (ref 3.5–5)
RBC # BLD AUTO: 3.48 M/UL (ref 3.5–5.5)
SEND OUT REPORT: NORMAL
SODIUM SERPL-SCNC: 137 MMOL/L (ref 132–146)
TEST NAME: NORMAL
WBC OTHER # BLD: 7.1 K/UL (ref 4.5–11.5)

## 2024-01-07 PROCEDURE — 84100 ASSAY OF PHOSPHORUS: CPT

## 2024-01-07 PROCEDURE — 6370000000 HC RX 637 (ALT 250 FOR IP): Performed by: STUDENT IN AN ORGANIZED HEALTH CARE EDUCATION/TRAINING PROGRAM

## 2024-01-07 PROCEDURE — 6370000000 HC RX 637 (ALT 250 FOR IP)

## 2024-01-07 PROCEDURE — 85025 COMPLETE CBC W/AUTO DIFF WBC: CPT

## 2024-01-07 PROCEDURE — 80048 BASIC METABOLIC PNL TOTAL CA: CPT

## 2024-01-07 PROCEDURE — 2580000003 HC RX 258: Performed by: STUDENT IN AN ORGANIZED HEALTH CARE EDUCATION/TRAINING PROGRAM

## 2024-01-07 PROCEDURE — 99024 POSTOP FOLLOW-UP VISIT: CPT | Performed by: SURGERY

## 2024-01-07 PROCEDURE — 83735 ASSAY OF MAGNESIUM: CPT

## 2024-01-07 PROCEDURE — 6360000002 HC RX W HCPCS: Performed by: STUDENT IN AN ORGANIZED HEALTH CARE EDUCATION/TRAINING PROGRAM

## 2024-01-07 PROCEDURE — 1200000000 HC SEMI PRIVATE

## 2024-01-07 PROCEDURE — 36415 COLL VENOUS BLD VENIPUNCTURE: CPT

## 2024-01-07 RX ORDER — CALCIUM CARBONATE 500 MG/1
500 TABLET, CHEWABLE ORAL 3 TIMES DAILY PRN
Status: DISCONTINUED | OUTPATIENT
Start: 2024-01-07 | End: 2024-01-08 | Stop reason: HOSPADM

## 2024-01-07 RX ORDER — POTASSIUM CHLORIDE 20 MEQ/1
40 TABLET, EXTENDED RELEASE ORAL ONCE
Status: COMPLETED | OUTPATIENT
Start: 2024-01-07 | End: 2024-01-07

## 2024-01-07 RX ADMIN — SODIUM CHLORIDE, PRESERVATIVE FREE 10 ML: 5 INJECTION INTRAVENOUS at 21:38

## 2024-01-07 RX ADMIN — PANTOPRAZOLE SODIUM 40 MG: 40 TABLET, DELAYED RELEASE ORAL at 05:18

## 2024-01-07 RX ADMIN — ROSUVASTATIN CALCIUM 20 MG: 20 TABLET, FILM COATED ORAL at 08:37

## 2024-01-07 RX ADMIN — ACETAMINOPHEN 1000 MG: 500 TABLET ORAL at 21:39

## 2024-01-07 RX ADMIN — POLYETHYLENE GLYCOL 3350 17 G: 17 POWDER, FOR SOLUTION ORAL at 08:38

## 2024-01-07 RX ADMIN — POTASSIUM CHLORIDE 40 MEQ: 1500 TABLET, EXTENDED RELEASE ORAL at 10:57

## 2024-01-07 RX ADMIN — OXYCODONE HYDROCHLORIDE 10 MG: 10 TABLET ORAL at 08:38

## 2024-01-07 RX ADMIN — SODIUM CHLORIDE, PRESERVATIVE FREE 10 ML: 5 INJECTION INTRAVENOUS at 21:43

## 2024-01-07 RX ADMIN — KETOROLAC TROMETHAMINE 15 MG: 30 INJECTION, SOLUTION INTRAMUSCULAR; INTRAVENOUS at 05:18

## 2024-01-07 RX ADMIN — HEPARIN 100 UNITS: 100 SYRINGE at 08:37

## 2024-01-07 RX ADMIN — HEPARIN SODIUM 5000 UNITS: 10000 INJECTION INTRAVENOUS; SUBCUTANEOUS at 08:38

## 2024-01-07 RX ADMIN — OXYCODONE HYDROCHLORIDE 10 MG: 10 TABLET ORAL at 17:08

## 2024-01-07 RX ADMIN — OXYCODONE HYDROCHLORIDE 10 MG: 10 TABLET ORAL at 04:04

## 2024-01-07 RX ADMIN — LORAZEPAM 0.25 MG: 0.5 TABLET ORAL at 22:09

## 2024-01-07 RX ADMIN — HEPARIN SODIUM 5000 UNITS: 10000 INJECTION INTRAVENOUS; SUBCUTANEOUS at 21:39

## 2024-01-07 RX ADMIN — ACETAMINOPHEN 1000 MG: 500 TABLET ORAL at 05:18

## 2024-01-07 RX ADMIN — ACETAMINOPHEN 1000 MG: 500 TABLET ORAL at 15:44

## 2024-01-07 RX ADMIN — CHLORTHALIDONE 25 MG: 25 TABLET ORAL at 08:37

## 2024-01-07 RX ADMIN — METHOCARBAMOL 750 MG: 750 TABLET ORAL at 21:39

## 2024-01-07 RX ADMIN — SODIUM CHLORIDE, PRESERVATIVE FREE 10 ML: 5 INJECTION INTRAVENOUS at 08:38

## 2024-01-07 RX ADMIN — ATENOLOL 50 MG: 50 TABLET ORAL at 08:37

## 2024-01-07 RX ADMIN — OXYCODONE HYDROCHLORIDE 10 MG: 10 TABLET ORAL at 13:02

## 2024-01-07 RX ADMIN — OXYCODONE HYDROCHLORIDE 10 MG: 10 TABLET ORAL at 21:38

## 2024-01-07 RX ADMIN — HEPARIN SODIUM 5000 UNITS: 10000 INJECTION INTRAVENOUS; SUBCUTANEOUS at 15:44

## 2024-01-07 RX ADMIN — CALCIUM CARBONATE 500 MG: 500 TABLET, CHEWABLE ORAL at 22:09

## 2024-01-07 RX ADMIN — METHOCARBAMOL 750 MG: 750 TABLET ORAL at 17:09

## 2024-01-07 RX ADMIN — METHOCARBAMOL 750 MG: 750 TABLET ORAL at 08:37

## 2024-01-07 RX ADMIN — MECLIZINE 12.5 MG: 12.5 TABLET ORAL at 11:24

## 2024-01-07 RX ADMIN — METHOCARBAMOL 750 MG: 750 TABLET ORAL at 13:51

## 2024-01-07 ASSESSMENT — PAIN DESCRIPTION - LOCATION
LOCATION: ABDOMEN

## 2024-01-07 ASSESSMENT — PAIN - FUNCTIONAL ASSESSMENT
PAIN_FUNCTIONAL_ASSESSMENT: PREVENTS OR INTERFERES SOME ACTIVE ACTIVITIES AND ADLS

## 2024-01-07 ASSESSMENT — PAIN DESCRIPTION - ONSET: ONSET: ON-GOING

## 2024-01-07 ASSESSMENT — PAIN SCALES - WONG BAKER
WONGBAKER_NUMERICALRESPONSE: 4
WONGBAKER_NUMERICALRESPONSE: 2
WONGBAKER_NUMERICALRESPONSE: 2

## 2024-01-07 ASSESSMENT — PAIN SCALES - GENERAL
PAINLEVEL_OUTOF10: 9
PAINLEVEL_OUTOF10: 7
PAINLEVEL_OUTOF10: 6
PAINLEVEL_OUTOF10: 5
PAINLEVEL_OUTOF10: 5
PAINLEVEL_OUTOF10: 8
PAINLEVEL_OUTOF10: 7
PAINLEVEL_OUTOF10: 6
PAINLEVEL_OUTOF10: 4
PAINLEVEL_OUTOF10: 7
PAINLEVEL_OUTOF10: 4

## 2024-01-07 ASSESSMENT — PAIN DESCRIPTION - ORIENTATION
ORIENTATION: LEFT;LOWER
ORIENTATION: MID
ORIENTATION: MID

## 2024-01-07 ASSESSMENT — PAIN DESCRIPTION - DESCRIPTORS
DESCRIPTORS: ACHING;DISCOMFORT
DESCRIPTORS: ACHING;DISCOMFORT;SORE
DESCRIPTORS: ACHING;DISCOMFORT
DESCRIPTORS: ACHING;DISCOMFORT;SORE
DESCRIPTORS: BURNING;SHARP
DESCRIPTORS: ACHING;DISCOMFORT;SORE
DESCRIPTORS: ACHING;DISCOMFORT

## 2024-01-07 ASSESSMENT — PAIN DESCRIPTION - FREQUENCY: FREQUENCY: CONTINUOUS

## 2024-01-07 ASSESSMENT — PAIN DESCRIPTION - PAIN TYPE: TYPE: SURGICAL PAIN

## 2024-01-07 NOTE — PLAN OF CARE
Problem: Discharge Planning  Goal: Discharge to home or other facility with appropriate resources  1/7/2024 1136 by John Baez, RN  Outcome: Progressing     Problem: Pain  Goal: Verbalizes/displays adequate comfort level or baseline comfort level  1/7/2024 1136 by John Baez, RN  Outcome: Progressing

## 2024-01-07 NOTE — PLAN OF CARE
Problem: Pain  Goal: Verbalizes/displays adequate comfort level or baseline comfort level  Outcome: Progressing     Problem: Discharge Planning  Goal: Discharge to home or other facility with appropriate resources  Outcome: Progressing     Problem: Pain  Goal: Verbalizes/displays adequate comfort level or baseline comfort level  1/7/2024 0624 by Willow Medrano, RN  Outcome: Progressing  1/7/2024 0623 by Willow Medrano, RN  Outcome: Progressing

## 2024-01-07 NOTE — PROGRESS NOTES
GENERAL SURGERY  DAILY PROGRESS NOTE    Patient's Name/Date of Birth: Zelda Lowery / 1955    Date: 2024     CC: ventral hernia    Subjective:  No issues overnight. Pain controlled with medications. Tolerated diet. Passing flatus       Objective:  Last 24Hrs  Temp  Av.4 °F (36.3 °C)  Min: 96.7 °F (35.9 °C)  Max: 98.1 °F (36.7 °C)  Resp  Av.8  Min: 16  Max: 18  Pulse  Av  Min: 70  Max: 72  Systolic (24hrs), Av , Min:103 , Max:127     Diastolic (24hrs), Av, Min:51, Max:68    SpO2  Av.5 %  Min: 96 %  Max: 97 %    I/O last 3 completed shifts:  In: 1674.1 [P.O.:1080; IV Piggyback:594.1]  Out: 112.5 [Drains:112.5]    JPL: 60 ss  JPR: 12 ss    General: In no acute distress, alert and oriented x4  Cardiovascular: Warm throughout, no edema  Respiratory: no respiratory distress, equal chest rise  Abdomen: soft, mildly TTP, midline cdi with dermabond, GE[place]GIA[place] RLQ, LLQ   Skin: no obvious rashes or lesions appreciated, no jaundice  Extremities: atraumatic, no focal motor deficits, no open wounds      CBC  Recent Labs     24  0546 24  0502 24  0545   WBC 10.7 7.3 7.1   RBC 3.15* 3.68 3.48*   HGB 7.8* 9.3* 8.4*   HCT 24.6* 30.7* 26.8*   MCV 78.1* 83.4 77.0*   MCH 24.8* 25.3* 24.1*   MCHC 31.7* 30.3* 31.3*   RDW 14.6 14.6 14.2    241 321   MPV 10.3 11.3 10.2         CMP  Recent Labs     24  0546 24  0502 24  0545    134 137   K 3.5 3.8 3.3*    97* 96*   CO2    BUN 8 7 9   CREATININE 0.5 0.5 0.6   GLUCOSE 128* 97 134*   CALCIUM 8.1* 8.8 8.9           Assessment/Plan:    Patient Active Problem List   Diagnosis    Hyperlipidemia    Essential hypertension, benign    Vitamin D deficiency    Status post Roque procedure (HCC)    Sigmoid diverticulosis    S/P colostomy takedown    Pre-op testing    Incisional hernia, without obstruction or gangrene    History of breast cancer    Mesenteric lymphadenopathy    Hemangioma of liver     Thyroid nodule       68 y.o. female s/p exploratory laparotomy, mesenteric node and nodule biopsy, reduced open incisional hernia repair with retrorectus mesh placement with placement of 2 retrorectus drains on 1/2/2024    Likely advance diet   Multimodal pain control   Ambulate  Bowel regimen: Miralax, suppositories  Continue drains   IS  Dvt ppx: scds, heparin TID  Discharge planning    Electronically signed by Caitlin Vega MD on 1/7/2024 at 8:55 AM       [facility]   ATTENDING PHYSICIAN PROGRESS NOTE      I have personally examined the patient, personally reviewed the record, and personally discussed the case with the resident. I have personally reviewed all relevant labs and imaging data. I am actively managing this patient's medications.  Please refer to the resident's note. I agree with the assessment and plan with the following corrections/additions. The following summarizes my clinical findings and independent assessment.      CC: hernia repair     Pt states pain is reasonably controlled with pain meds.  Passing flatus; had BM; no nausea.     Awake and alert  Follows commands  Hrt:  regular  Lungs:  clear  Abd:  soft; BS active; expected post-op tenderness; drains with serosang drainage  Skin:  warm/dry  Ext:  moves all 4 ext             Patient Active Problem List     Diagnosis Date Noted    Sigmoid diverticulosis 02/22/2023    Status post Roque procedure (HCC) 12/13/2022    Thyroid nodule 11/17/2023    Incisional hernia, without obstruction or gangrene 10/03/2023    History of breast cancer 10/03/2023    Mesenteric lymphadenopathy 10/03/2023    Hemangioma of liver 10/03/2023    S/P colostomy takedown 04/17/2023    Pre-op testing 04/17/2023    Hyperlipidemia 12/02/2016    Essential hypertension, benign 12/02/2016    Vitamin D deficiency 12/02/2016      S/p ex lap with mesenteric lymph node bx/rectorectus hernia repair with bilateral TAR.     Multimodal pain control  Monitor abd

## 2024-01-08 VITALS
SYSTOLIC BLOOD PRESSURE: 102 MMHG | WEIGHT: 192.8 LBS | RESPIRATION RATE: 18 BRPM | HEIGHT: 62 IN | BODY MASS INDEX: 35.48 KG/M2 | DIASTOLIC BLOOD PRESSURE: 57 MMHG | HEART RATE: 65 BPM | OXYGEN SATURATION: 95 % | TEMPERATURE: 97 F

## 2024-01-08 LAB
ANION GAP SERPL CALCULATED.3IONS-SCNC: 11 MMOL/L (ref 7–16)
BASOPHILS # BLD: 0.02 K/UL (ref 0–0.2)
BASOPHILS NFR BLD: 0 % (ref 0–2)
BUN SERPL-MCNC: 9 MG/DL (ref 6–23)
CALCIUM SERPL-MCNC: 9.3 MG/DL (ref 8.6–10.2)
CHLORIDE SERPL-SCNC: 99 MMOL/L (ref 98–107)
CO2 SERPL-SCNC: 28 MMOL/L (ref 22–29)
CREAT SERPL-MCNC: 0.5 MG/DL (ref 0.5–1)
EOSINOPHIL # BLD: 0.17 K/UL (ref 0.05–0.5)
EOSINOPHILS RELATIVE PERCENT: 2 % (ref 0–6)
ERYTHROCYTE [DISTWIDTH] IN BLOOD BY AUTOMATED COUNT: 14.1 % (ref 11.5–15)
GFR SERPL CREATININE-BSD FRML MDRD: >60 ML/MIN/1.73M2
GLUCOSE SERPL-MCNC: 120 MG/DL (ref 74–99)
HCT VFR BLD AUTO: 26.7 % (ref 34–48)
HGB BLD-MCNC: 8.3 G/DL (ref 11.5–15.5)
IMM GRANULOCYTES # BLD AUTO: 0.13 K/UL (ref 0–0.58)
IMM GRANULOCYTES NFR BLD: 2 % (ref 0–5)
LYMPHOCYTES NFR BLD: 2.74 K/UL (ref 1.5–4)
LYMPHOCYTES RELATIVE PERCENT: 33 % (ref 20–42)
MAGNESIUM SERPL-MCNC: 1.9 MG/DL (ref 1.6–2.6)
MCH RBC QN AUTO: 24.5 PG (ref 26–35)
MCHC RBC AUTO-ENTMCNC: 31.1 G/DL (ref 32–34.5)
MCV RBC AUTO: 78.8 FL (ref 80–99.9)
MONOCYTES NFR BLD: 0.75 K/UL (ref 0.1–0.95)
MONOCYTES NFR BLD: 9 % (ref 2–12)
NEUTROPHILS NFR BLD: 55 % (ref 43–80)
NEUTS SEG NFR BLD: 4.61 K/UL (ref 1.8–7.3)
PHOSPHATE SERPL-MCNC: 3.5 MG/DL (ref 2.5–4.5)
PLATELET # BLD AUTO: 351 K/UL (ref 130–450)
PMV BLD AUTO: 10 FL (ref 7–12)
POTASSIUM SERPL-SCNC: 3.7 MMOL/L (ref 3.5–5)
RBC # BLD AUTO: 3.39 M/UL (ref 3.5–5.5)
SODIUM SERPL-SCNC: 138 MMOL/L (ref 132–146)
WBC OTHER # BLD: 8.4 K/UL (ref 4.5–11.5)

## 2024-01-08 PROCEDURE — 6370000000 HC RX 637 (ALT 250 FOR IP)

## 2024-01-08 PROCEDURE — 84100 ASSAY OF PHOSPHORUS: CPT

## 2024-01-08 PROCEDURE — 6370000000 HC RX 637 (ALT 250 FOR IP): Performed by: STUDENT IN AN ORGANIZED HEALTH CARE EDUCATION/TRAINING PROGRAM

## 2024-01-08 PROCEDURE — 80048 BASIC METABOLIC PNL TOTAL CA: CPT

## 2024-01-08 PROCEDURE — 6360000002 HC RX W HCPCS: Performed by: STUDENT IN AN ORGANIZED HEALTH CARE EDUCATION/TRAINING PROGRAM

## 2024-01-08 PROCEDURE — 2580000003 HC RX 258: Performed by: STUDENT IN AN ORGANIZED HEALTH CARE EDUCATION/TRAINING PROGRAM

## 2024-01-08 PROCEDURE — 6360000002 HC RX W HCPCS

## 2024-01-08 PROCEDURE — 85025 COMPLETE CBC W/AUTO DIFF WBC: CPT

## 2024-01-08 PROCEDURE — 36415 COLL VENOUS BLD VENIPUNCTURE: CPT

## 2024-01-08 PROCEDURE — 83735 ASSAY OF MAGNESIUM: CPT

## 2024-01-08 RX ORDER — SENNA AND DOCUSATE SODIUM 50; 8.6 MG/1; MG/1
2 TABLET, FILM COATED ORAL 2 TIMES DAILY
Qty: 28 TABLET | Refills: 0 | Status: SHIPPED | OUTPATIENT
Start: 2024-01-08

## 2024-01-08 RX ORDER — METHOCARBAMOL 750 MG/1
750 TABLET, FILM COATED ORAL 4 TIMES DAILY
Qty: 40 TABLET | Refills: 0 | Status: SHIPPED | OUTPATIENT
Start: 2024-01-08 | End: 2024-01-16 | Stop reason: SDUPTHER

## 2024-01-08 RX ORDER — DIPHENHYDRAMINE HCL 25 MG
25 TABLET ORAL EVERY 6 HOURS PRN
Status: DISCONTINUED | OUTPATIENT
Start: 2024-01-08 | End: 2024-01-08 | Stop reason: HOSPADM

## 2024-01-08 RX ORDER — OXYCODONE HYDROCHLORIDE 5 MG/1
5 TABLET ORAL EVERY 6 HOURS PRN
Qty: 20 TABLET | Refills: 0 | Status: SHIPPED | OUTPATIENT
Start: 2024-01-08 | End: 2024-01-13

## 2024-01-08 RX ADMIN — ACETAMINOPHEN 1000 MG: 500 TABLET ORAL at 14:27

## 2024-01-08 RX ADMIN — OXYCODONE HYDROCHLORIDE 10 MG: 10 TABLET ORAL at 02:07

## 2024-01-08 RX ADMIN — LORAZEPAM 0.25 MG: 0.5 TABLET ORAL at 14:34

## 2024-01-08 RX ADMIN — OXYCODONE HYDROCHLORIDE 10 MG: 10 TABLET ORAL at 10:45

## 2024-01-08 RX ADMIN — SODIUM CHLORIDE, PRESERVATIVE FREE 10 ML: 5 INJECTION INTRAVENOUS at 09:34

## 2024-01-08 RX ADMIN — ATENOLOL 50 MG: 50 TABLET ORAL at 09:31

## 2024-01-08 RX ADMIN — OXYCODONE HYDROCHLORIDE 10 MG: 10 TABLET ORAL at 15:52

## 2024-01-08 RX ADMIN — CHLORTHALIDONE 25 MG: 25 TABLET ORAL at 09:31

## 2024-01-08 RX ADMIN — BISACODYL 10 MG: 10 SUPPOSITORY RECTAL at 09:34

## 2024-01-08 RX ADMIN — HYDROMORPHONE HYDROCHLORIDE 0.5 MG: 1 INJECTION, SOLUTION INTRAMUSCULAR; INTRAVENOUS; SUBCUTANEOUS at 00:22

## 2024-01-08 RX ADMIN — METHOCARBAMOL 750 MG: 750 TABLET ORAL at 14:27

## 2024-01-08 RX ADMIN — ACETAMINOPHEN 1000 MG: 500 TABLET ORAL at 05:59

## 2024-01-08 RX ADMIN — ROSUVASTATIN CALCIUM 20 MG: 20 TABLET, FILM COATED ORAL at 09:31

## 2024-01-08 RX ADMIN — HEPARIN SODIUM 5000 UNITS: 10000 INJECTION INTRAVENOUS; SUBCUTANEOUS at 09:32

## 2024-01-08 RX ADMIN — METHOCARBAMOL 750 MG: 750 TABLET ORAL at 09:31

## 2024-01-08 RX ADMIN — PANTOPRAZOLE SODIUM 40 MG: 40 TABLET, DELAYED RELEASE ORAL at 05:59

## 2024-01-08 RX ADMIN — POLYETHYLENE GLYCOL 3350 17 G: 17 POWDER, FOR SOLUTION ORAL at 09:34

## 2024-01-08 ASSESSMENT — PAIN DESCRIPTION - DESCRIPTORS
DESCRIPTORS: ACHING
DESCRIPTORS: ACHING
DESCRIPTORS: ACHING;DISCOMFORT;DULL

## 2024-01-08 ASSESSMENT — PAIN DESCRIPTION - LOCATION
LOCATION: ABDOMEN

## 2024-01-08 ASSESSMENT — PAIN - FUNCTIONAL ASSESSMENT
PAIN_FUNCTIONAL_ASSESSMENT: ACTIVITIES ARE NOT PREVENTED

## 2024-01-08 ASSESSMENT — PAIN DESCRIPTION - FREQUENCY: FREQUENCY: INTERMITTENT

## 2024-01-08 ASSESSMENT — PAIN DESCRIPTION - ORIENTATION
ORIENTATION: LEFT;RIGHT
ORIENTATION: RIGHT;MID
ORIENTATION: MID
ORIENTATION: LEFT;MID
ORIENTATION: LEFT;RIGHT;LOWER

## 2024-01-08 ASSESSMENT — PAIN SCALES - GENERAL
PAINLEVEL_OUTOF10: 7
PAINLEVEL_OUTOF10: 7
PAINLEVEL_OUTOF10: 6

## 2024-01-08 ASSESSMENT — PAIN DESCRIPTION - PAIN TYPE: TYPE: SURGICAL PAIN

## 2024-01-08 ASSESSMENT — PAIN DESCRIPTION - ONSET: ONSET: GRADUAL

## 2024-01-08 NOTE — PROGRESS NOTES
GENERAL SURGERY  DAILY PROGRESS NOTE    Patient's Name/Date of Birth: Zelda Lowery / 1955    Date: 2024     CC: ventral hernia    Subjective:  Doing well. Tolerating diet.      Objective:  Last 24Hrs  Temp  Av.2 °F (36.2 °C)  Min: 96.7 °F (35.9 °C)  Max: 98.2 °F (36.8 °C)  Resp  Av.3  Min: 16  Max: 18  Pulse  Av  Min: 62  Max: 72  Systolic (24hrs), Av , Min:102 , Max:128     Diastolic (24hrs), Av, Min:48, Max:55    SpO2  Av.8 %  Min: 94 %  Max: 97 %    I/O last 3 completed shifts:  In: 1000 [P.O.:1000]  Out: 99.5 [Drains:99.5]    JPL: 62 ss  JPR: 7 ss    General: In no acute distress, alert and oriented x4  Cardiovascular: Warm throughout, no edema  Respiratory: no respiratory distress, equal chest rise  Abdomen: soft, mildly TTP, midline cdi with dermabond, GE[place]NA RLQ, LLQ   Skin: no obvious rashes or lesions appreciated, no jaundice  Extremities: atraumatic, no focal motor deficits, no open wounds      CBC  Recent Labs     24  0502 24  0545 24  0431   WBC 7.3 7.1 8.4   RBC 3.68 3.48* 3.39*   HGB 9.3* 8.4* 8.3*   HCT 30.7* 26.8* 26.7*   MCV 83.4 77.0* 78.8*   MCH 25.3* 24.1* 24.5*   MCHC 30.3* 31.3* 31.1*   RDW 14.6 14.2 14.1    321 351   MPV 11.3 10.2 10.0         CMP  Recent Labs     24  0502 24  0545 24  0431    137 138   K 3.8 3.3* 3.7   CL 97* 96* 99   CO2 24 24 28   BUN 7 9 9   CREATININE 0.5 0.6 0.5   GLUCOSE 97 134* 120*   CALCIUM 8.8 8.9 9.3           Assessment/Plan:    Patient Active Problem List   Diagnosis    Hyperlipidemia    Essential hypertension, benign    Vitamin D deficiency    Status post Roque procedure (HCC)    Sigmoid diverticulosis    S/P colostomy takedown    Pre-op testing    Incisional hernia, without obstruction or gangrene    History of breast cancer    Mesenteric lymphadenopathy    Hemangioma of liver    Thyroid nodule       68 y.o. female s/p exploratory laparotomy, mesenteric lymph node and  nodule biopsy, open incisional hernia repair with retrorectus mesh placement with placement of 2 retrorectus drains on 1/2/2024.    Remove R GEORGIANA this morning.     Continue regular diet  Multimodal pain control  Ambulate  Pulmonary hygiene  Bowel regimen  Continue L GEORGIANA drain  DVT chemoppx    Discharge later today        Electronically signed by Elmer Bains MD on 1/8/2024 at 7:21 AM

## 2024-01-08 NOTE — DISCHARGE INSTRUCTIONS
Home Care   Keep the incision area clean and dry   Okay to take a shower starting tomorrow, do not submerge your incisions under water  Place ice on incisions to decrease the pain and bruising  HHC to help manage GEORGIANA Drain output - keep diary of output    Diet  Okay to resume diet you were taking prior to surgery    Physical Activity   Walk frequently, do not perform strenuous activity but okay for household chores, etc.  Breath deeply every hour to prevent pneumonia  No heavy lifting over 10lbs for 4wks    Work   Okay to return to work when your pain is controlled  Avoid heavy lifting while at work     Medications   Over-the-counter Tylenol and ibuprofen are okay to take for pain  Do not take more than directions on package  No driving until off narcotics  If given antibiotics, take them as prescribed    Follow-up   Follow-up as scheduled in 2 weeks with Dr. Dorion    Call Your Doctor If Any of the Following Occurs   Signs of infection, including fever (>101.5F) and chills   Redness, swelling, increasing pain, excessive bleeding, or discharge at the incision site   Cough, shortness of breath, chest pain   Increased abdominal pain   Nausea and/or vomiting that does not resolve off narcotics.   Pain, burning, urgency or frequency of urination, or blood in the urine   Pain and/or swelling in your feet, calves, or legs   Dark urine, light stools, or evidence of jaundice (yellowing of the skin or eyes)    In case of an emergency, CALL 911 immediately.

## 2024-01-08 NOTE — CARE COORDINATION
CM update note.  D/c order noted.  Pt plans for home with [facility].  HHC orders placed.  Consuelo with [facility] notified of d/c today.  Pt daughter to provide transport home today.  Demetrio Roach RN [phone].

## 2024-01-08 NOTE — PLAN OF CARE
Problem: Discharge Planning  Goal: Discharge to home or other facility with appropriate resources  1/8/2024 1552 by Sheree Hawkins RN  Outcome: Progressing  1/8/2024 0406 by Latosha Horton RN  Outcome: Progressing     Problem: Pain  Goal: Verbalizes/displays adequate comfort level or baseline comfort level  1/8/2024 1552 by Sheree Hawkins RN  Outcome: Progressing  1/8/2024 0406 by Latosha Horton RN  Outcome: Progressing

## 2024-01-10 LAB — SURGICAL PATHOLOGY REPORT: NORMAL

## 2024-01-11 ENCOUNTER — TELEPHONE (OUTPATIENT)
Dept: SURGERY | Age: 69
End: 2024-01-11

## 2024-01-11 DIAGNOSIS — C82.13 FOLLICULAR LYMPHOMA GRADE II OF INTRA-ABDOMINAL LYMPH NODES (HCC): Primary | ICD-10-CM

## 2024-01-11 NOTE — TELEPHONE ENCOUNTER
I reviewed her surgical pathology results with her today.  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  I am going to refer her to [facility] for a consultation.      She is continuing to recover from her operation last week.  Her LLQ drain output is decreasing to less than 50-60 cc/day.    I will have my clinic schedule her to come in on Tuesday, January 16, for a postop visit and to remove her drain.

## 2024-01-12 ENCOUNTER — TELEPHONE (OUTPATIENT)
Dept: SURGERY | Age: 69
End: 2024-01-12

## 2024-01-12 ENCOUNTER — OFFICE VISIT (OUTPATIENT)
Dept: PRIMARY CARE CLINIC | Age: 69
End: 2024-01-12

## 2024-01-12 VITALS
RESPIRATION RATE: 18 BRPM | DIASTOLIC BLOOD PRESSURE: 76 MMHG | BODY MASS INDEX: 35.33 KG/M2 | TEMPERATURE: 97.8 F | WEIGHT: 192 LBS | HEIGHT: 62 IN | HEART RATE: 64 BPM | SYSTOLIC BLOOD PRESSURE: 126 MMHG | OXYGEN SATURATION: 98 %

## 2024-01-12 DIAGNOSIS — I10 ESSENTIAL HYPERTENSION, BENIGN: Primary | ICD-10-CM

## 2024-01-12 DIAGNOSIS — G89.18 POST-OP PAIN: Primary | ICD-10-CM

## 2024-01-12 DIAGNOSIS — E66.01 SEVERE OBESITY (BMI 35.0-39.9) WITH COMORBIDITY (HCC): ICD-10-CM

## 2024-01-12 DIAGNOSIS — F41.9 ANXIETY: ICD-10-CM

## 2024-01-12 DIAGNOSIS — C82.13 FOLLICULAR LYMPHOMA GRADE II OF INTRA-ABDOMINAL LYMPH NODES (HCC): ICD-10-CM

## 2024-01-12 DIAGNOSIS — Z93.3 STATUS POST HARTMANN PROCEDURE (HCC): ICD-10-CM

## 2024-01-12 RX ORDER — OXYCODONE HYDROCHLORIDE 5 MG/1
5 TABLET ORAL EVERY 6 HOURS PRN
Qty: 28 TABLET | Refills: 0 | Status: SHIPPED | OUTPATIENT
Start: 2024-01-12 | End: 2024-01-19

## 2024-01-12 RX ORDER — ESCITALOPRAM OXALATE 10 MG/1
10 TABLET ORAL DAILY
Qty: 30 TABLET | Refills: 3 | Status: SHIPPED | OUTPATIENT
Start: 2024-01-12

## 2024-01-12 RX ORDER — LORAZEPAM 0.5 MG/1
0.5 TABLET ORAL 2 TIMES DAILY
Qty: 60 TABLET | Refills: 2 | Status: SHIPPED | OUTPATIENT
Start: 2024-01-12 | End: 2024-04-11

## 2024-01-12 ASSESSMENT — ENCOUNTER SYMPTOMS
FACIAL SWELLING: 0
NAUSEA: 0
WHEEZING: 0
SINUS PRESSURE: 0
CHEST TIGHTNESS: 0
DIARRHEA: 0
BLOOD IN STOOL: 0
PHOTOPHOBIA: 0
APNEA: 0
ABDOMINAL PAIN: 1
COLOR CHANGE: 0
SORE THROAT: 0
VOMITING: 0
SHORTNESS OF BREATH: 0
COUGH: 0
BACK PAIN: 0
ALLERGIC/IMMUNOLOGIC NEGATIVE: 1

## 2024-01-12 ASSESSMENT — PATIENT HEALTH QUESTIONNAIRE - PHQ9
SUM OF ALL RESPONSES TO PHQ9 QUESTIONS 1 & 2: 0
SUM OF ALL RESPONSES TO PHQ QUESTIONS 1-9: 0
1. LITTLE INTEREST OR PLEASURE IN DOING THINGS: 0
SUM OF ALL RESPONSES TO PHQ QUESTIONS 1-9: 0
SUM OF ALL RESPONSES TO PHQ QUESTIONS 1-9: 0
2. FEELING DOWN, DEPRESSED OR HOPELESS: 0
SUM OF ALL RESPONSES TO PHQ QUESTIONS 1-9: 0

## 2024-01-12 NOTE — PROGRESS NOTES
Chief Complaint:   Chief Complaint   Patient presents with    Post-Op Check     Had hernia repair on 1/2/24.   Found cancerous lymph node seeing dr jose maria arellano next week    Anxiety     Worse recently       Anxiety  Presents for follow-up visit. Symptoms include nervous/anxious behavior. Patient reports no chest pain, confusion, nausea, palpitations or shortness of breath. Symptoms occur constantly. The quality of sleep is good.     Compliance with medications is %.   Mental Health Problem  The primary symptoms include dysphoric mood. This is a chronic problem.   The degree of incapacity that she is experiencing as a consequence of her illness is moderate. Additional symptoms of the illness include abdominal pain. Additional symptoms of the illness do not include agitation or headaches. She does not admit to suicidal ideas. She does not have a plan to attempt suicide. She does not contemplate harming herself. She has not already injured self. She does not contemplate injuring another person. She has not already  injured another person.   Abdominal Pain  This is a new problem. The current episode started 1 to 4 weeks ago. The onset quality is sudden. The pain is located in the generalized abdominal region. The pain is at a severity of 1/10. The pain is mild. Pertinent negatives include no arthralgias, diarrhea, frequency, headaches, myalgias, nausea or vomiting.       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

## 2024-01-12 NOTE — TELEPHONE ENCOUNTER
Pt underwent lap exploratory, mesenteric lymph node bx, mid small bowel mesenteric nodule bx, bilateral transabdominus muscle release, bilateral transversus abdominal plane block, talya-stoppa-like incisional hernia repair with retrorectus mesh placement, bilateral retrorctus drain placement, hernia sac excision with Dr. Mi 1/2/24.   MA received call from pt stating her pain medication is due to run out tomorrow morning, and is requesting a refill for oxycodone 5mg sent to pharmacy on file to get her through until her follow up appt with Dr. Mi on Tuesday 1/16/24.     Due to Dr. Mi being out of the office, routing to Dr. Walker, [facility], for further advisement.    Electronically signed by Gladys Galvin MA on 1/12/2024 at 1:39 PM

## 2024-01-15 RX ORDER — CHOLECALCIFEROL (VITAMIN D3) 50 MCG
2000 TABLET ORAL DAILY
Qty: 90 TABLET | Refills: 3 | Status: SHIPPED | OUTPATIENT
Start: 2024-01-15

## 2024-01-16 ENCOUNTER — OFFICE VISIT (OUTPATIENT)
Dept: SURGERY | Age: 69
End: 2024-01-16
Payer: MEDICARE

## 2024-01-16 VITALS
RESPIRATION RATE: 16 BRPM | HEIGHT: 62 IN | OXYGEN SATURATION: 96 % | BODY MASS INDEX: 32.39 KG/M2 | DIASTOLIC BLOOD PRESSURE: 65 MMHG | HEART RATE: 81 BPM | WEIGHT: 176 LBS | SYSTOLIC BLOOD PRESSURE: 135 MMHG | TEMPERATURE: 97 F

## 2024-01-16 DIAGNOSIS — C82.13 FOLLICULAR LYMPHOMA GRADE II OF INTRA-ABDOMINAL LYMPH NODES (HCC): Primary | ICD-10-CM

## 2024-01-16 DIAGNOSIS — G89.18 POSTOPERATIVE PAIN: ICD-10-CM

## 2024-01-16 DIAGNOSIS — G89.18 POST-OP PAIN: ICD-10-CM

## 2024-01-16 DIAGNOSIS — Z48.03 CHANGE OR REMOVAL OF DRAINS: ICD-10-CM

## 2024-01-16 DIAGNOSIS — Z87.19 S/P REPAIR OF VENTRAL HERNIA: ICD-10-CM

## 2024-01-16 DIAGNOSIS — Z09 POSTOP CHECK: ICD-10-CM

## 2024-01-16 DIAGNOSIS — Z98.890 S/P REPAIR OF VENTRAL HERNIA: ICD-10-CM

## 2024-01-16 PROCEDURE — 99212 OFFICE O/P EST SF 10 MIN: CPT | Performed by: SURGERY

## 2024-01-16 RX ORDER — OXYCODONE HYDROCHLORIDE 5 MG/1
5 TABLET ORAL EVERY 6 HOURS PRN
Qty: 20 TABLET | Refills: 0 | Status: SHIPPED
Start: 2024-01-18 | End: 2024-01-16

## 2024-01-16 RX ORDER — OXYCODONE HYDROCHLORIDE 5 MG/1
5 TABLET ORAL EVERY 6 HOURS PRN
Qty: 20 TABLET | Refills: 0 | Status: SHIPPED | OUTPATIENT
Start: 2024-01-18 | End: 2024-01-23

## 2024-01-16 RX ORDER — METHOCARBAMOL 750 MG/1
750 TABLET, FILM COATED ORAL 4 TIMES DAILY
Qty: 40 TABLET | Refills: 0 | Status: SHIPPED | OUTPATIENT
Start: 2024-01-18 | End: 2024-01-28

## 2024-01-16 NOTE — PROGRESS NOTES
Chief Complaint   Patient presents with    Post-Op Check     Exploratory lap, mesenteric lymph node biopsy and incisional hernia repair         Assessment:  1. 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 Change or removal of drains    3. Postoperative pain    4. Postop check    5. Post-op pain    6. S/P abdominal wall reconstruction, TAR, mesh; mesenteric lymph node biopsy 1/2/24           Plan:  LLQ GEORGIANA drain removed  Continue restricted activity  Robaxin and oxycodone refilled   Wear abdominal binder PRN comfort  Has appointment schedule with Dr. Addison Phipps on 1/23/24  Return in about 1 month (around 2/16/2024) for Postoperative visit.      Subjective:  Doing fairly well  Staggering oxycodone, robaxin, and tylenol for pain control  GEORGIANA drain 50 cc/day  Tolerating diet  Moving bowels  Wearing binder occasionally but it is riding up and uncomfortable  Notices some asymmetry at xiphoid    Objective:   /65 (Site: Left Upper Arm, Position: Sitting, Cuff Size: Large Adult)   Pulse 81   Temp 97 °F (36.1 °C)   Resp 16   Ht 1.575 m (5' 2\")   Wt 79.8 kg (176 lb)   SpO2 96%   BMI 32.19 kg/m²   General - no apparent distress   Abdomen - midline healing well (glue peeling), mild tenderness, no seroma/hernia noted, LLQ GEORGIANA serous

## 2024-01-23 ENCOUNTER — HOSPITAL ENCOUNTER (OUTPATIENT)
Dept: INFUSION THERAPY | Age: 69
Discharge: HOME | End: 2024-01-23
Payer: MEDICARE

## 2024-01-23 ENCOUNTER — OFFICE VISIT (OUTPATIENT)
Dept: ONCOLOGY | Age: 69
End: 2024-01-23
Payer: MEDICARE

## 2024-01-23 ENCOUNTER — TELEPHONE (OUTPATIENT)
Dept: CASE MANAGEMENT | Age: 69
End: 2024-01-23

## 2024-01-23 VITALS
HEIGHT: 62 IN | WEIGHT: 174 LBS | OXYGEN SATURATION: 95 % | DIASTOLIC BLOOD PRESSURE: 62 MMHG | BODY MASS INDEX: 32.02 KG/M2 | HEART RATE: 64 BPM | SYSTOLIC BLOOD PRESSURE: 138 MMHG | TEMPERATURE: 97.3 F

## 2024-01-23 DIAGNOSIS — C50.911 BILATERAL MALIGNANT NEOPLASM OF BREAST IN FEMALE, UNSPECIFIED ESTROGEN RECEPTOR STATUS, UNSPECIFIED SITE OF BREAST (HCC): ICD-10-CM

## 2024-01-23 DIAGNOSIS — C82.10 FOLLICULAR LYMPHOMA GRADE II, UNSPECIFIED BODY REGION (HCC): ICD-10-CM

## 2024-01-23 DIAGNOSIS — D64.9 ANEMIA, UNSPECIFIED TYPE: ICD-10-CM

## 2024-01-23 DIAGNOSIS — D64.9 ANEMIA, UNSPECIFIED TYPE: Primary | ICD-10-CM

## 2024-01-23 DIAGNOSIS — C50.912 BILATERAL MALIGNANT NEOPLASM OF BREAST IN FEMALE, UNSPECIFIED ESTROGEN RECEPTOR STATUS, UNSPECIFIED SITE OF BREAST (HCC): ICD-10-CM

## 2024-01-23 LAB
BASOPHILS # BLD: 0.02 K/UL (ref 0–0.2)
BASOPHILS NFR BLD: 0 % (ref 0–2)
EOSINOPHIL # BLD: 0.06 K/UL (ref 0.05–0.5)
EOSINOPHILS RELATIVE PERCENT: 1 % (ref 0–6)
ERYTHROCYTE [DISTWIDTH] IN BLOOD BY AUTOMATED COUNT: 14.6 % (ref 11.5–15)
FERRITIN SERPL-MCNC: 56 NG/ML
HCT VFR BLD AUTO: 31.7 % (ref 34–48)
HGB BLD-MCNC: 10 G/DL (ref 11.5–15.5)
IMM GRANULOCYTES # BLD AUTO: <0.03 K/UL (ref 0–0.58)
IMM GRANULOCYTES NFR BLD: 0 % (ref 0–5)
IRON SATN MFR SERPL: 12 % (ref 15–50)
IRON SERPL-MCNC: 41 UG/DL (ref 37–145)
LDH SERPL-CCNC: 138 U/L (ref 135–214)
LYMPHOCYTES NFR BLD: 1.58 K/UL (ref 1.5–4)
LYMPHOCYTES RELATIVE PERCENT: 18 % (ref 20–42)
MCH RBC QN AUTO: 24.2 PG (ref 26–35)
MCHC RBC AUTO-ENTMCNC: 31.5 G/DL (ref 32–34.5)
MCV RBC AUTO: 76.6 FL (ref 80–99.9)
MONOCYTES NFR BLD: 0.84 K/UL (ref 0.1–0.95)
MONOCYTES NFR BLD: 10 % (ref 2–12)
NEUTROPHILS NFR BLD: 71 % (ref 43–80)
NEUTS SEG NFR BLD: 6.26 K/UL (ref 1.8–7.3)
PLATELET # BLD AUTO: 555 K/UL (ref 130–450)
PMV BLD AUTO: 9.1 FL (ref 7–12)
RBC # BLD AUTO: 4.14 M/UL (ref 3.5–5.5)
TIBC SERPL-MCNC: 346 UG/DL (ref 250–450)
WBC OTHER # BLD: 8.8 K/UL (ref 4.5–11.5)

## 2024-01-23 PROCEDURE — 83615 LACTATE (LD) (LDH) ENZYME: CPT

## 2024-01-23 PROCEDURE — 1111F DSCHRG MED/CURRENT MED MERGE: CPT | Performed by: INTERNAL MEDICINE

## 2024-01-23 PROCEDURE — 1036F TOBACCO NON-USER: CPT | Performed by: INTERNAL MEDICINE

## 2024-01-23 PROCEDURE — 83540 ASSAY OF IRON: CPT

## 2024-01-23 PROCEDURE — 3017F COLORECTAL CA SCREEN DOC REV: CPT | Performed by: INTERNAL MEDICINE

## 2024-01-23 PROCEDURE — 99205 OFFICE O/P NEW HI 60 MIN: CPT | Performed by: INTERNAL MEDICINE

## 2024-01-23 PROCEDURE — 3078F DIAST BP <80 MM HG: CPT | Performed by: INTERNAL MEDICINE

## 2024-01-23 PROCEDURE — 83550 IRON BINDING TEST: CPT

## 2024-01-23 PROCEDURE — G8484 FLU IMMUNIZE NO ADMIN: HCPCS | Performed by: INTERNAL MEDICINE

## 2024-01-23 PROCEDURE — 1124F ACP DISCUSS-NO DSCNMKR DOCD: CPT | Performed by: INTERNAL MEDICINE

## 2024-01-23 PROCEDURE — G8417 CALC BMI ABV UP PARAM F/U: HCPCS | Performed by: INTERNAL MEDICINE

## 2024-01-23 PROCEDURE — 36415 COLL VENOUS BLD VENIPUNCTURE: CPT

## 2024-01-23 PROCEDURE — 85025 COMPLETE CBC W/AUTO DIFF WBC: CPT

## 2024-01-23 PROCEDURE — 1090F PRES/ABSN URINE INCON ASSESS: CPT | Performed by: INTERNAL MEDICINE

## 2024-01-23 PROCEDURE — 82728 ASSAY OF FERRITIN: CPT

## 2024-01-23 PROCEDURE — G8399 PT W/DXA RESULTS DOCUMENT: HCPCS | Performed by: INTERNAL MEDICINE

## 2024-01-23 PROCEDURE — G8427 DOCREV CUR MEDS BY ELIG CLIN: HCPCS | Performed by: INTERNAL MEDICINE

## 2024-01-23 PROCEDURE — 3075F SYST BP GE 130 - 139MM HG: CPT | Performed by: INTERNAL MEDICINE

## 2024-01-23 RX ORDER — FERROUS SULFATE 325(65) MG
325 TABLET ORAL
Qty: 60 TABLET | Refills: 1 | Status: SHIPPED | OUTPATIENT
Start: 2024-01-23

## 2024-01-23 NOTE — TELEPHONE ENCOUNTER
Called Dr Cotter office to request genetic testing results.Their office will fax. Number provided.

## 2024-01-23 NOTE — TELEPHONE ENCOUNTER
Met with patient and daughters during her initial consultation with Dr.El Phipps for her recent Follicular Lymphoma  cancer diagnosis. Introduced myself and explained my role with patients receiving treatment at our center. Patient was friendly and receptive.Instructed on next steps including lab work today and surveillance per Dr.El Phipps's recommendations and follow up care.Provided patient with transportation resource list, local support group information and literature on Follicular Lymphoma (NCCN Patient Guidelines booklet). Reviewed resources available to her such as Social Work, Dietician and Financial Aid Navigator. Patient denies any needs at this time.New patient nursing assessment, medical history, surgical history, family history completed. Medication list reviewed and updated. Provided with my contact information and instructed patient to call me with questions or concerns. Verbalizes understanding. Patient appreciative of visit. Betsy GEEN,RN-OCN Nurse Navigator

## 2024-01-24 NOTE — PROGRESS NOTES
Patient provided with discharge instructions, received printed AVS.  All questions answered.  Patient understands follow up plan of care.     
Zelda Lowery  1955 68 y.o.      Referring Physician: Dr Copeland    PCP: Davian Brooks DO    Vitals:    24 1139   BP: 138/62   Pulse: 64   Temp: 97.3 °F (36.3 °C)   SpO2: 95%        Wt Readings from Last 3 Encounters:   24 78.9 kg (174 lb)   24 79.8 kg (176 lb)   24 87.1 kg (192 lb)        Body mass index is 31.83 kg/m².          Chief Complaint:   Chief Complaint   Patient presents with    New Patient    Lymphoma          Cancer Staging   No matching staging information was found for the patient.    Prior Radiation Therapy? YES: Site Treated: Breast          Facility: [facility]          Date: &     Concurrent Chemo/radiation? NO    Prior Chemotherapy? NO    Prior Hormonal Therapy? NO    Head and Neck Cancer? No, patient does NOT have HN cancer.      LMP:     Age at first Menses: 15    : 3    Para: 3          Current Outpatient Medications:     ferrous sulfate (IRON 325) 325 (65 Fe) MG tablet, Take 1 tablet by mouth daily (with breakfast), Disp: 60 tablet, Rfl: 1    methocarbamol (ROBAXIN-750) 750 MG tablet, Take 1 tablet by mouth 4 times daily for 10 days, Disp: 40 tablet, Rfl: 0    oxyCODONE (ROXICODONE) 5 MG immediate release tablet, Take 1 tablet by mouth every 6 hours as needed for Pain for up to 5 days. Intended supply: 5 days. Take lowest dose possible to manage pain Max Daily Amount: 20 mg, Disp: 20 tablet, Rfl: 0    Handicap Placard MISC, by Does not apply route, Disp: 1 each, Rfl: 0    vitamin D (CHOLECALCIFEROL) 50 MCG (2000) TABS tablet, Take 1 tablet by mouth daily, Disp: 90 tablet, Rfl: 3    LORazepam (ATIVAN) 0.5 MG tablet, Take 1 tablet by mouth 2 times daily for 90 days. Max Daily Amount: 1 mg, Disp: 60 tablet, Rfl: 2    escitalopram (LEXAPRO) 10 MG tablet, Take 1 tablet by mouth daily, Disp: 30 tablet, Rfl: 3    sennosides-docusate sodium (SENOKOT-S) 8.6-50 MG tablet, Take 2 tablets by mouth 2 times daily, Disp: 28 tablet, Rfl: 0    
  Housing Stability: Patient Declined (1/2/2024)    Housing Stability Vital Sign     Unable to Pay for Housing in the Last Year: Patient declined     Number of Places Lived in the Last Year: 1     Unstable Housing in the Last Year: Patient declined       Allergies:  No Known Allergies    Physical Exam:  /62   Pulse 64   Temp 97.3 °F (36.3 °C)   Ht 1.575 m (5' 2\")   Wt 78.9 kg (174 lb)   SpO2 95%   BMI 31.83 kg/m²   GENERAL: Alert, oriented x 3, not in acute distress.  HEENT: PERRLA; EOMI. Oropharynx clear.   NECK: Supple. No palpable cervical or supraclavicular lymphadenopathy.   LUNGS: Good air entry bilaterally. No wheezing, crackles or rhonchi.   CARDIOVASCULAR: Regular rate. No murmurs, rubs or gallops.   ABDOMEN: Incisions are healing nicely, abdomen is soft. Non-tender, non-distended. Positive bowel sounds.  EXTREMITIES: Without clubbing, cyanosis, or edema.   NEUROLOGIC: No focal deficits.   ECOG PS 1    Impression/Plan:     Mrs. Lowery is a very pleasant 68-year-old lady, with a past medical history significant for bilateral breast cancer, she was diagnosed with left breast cancer in 1993, she had a left breast lumpectomy done followed by radiation therapy, in 2008 she was diagnosed with right breast cancer, had a lumpectomy done, followed by adjuvant radiation therapy, she did not receive adjuvant chemotherapy or endocrine therapy, also history of hyperlipidemia, hypertension, anxiety, and perforated stercoral sigmoid colitis status post ex lap with Jay's procedure on 11/22/2022, and colostomy reversal on 4/17/2023.  The patient had presented with abdominal wall asymmetry and pressure, CT scan of the abdomen the pelvis from 9/23/2023 has revealed an interval increase in the lymphadenopathy along the root of the mesentery, concerning for an underlying lymphoproliferative disorder, PET scan was done on 10/27/2023 revealing hypermetabolic mesenteric lymphadenopathy, had not significantly

## 2024-01-25 ENCOUNTER — PATIENT MESSAGE (OUTPATIENT)
Dept: SURGERY | Age: 69
End: 2024-01-25

## 2024-01-25 DIAGNOSIS — G89.18 POSTOPERATIVE PAIN: Primary | ICD-10-CM

## 2024-01-25 NOTE — TELEPHONE ENCOUNTER
From: Zelda Lowery  To: Dr. Domenico Mi  Sent: 1/25/2024 10:14 AM EST  Subject: Pain level 6 when medication wears off    Hi,  Can you please prescribe something I can take for pain? I am almost out of the oxicodon and 5 days left of methocararbinal. I still take Tylenol every six hours.   Thank you,  Ritu

## 2024-01-26 RX ORDER — OXYCODONE HYDROCHLORIDE 5 MG/1
5 TABLET ORAL EVERY 6 HOURS PRN
Qty: 12 TABLET | Refills: 0 | Status: SHIPPED | OUTPATIENT
Start: 2024-01-26 | End: 2024-01-29

## 2024-01-27 NOTE — TELEPHONE ENCOUNTER
Orders Placed This Encounter   Medications    oxyCODONE (ROXICODONE) 5 MG immediate release tablet     Sig: Take 1 tablet by mouth every 6 hours as needed for Pain for up to 3 days. Max Daily Amount: 20 mg     Dispense:  12 tablet     Refill:  0     Reduce doses taken as pain becomes manageable

## 2024-02-01 PROBLEM — Z01.818 PRE-OP TESTING: Status: RESOLVED | Noted: 2023-04-17 | Resolved: 2024-02-01

## 2024-02-09 ENCOUNTER — TELEPHONE (OUTPATIENT)
Dept: SURGERY | Age: 69
End: 2024-02-09

## 2024-02-09 NOTE — TELEPHONE ENCOUNTER
MA received voicemail from pt stating she is still having a great amount of discomfort from recent surgery with Dr. Mi. Pt states she has been taking Tylenol Q6H, but the Tylenol just does not cut it. Pt is requesting a new prescription for pain medication, but does not want any narcotics.     Routing to Dr. Best ([facility]) for further advisement due to Dr. Mi being out of the office.    Electronically signed by Gladys Galvin MA on 2/9/2024 at 1:44 PM

## 2024-02-12 RX ORDER — METHOCARBAMOL 500 MG/1
500 TABLET, FILM COATED ORAL 4 TIMES DAILY PRN
Qty: 40 TABLET | Refills: 0 | Status: SHIPPED | OUTPATIENT
Start: 2024-02-12 | End: 2024-02-22

## 2024-02-13 NOTE — TELEPHONE ENCOUNTER
MA attempted to return pt call to inform her that Dr. Best had ordered her Robaxin. MA left detailed vm requesting return call with any further questions or concerns.    Electronically signed by Gladys Galvin MA on 2/13/2024 at 8:54 AM

## 2024-02-15 ENCOUNTER — PATIENT MESSAGE (OUTPATIENT)
Dept: PRIMARY CARE CLINIC | Age: 69
End: 2024-02-15

## 2024-02-15 DIAGNOSIS — E78.49 OTHER HYPERLIPIDEMIA: ICD-10-CM

## 2024-02-15 DIAGNOSIS — I10 ESSENTIAL HYPERTENSION, BENIGN: ICD-10-CM

## 2024-02-15 RX ORDER — ROSUVASTATIN CALCIUM 20 MG/1
20 TABLET, COATED ORAL DAILY
Qty: 90 TABLET | Refills: 3 | Status: SHIPPED | OUTPATIENT
Start: 2024-02-15

## 2024-02-15 RX ORDER — PANTOPRAZOLE SODIUM 40 MG/1
TABLET, DELAYED RELEASE ORAL
Qty: 90 TABLET | Refills: 3 | Status: SHIPPED | OUTPATIENT
Start: 2024-02-15

## 2024-02-15 RX ORDER — ATENOLOL AND CHLORTHALIDONE TABLET 50; 25 MG/1; MG/1
1 TABLET ORAL DAILY
Qty: 90 TABLET | Refills: 3 | Status: SHIPPED | OUTPATIENT
Start: 2024-02-15

## 2024-02-15 RX ORDER — MECLIZINE HCL 12.5 MG/1
TABLET ORAL
Qty: 90 TABLET | Refills: 1 | Status: SHIPPED | OUTPATIENT
Start: 2024-02-15

## 2024-02-15 NOTE — TELEPHONE ENCOUNTER
From: Zelda Lowery  To: Dr. Davian Brooks  Sent: 2/15/2024 3:27 PM EST  Subject: [facility] no longer a provider    Hi,  I was notified that I have to use [facility] now.   The prescriptions did not transfer over from [facility].   I will run out of :  Atenolol/chlor  Rovoststatin  Pantaprozol   Meclizine.     Can you make this change for me?  Thanks!  BP

## 2024-02-19 ENCOUNTER — TELEPHONE (OUTPATIENT)
Dept: RADIATION ONCOLOGY | Age: 69
End: 2024-02-19

## 2024-02-19 NOTE — TELEPHONE ENCOUNTER
SW contacted pt re: positive distress screen.  Pt reported that things were well at this time and was thankful for SW reaching out.  SW introduced self and role of oncology SW to pt and pt was encouraged to reach out to this provider should any other needs arise.       Dusty Reeves MSW, CLARICEW-S  Oncology Social Worker

## 2024-02-27 ENCOUNTER — OFFICE VISIT (OUTPATIENT)
Dept: SURGERY | Age: 69
End: 2024-02-27
Payer: MEDICARE

## 2024-02-27 VITALS
DIASTOLIC BLOOD PRESSURE: 60 MMHG | RESPIRATION RATE: 16 BRPM | WEIGHT: 174 LBS | OXYGEN SATURATION: 97 % | BODY MASS INDEX: 32.02 KG/M2 | HEART RATE: 68 BPM | SYSTOLIC BLOOD PRESSURE: 128 MMHG | HEIGHT: 62 IN

## 2024-02-27 DIAGNOSIS — Z98.890 S/P REPAIR OF VENTRAL HERNIA: ICD-10-CM

## 2024-02-27 DIAGNOSIS — Z87.19 S/P REPAIR OF VENTRAL HERNIA: ICD-10-CM

## 2024-02-27 DIAGNOSIS — C82.13 FOLLICULAR LYMPHOMA GRADE II OF INTRA-ABDOMINAL LYMPH NODES (HCC): ICD-10-CM

## 2024-02-27 DIAGNOSIS — G89.18 POSTOPERATIVE PAIN: Primary | ICD-10-CM

## 2024-02-27 PROCEDURE — 99212 OFFICE O/P EST SF 10 MIN: CPT | Performed by: SURGERY

## 2024-02-27 PROCEDURE — 99024 POSTOP FOLLOW-UP VISIT: CPT | Performed by: SURGERY

## 2024-02-27 RX ORDER — IBUPROFEN 600 MG/1
600 TABLET ORAL 3 TIMES DAILY PRN
Qty: 90 TABLET | Refills: 0 | Status: SHIPPED | OUTPATIENT
Start: 2024-02-27

## 2024-02-27 RX ORDER — METHOCARBAMOL 750 MG/1
750 TABLET, FILM COATED ORAL 3 TIMES DAILY
Qty: 90 TABLET | Refills: 0 | Status: SHIPPED | OUTPATIENT
Start: 2024-02-27 | End: 2024-03-28

## 2024-02-27 NOTE — PROGRESS NOTES
Chief Complaint   Patient presents with    Post-Op Check     LAPAROTOMY EXPLORATORY, TAP block  mesenteric lymph node biopsy  incisional hernia repair with mesh and  transverses abdominal release bilateral        Assessment:  1. Postoperative pain    2. S/P abdominal wall reconstruction, TAR, mesh; mesenteric lymph node biopsy 1/2/24    3. Follicular lymphoma grade II of intra-abdominal lymph nodes (HCC)         Plan:  Increase activity as tolerated  Continue Tylenol and methocarbamol  Add ibuprofen for pain   Outpatient PT ordered  Provided with script for handicap placard  Return in about 6 weeks (around 4/9/2024) for Postoperative visit.       Subjective:  Has clawing, diffuse pain in abdomen  Notes significant RLE weakness compared to LLE  Finished [facility] PTOT  Saw Dr. Addison Phipps - plan is for watchful waiting      Objective:   /60   Pulse 68   Resp 16   Ht 1.575 m (5' 2\")   Wt 78.9 kg (174 lb)   SpO2 97%   BMI 31.83 kg/m²   General - no apparent distress   Abdomen - midline scar healing well. No bulge/hernia. Moderately tender along midline

## 2024-03-11 LAB — MAMMOGRAPHY, EXTERNAL: NORMAL

## 2024-04-29 DIAGNOSIS — D64.9 ANEMIA, UNSPECIFIED TYPE: Primary | ICD-10-CM

## 2024-04-30 ENCOUNTER — OFFICE VISIT (OUTPATIENT)
Dept: SURGERY | Age: 69
End: 2024-04-30
Payer: MEDICARE

## 2024-04-30 ENCOUNTER — OFFICE VISIT (OUTPATIENT)
Dept: ONCOLOGY | Age: 69
End: 2024-04-30
Payer: MEDICARE

## 2024-04-30 ENCOUNTER — HOSPITAL ENCOUNTER (OUTPATIENT)
Dept: INFUSION THERAPY | Age: 69
Discharge: HOME | End: 2024-04-30
Payer: MEDICARE

## 2024-04-30 VITALS
TEMPERATURE: 97.8 F | OXYGEN SATURATION: 96 % | SYSTOLIC BLOOD PRESSURE: 168 MMHG | DIASTOLIC BLOOD PRESSURE: 74 MMHG | WEIGHT: 180 LBS | HEART RATE: 64 BPM | HEIGHT: 62 IN | BODY MASS INDEX: 33.13 KG/M2

## 2024-04-30 VITALS
WEIGHT: 177 LBS | OXYGEN SATURATION: 98 % | SYSTOLIC BLOOD PRESSURE: 122 MMHG | BODY MASS INDEX: 32.57 KG/M2 | HEART RATE: 58 BPM | DIASTOLIC BLOOD PRESSURE: 64 MMHG | TEMPERATURE: 97.6 F | RESPIRATION RATE: 16 BRPM | HEIGHT: 62 IN

## 2024-04-30 DIAGNOSIS — D64.9 ANEMIA, UNSPECIFIED TYPE: Primary | ICD-10-CM

## 2024-04-30 DIAGNOSIS — Z98.890 S/P REPAIR OF VENTRAL HERNIA: Primary | ICD-10-CM

## 2024-04-30 DIAGNOSIS — D64.9 ANEMIA, UNSPECIFIED TYPE: ICD-10-CM

## 2024-04-30 DIAGNOSIS — C82.10 FOLLICULAR LYMPHOMA GRADE II, UNSPECIFIED BODY REGION (HCC): Primary | ICD-10-CM

## 2024-04-30 DIAGNOSIS — C82.13 FOLLICULAR LYMPHOMA GRADE II OF INTRA-ABDOMINAL LYMPH NODES (HCC): ICD-10-CM

## 2024-04-30 DIAGNOSIS — Z87.19 S/P REPAIR OF VENTRAL HERNIA: Primary | ICD-10-CM

## 2024-04-30 LAB
ALBUMIN SERPL-MCNC: 4.6 G/DL (ref 3.5–5.2)
ALP SERPL-CCNC: 68 U/L (ref 35–104)
ALT SERPL-CCNC: 18 U/L (ref 0–32)
ANION GAP SERPL CALCULATED.3IONS-SCNC: 15 MMOL/L (ref 7–16)
AST SERPL-CCNC: 19 U/L (ref 0–31)
BASOPHILS # BLD: 0.02 K/UL (ref 0–0.2)
BASOPHILS NFR BLD: 0 % (ref 0–2)
BILIRUB SERPL-MCNC: 0.7 MG/DL (ref 0–1.2)
BUN SERPL-MCNC: 12 MG/DL (ref 6–23)
CALCIUM SERPL-MCNC: 9.7 MG/DL (ref 8.6–10.2)
CHLORIDE SERPL-SCNC: 100 MMOL/L (ref 98–107)
CO2 SERPL-SCNC: 26 MMOL/L (ref 22–29)
CREAT SERPL-MCNC: 0.5 MG/DL (ref 0.5–1)
EOSINOPHIL # BLD: 0.06 K/UL (ref 0.05–0.5)
EOSINOPHILS RELATIVE PERCENT: 1 % (ref 0–6)
ERYTHROCYTE [DISTWIDTH] IN BLOOD BY AUTOMATED COUNT: 17 % (ref 11.5–15)
FERRITIN SERPL-MCNC: 68 NG/ML
FOLATE SERPL-MCNC: 12.7 NG/ML (ref 4.8–24.2)
GFR SERPL CREATININE-BSD FRML MDRD: >90 ML/MIN/1.73M2
GLUCOSE SERPL-MCNC: 99 MG/DL (ref 74–99)
HCT VFR BLD AUTO: 39.4 % (ref 34–48)
HGB BLD-MCNC: 12.3 G/DL (ref 11.5–15.5)
IMM GRANULOCYTES # BLD AUTO: <0.03 K/UL (ref 0–0.58)
IMM GRANULOCYTES NFR BLD: 0 % (ref 0–5)
IRON SATN MFR SERPL: 23 % (ref 15–50)
IRON SERPL-MCNC: 81 UG/DL (ref 37–145)
LDH SERPL-CCNC: 168 U/L (ref 135–214)
LYMPHOCYTES NFR BLD: 2.35 K/UL (ref 1.5–4)
LYMPHOCYTES RELATIVE PERCENT: 35 % (ref 20–42)
MCH RBC QN AUTO: 23.4 PG (ref 26–35)
MCHC RBC AUTO-ENTMCNC: 31.2 G/DL (ref 32–34.5)
MCV RBC AUTO: 74.9 FL (ref 80–99.9)
MONOCYTES NFR BLD: 0.65 K/UL (ref 0.1–0.95)
MONOCYTES NFR BLD: 10 % (ref 2–12)
NEUTROPHILS NFR BLD: 54 % (ref 43–80)
NEUTS SEG NFR BLD: 3.68 K/UL (ref 1.8–7.3)
PLATELET # BLD AUTO: 351 K/UL (ref 130–450)
PMV BLD AUTO: 9.8 FL (ref 7–12)
POTASSIUM SERPL-SCNC: 3.4 MMOL/L (ref 3.5–5)
PROT SERPL-MCNC: 7.5 G/DL (ref 6.4–8.3)
RBC # BLD AUTO: 5.26 M/UL (ref 3.5–5.5)
SODIUM SERPL-SCNC: 141 MMOL/L (ref 132–146)
TIBC SERPL-MCNC: 347 UG/DL (ref 250–450)
VIT B12 SERPL-MCNC: 263 PG/ML (ref 211–946)
WBC OTHER # BLD: 6.8 K/UL (ref 4.5–11.5)

## 2024-04-30 PROCEDURE — 84165 PROTEIN E-PHORESIS SERUM: CPT

## 2024-04-30 PROCEDURE — 82607 VITAMIN B-12: CPT

## 2024-04-30 PROCEDURE — 36415 COLL VENOUS BLD VENIPUNCTURE: CPT

## 2024-04-30 PROCEDURE — 84155 ASSAY OF PROTEIN SERUM: CPT

## 2024-04-30 PROCEDURE — G8427 DOCREV CUR MEDS BY ELIG CLIN: HCPCS | Performed by: INTERNAL MEDICINE

## 2024-04-30 PROCEDURE — G8399 PT W/DXA RESULTS DOCUMENT: HCPCS | Performed by: INTERNAL MEDICINE

## 2024-04-30 PROCEDURE — 85025 COMPLETE CBC W/AUTO DIFF WBC: CPT

## 2024-04-30 PROCEDURE — 83540 ASSAY OF IRON: CPT

## 2024-04-30 PROCEDURE — 80053 COMPREHEN METABOLIC PANEL: CPT

## 2024-04-30 PROCEDURE — 3017F COLORECTAL CA SCREEN DOC REV: CPT | Performed by: INTERNAL MEDICINE

## 2024-04-30 PROCEDURE — 99212 OFFICE O/P EST SF 10 MIN: CPT | Performed by: SURGERY

## 2024-04-30 PROCEDURE — G8417 CALC BMI ABV UP PARAM F/U: HCPCS | Performed by: INTERNAL MEDICINE

## 2024-04-30 PROCEDURE — 1124F ACP DISCUSS-NO DSCNMKR DOCD: CPT | Performed by: INTERNAL MEDICINE

## 2024-04-30 PROCEDURE — 82746 ASSAY OF FOLIC ACID SERUM: CPT

## 2024-04-30 PROCEDURE — 82728 ASSAY OF FERRITIN: CPT

## 2024-04-30 PROCEDURE — 83615 LACTATE (LD) (LDH) ENZYME: CPT

## 2024-04-30 PROCEDURE — 3078F DIAST BP <80 MM HG: CPT | Performed by: INTERNAL MEDICINE

## 2024-04-30 PROCEDURE — 83550 IRON BINDING TEST: CPT

## 2024-04-30 PROCEDURE — 1036F TOBACCO NON-USER: CPT | Performed by: INTERNAL MEDICINE

## 2024-04-30 PROCEDURE — 99214 OFFICE O/P EST MOD 30 MIN: CPT | Performed by: INTERNAL MEDICINE

## 2024-04-30 PROCEDURE — 3077F SYST BP >= 140 MM HG: CPT | Performed by: INTERNAL MEDICINE

## 2024-04-30 PROCEDURE — 1090F PRES/ABSN URINE INCON ASSESS: CPT | Performed by: INTERNAL MEDICINE

## 2024-04-30 RX ORDER — FERROUS SULFATE 325(65) MG
325 TABLET ORAL
Qty: 60 TABLET | Refills: 1 | Status: SHIPPED | OUTPATIENT
Start: 2024-04-30

## 2024-04-30 RX ORDER — POTASSIUM CHLORIDE 20 MEQ/1
20 TABLET, EXTENDED RELEASE ORAL DAILY
Qty: 14 TABLET | Refills: 0 | Status: SHIPPED | OUTPATIENT
Start: 2024-04-30 | End: 2024-05-14

## 2024-04-30 NOTE — PROGRESS NOTES
[facility] PHYSICIANS [facility] CARE [facility] MED ONCOLOGY  [address] [address]  Dept: [phone]  Loc: [phone]  Attending progress note      Reason for Visit:   Follicular lymphoma.    Referring Physician:  Domenico Mi MD    PCP:  Davian Brooks DO    History of Present Illness:    Mrs. Lowery is a very pleasant 69-year-old lady, with a past medical history significant for bilateral breast cancer, she was diagnosed with left breast cancer in 1993, she had a left breast lumpectomy done followed by radiation therapy, in 2008 she was diagnosed with right breast cancer, had a lumpectomy done, followed by adjuvant radiation therapy, she did not receive adjuvant chemotherapy or endocrine therapy, also history of hyperlipidemia, hypertension, anxiety, and perforated stercoral sigmoid colitis status post ex lap with Jay's procedure on 11/22/2022, and colostomy reversal on 4/17/2023.  The patient had presented with abdominal wall asymmetry and pressure, CT scan of the abdomen the pelvis from 9/23/2023 has revealed an interval increase in the lymphadenopathy along the root of the mesentery, concerning for an underlying lymphoproliferative disorder, PET scan was done on 10/27/2023 revealing hypermetabolic mesenteric lymphadenopathy, had not significantly changed since 2022, had decreased from 2007, and increased right thyroid uptake.  Neck ultrasound had revealed 9 mm mid to lower pole TR 5 nodule, which is being followed.  The patient underwent on 1/3/2024 ex lap, incisional hernia repair, and central lymph node biopsy, pathology was consistent with classic follicular lymphoma, grade 1-2.     The patient returns for a follow-up visit, doing well overall, she does have tingling and numbness of the hands, no unexplained weight loss, drenching night sweats or recurrent infections.  No bleeding.    Review of Systems;  CONSTITUTIONAL: No fever, chills.  Decreased appetite and

## 2024-04-30 NOTE — PROGRESS NOTES
Chief Complaint   Patient presents with    Post-Op Check     Post op check for ex lap and hernia repair, pt states that she is still having some pain. Pt would also like another order to continue PT.         Assessment:  1. S/P abdominal wall reconstruction, TAR, mesh; mesenteric lymph node biopsy 1/2/24    2. Follicular lymphoma grade II of intra-abdominal lymph nodes (HCC)         Plan:  Continue PT  Increase activity as tolerated  Recommend f/u with [facility] orthopedics for right hip locking/pain  Continue tylenol/advil  follow up 3 months     Subjective:  Has intermittent sharp, stinging abdominal pain that is localized to right and left lateral abdominal wall      Objective:   /64 (Site: Left Upper Arm, Position: Sitting, Cuff Size: Large Adult)   Pulse 58   Temp 97.6 °F (36.4 °C) (Infrared)   Resp 16   Ht 1.575 m (5' 2\")   Wt 80.3 kg (177 lb)   SpO2 98%   BMI 32.37 kg/m²   General - no apparent distress   Abdomen - midline scar healing well. No bulge/hernia. Moderately tender along midline and bilateral lateral area

## 2024-05-01 LAB
ALBUMIN SERPL-MCNC: 3.8 G/DL (ref 3.5–4.7)
ALPHA1 GLOB SERPL ELPH-MCNC: 0.2 G/DL (ref 0.2–0.4)
ALPHA2 GLOB SERPL ELPH-MCNC: 0.9 G/DL (ref 0.5–1)
B-GLOBULIN SERPL ELPH-MCNC: 1 G/DL (ref 0.8–1.3)
GAMMA GLOB SERPL ELPH-MCNC: 1 G/DL (ref 0.7–1.6)
PATHOLOGIST: NORMAL
PROT PATTERN SERPL ELPH-IMP: NORMAL
PROT SERPL-MCNC: 6.9 G/DL (ref 6.4–8.3)

## 2024-05-01 RX ORDER — DIPHENHYDRAMINE HYDROCHLORIDE 50 MG/ML
50 INJECTION INTRAMUSCULAR; INTRAVENOUS
OUTPATIENT
Start: 2024-05-01

## 2024-05-01 RX ORDER — ONDANSETRON 2 MG/ML
8 INJECTION INTRAMUSCULAR; INTRAVENOUS
OUTPATIENT
Start: 2024-05-01

## 2024-05-01 RX ORDER — ACETAMINOPHEN 325 MG/1
650 TABLET ORAL
OUTPATIENT
Start: 2024-05-01

## 2024-05-01 RX ORDER — ALBUTEROL SULFATE 90 UG/1
4 AEROSOL, METERED RESPIRATORY (INHALATION) PRN
OUTPATIENT
Start: 2024-05-01

## 2024-05-01 RX ORDER — EPINEPHRINE 1 MG/ML
0.3 INJECTION, SOLUTION, CONCENTRATE INTRAVENOUS PRN
OUTPATIENT
Start: 2024-05-01

## 2024-05-01 RX ORDER — CYANOCOBALAMIN 1000 UG/ML
1000 INJECTION, SOLUTION INTRAMUSCULAR; SUBCUTANEOUS ONCE
Status: CANCELLED | OUTPATIENT
Start: 2024-05-01 | End: 2024-05-01

## 2024-05-01 RX ORDER — SODIUM CHLORIDE 9 MG/ML
INJECTION, SOLUTION INTRAVENOUS CONTINUOUS
OUTPATIENT
Start: 2024-05-01

## 2024-05-02 ENCOUNTER — TELEPHONE (OUTPATIENT)
Dept: INFUSION THERAPY | Age: 69
End: 2024-05-02

## 2024-05-02 NOTE — TELEPHONE ENCOUNTER
Patient notified, Dr Addison Phipps has reviewed labs, patient has B12 PO sent to patients pharmacy and needs scheduled for B12 inj. No answer. Message left for patient to call our office back for instructions.

## 2024-05-03 ENCOUNTER — TELEPHONE (OUTPATIENT)
Dept: INFUSION THERAPY | Age: 69
End: 2024-05-03

## 2024-05-03 NOTE — TELEPHONE ENCOUNTER
Scheduling reached out to patient. Patient aware that b12 injections need scheduled and po b12 is a script at patient's pharmacy. Patient did not answer. Message left on patient phone per edita Small

## 2024-05-06 ENCOUNTER — HOSPITAL ENCOUNTER (OUTPATIENT)
Dept: INFUSION THERAPY | Age: 69
Discharge: HOME | End: 2024-05-06
Payer: MEDICARE

## 2024-05-06 DIAGNOSIS — C82.13 FOLLICULAR LYMPHOMA GRADE II OF INTRA-ABDOMINAL LYMPH NODES (HCC): Primary | ICD-10-CM

## 2024-05-06 PROCEDURE — 96372 THER/PROPH/DIAG INJ SC/IM: CPT

## 2024-05-06 PROCEDURE — 6360000002 HC RX W HCPCS: Performed by: INTERNAL MEDICINE

## 2024-05-06 RX ORDER — DIPHENHYDRAMINE HYDROCHLORIDE 50 MG/ML
50 INJECTION INTRAMUSCULAR; INTRAVENOUS
OUTPATIENT
Start: 2024-05-06

## 2024-05-06 RX ORDER — ALBUTEROL SULFATE 90 UG/1
4 AEROSOL, METERED RESPIRATORY (INHALATION) PRN
OUTPATIENT
Start: 2024-05-06

## 2024-05-06 RX ORDER — ACETAMINOPHEN 325 MG/1
650 TABLET ORAL
OUTPATIENT
Start: 2024-05-06

## 2024-05-06 RX ORDER — SODIUM CHLORIDE 9 MG/ML
INJECTION, SOLUTION INTRAVENOUS CONTINUOUS
OUTPATIENT
Start: 2024-05-06

## 2024-05-06 RX ORDER — FERROUS SULFATE 325(65) MG
325 TABLET ORAL
Qty: 60 TABLET | Refills: 1 | Status: CANCELLED | OUTPATIENT
Start: 2024-05-06

## 2024-05-06 RX ORDER — ONDANSETRON 2 MG/ML
8 INJECTION INTRAMUSCULAR; INTRAVENOUS
OUTPATIENT
Start: 2024-05-06

## 2024-05-06 RX ORDER — CYANOCOBALAMIN 1000 UG/ML
1000 INJECTION, SOLUTION INTRAMUSCULAR; SUBCUTANEOUS ONCE
Status: CANCELLED | OUTPATIENT
Start: 2024-05-06 | End: 2024-05-06

## 2024-05-06 RX ORDER — EPINEPHRINE 1 MG/ML
0.3 INJECTION, SOLUTION, CONCENTRATE INTRAVENOUS PRN
OUTPATIENT
Start: 2024-05-06

## 2024-05-06 RX ORDER — CYANOCOBALAMIN 1000 UG/ML
1000 INJECTION, SOLUTION INTRAMUSCULAR; SUBCUTANEOUS ONCE
Status: COMPLETED | OUTPATIENT
Start: 2024-05-06 | End: 2024-05-06

## 2024-05-06 RX ADMIN — CYANOCOBALAMIN 1000 MCG: 1000 INJECTION, SOLUTION INTRAMUSCULAR; SUBCUTANEOUS at 10:16

## 2024-05-15 DIAGNOSIS — F41.9 ANXIETY: ICD-10-CM

## 2024-05-16 RX ORDER — ESCITALOPRAM OXALATE 10 MG/1
10 TABLET ORAL DAILY
Qty: 30 TABLET | Refills: 0 | Status: SHIPPED | OUTPATIENT
Start: 2024-05-16

## 2024-05-26 DIAGNOSIS — F41.9 ANXIETY: ICD-10-CM

## 2024-05-28 RX ORDER — LORAZEPAM 0.5 MG/1
0.5 TABLET ORAL 2 TIMES DAILY
Qty: 60 TABLET | Refills: 1 | Status: SHIPPED | OUTPATIENT
Start: 2024-05-28 | End: 2024-08-26

## 2024-06-17 DIAGNOSIS — F41.9 ANXIETY: ICD-10-CM

## 2024-06-17 RX ORDER — ESCITALOPRAM OXALATE 10 MG/1
10 TABLET ORAL DAILY
Qty: 30 TABLET | Refills: 0 | Status: SHIPPED | OUTPATIENT
Start: 2024-06-17

## 2024-07-09 SDOH — ECONOMIC STABILITY: FOOD INSECURITY: WITHIN THE PAST 12 MONTHS, YOU WORRIED THAT YOUR FOOD WOULD RUN OUT BEFORE YOU GOT MONEY TO BUY MORE.: NEVER TRUE

## 2024-07-09 SDOH — ECONOMIC STABILITY: FOOD INSECURITY: WITHIN THE PAST 12 MONTHS, THE FOOD YOU BOUGHT JUST DIDN'T LAST AND YOU DIDN'T HAVE MONEY TO GET MORE.: NEVER TRUE

## 2024-07-09 SDOH — HEALTH STABILITY: PHYSICAL HEALTH: ON AVERAGE, HOW MANY MINUTES DO YOU ENGAGE IN EXERCISE AT THIS LEVEL?: 60 MIN

## 2024-07-09 SDOH — ECONOMIC STABILITY: INCOME INSECURITY: HOW HARD IS IT FOR YOU TO PAY FOR THE VERY BASICS LIKE FOOD, HOUSING, MEDICAL CARE, AND HEATING?: SOMEWHAT HARD

## 2024-07-09 SDOH — HEALTH STABILITY: PHYSICAL HEALTH: ON AVERAGE, HOW MANY DAYS PER WEEK DO YOU ENGAGE IN MODERATE TO STRENUOUS EXERCISE (LIKE A BRISK WALK)?: 5 DAYS

## 2024-07-09 ASSESSMENT — PATIENT HEALTH QUESTIONNAIRE - PHQ9
SUM OF ALL RESPONSES TO PHQ QUESTIONS 1-9: 0
2. FEELING DOWN, DEPRESSED OR HOPELESS: NOT AT ALL
SUM OF ALL RESPONSES TO PHQ QUESTIONS 1-9: 0
SUM OF ALL RESPONSES TO PHQ9 QUESTIONS 1 & 2: 0
SUM OF ALL RESPONSES TO PHQ QUESTIONS 1-9: 0
1. LITTLE INTEREST OR PLEASURE IN DOING THINGS: NOT AT ALL
SUM OF ALL RESPONSES TO PHQ QUESTIONS 1-9: 0

## 2024-07-09 ASSESSMENT — LIFESTYLE VARIABLES
HOW OFTEN DO YOU HAVE A DRINK CONTAINING ALCOHOL: NEVER
HOW OFTEN DO YOU HAVE SIX OR MORE DRINKS ON ONE OCCASION: 1
HOW OFTEN DO YOU HAVE A DRINK CONTAINING ALCOHOL: 1
HOW MANY STANDARD DRINKS CONTAINING ALCOHOL DO YOU HAVE ON A TYPICAL DAY: 0
HOW MANY STANDARD DRINKS CONTAINING ALCOHOL DO YOU HAVE ON A TYPICAL DAY: PATIENT DOES NOT DRINK

## 2024-07-10 ENCOUNTER — OFFICE VISIT (OUTPATIENT)
Dept: PRIMARY CARE CLINIC | Age: 69
End: 2024-07-10

## 2024-07-10 VITALS
RESPIRATION RATE: 18 BRPM | HEIGHT: 62 IN | BODY MASS INDEX: 33.31 KG/M2 | TEMPERATURE: 97.8 F | HEART RATE: 66 BPM | WEIGHT: 181 LBS | OXYGEN SATURATION: 98 % | SYSTOLIC BLOOD PRESSURE: 134 MMHG | DIASTOLIC BLOOD PRESSURE: 84 MMHG

## 2024-07-10 DIAGNOSIS — Z00.00 MEDICARE ANNUAL WELLNESS VISIT, SUBSEQUENT: Primary | ICD-10-CM

## 2024-07-10 DIAGNOSIS — I10 ESSENTIAL HYPERTENSION, BENIGN: ICD-10-CM

## 2024-07-10 DIAGNOSIS — E55.9 VITAMIN D DEFICIENCY: ICD-10-CM

## 2024-07-10 DIAGNOSIS — C82.13 FOLLICULAR LYMPHOMA GRADE II OF INTRA-ABDOMINAL LYMPH NODES (HCC): ICD-10-CM

## 2024-07-10 DIAGNOSIS — E78.49 OTHER HYPERLIPIDEMIA: ICD-10-CM

## 2024-07-10 DIAGNOSIS — H91.93 BILATERAL HEARING LOSS, UNSPECIFIED HEARING LOSS TYPE: ICD-10-CM

## 2024-07-10 DIAGNOSIS — F41.9 ANXIETY: ICD-10-CM

## 2024-07-10 DIAGNOSIS — E66.01 SEVERE OBESITY (BMI 35.0-39.9) WITH COMORBIDITY (HCC): ICD-10-CM

## 2024-07-10 DIAGNOSIS — E78.2 ELEVATED TRIGLYCERIDES WITH HIGH CHOLESTEROL: Primary | ICD-10-CM

## 2024-07-10 LAB
ALBUMIN: 4.6 G/DL (ref 3.5–5.2)
ALP BLD-CCNC: 65 U/L (ref 35–104)
ALT SERPL-CCNC: 10 U/L (ref 0–32)
ANION GAP SERPL CALCULATED.3IONS-SCNC: 20 MMOL/L (ref 7–16)
AST SERPL-CCNC: 12 U/L (ref 0–31)
BASOPHILS ABSOLUTE: 0.03 K/UL (ref 0–0.2)
BASOPHILS RELATIVE PERCENT: 0 % (ref 0–2)
BILIRUB SERPL-MCNC: 0.9 MG/DL (ref 0–1.2)
BUN BLDV-MCNC: 18 MG/DL (ref 6–23)
CALCIUM SERPL-MCNC: 10.1 MG/DL (ref 8.6–10.2)
CHLORIDE BLD-SCNC: 95 MMOL/L (ref 98–107)
CHOLESTEROL, TOTAL: 211 MG/DL
CO2: 22 MMOL/L (ref 22–29)
CREAT SERPL-MCNC: 0.7 MG/DL (ref 0.5–1)
EOSINOPHILS ABSOLUTE: 0.08 K/UL (ref 0.05–0.5)
EOSINOPHILS RELATIVE PERCENT: 1 % (ref 0–6)
GFR, ESTIMATED: >90 ML/MIN/1.73M2
GLUCOSE BLD-MCNC: 97 MG/DL (ref 74–99)
HCT VFR BLD CALC: 40.7 % (ref 34–48)
HDLC SERPL-MCNC: 50 MG/DL
HEMOGLOBIN: 12.7 G/DL (ref 11.5–15.5)
IMMATURE GRANULOCYTES %: 0 % (ref 0–5)
IMMATURE GRANULOCYTES ABSOLUTE: 0.04 K/UL (ref 0–0.58)
LDL CHOLESTEROL: 91 MG/DL
LYMPHOCYTES ABSOLUTE: 2.9 K/UL (ref 1.5–4)
LYMPHOCYTES RELATIVE PERCENT: 29 % (ref 20–42)
MCH RBC QN AUTO: 24.7 PG (ref 26–35)
MCHC RBC AUTO-ENTMCNC: 31.2 G/DL (ref 32–34.5)
MCV RBC AUTO: 79 FL (ref 80–99.9)
MONOCYTES ABSOLUTE: 0.82 K/UL (ref 0.1–0.95)
MONOCYTES RELATIVE PERCENT: 8 % (ref 2–12)
NEUTROPHILS ABSOLUTE: 6.22 K/UL (ref 1.8–7.3)
NEUTROPHILS RELATIVE PERCENT: 62 % (ref 43–80)
PDW BLD-RTO: 14.7 % (ref 11.5–15)
PLATELET CONFIRMATION: NORMAL
PLATELET, FLUORESCENCE: 268 K/UL (ref 130–450)
PMV BLD AUTO: 12 FL (ref 7–12)
POTASSIUM SERPL-SCNC: 4.3 MMOL/L (ref 3.5–5)
RBC # BLD: 5.15 M/UL (ref 3.5–5.5)
SODIUM BLD-SCNC: 137 MMOL/L (ref 132–146)
TOTAL PROTEIN: 7.9 G/DL (ref 6.4–8.3)
TRIGL SERPL-MCNC: 352 MG/DL
TSH SERPL DL<=0.05 MIU/L-ACNC: 1.68 UIU/ML (ref 0.27–4.2)
VITAMIN D 25-HYDROXY: 39.9 NG/ML (ref 30–100)
VLDLC SERPL CALC-MCNC: 70 MG/DL
WBC # BLD: 10.1 K/UL (ref 4.5–11.5)

## 2024-07-10 NOTE — PATIENT INSTRUCTIONS
Limit drinks and foods with added sugar. These include candy, desserts, and soda pop.   Heart-healthy lifestyle    If your doctor recommends it, get more exercise. For many people, walking is a good choice. Or you may want to swim, bike, or do other activities. Bit by bit, increase the time you're active every day. Try for at least 30 minutes on most days of the week.     Try to quit or cut back on using tobacco and other nicotine products. This includes smoking and vaping. If you need help quitting, talk to your doctor about stop-smoking programs and medicines. These can increase your chances of quitting for good. Quitting is one of the most important things you can do to protect your heart. It is never too late to quit. Try to avoid secondhand smoke too.     Stay at a weight that's healthy for you. Talk to your doctor if you need help losing weight.     Try to get 7 to 9 hours of sleep each night.     Limit alcohol to 2 drinks a day for men and 1 drink a day for women. Too much alcohol can cause health problems.     Manage other health problems such as diabetes, high blood pressure, and high cholesterol. If you think you may have a problem with alcohol or drug use, talk to your doctor.   Medicines    Take your medicines exactly as prescribed. Call your doctor if you think you are having a problem with your medicine.     If your doctor recommends aspirin, take the amount directed each day. Make sure you take aspirin and not another kind of pain reliever, such as acetaminophen (Tylenol).   When should you call for help?   Call 911 if you have symptoms of a heart attack. These may include:    Chest pain or pressure, or a strange feeling in the chest.     Sweating.     Shortness of breath.     Pain, pressure, or a strange feeling in the back, neck, jaw, or upper belly or in one or both shoulders or arms.     Lightheadedness or sudden weakness.     A fast or irregular heartbeat.   After you call 911, the

## 2024-07-10 NOTE — PROGRESS NOTES
Medicare Annual Wellness Visit    Zelda Lowery is here for Medicare AWV and Pain (Chest pain on going for last few years. Happens and make pt want to throw up, never does. Does not happen often. )    Assessment & Plan   Essential hypertension, benign  -     CBC with Auto Differential; Future  -     Comprehensive Metabolic Panel; Future  -     Lipid Panel; Future  -     TSH; Future  Other hyperlipidemia  -     CBC with Auto Differential; Future  -     Comprehensive Metabolic Panel; Future  -     Lipid Panel; Future  -     TSH; Future  Vitamin D deficiency  -     CBC with Auto Differential; Future  -     Comprehensive Metabolic Panel; Future  -     Lipid Panel; Future  -     TSH; Future  -     Vitamin D 25 Hydroxy; Future  Follicular lymphoma grade II of intra-abdominal lymph nodes (HCC)  -     CBC with Auto Differential; Future  -     Comprehensive Metabolic Panel; Future  -     Lipid Panel; Future  -     TSH; Future  Anxiety  -     CBC with Auto Differential; Future  -     Comprehensive Metabolic Panel; Future  -     Lipid Panel; Future  -     TSH; Future  Severe obesity (BMI 35.0-39.9) with comorbidity (HCC)  -     CBC with Auto Differential; Future  -     Comprehensive Metabolic Panel; Future  -     Lipid Panel; Future  -     TSH; Future  Bilateral hearing loss, unspecified hearing loss type  -     Min Laureano AU.D, Audiology, [place]    Recommendations for Preventive Services Due: see orders and patient instructions/AVS.  Recommended screening schedule for the next 5-10 years is provided to the patient in written form: see Patient Instructions/AVS.     No follow-ups on file.     Subjective   The following acute and/or chronic problems were also addressed today:  Doing well  C/o hearing loss  Patient's complete Health Risk Assessment and screening values have been reviewed and are found in Flowsheets. The following problems were reviewed today and where indicated follow up appointments were made

## 2024-07-11 RX ORDER — ESCITALOPRAM OXALATE 10 MG/1
10 TABLET ORAL DAILY
Qty: 30 TABLET | Refills: 0 | Status: SHIPPED | OUTPATIENT
Start: 2024-07-11

## 2024-07-11 RX ORDER — CHOLECALCIFEROL (VITAMIN D3) 50 MCG
2000 TABLET ORAL DAILY
Qty: 90 TABLET | Refills: 3 | Status: SHIPPED | OUTPATIENT
Start: 2024-07-11

## 2024-07-11 RX ORDER — OMEGA-3-ACID ETHYL ESTERS 1 G/1
1 CAPSULE, LIQUID FILLED ORAL 2 TIMES DAILY
Qty: 60 CAPSULE | Refills: 3 | Status: SHIPPED | OUTPATIENT
Start: 2024-07-11

## 2024-08-01 ENCOUNTER — TELEPHONE (OUTPATIENT)
Dept: AUDIOLOGY | Age: 69
End: 2024-08-01

## 2024-08-01 NOTE — TELEPHONE ENCOUNTER
Referral for Hearing Evaluation received.  Called patient and left a voicemail for the patient to call back for scheduling.    Electronically signed by Loreto Holt on 8/1/24 at 10:31 AM EDT

## 2024-08-05 ENCOUNTER — TELEPHONE (OUTPATIENT)
Dept: AUDIOLOGY | Age: 69
End: 2024-08-05

## 2024-08-05 NOTE — TELEPHONE ENCOUNTER
Patient called to schedule hearing test. WQ referral. Called and LVM  to schedule.    Electronically signed by Loreto Holt on 8/5/2024 at 12:00 PM

## 2024-08-12 DIAGNOSIS — F41.9 ANXIETY: ICD-10-CM

## 2024-08-12 RX ORDER — LORAZEPAM 0.5 MG/1
TABLET ORAL
Qty: 60 TABLET | Refills: 1 | Status: SHIPPED | OUTPATIENT
Start: 2024-08-12 | End: 2024-09-11

## 2024-08-13 ENCOUNTER — OFFICE VISIT (OUTPATIENT)
Dept: ONCOLOGY | Age: 69
End: 2024-08-13
Payer: MEDICARE

## 2024-08-13 ENCOUNTER — HOSPITAL ENCOUNTER (OUTPATIENT)
Dept: INFUSION THERAPY | Age: 69
Discharge: HOME | End: 2024-08-13
Payer: MEDICARE

## 2024-08-13 VITALS
SYSTOLIC BLOOD PRESSURE: 136 MMHG | OXYGEN SATURATION: 95 % | TEMPERATURE: 97.6 F | WEIGHT: 181.1 LBS | HEART RATE: 59 BPM | BODY MASS INDEX: 33.12 KG/M2 | DIASTOLIC BLOOD PRESSURE: 64 MMHG

## 2024-08-13 DIAGNOSIS — D64.9 ANEMIA, UNSPECIFIED TYPE: ICD-10-CM

## 2024-08-13 DIAGNOSIS — C82.10 FOLLICULAR LYMPHOMA GRADE II, UNSPECIFIED BODY REGION (HCC): ICD-10-CM

## 2024-08-13 DIAGNOSIS — D64.9 ANEMIA, UNSPECIFIED TYPE: Primary | ICD-10-CM

## 2024-08-13 LAB
ALBUMIN SERPL-MCNC: 4.5 G/DL (ref 3.5–5.2)
ALP SERPL-CCNC: 66 U/L (ref 35–104)
ALT SERPL-CCNC: 18 U/L (ref 0–32)
ANION GAP SERPL CALCULATED.3IONS-SCNC: 13 MMOL/L (ref 7–16)
AST SERPL-CCNC: 26 U/L (ref 0–31)
BASOPHILS # BLD: 0.02 K/UL (ref 0–0.2)
BASOPHILS NFR BLD: 0 % (ref 0–2)
BILIRUB SERPL-MCNC: 0.8 MG/DL (ref 0–1.2)
BUN SERPL-MCNC: 13 MG/DL (ref 6–23)
CALCIUM SERPL-MCNC: 9.9 MG/DL (ref 8.6–10.2)
CHLORIDE SERPL-SCNC: 96 MMOL/L (ref 98–107)
CO2 SERPL-SCNC: 26 MMOL/L (ref 22–29)
CREAT SERPL-MCNC: 0.6 MG/DL (ref 0.5–1)
EOSINOPHIL # BLD: 0.07 K/UL (ref 0.05–0.5)
EOSINOPHILS RELATIVE PERCENT: 1 % (ref 0–6)
ERYTHROCYTE [DISTWIDTH] IN BLOOD BY AUTOMATED COUNT: 13.9 % (ref 11.5–15)
FERRITIN SERPL-MCNC: 115 NG/ML
GFR, ESTIMATED: >90 ML/MIN/1.73M2
GLUCOSE SERPL-MCNC: 98 MG/DL (ref 74–99)
HCT VFR BLD AUTO: 38.7 % (ref 34–48)
HGB BLD-MCNC: 12.8 G/DL (ref 11.5–15.5)
IMM GRANULOCYTES # BLD AUTO: <0.03 K/UL (ref 0–0.58)
IMM GRANULOCYTES NFR BLD: 0 % (ref 0–5)
IRON SATN MFR SERPL: 23 % (ref 15–50)
IRON SERPL-MCNC: 84 UG/DL (ref 37–145)
LDH SERPL-CCNC: 304 U/L (ref 135–214)
LYMPHOCYTES NFR BLD: 2.44 K/UL (ref 1.5–4)
LYMPHOCYTES RELATIVE PERCENT: 33 % (ref 20–42)
MCH RBC QN AUTO: 25.6 PG (ref 26–35)
MCHC RBC AUTO-ENTMCNC: 33.1 G/DL (ref 32–34.5)
MCV RBC AUTO: 77.4 FL (ref 80–99.9)
MONOCYTES NFR BLD: 0.57 K/UL (ref 0.1–0.95)
MONOCYTES NFR BLD: 8 % (ref 2–12)
NEUTROPHILS NFR BLD: 58 % (ref 43–80)
NEUTS SEG NFR BLD: 4.28 K/UL (ref 1.8–7.3)
PLATELET # BLD AUTO: 344 K/UL (ref 130–450)
PMV BLD AUTO: 10.5 FL (ref 7–12)
POTASSIUM SERPL-SCNC: 4.1 MMOL/L (ref 3.5–5)
PROT SERPL-MCNC: 7.7 G/DL (ref 6.4–8.3)
RBC # BLD AUTO: 5 M/UL (ref 3.5–5.5)
SODIUM SERPL-SCNC: 135 MMOL/L (ref 132–146)
TIBC SERPL-MCNC: 362 UG/DL (ref 250–450)
VIT B12 SERPL-MCNC: 753 PG/ML (ref 211–946)
WBC OTHER # BLD: 7.4 K/UL (ref 4.5–11.5)

## 2024-08-13 PROCEDURE — 1124F ACP DISCUSS-NO DSCNMKR DOCD: CPT | Performed by: INTERNAL MEDICINE

## 2024-08-13 PROCEDURE — G8399 PT W/DXA RESULTS DOCUMENT: HCPCS | Performed by: INTERNAL MEDICINE

## 2024-08-13 PROCEDURE — 83540 ASSAY OF IRON: CPT

## 2024-08-13 PROCEDURE — 82728 ASSAY OF FERRITIN: CPT

## 2024-08-13 PROCEDURE — 83550 IRON BINDING TEST: CPT

## 2024-08-13 PROCEDURE — 82607 VITAMIN B-12: CPT

## 2024-08-13 PROCEDURE — 36415 COLL VENOUS BLD VENIPUNCTURE: CPT

## 2024-08-13 PROCEDURE — 3078F DIAST BP <80 MM HG: CPT | Performed by: INTERNAL MEDICINE

## 2024-08-13 PROCEDURE — 3075F SYST BP GE 130 - 139MM HG: CPT | Performed by: INTERNAL MEDICINE

## 2024-08-13 PROCEDURE — 99214 OFFICE O/P EST MOD 30 MIN: CPT | Performed by: INTERNAL MEDICINE

## 2024-08-13 PROCEDURE — 85025 COMPLETE CBC W/AUTO DIFF WBC: CPT

## 2024-08-13 PROCEDURE — 83020 HEMOGLOBIN ELECTROPHORESIS: CPT

## 2024-08-13 PROCEDURE — 83615 LACTATE (LD) (LDH) ENZYME: CPT

## 2024-08-13 PROCEDURE — G8427 DOCREV CUR MEDS BY ELIG CLIN: HCPCS | Performed by: INTERNAL MEDICINE

## 2024-08-13 PROCEDURE — 1090F PRES/ABSN URINE INCON ASSESS: CPT | Performed by: INTERNAL MEDICINE

## 2024-08-13 PROCEDURE — G8417 CALC BMI ABV UP PARAM F/U: HCPCS | Performed by: INTERNAL MEDICINE

## 2024-08-13 PROCEDURE — 80053 COMPREHEN METABOLIC PANEL: CPT

## 2024-08-13 PROCEDURE — 1036F TOBACCO NON-USER: CPT | Performed by: INTERNAL MEDICINE

## 2024-08-13 PROCEDURE — 3017F COLORECTAL CA SCREEN DOC REV: CPT | Performed by: INTERNAL MEDICINE

## 2024-08-13 NOTE — PROGRESS NOTES
[facility] PHYSICIANS [facility] MED ONCOLOGY  [address] [facility] [address]  Dept: [phone]  Loc: [phone]  Attending progress note      Reason for Visit:   Follicular lymphoma.    Referring Physician:  Domenico Mi MD    PCP:  Davian Brooks DO    History of Present Illness:    Mrs. Lowery is a very pleasant 69-year-old lady, with a past medical history significant for bilateral breast cancer, she was diagnosed with left breast cancer in 1993, she had a left breast lumpectomy done followed by radiation therapy, in 2008 she was diagnosed with right breast cancer, had a lumpectomy done, followed by adjuvant radiation therapy, she did not receive adjuvant chemotherapy or endocrine therapy, also history of hyperlipidemia, hypertension, anxiety, and perforated stercoral sigmoid colitis status post ex lap with Jay's procedure on 11/22/2022, and colostomy reversal on 4/17/2023.  The patient had presented with abdominal wall asymmetry and pressure, CT scan of the abdomen the pelvis from 9/23/2023 has revealed an interval increase in the lymphadenopathy along the root of the mesentery, concerning for an underlying lymphoproliferative disorder, PET scan was done on 10/27/2023 revealing hypermetabolic mesenteric lymphadenopathy, had not significantly changed since 2022, had decreased from 2007, and increased right thyroid uptake.  Neck ultrasound had revealed 9 mm mid to lower pole TR 5 nodule, which is being followed.  The patient underwent on 1/3/2024 ex lap, incisional hernia repair, and central lymph node biopsy, pathology was consistent with classic follicular lymphoma, grade 1-2.     The patient returns for a follow-up visit, she is feeling tired, no unexplained weight loss, drenching night sweats, or recurrent infections.    Review of Systems;  CONSTITUTIONAL: No fever, chills.  Fair appetite, positive for fatigue.  ENMT: Eyes: No diplopia; Nose: No epistaxis. Mouth:

## 2024-08-16 LAB
HGB ELECTROPHORESIS INTERP: NORMAL
PATHOLOGIST: NORMAL

## 2024-08-17 DIAGNOSIS — F41.9 ANXIETY: ICD-10-CM

## 2024-08-19 RX ORDER — ESCITALOPRAM OXALATE 10 MG/1
10 TABLET ORAL DAILY
Qty: 30 TABLET | Refills: 5 | Status: SHIPPED | OUTPATIENT
Start: 2024-08-19

## 2024-08-20 ENCOUNTER — OFFICE VISIT (OUTPATIENT)
Dept: SURGERY | Age: 69
End: 2024-08-20
Payer: MEDICARE

## 2024-08-20 VITALS
DIASTOLIC BLOOD PRESSURE: 58 MMHG | HEIGHT: 62 IN | WEIGHT: 181 LBS | SYSTOLIC BLOOD PRESSURE: 113 MMHG | OXYGEN SATURATION: 94 % | BODY MASS INDEX: 33.31 KG/M2 | HEART RATE: 71 BPM | TEMPERATURE: 97.2 F

## 2024-08-20 DIAGNOSIS — Z87.19 S/P REPAIR OF VENTRAL HERNIA: Primary | ICD-10-CM

## 2024-08-20 DIAGNOSIS — C82.13 FOLLICULAR LYMPHOMA GRADE II OF INTRA-ABDOMINAL LYMPH NODES (HCC): ICD-10-CM

## 2024-08-20 DIAGNOSIS — G89.18 POSTOPERATIVE PAIN: ICD-10-CM

## 2024-08-20 DIAGNOSIS — Z98.890 S/P REPAIR OF VENTRAL HERNIA: Primary | ICD-10-CM

## 2024-08-20 PROCEDURE — 99212 OFFICE O/P EST SF 10 MIN: CPT | Performed by: SURGERY

## 2024-08-20 NOTE — PROGRESS NOTES
Chief Complaint   Patient presents with    Abdominal Pain     Pt c/o occasional abd pain in RLQ, pt c/o occasional pain in incisional areas. Denies n/v        Assessment:  1. S/P abdominal wall reconstruction, TAR, mesh; mesenteric lymph node biopsy 1/2/24    2. Postoperative pain    3. 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      Subjective:  Feels like she is improving from an abdominal wall pain standpoint.  She has been actively engaged in aquatic-based physical therapy twice a week and land-based physical therapy once a week.  This is also under the direction of her orthopedic surgeon at the [facility].  She has been following up with Dr. Addison Phipps.  She noted that she had an increased LDH of uncertain significance.  She does notice some areas of pouching in her abdominal wall.  Her pain is generally at baseline soreness that is not increased with certain activity.  It is well-controlled with over-the-counter ibuprofen.  On occasion she will have sharp stabbing right sided abdominal pain.    Objective:   BP (!) 113/58 (Site: Left Upper Arm, Position: Sitting, Cuff Size: Medium Adult)   Pulse 71   Temp 97.2 °F (36.2 °C) (Temporal)   Ht 1.575 m (5' 2\")   Wt 82.1 kg (181 lb)   SpO2 94%   BMI 33.11 kg/m²   General-healthy appearing 69-year-old white woman in no distress  Abdomen-well-healing midline surgical scar.  Some asymmetry throughout the abdominal wall subcutaneous tissue and muscle.  No palpable fascial defect or hernia noted.

## 2024-08-22 ENCOUNTER — PROCEDURE VISIT (OUTPATIENT)
Dept: AUDIOLOGY | Age: 69
End: 2024-08-22
Payer: MEDICARE

## 2024-08-22 ENCOUNTER — TELEPHONE (OUTPATIENT)
Dept: AUDIOLOGY | Age: 69
End: 2024-08-22

## 2024-08-22 DIAGNOSIS — H69.91 DYSFUNCTION OF RIGHT EUSTACHIAN TUBE: ICD-10-CM

## 2024-08-22 DIAGNOSIS — R42 VERTIGO: ICD-10-CM

## 2024-08-22 DIAGNOSIS — H90.42 SENSORINEURAL HEARING LOSS (SNHL) OF LEFT EAR WITH UNRESTRICTED HEARING OF RIGHT EAR: Primary | ICD-10-CM

## 2024-08-22 PROCEDURE — 92567 TYMPANOMETRY: CPT

## 2024-08-22 PROCEDURE — 92557 COMPREHENSIVE HEARING TEST: CPT

## 2024-08-22 NOTE — PROGRESS NOTES
AUDIOMETRIC EVALUATION    REASON FOR REFERRAL:  This patient was referred for audiometric testing by Davian rBooks DO due to concerns for hearing loss.  Patient reported bilateral hearing loss and dizziness. Patient stated that she has a history of vertigo that was taken care of by an ENT. Patient denied any tinnitus, ear pain, ear drainage, history of ear infections, ear surgery, loud noise exposure, major head injuries, and family history of hearing loss.     RESULTS:  Pure tone audiometric testing using earphones  was carried out. Results revealed thresholds of 15dBHL at 250 Hz, sloping to 40 dBHL through 8000 Hz, in the right ear and 20 dBHL at 250 Hz, sloping to 55 dBHL through 8000 Hz, in the left ear. Speech reception thresholds were obtained at 20 dBHL in the right ear, and 25 dBHL in the left ear. Speech discrimination testing was performed at 65 dBHL bilaterally. Obtained were scores of 100 % in the right ear, and 92 % in the left ear. Masked Bone Conduction Testing revealed thresholds consistent with air conduction thresholds, bilaterally.  Tympanometry revealed normal middle ear peak pressure and compliance, bilaterally.     IMPRESSION:  Today's results revealed hearing sensitivity within normal limits through 6000 Hz, sloping to mild hearing loss, in the right ear. The left ear revealed essentially mild sensorineural hearing loss through 2000 Hz, rising to within normal limits through 4000 Hz, sloping to moderate. Speech reception thresholds were in good agreement with the pure tone averages, bilaterally.  Speech discrimination scores were excellent, bilaterally. Tympanometry suggests middle ear function within normal limits, bilaterally.       An ENT consult is suggested due to asymmetrical hearing loss and ETD.    A re-evaluation is recommended annually or if any changes in hearing occur prior.     The above results were reviewed with the patient.     If I can be of further assistance or provide  additional information, please do not hesitate to contact this office.      Thank you for the referral.        ___________________________________  NIKOS Braxton.  Third Year Audiology Student    Loreto Holt/CCC-SAMINA  [facility] [id number]  Electronically signed by Loreto Holt on 8/22/2024 at 2:57 PM

## 2024-08-22 NOTE — TELEPHONE ENCOUNTER
Patient needs to be seen by NP/ENT for ETD/Asymmetrical hearing loss. Thank you!    Electronically signed by Loreto Holt on 8/22/2024 at 3:35 PM

## 2024-09-17 ENCOUNTER — OFFICE VISIT (OUTPATIENT)
Dept: PRIMARY CARE CLINIC | Age: 69
End: 2024-09-17
Payer: MEDICARE

## 2024-09-17 VITALS
OXYGEN SATURATION: 97 % | WEIGHT: 178.6 LBS | TEMPERATURE: 97.3 F | DIASTOLIC BLOOD PRESSURE: 72 MMHG | BODY MASS INDEX: 32.87 KG/M2 | HEIGHT: 62 IN | SYSTOLIC BLOOD PRESSURE: 128 MMHG | HEART RATE: 61 BPM

## 2024-09-17 DIAGNOSIS — M25.542 ARTHRALGIA OF BOTH HANDS: Primary | ICD-10-CM

## 2024-09-17 DIAGNOSIS — N32.81 OAB (OVERACTIVE BLADDER): ICD-10-CM

## 2024-09-17 DIAGNOSIS — M25.541 ARTHRALGIA OF BOTH HANDS: ICD-10-CM

## 2024-09-17 DIAGNOSIS — M25.541 ARTHRALGIA OF BOTH HANDS: Primary | ICD-10-CM

## 2024-09-17 DIAGNOSIS — R76.8 ANA POSITIVE: Primary | ICD-10-CM

## 2024-09-17 DIAGNOSIS — M25.542 ARTHRALGIA OF BOTH HANDS: ICD-10-CM

## 2024-09-17 LAB
BASOPHILS ABSOLUTE: 0.01 K/UL (ref 0–0.2)
BASOPHILS RELATIVE PERCENT: 0 % (ref 0–2)
BILIRUBIN, URINE: NEGATIVE
COLOR, UA: YELLOW
EOSINOPHILS ABSOLUTE: 0.06 K/UL (ref 0.05–0.5)
EOSINOPHILS RELATIVE PERCENT: 1 % (ref 0–6)
GLUCOSE URINE: NEGATIVE MG/DL
HCT VFR BLD CALC: 39.2 % (ref 34–48)
HEMOGLOBIN: 12.2 G/DL (ref 11.5–15.5)
IMMATURE GRANULOCYTES %: 0 % (ref 0–5)
IMMATURE GRANULOCYTES ABSOLUTE: <0.03 K/UL (ref 0–0.58)
KETONES, URINE: NEGATIVE MG/DL
LEUKOCYTE ESTERASE, URINE: ABNORMAL
LYMPHOCYTES ABSOLUTE: 2.38 K/UL (ref 1.5–4)
LYMPHOCYTES RELATIVE PERCENT: 30 % (ref 20–42)
MCH RBC QN AUTO: 25.2 PG (ref 26–35)
MCHC RBC AUTO-ENTMCNC: 31.1 G/DL (ref 32–34.5)
MCV RBC AUTO: 80.8 FL (ref 80–99.9)
MONOCYTES ABSOLUTE: 0.64 K/UL (ref 0.1–0.95)
MONOCYTES RELATIVE PERCENT: 8 % (ref 2–12)
NEUTROPHILS ABSOLUTE: 4.82 K/UL (ref 1.8–7.3)
NEUTROPHILS RELATIVE PERCENT: 61 % (ref 43–80)
NITRITE, URINE: NEGATIVE
PDW BLD-RTO: 14.1 % (ref 11.5–15)
PH, URINE: 6.5 (ref 5–9)
PLATELET # BLD: 298 K/UL (ref 130–450)
PMV BLD AUTO: 11.5 FL (ref 7–12)
PROTEIN UA: NEGATIVE MG/DL
RBC # BLD: 4.85 M/UL (ref 3.5–5.5)
RBC UA: ABNORMAL /HPF
SPECIFIC GRAVITY UA: <1.005 (ref 1–1.03)
TURBIDITY: CLEAR
URINE HGB: NEGATIVE
UROBILINOGEN, URINE: 0.2 EU/DL (ref 0–1)
WBC # BLD: 7.9 K/UL (ref 4.5–11.5)
WBC UA: ABNORMAL /HPF

## 2024-09-17 PROCEDURE — 99214 OFFICE O/P EST MOD 30 MIN: CPT | Performed by: FAMILY MEDICINE

## 2024-09-17 PROCEDURE — 1090F PRES/ABSN URINE INCON ASSESS: CPT | Performed by: FAMILY MEDICINE

## 2024-09-17 PROCEDURE — 3074F SYST BP LT 130 MM HG: CPT | Performed by: FAMILY MEDICINE

## 2024-09-17 PROCEDURE — 1124F ACP DISCUSS-NO DSCNMKR DOCD: CPT | Performed by: FAMILY MEDICINE

## 2024-09-17 PROCEDURE — G8417 CALC BMI ABV UP PARAM F/U: HCPCS | Performed by: FAMILY MEDICINE

## 2024-09-17 PROCEDURE — 3078F DIAST BP <80 MM HG: CPT | Performed by: FAMILY MEDICINE

## 2024-09-17 PROCEDURE — G8427 DOCREV CUR MEDS BY ELIG CLIN: HCPCS | Performed by: FAMILY MEDICINE

## 2024-09-17 PROCEDURE — 1036F TOBACCO NON-USER: CPT | Performed by: FAMILY MEDICINE

## 2024-09-17 PROCEDURE — G8399 PT W/DXA RESULTS DOCUMENT: HCPCS | Performed by: FAMILY MEDICINE

## 2024-09-17 PROCEDURE — 3017F COLORECTAL CA SCREEN DOC REV: CPT | Performed by: FAMILY MEDICINE

## 2024-09-17 RX ORDER — OXYBUTYNIN CHLORIDE 10 MG/1
10 TABLET, EXTENDED RELEASE ORAL DAILY
Qty: 30 TABLET | Refills: 3 | Status: SHIPPED | OUTPATIENT
Start: 2024-09-17

## 2024-09-17 ASSESSMENT — ENCOUNTER SYMPTOMS
PHOTOPHOBIA: 0
BACK PAIN: 0
BLOOD IN STOOL: 0
SHORTNESS OF BREATH: 0
APNEA: 0
CHEST TIGHTNESS: 0
FACIAL SWELLING: 0
COLOR CHANGE: 0
WHEEZING: 0
ALLERGIC/IMMUNOLOGIC NEGATIVE: 1
SINUS PRESSURE: 0
DIARRHEA: 0

## 2024-09-18 LAB
ALBUMIN: 4.6 G/DL (ref 3.5–5.2)
ALP BLD-CCNC: 58 U/L (ref 35–104)
ALT SERPL-CCNC: 24 U/L (ref 0–32)
ANA PATTERN: ABNORMAL
ANA TITER: ABNORMAL
ANION GAP SERPL CALCULATED.3IONS-SCNC: 13 MMOL/L (ref 7–16)
ANTI-NUCLEAR ANTIBODY (ANA): POSITIVE
AST SERPL-CCNC: 27 U/L (ref 0–31)
BILIRUB SERPL-MCNC: 0.7 MG/DL (ref 0–1.2)
BUN BLDV-MCNC: 13 MG/DL (ref 6–23)
CALCIUM SERPL-MCNC: 10 MG/DL (ref 8.6–10.2)
CHLORIDE BLD-SCNC: 96 MMOL/L (ref 98–107)
CO2: 28 MMOL/L (ref 22–29)
CREAT SERPL-MCNC: 0.6 MG/DL (ref 0.5–1)
GFR, ESTIMATED: >90 ML/MIN/1.73M2
GLUCOSE BLD-MCNC: 103 MG/DL (ref 74–99)
POTASSIUM SERPL-SCNC: 3.5 MMOL/L (ref 3.5–5)
RHEUMATOID FACTOR: <10 IU/ML (ref 0–13)
SED RATE, AUTOMATED: 11 MM/HR (ref 0–20)
SODIUM BLD-SCNC: 137 MMOL/L (ref 132–146)
TOTAL PROTEIN: 7.2 G/DL (ref 6.4–8.3)

## 2024-09-19 ENCOUNTER — OFFICE VISIT (OUTPATIENT)
Dept: ENT CLINIC | Age: 69
End: 2024-09-19
Payer: MEDICARE

## 2024-09-19 VITALS
DIASTOLIC BLOOD PRESSURE: 70 MMHG | WEIGHT: 180.5 LBS | HEART RATE: 69 BPM | BODY MASS INDEX: 33.21 KG/M2 | HEIGHT: 62 IN | SYSTOLIC BLOOD PRESSURE: 125 MMHG

## 2024-09-19 DIAGNOSIS — H90.6 MIXED HEARING LOSS, BILATERAL: ICD-10-CM

## 2024-09-19 DIAGNOSIS — H91.8X3 ASYMMETRICAL HEARING LOSS: Primary | ICD-10-CM

## 2024-09-19 PROCEDURE — 99204 OFFICE O/P NEW MOD 45 MIN: CPT | Performed by: OTOLARYNGOLOGY

## 2024-09-19 PROCEDURE — 3078F DIAST BP <80 MM HG: CPT | Performed by: OTOLARYNGOLOGY

## 2024-09-19 PROCEDURE — 3074F SYST BP LT 130 MM HG: CPT | Performed by: OTOLARYNGOLOGY

## 2024-09-19 PROCEDURE — 1124F ACP DISCUSS-NO DSCNMKR DOCD: CPT | Performed by: OTOLARYNGOLOGY

## 2024-09-19 ASSESSMENT — ENCOUNTER SYMPTOMS
ALLERGIC/IMMUNOLOGIC NEGATIVE: 1
EYES NEGATIVE: 1
SORE THROAT: 0
RHINORRHEA: 0
RESPIRATORY NEGATIVE: 1

## 2024-09-20 ENCOUNTER — TELEPHONE (OUTPATIENT)
Dept: RHEUMATOLOGY | Age: 69
End: 2024-09-20

## 2024-09-20 NOTE — TELEPHONE ENCOUNTER
Pt called in to [facility] an appt, we have a referral from Dr. Brooks. Ritu can be reached at [phone]

## 2024-09-25 RX ORDER — FERROUS SULFATE 325(65) MG
1 TABLET ORAL
Qty: 60 TABLET | Refills: 0 | Status: SHIPPED | OUTPATIENT
Start: 2024-09-25

## 2024-10-14 ENCOUNTER — HOSPITAL ENCOUNTER (OUTPATIENT)
Dept: MRI IMAGING | Age: 69
Discharge: HOME | End: 2024-10-16
Attending: OTOLARYNGOLOGY
Payer: MEDICARE

## 2024-10-14 DIAGNOSIS — H91.8X3 ASYMMETRICAL HEARING LOSS: ICD-10-CM

## 2024-10-14 PROCEDURE — A9579 GAD-BASE MR CONTRAST NOS,1ML: HCPCS | Performed by: RADIOLOGY

## 2024-10-14 PROCEDURE — 70553 MRI BRAIN STEM W/O & W/DYE: CPT

## 2024-10-14 PROCEDURE — 6360000004 HC RX CONTRAST MEDICATION: Performed by: RADIOLOGY

## 2024-10-14 RX ADMIN — GADOTERIDOL 17 ML: 279.3 INJECTION, SOLUTION INTRAVENOUS at 17:05

## 2024-10-16 DIAGNOSIS — F41.9 ANXIETY: ICD-10-CM

## 2024-10-17 RX ORDER — LORAZEPAM 0.5 MG/1
TABLET ORAL
Qty: 60 TABLET | Refills: 1 | Status: SHIPPED | OUTPATIENT
Start: 2024-10-17 | End: 2024-11-16

## 2024-10-31 ENCOUNTER — OFFICE VISIT (OUTPATIENT)
Dept: ENT CLINIC | Age: 69
End: 2024-10-31

## 2024-10-31 VITALS
DIASTOLIC BLOOD PRESSURE: 64 MMHG | SYSTOLIC BLOOD PRESSURE: 135 MMHG | BODY MASS INDEX: 32.76 KG/M2 | HEIGHT: 62 IN | HEART RATE: 66 BPM | WEIGHT: 178 LBS

## 2024-10-31 DIAGNOSIS — H90.6 MIXED HEARING LOSS, BILATERAL: ICD-10-CM

## 2024-10-31 DIAGNOSIS — H91.8X3 ASYMMETRICAL HEARING LOSS: Primary | ICD-10-CM

## 2024-10-31 ASSESSMENT — ENCOUNTER SYMPTOMS
SORE THROAT: 0
EYES NEGATIVE: 1
ALLERGIC/IMMUNOLOGIC NEGATIVE: 1
RESPIRATORY NEGATIVE: 1
RHINORRHEA: 0

## 2024-10-31 NOTE — PROGRESS NOTES
alert and oriented to person, place, and time.             MRI IAC     IMPRESSION:  1. Unremarkable MRI of the internal auditory canals.  2. No acute intracranial abnormality.  3. Mild chronic microvascular ischemic changes.         Assessment:       Diagnosis Orders   1. Asymmetrical hearing loss        2. Mixed hearing loss, bilateral                  Plan:        69-year-old female with progressive hearing loss who presents for evaluation.  Her examination is within normal limits.  Audiogram showed left asymmetric hearing loss. MRI is clear. She is not a candidate for hearing aids at this time.  She will continue with hearing protection.    Follow-up on annual basis .          Electronically signed by Amilcar Braden DO on 9/19/24 at1:47 PM EDT

## 2024-11-12 ENCOUNTER — OFFICE VISIT (OUTPATIENT)
Dept: ONCOLOGY | Age: 69
End: 2024-11-12

## 2024-11-12 ENCOUNTER — HOSPITAL ENCOUNTER (OUTPATIENT)
Dept: INFUSION THERAPY | Age: 69
Discharge: HOME | End: 2024-11-12
Payer: MEDICARE

## 2024-11-12 VITALS
TEMPERATURE: 97.7 F | HEIGHT: 62 IN | HEART RATE: 54 BPM | BODY MASS INDEX: 32.54 KG/M2 | SYSTOLIC BLOOD PRESSURE: 144 MMHG | DIASTOLIC BLOOD PRESSURE: 80 MMHG | WEIGHT: 176.8 LBS | OXYGEN SATURATION: 98 %

## 2024-11-12 DIAGNOSIS — C50.912 BILATERAL MALIGNANT NEOPLASM OF BREAST IN FEMALE, UNSPECIFIED ESTROGEN RECEPTOR STATUS, UNSPECIFIED SITE OF BREAST (HCC): ICD-10-CM

## 2024-11-12 DIAGNOSIS — D64.9 ANEMIA, UNSPECIFIED TYPE: ICD-10-CM

## 2024-11-12 DIAGNOSIS — C82.10 FOLLICULAR LYMPHOMA GRADE II, UNSPECIFIED BODY REGION (HCC): Primary | ICD-10-CM

## 2024-11-12 DIAGNOSIS — D64.9 ANEMIA, UNSPECIFIED TYPE: Primary | ICD-10-CM

## 2024-11-12 DIAGNOSIS — C50.911 BILATERAL MALIGNANT NEOPLASM OF BREAST IN FEMALE, UNSPECIFIED ESTROGEN RECEPTOR STATUS, UNSPECIFIED SITE OF BREAST (HCC): ICD-10-CM

## 2024-11-12 LAB
ALBUMIN SERPL-MCNC: 4.6 G/DL (ref 3.5–5.2)
ALP SERPL-CCNC: 49 U/L (ref 35–104)
ALT SERPL-CCNC: 11 U/L (ref 0–32)
ANION GAP SERPL CALCULATED.3IONS-SCNC: 11 MMOL/L (ref 7–16)
AST SERPL-CCNC: 13 U/L (ref 0–31)
BASOPHILS # BLD: 0.02 K/UL (ref 0–0.2)
BASOPHILS NFR BLD: 0 % (ref 0–2)
BILIRUB SERPL-MCNC: 0.7 MG/DL (ref 0–1.2)
BUN SERPL-MCNC: 15 MG/DL (ref 6–23)
CALCIUM SERPL-MCNC: 9.8 MG/DL (ref 8.6–10.2)
CHLORIDE SERPL-SCNC: 100 MMOL/L (ref 98–107)
CO2 SERPL-SCNC: 29 MMOL/L (ref 22–29)
CREAT SERPL-MCNC: 0.5 MG/DL (ref 0.5–1)
EOSINOPHIL # BLD: 0.09 K/UL (ref 0.05–0.5)
EOSINOPHILS RELATIVE PERCENT: 2 % (ref 0–6)
ERYTHROCYTE [DISTWIDTH] IN BLOOD BY AUTOMATED COUNT: 13.8 % (ref 11.5–15)
FERRITIN SERPL-MCNC: 137 NG/ML
GFR, ESTIMATED: >90 ML/MIN/1.73M2
GLUCOSE SERPL-MCNC: 113 MG/DL (ref 74–99)
HCT VFR BLD AUTO: 39.3 % (ref 34–48)
HGB BLD-MCNC: 12.6 G/DL (ref 11.5–15.5)
IMM GRANULOCYTES # BLD AUTO: <0.03 K/UL (ref 0–0.58)
IMM GRANULOCYTES NFR BLD: 0 % (ref 0–5)
IRON SATN MFR SERPL: 26 % (ref 15–50)
IRON SERPL-MCNC: 83 UG/DL (ref 37–145)
LDH SERPL-CCNC: 162 U/L (ref 135–214)
LYMPHOCYTES NFR BLD: 1.69 K/UL (ref 1.5–4)
LYMPHOCYTES RELATIVE PERCENT: 30 % (ref 20–42)
MCH RBC QN AUTO: 24.7 PG (ref 26–35)
MCHC RBC AUTO-ENTMCNC: 32.1 G/DL (ref 32–34.5)
MCV RBC AUTO: 77.1 FL (ref 80–99.9)
MONOCYTES NFR BLD: 0.53 K/UL (ref 0.1–0.95)
MONOCYTES NFR BLD: 9 % (ref 2–12)
NEUTROPHILS NFR BLD: 59 % (ref 43–80)
NEUTS SEG NFR BLD: 3.36 K/UL (ref 1.8–7.3)
PLATELET # BLD AUTO: 306 K/UL (ref 130–450)
PMV BLD AUTO: 10.4 FL (ref 7–12)
POTASSIUM SERPL-SCNC: 3.5 MMOL/L (ref 3.5–5)
PROT SERPL-MCNC: 7 G/DL (ref 6.4–8.3)
RBC # BLD AUTO: 5.1 M/UL (ref 3.5–5.5)
SODIUM SERPL-SCNC: 140 MMOL/L (ref 132–146)
TIBC SERPL-MCNC: 315 UG/DL (ref 250–450)
WBC OTHER # BLD: 5.7 K/UL (ref 4.5–11.5)

## 2024-11-12 PROCEDURE — 85025 COMPLETE CBC W/AUTO DIFF WBC: CPT

## 2024-11-12 PROCEDURE — 36415 COLL VENOUS BLD VENIPUNCTURE: CPT

## 2024-11-12 PROCEDURE — 80053 COMPREHEN METABOLIC PANEL: CPT

## 2024-11-12 PROCEDURE — 83540 ASSAY OF IRON: CPT

## 2024-11-12 PROCEDURE — 83615 LACTATE (LD) (LDH) ENZYME: CPT

## 2024-11-12 PROCEDURE — 83550 IRON BINDING TEST: CPT

## 2024-11-12 PROCEDURE — 82728 ASSAY OF FERRITIN: CPT

## 2024-11-13 NOTE — PROGRESS NOTES
Patient provided with discharge instructions, received printed AVS.  All questions answered.  Patient understands follow up plan of care.     
decreased from 2007, and increased right thyroid uptake.  Neck ultrasound had revealed 9 mm mid to lower pole TR 5 nodule, which is being followed.  The patient underwent on 1/3/2024 ex lap, incisional hernia repair, and central lymph node biopsy, pathology was consistent with classic follicular lymphoma, grade 1-2.     The patient has non-Hodgkin lymphoma, follicular lymphoma, grade 1-2 involving the mesenteric lymphadenopathy, diagnosis, prognosis/natural course of the disease were discussed with the patient and her family members, the patient does not have bulky disease, the largest lymph node is 3.1 cm, she does not have B symptoms, or cytopenias, LDH is normal, treatment is not indicated at this time.  The warning signs/symptoms of disease progression were reviewed with the patient.  Recommended surveillance.    Microcytic anemia, secondary to iron deficiency, the patient received oral iron supplements, with iron deficiency had resolved, hemoglobin/hematocrit had normalized, but has persistent microcytosis, MCV 77.1, hemoglobin electrophoresis was done on 8/13/2024, revealing normal hemoglobin electrophoretic pattern.    Bilateral breast cancer, with no evidence of disease recurrence, reviewed genetic testing results, negative.  The patient had a screening mammogram done in March, which was negative, continue breast screening.    Vitamin B12 was borderline, labs reviewed, vitamin B12 had improved to 753, continue oral supplement.    RTC in 3 months.    Thank you for allowing us to participate in the care of Mrs. Lowery.    JULIEN ALMEIDA MD   HEMATOLOGY/MEDICAL ONCOLOGY  [facility] PHYSICIANS [facility] CARE [facility] MED ONCOLOGY  [address] [place] [address]  Dept: [phone]  Loc: [phone]

## 2024-11-27 RX ORDER — FERROUS SULFATE 325(65) MG
1 TABLET ORAL DAILY
Qty: 60 TABLET | Refills: 0 | Status: SHIPPED | OUTPATIENT
Start: 2024-11-27

## 2024-12-02 ASSESSMENT — RHEUMATOLOGY NEW PATIENT QUESTIONNAIRE
HEARTBURN OR REFLUX: N
SHORTNESS OF BREATH: N
SWOLLEN LEGS OR FEET: N
NAUSEA: N
ANXIETY: Y
UNUSUALLY RAPID OR SLOWED HEART RATE: N
RASH OR ULCERS: N
EASILY LOSING TEMPER: N
EYE DRYNESS: N
HOARSE VOICE: N
BLOOD IN STOOLS: N
BLACK STOOLS: N
JOINT SWELLING: N
HEADACHES: N
DIFFICULTY SWALLOWING: N
UNUSUAL BLEEDING: N
ANEMIA: Y
SKIN REDNESS: N
UNUSUAL FATIGUE: Y
UNEXPLAINED HEARING LOSS: N
DIFFICULTY FALLING ASLEEP: Y
FEVER: N
CHEST PAIN: N
EXCESSIVE HAIR LOSS (MORE THAN YOUR NORM): N
MEMORY LOSS: N
EYE REDNESS: N
SKIN TIGHTNESS: N
RASH: N
DRYNESS OF MOUTH: N
MUSCLE WEAKNESS: Y
JAUNDICE: N
DOUBLE OR BLURRED VISION: N
VOMITING OF BLOOD OR COFFEE GROUND CONSISTENCY MATERIAL: N
LOSS OF CONSCIOUSNESS: N
COUGH: N
COLOR CHANGES OF HANDS OR FEET IN THE COLD: N
STOMACH PAIN: N
NODULES/BUMPS: N
ABNORMAL URINE: N
SEIZURES: N
JOINT PAIN: Y
SWOLLEN OR TENDER GLANDS: N
BEHAVIORAL CHANGES: N
UNEXPLAINED WEIGHT CHANGE: N
LOSS OF VISION: N
VAGINAL DRYNESS: N
FAINTING: N
EASY BRUISING: N
NIGHT SWEATS: N
INCREASED SUSCEPTIBILITY TO INFECTION: N
AGITATION: N
MORNING STIFFNESS: Y
EYE PAIN: N
NUMBNESS OR TINGLING IN HANDS OR FEET: Y
PERSISTENT DIARRHEA: N
SORES IN MOUTH OR NOSE: N
PAIN OR BURNING ON URINATION: N
SUN SENSITIVE (SUN ALLERGY): N
HOW WOULD YOU DESCRIBE YOUR STIFFNESS ON AVERAGE: MODERATE
MORNING STIFFNESS IN LOWER BACK: Y
DIFFICULTY STAYING ASLEEP: Y
DEPRESSION: N
DIFFICULTY BREATHING LYING DOWN: N

## 2024-12-02 NOTE — PROGRESS NOTES
[facility] RHEUMATOLOGY  [address] [address]  Dept: [phone]  Dept Fax: [phone]    Zelda Lowery 1955 is a 69 y.o. female, here for evaluation of the following chief complaint(s): New Patient      Subjective   SUBJECTIVE/OBJECTIVE:    HPI: Zelda Lowery is a 69 y.o. female with a history of b/l breast ca s/p b/l lumpectomy and radiation (no chemotherapy), perforated sigmoid colitis, HTN, HLD, osteoporosis, anxiety, OC, and non-Hodgkin's follicular lymphoma (no treatment, monitoring) referred by PCP for arthralgia of both hands. Labs notable for normal ESR, negative RF, RELL 1:160, essentially normal CBC & CMP (microcytosis noted). XR b/l hands 09/2024 read as normal.    Patient states sx started a yr  ago, L 2nd digit was the worst but now there are more fingers involved. Feels like \"scalding water\" but symptoms are constant. She does knit and plays piano, occasionally fingers get stuck while she's playing piano. 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 Less activity helps somewhat but is bothersome to her that she can't do what she wants. Takes Tylenol w/some relief. Not necessarily worse with activity. Unsure if she wakes up because of it at night, difficulty opening things bc of weakness. Occasional similar symptoms in the L foot which does occasionally happen at night. No visible swelling. No neck pain or elbow pain. She had R KEVIN and revision in the past. She does have dry mouth but believes this is a side effect of medication. No dry eyes. She was prescribed Fosamax in the past for osteoporosis but took it for 3 mos and stopped d/t nausea, achiness, and fatigue. No hx of fragility fractures. She has taken very short bursts of steroids for vertigo in the past, only twice. G3, went through menopause at age 44 after oophorectomy. Has permanent bridge, no plans for invasive dental procedures.     FMH: No known FMH of autoimmunity    Social: Denies tobacco and

## 2024-12-03 ENCOUNTER — OFFICE VISIT (OUTPATIENT)
Dept: RHEUMATOLOGY | Age: 69
End: 2024-12-03

## 2024-12-03 VITALS
WEIGHT: 175 LBS | HEART RATE: 59 BPM | BODY MASS INDEX: 32.2 KG/M2 | HEIGHT: 62 IN | SYSTOLIC BLOOD PRESSURE: 138 MMHG | OXYGEN SATURATION: 99 % | DIASTOLIC BLOOD PRESSURE: 65 MMHG

## 2024-12-03 DIAGNOSIS — M81.0 AGE-RELATED OSTEOPOROSIS WITHOUT CURRENT PATHOLOGICAL FRACTURE: ICD-10-CM

## 2024-12-03 DIAGNOSIS — G62.9 PERIPHERAL POLYNEUROPATHY: ICD-10-CM

## 2024-12-03 DIAGNOSIS — R76.8 POSITIVE ANA (ANTINUCLEAR ANTIBODY): Primary | ICD-10-CM

## 2024-12-03 DIAGNOSIS — E56.8 DEFICIENCY OF OTHER VITAMINS: ICD-10-CM

## 2024-12-03 RX ORDER — SODIUM CHLORIDE 0.9 % (FLUSH) 0.9 %
5-40 SYRINGE (ML) INJECTION PRN
OUTPATIENT
Start: 2024-12-09

## 2024-12-03 RX ORDER — HYDROCORTISONE SODIUM SUCCINATE 100 MG/2ML
100 INJECTION INTRAMUSCULAR; INTRAVENOUS
OUTPATIENT
Start: 2024-12-09

## 2024-12-03 RX ORDER — SODIUM CHLORIDE 9 MG/ML
5-250 INJECTION, SOLUTION INTRAVENOUS PRN
OUTPATIENT
Start: 2024-12-09

## 2024-12-03 RX ORDER — HEPARIN SODIUM (PORCINE) LOCK FLUSH IV SOLN 100 UNIT/ML 100 UNIT/ML
500 SOLUTION INTRAVENOUS PRN
OUTPATIENT
Start: 2024-12-09

## 2024-12-03 RX ORDER — ALBUTEROL SULFATE 90 UG/1
4 INHALANT RESPIRATORY (INHALATION) PRN
OUTPATIENT
Start: 2024-12-09

## 2024-12-03 RX ORDER — SODIUM CHLORIDE 9 MG/ML
INJECTION, SOLUTION INTRAVENOUS CONTINUOUS
OUTPATIENT
Start: 2024-12-09

## 2024-12-03 RX ORDER — ZOLEDRONIC ACID 0.05 MG/ML
5 INJECTION, SOLUTION INTRAVENOUS ONCE
OUTPATIENT
Start: 2024-12-09 | End: 2024-12-09

## 2024-12-03 RX ORDER — FAMOTIDINE 10 MG/ML
20 INJECTION, SOLUTION INTRAVENOUS
OUTPATIENT
Start: 2024-12-09

## 2024-12-03 RX ORDER — EPINEPHRINE 1 MG/ML
0.3 INJECTION, SOLUTION, CONCENTRATE INTRAVENOUS PRN
OUTPATIENT
Start: 2024-12-09

## 2024-12-03 RX ORDER — ONDANSETRON 2 MG/ML
8 INJECTION INTRAMUSCULAR; INTRAVENOUS
OUTPATIENT
Start: 2024-12-09

## 2024-12-03 RX ORDER — DIPHENHYDRAMINE HYDROCHLORIDE 50 MG/ML
50 INJECTION INTRAMUSCULAR; INTRAVENOUS
OUTPATIENT
Start: 2024-12-09

## 2024-12-03 RX ORDER — ACETAMINOPHEN 325 MG/1
650 TABLET ORAL
OUTPATIENT
Start: 2024-12-09

## 2024-12-03 NOTE — PROGRESS NOTES
Patient here as a new patient for rheumatology consult for arthralgia bilateral hands, positive RELL. Patient had X-rays of bilateral hands completed 09/2024 in epic. Blood work completed 09/2024 in [facility]. Patient states that the is her first encounter for rheumatology consult. Patient states that the pain in the hands have been ongoing for over one year now, c/o tingling, numbness and stiffness.    Patient states that when she had her blood work done 09/2024, she was having severe pain in the hands, pain score 7 our of 10. Patient does take Tylenol when needed, but also uses topical ointments.       Electronically signed by Millie Graham CMA on 12/3/2024 at 9:56 AM

## 2024-12-03 NOTE — PATIENT INSTRUCTIONS
Dr. Tovar has ordered a radiology test for you such as \"Bone Density Scan, MRI, CT, etc\". [facility] Radiology Scheduling Department should contact you within 1-2 weeks to schedule your appointment. If you do not hear from the scheduling department, please contact them at [phone]. If any questions, please call Dr. Tovar's office if needed at [phone]. Thank you.

## 2024-12-11 DIAGNOSIS — F41.9 ANXIETY: ICD-10-CM

## 2024-12-11 RX ORDER — LORAZEPAM 0.5 MG/1
TABLET ORAL
Qty: 60 TABLET | Refills: 2 | Status: SHIPPED | OUTPATIENT
Start: 2024-12-11 | End: 2025-01-10

## 2024-12-17 ENCOUNTER — OFFICE VISIT (OUTPATIENT)
Dept: PRIMARY CARE CLINIC | Age: 69
End: 2024-12-17

## 2024-12-17 VITALS
HEIGHT: 62 IN | SYSTOLIC BLOOD PRESSURE: 120 MMHG | BODY MASS INDEX: 31.83 KG/M2 | HEART RATE: 63 BPM | WEIGHT: 173 LBS | DIASTOLIC BLOOD PRESSURE: 76 MMHG | TEMPERATURE: 97.3 F | OXYGEN SATURATION: 97 %

## 2024-12-17 DIAGNOSIS — G62.9 PERIPHERAL POLYNEUROPATHY: ICD-10-CM

## 2024-12-17 DIAGNOSIS — I10 ESSENTIAL HYPERTENSION, BENIGN: Primary | ICD-10-CM

## 2024-12-17 DIAGNOSIS — E56.8 DEFICIENCY OF OTHER VITAMINS: ICD-10-CM

## 2024-12-17 DIAGNOSIS — C82.13 FOLLICULAR LYMPHOMA GRADE II OF INTRA-ABDOMINAL LYMPH NODES (HCC): ICD-10-CM

## 2024-12-17 DIAGNOSIS — M81.0 AGE-RELATED OSTEOPOROSIS WITHOUT CURRENT PATHOLOGICAL FRACTURE: ICD-10-CM

## 2024-12-17 DIAGNOSIS — I10 ESSENTIAL HYPERTENSION, BENIGN: ICD-10-CM

## 2024-12-17 DIAGNOSIS — N32.81 OAB (OVERACTIVE BLADDER): ICD-10-CM

## 2024-12-17 DIAGNOSIS — E78.49 OTHER HYPERLIPIDEMIA: ICD-10-CM

## 2024-12-17 DIAGNOSIS — R76.8 POSITIVE ANA (ANTINUCLEAR ANTIBODY): ICD-10-CM

## 2024-12-17 LAB
ALBUMIN: 4.7 G/DL (ref 3.5–5.2)
ALP BLD-CCNC: 57 U/L (ref 35–104)
ALT SERPL-CCNC: 15 U/L (ref 0–32)
ANION GAP SERPL CALCULATED.3IONS-SCNC: 16 MMOL/L (ref 7–16)
AST SERPL-CCNC: 22 U/L (ref 0–31)
BASOPHILS ABSOLUTE: 0.01 K/UL (ref 0–0.2)
BASOPHILS RELATIVE PERCENT: 0 % (ref 0–2)
BILIRUB SERPL-MCNC: 1 MG/DL (ref 0–1.2)
BUN BLDV-MCNC: 11 MG/DL (ref 6–23)
CALCIUM SERPL-MCNC: 10.1 MG/DL (ref 8.6–10.2)
CHLORIDE BLD-SCNC: 98 MMOL/L (ref 98–107)
CHOLESTEROL, TOTAL: 180 MG/DL
CO2: 24 MMOL/L (ref 22–29)
CREAT SERPL-MCNC: 0.6 MG/DL (ref 0.5–1)
EOSINOPHILS ABSOLUTE: 0.06 K/UL (ref 0.05–0.5)
EOSINOPHILS RELATIVE PERCENT: 1 % (ref 0–6)
GFR, ESTIMATED: >90 ML/MIN/1.73M2
GLUCOSE BLD-MCNC: 111 MG/DL (ref 74–99)
HCT VFR BLD CALC: 40.4 % (ref 34–48)
HDLC SERPL-MCNC: 53 MG/DL
HEMOGLOBIN: 13 G/DL (ref 11.5–15.5)
IMMATURE GRANULOCYTES %: 0 % (ref 0–5)
IMMATURE GRANULOCYTES ABSOLUTE: <0.03 K/UL (ref 0–0.58)
LDL CHOLESTEROL: 86 MG/DL
LYMPHOCYTES ABSOLUTE: 1.8 K/UL (ref 1.5–4)
LYMPHOCYTES RELATIVE PERCENT: 31 % (ref 20–42)
MCH RBC QN AUTO: 25 PG (ref 26–35)
MCHC RBC AUTO-ENTMCNC: 32.2 G/DL (ref 32–34.5)
MCV RBC AUTO: 77.8 FL (ref 80–99.9)
MONOCYTES ABSOLUTE: 0.48 K/UL (ref 0.1–0.95)
MONOCYTES RELATIVE PERCENT: 8 % (ref 2–12)
NEUTROPHILS ABSOLUTE: 3.39 K/UL (ref 1.8–7.3)
NEUTROPHILS RELATIVE PERCENT: 59 % (ref 43–80)
PDW BLD-RTO: 14.1 % (ref 11.5–15)
PLATELET # BLD: 335 K/UL (ref 130–450)
PMV BLD AUTO: 10.5 FL (ref 7–12)
POTASSIUM SERPL-SCNC: 3.1 MMOL/L (ref 3.5–5)
PTH INTACT: 31.1 PG/ML (ref 15–65)
RBC # BLD: 5.19 M/UL (ref 3.5–5.5)
SODIUM BLD-SCNC: 138 MMOL/L (ref 132–146)
TOTAL PROTEIN: 7.6 G/DL (ref 6.4–8.3)
TRIGL SERPL-MCNC: 206 MG/DL
TSH SERPL DL<=0.05 MIU/L-ACNC: 0.78 UIU/ML (ref 0.27–4.2)
VITAMIN D 25-HYDROXY: 41.9 NG/ML (ref 30–100)
VLDLC SERPL CALC-MCNC: 41 MG/DL
WBC # BLD: 5.8 K/UL (ref 4.5–11.5)

## 2024-12-17 RX ORDER — OXYBUTYNIN CHLORIDE 15 MG/1
15 TABLET, EXTENDED RELEASE ORAL DAILY
Qty: 30 TABLET | Refills: 3 | Status: SHIPPED | OUTPATIENT
Start: 2024-12-17

## 2024-12-17 ASSESSMENT — ENCOUNTER SYMPTOMS
FACIAL SWELLING: 0
APNEA: 0
ABDOMINAL PAIN: 0
BACK PAIN: 0
SORE THROAT: 0
DIARRHEA: 0
SHORTNESS OF BREATH: 0
SINUS PRESSURE: 0
VOMITING: 0
COUGH: 0
COLOR CHANGE: 0
NAUSEA: 0
CHEST TIGHTNESS: 0
WHEEZING: 0
ALLERGIC/IMMUNOLOGIC NEGATIVE: 1
BLOOD IN STOOL: 0
PHOTOPHOBIA: 0

## 2024-12-17 NOTE — PROGRESS NOTES
nausea and vomiting.   Genitourinary:  Positive for frequency. Negative for difficulty urinating and urgency.   Musculoskeletal:  Negative for arthralgias, back pain, joint swelling and myalgias.   Skin:  Negative for color change and rash.   Allergic/Immunologic: Negative.    Neurological:  Positive for dizziness. Negative for syncope, weakness, light-headedness and headaches.   Hematological: Negative.    Psychiatric/Behavioral:  Negative for agitation, behavioral problems, confusion and self-injury. The patient is not nervous/anxious.    All other systems reviewed and are negative.      Physical Exam  Vitals and nursing note reviewed.   Constitutional:       General: She is not in acute distress.     Appearance: She is well-developed.   HENT:      Head: Normocephalic and atraumatic.      Nose: Nose normal.   Eyes:      Conjunctiva/sclera: Conjunctivae normal.      Pupils: Pupils are equal, round, and reactive to light.   Neck:      Thyroid: No thyromegaly.      Vascular: No JVD.   Cardiovascular:      Rate and Rhythm: Normal rate and regular rhythm.      Heart sounds: Normal heart sounds. No murmur heard.     No friction rub. No gallop.   Pulmonary:      Effort: Pulmonary effort is normal. No respiratory distress.      Breath sounds: Normal breath sounds. No wheezing.   Abdominal:      General: Bowel sounds are normal. There is no distension.      Palpations: Abdomen is soft.      Tenderness: There is no abdominal tenderness. There is no guarding or rebound.   Musculoskeletal:         General: Normal range of motion.      Cervical back: Normal range of motion and neck supple.   Lymphadenopathy:      Cervical: No cervical adenopathy.   Skin:     General: Skin is warm and dry.      Findings: No erythema or rash.   Neurological:      Mental Status: She is alert and oriented to person, place, and time.      Cranial Nerves: No cranial nerve deficit.      Motor: No abnormal muscle tone.      Coordination: Coordination

## 2024-12-18 DIAGNOSIS — E87.6 HYPOKALEMIA: Primary | ICD-10-CM

## 2024-12-18 LAB
SJOGREN'S ANTIBODIES (SSA): NEGATIVE
SJOGREN'S ANTIBODIES (SSB): NEGATIVE

## 2024-12-20 LAB
ALBUMIN (CALCULATED): 3.9 G/DL (ref 3.5–4.7)
ALPHA-1-GLOBULIN: 0.3 G/DL (ref 0.2–0.4)
ALPHA-2-GLOBULIN: 0.9 G/DL (ref 0.5–1)
BETA GLOBULIN: 1.1 G/DL (ref 0.8–1.3)
GAMMA GLOBULIN: 1 G/DL (ref 0.7–1.6)
PATHOLOGIST: NORMAL
PROTEIN ELECTROPHORESIS, SERUM: NORMAL
TOTAL PROTEIN: 7.2 G/DL (ref 6.4–8.3)

## 2024-12-21 LAB
EER IMMUNOFIX ELECTROPHORESIS SERUM: NORMAL
IMMUNOFIXATION ELECTROPHORESIS: NORMAL

## 2024-12-31 RX ORDER — LANOLIN ALCOHOL/MO/W.PET/CERES
1000 CREAM (GRAM) TOPICAL DAILY
Qty: 30 TABLET | Refills: 0 | Status: SHIPPED | OUTPATIENT
Start: 2024-12-31

## 2025-01-02 NOTE — PROGRESS NOTES
Received a referral for Reclast along with labs. Called to hospital Pharmacist Loly to have creatinine clearance calculated. Patient is 69 years old, height 5'2\", weight 175 lb , gender female, creatinine level 0.6 mg/dL. Pharmacist did calculation and I was told creatinine clearance is 93 ml/min.

## 2025-01-09 ENCOUNTER — HOSPITAL ENCOUNTER (OUTPATIENT)
Dept: INFUSION THERAPY | Age: 70
Setting detail: INFUSION SERIES
Discharge: HOME | End: 2025-01-09
Payer: MEDICARE

## 2025-01-09 VITALS
SYSTOLIC BLOOD PRESSURE: 158 MMHG | DIASTOLIC BLOOD PRESSURE: 75 MMHG | TEMPERATURE: 98.6 F | HEART RATE: 56 BPM | BODY MASS INDEX: 32.01 KG/M2 | RESPIRATION RATE: 16 BRPM | WEIGHT: 175 LBS | OXYGEN SATURATION: 99 %

## 2025-01-09 DIAGNOSIS — M81.0 AGE-RELATED OSTEOPOROSIS WITHOUT CURRENT PATHOLOGICAL FRACTURE: Primary | ICD-10-CM

## 2025-01-09 PROCEDURE — 2500000003 HC RX 250 WO HCPCS: Performed by: STUDENT IN AN ORGANIZED HEALTH CARE EDUCATION/TRAINING PROGRAM

## 2025-01-09 PROCEDURE — 2580000003 HC RX 258: Performed by: STUDENT IN AN ORGANIZED HEALTH CARE EDUCATION/TRAINING PROGRAM

## 2025-01-09 PROCEDURE — 6360000002 HC RX W HCPCS: Performed by: STUDENT IN AN ORGANIZED HEALTH CARE EDUCATION/TRAINING PROGRAM

## 2025-01-09 PROCEDURE — 96365 THER/PROPH/DIAG IV INF INIT: CPT

## 2025-01-09 RX ORDER — HYDROCORTISONE SODIUM SUCCINATE 100 MG/2ML
100 INJECTION INTRAMUSCULAR; INTRAVENOUS
OUTPATIENT
Start: 2025-12-16

## 2025-01-09 RX ORDER — HYDROCORTISONE SODIUM SUCCINATE 100 MG/2ML
100 INJECTION INTRAMUSCULAR; INTRAVENOUS
OUTPATIENT
Start: 2025-01-09

## 2025-01-09 RX ORDER — ZOLEDRONIC ACID 0.05 MG/ML
5 INJECTION, SOLUTION INTRAVENOUS ONCE
Status: COMPLETED | OUTPATIENT
Start: 2025-01-09 | End: 2025-01-09

## 2025-01-09 RX ORDER — SODIUM CHLORIDE 0.9 % (FLUSH) 0.9 %
5-40 SYRINGE (ML) INJECTION PRN
Status: DISCONTINUED | OUTPATIENT
Start: 2025-01-09 | End: 2025-01-10 | Stop reason: HOSPADM

## 2025-01-09 RX ORDER — ACETAMINOPHEN 325 MG/1
650 TABLET ORAL
OUTPATIENT
Start: 2025-12-16

## 2025-01-09 RX ORDER — DIPHENHYDRAMINE HYDROCHLORIDE 50 MG/ML
50 INJECTION INTRAMUSCULAR; INTRAVENOUS
OUTPATIENT
Start: 2025-01-09

## 2025-01-09 RX ORDER — HEPARIN 100 UNIT/ML
500 SYRINGE INTRAVENOUS PRN
OUTPATIENT
Start: 2025-12-16

## 2025-01-09 RX ORDER — SODIUM CHLORIDE 9 MG/ML
INJECTION, SOLUTION INTRAVENOUS CONTINUOUS
OUTPATIENT
Start: 2025-12-16

## 2025-01-09 RX ORDER — SODIUM CHLORIDE 9 MG/ML
5-250 INJECTION, SOLUTION INTRAVENOUS PRN
Status: DISCONTINUED | OUTPATIENT
Start: 2025-01-09 | End: 2025-01-10 | Stop reason: HOSPADM

## 2025-01-09 RX ORDER — SODIUM CHLORIDE 9 MG/ML
5-250 INJECTION, SOLUTION INTRAVENOUS PRN
OUTPATIENT
Start: 2025-12-16

## 2025-01-09 RX ORDER — ZOLEDRONIC ACID 0.05 MG/ML
5 INJECTION, SOLUTION INTRAVENOUS ONCE
OUTPATIENT
Start: 2025-12-16 | End: 2025-12-16

## 2025-01-09 RX ORDER — ONDANSETRON 2 MG/ML
8 INJECTION INTRAMUSCULAR; INTRAVENOUS
OUTPATIENT
Start: 2025-01-09

## 2025-01-09 RX ORDER — SODIUM CHLORIDE 0.9 % (FLUSH) 0.9 %
5-40 SYRINGE (ML) INJECTION PRN
OUTPATIENT
Start: 2025-12-16

## 2025-01-09 RX ORDER — ONDANSETRON 2 MG/ML
8 INJECTION INTRAMUSCULAR; INTRAVENOUS
OUTPATIENT
Start: 2025-12-16

## 2025-01-09 RX ORDER — EPINEPHRINE 1 MG/ML
0.3 INJECTION, SOLUTION, CONCENTRATE INTRAVENOUS PRN
OUTPATIENT
Start: 2025-01-09

## 2025-01-09 RX ORDER — ACETAMINOPHEN 325 MG/1
650 TABLET ORAL
OUTPATIENT
Start: 2025-01-09

## 2025-01-09 RX ORDER — EPINEPHRINE 1 MG/ML
0.3 INJECTION, SOLUTION, CONCENTRATE INTRAVENOUS PRN
OUTPATIENT
Start: 2025-12-16

## 2025-01-09 RX ORDER — ALBUTEROL SULFATE 90 UG/1
4 INHALANT RESPIRATORY (INHALATION) PRN
OUTPATIENT
Start: 2025-01-09

## 2025-01-09 RX ORDER — SODIUM CHLORIDE 9 MG/ML
INJECTION, SOLUTION INTRAVENOUS CONTINUOUS
OUTPATIENT
Start: 2025-01-09

## 2025-01-09 RX ORDER — ALBUTEROL SULFATE 90 UG/1
4 INHALANT RESPIRATORY (INHALATION) PRN
OUTPATIENT
Start: 2025-12-16

## 2025-01-09 RX ORDER — DIPHENHYDRAMINE HYDROCHLORIDE 50 MG/ML
50 INJECTION INTRAMUSCULAR; INTRAVENOUS
OUTPATIENT
Start: 2025-12-16

## 2025-01-09 RX ADMIN — ZOLEDRONIC ACID 5 MG: 5 INJECTION INTRAVENOUS at 12:12

## 2025-01-09 RX ADMIN — SODIUM CHLORIDE, PRESERVATIVE FREE 10 ML: 5 INJECTION INTRAVENOUS at 12:10

## 2025-01-09 RX ADMIN — SODIUM CHLORIDE 20 ML/HR: 9 INJECTION, SOLUTION INTRAVENOUS at 12:13

## 2025-01-09 ASSESSMENT — PATIENT HEALTH QUESTIONNAIRE - PHQ9
SUM OF ALL RESPONSES TO PHQ QUESTIONS 1-9: 0
SUM OF ALL RESPONSES TO PHQ QUESTIONS 1-9: 0
2. FEELING DOWN, DEPRESSED OR HOPELESS: NOT AT ALL
1. LITTLE INTEREST OR PLEASURE IN DOING THINGS: NOT AT ALL
SUM OF ALL RESPONSES TO PHQ9 QUESTIONS 1 & 2: 0
2. FEELING DOWN, DEPRESSED OR HOPELESS: NOT AT ALL
SUM OF ALL RESPONSES TO PHQ QUESTIONS 1-9: 0
SUM OF ALL RESPONSES TO PHQ9 QUESTIONS 1 & 2: 0
SUM OF ALL RESPONSES TO PHQ QUESTIONS 1-9: 0
1. LITTLE INTEREST OR PLEASURE IN DOING THINGS: NOT AT ALL

## 2025-01-09 NOTE — PROGRESS NOTES
Before infusion   RECLAST patient declined any infections, open wounds, or change in medical condition. Tolerated infusion well.  Reviewed therapy plan, offered education material and/or discharge material, reviewed medication information and signs and symptoms  and educated on possible side effects, verbalizes good knowledge of current plan patient verbalizes understanding, and has no signs or symptoms to report at this time. Patient discharged. Patient alert and oriented x3.   No distress noted.   Vital signs stable.   Patient denies any new or worsening pain.  Patient denies any needs.  All questions answered.  Next appointment scheduled.DECLINES  copy of AVS. Instructed on importance, patientSTAYED 30 min observation after infusion completed.

## 2025-01-10 ENCOUNTER — OFFICE VISIT (OUTPATIENT)
Dept: PRIMARY CARE CLINIC | Age: 70
End: 2025-01-10

## 2025-01-10 VITALS
BODY MASS INDEX: 32.57 KG/M2 | SYSTOLIC BLOOD PRESSURE: 134 MMHG | WEIGHT: 177 LBS | HEART RATE: 74 BPM | HEIGHT: 62 IN | TEMPERATURE: 98.1 F | DIASTOLIC BLOOD PRESSURE: 80 MMHG | OXYGEN SATURATION: 97 % | RESPIRATION RATE: 18 BRPM

## 2025-01-10 DIAGNOSIS — C82.13 FOLLICULAR LYMPHOMA GRADE II OF INTRA-ABDOMINAL LYMPH NODES (HCC): ICD-10-CM

## 2025-01-10 DIAGNOSIS — I10 ESSENTIAL HYPERTENSION, BENIGN: Primary | ICD-10-CM

## 2025-01-10 PROBLEM — Z93.3 STATUS POST HARTMANN PROCEDURE (HCC): Status: RESOLVED | Noted: 2022-12-13 | Resolved: 2025-01-10

## 2025-01-10 SDOH — ECONOMIC STABILITY: FOOD INSECURITY: WITHIN THE PAST 12 MONTHS, YOU WORRIED THAT YOUR FOOD WOULD RUN OUT BEFORE YOU GOT MONEY TO BUY MORE.: NEVER TRUE

## 2025-01-10 SDOH — ECONOMIC STABILITY: FOOD INSECURITY: WITHIN THE PAST 12 MONTHS, THE FOOD YOU BOUGHT JUST DIDN'T LAST AND YOU DIDN'T HAVE MONEY TO GET MORE.: NEVER TRUE

## 2025-01-10 ASSESSMENT — ENCOUNTER SYMPTOMS
ABDOMINAL PAIN: 0
FACIAL SWELLING: 0
NAUSEA: 0
BACK PAIN: 0
WHEEZING: 0
SINUS PRESSURE: 0
SORE THROAT: 0
COUGH: 0
DIARRHEA: 0
BLOOD IN STOOL: 0
SHORTNESS OF BREATH: 0
ALLERGIC/IMMUNOLOGIC NEGATIVE: 1
CHEST TIGHTNESS: 0
COLOR CHANGE: 0
PHOTOPHOBIA: 0
APNEA: 0
VOMITING: 0

## 2025-01-10 NOTE — PROGRESS NOTES
Chief Complaint:   Chief Complaint   Patient presents with    6 Month Follow-Up       Hypertension  This is a new problem. The current episode started in the past 7 days. The problem is unchanged. The problem is controlled. Pertinent negatives include no chest pain, headaches, palpitations or shortness of breath. Past treatments include beta blockers and diuretics. The current treatment provides significant improvement. There are no compliance problems.        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    Hyperlipidemia     Hypertension     Osteoarthritis 2021    Peritonitis (HCC) 11/22/2022    Vitamin D deficiency        Past Surgical History:   Procedure Laterality Date    ABDOMEN SURGERY N/A 11/30/2022    DELAYED PRIMARY CLOSURE OF ABDOMINAL WOUND performed by Randy Villarreal MD at [facility] [place]    ABDOMEN SURGERY N/A 1/2/2024    mesenteric lymph node biopsy performed by Domenico Mi MD at [facility] [place]    APPENDECTOMY      BREAST SURGERY Bilateral     cancer    COLONOSCOPY  2016    COLONOSCOPY N/A 02/22/2023    COLONOSCOPY THROUGH STOMA performed by Domenico Mi MD at [facility]OSCOPY    COLOSTOMY      COLOSTOMY N/A 04/17/2023    LAPRASCOPIC COLOSTOMY REVERSAL, rigid proctoscope performed by Domenico Mi MD at [facility] [place]    HERNIA REPAIR N/A 1/2/2024    incisional hernia repair with mesh and  transverses abdominal release bilateral performed by Domenico Mi MD at [facility] [place]

## 2025-01-16 ENCOUNTER — HOSPITAL ENCOUNTER (OUTPATIENT)
Dept: NEUROLOGY | Age: 70
Discharge: HOME | End: 2025-01-16
Payer: MEDICARE

## 2025-01-16 VITALS — WEIGHT: 177 LBS | HEIGHT: 62 IN | BODY MASS INDEX: 32.57 KG/M2

## 2025-01-16 PROCEDURE — 95913 NRV CNDJ TEST 13/> STUDIES: CPT

## 2025-01-16 PROCEDURE — 95886 MUSC TEST DONE W/N TEST COMP: CPT

## 2025-01-16 NOTE — PROCEDURES
[facility]  Electrodiagnostic Laboratory   [place]         Full Name: Zelda Lowery Gender: Female  MRN: [id number] YOB: 1955  Location: Outpt.      Visit Date: 1/16/2025 12:43  Age: 69 Years 8 Months Old  Examining Physician: Dr. Cox  Referring Physician: Dr. Flannery  Technician: Pricilla Bentley   Height: 5 feet 2 inch  Weight: 177 lbs  BMI: 32.4  Notes:  Peripheral polyneuropathy        Motor NCS      Nerve / Sites Lat. Amplitude Distance Velocity Temp.    ms mV cm m/s °C   R Median - APB      Wrist 3.91 9.0 8  32      Elbow 7.50 7.2 19 53 32   L Median - APB      Wrist 3.59 7.2 8  32      Elbow 7.19 6.5 19 53 32   R Ulnar - ADM      Wrist 2.60 8.5 8  32      B.Elbow 5.42 8.1 17 60 32      A.Elbow 7.03 7.5 10 62 32   L Ulnar - ADM      Wrist 2.55 8.4 8  32      B.Elbow 5.26 8.3 17 63 32      A.Elbow 6.82 8.3 10 64 32       Sensory NCS      Nerve / Sites Onset Lat Peak Lat PP Amp Distance Velocity Temp.    ms ms µV cm m/s °C   R Median - Digit II (Antidromic)      Mid Palm 1.15 1.67 48.9 7 61 32      Wrist 2.76 3.44 37.2 14 51 32   L Median - Digit II (Antidromic)      Mid Palm 1.09 1.61 35.5 7 64 32      Wrist 2.76 3.59 27.7 14 51 32   R Ulnar - Digit V (Antidromic)      Wrist 2.29 3.07 36.9 14 61 32   L Ulnar - Digit V (Antidromic)      Wrist 2.08 2.92 40.5 14 67 32.1   R Radial - Anatomical snuff box (Forearm)      Forearm 1.51 2.08 35.5 10 66 32   L Radial - Anatomical snuff box (Forearm)      Forearm 1.56 2.19 27.3 10 64 32       Combined Sensory Index      Nerve / Sites Rec. Site Peak Lat NP Amp PP Amp Segments Peak Diff Temp.     ms µV µV  ms °C   R Median - CSI      Median Thumb 3.07 26.6 33.8 Median - Radial 0.36 32      Radial Thumb 2.71 26.6 14.7 Median - Ulnar 0.42 32      Median Ring 3.59 14.9 28.7 Median palm - Ulnar palm 0.42 32      Ulnar Ring 3.18 14.7 20.6         Median palm Wrist 2.03 53.2 65.0         Ulnar palm Wrist 1.61 17.3 12.9         CSI

## 2025-01-20 DIAGNOSIS — E78.49 OTHER HYPERLIPIDEMIA: ICD-10-CM

## 2025-01-20 DIAGNOSIS — I10 ESSENTIAL HYPERTENSION, BENIGN: ICD-10-CM

## 2025-01-20 RX ORDER — ATENOLOL AND CHLORTHALIDONE TABLET 50; 25 MG/1; MG/1
1 TABLET ORAL DAILY
Qty: 90 TABLET | Refills: 3 | Status: SHIPPED | OUTPATIENT
Start: 2025-01-20

## 2025-01-20 RX ORDER — ROSUVASTATIN CALCIUM 20 MG/1
20 TABLET, COATED ORAL DAILY
Qty: 90 TABLET | Refills: 3 | Status: SHIPPED | OUTPATIENT
Start: 2025-01-20

## 2025-01-20 RX ORDER — PANTOPRAZOLE SODIUM 40 MG/1
TABLET, DELAYED RELEASE ORAL
Qty: 90 TABLET | Refills: 3 | Status: SHIPPED | OUTPATIENT
Start: 2025-01-20

## 2025-01-21 ENCOUNTER — OFFICE VISIT (OUTPATIENT)
Dept: SURGERY | Age: 70
End: 2025-01-21
Payer: MEDICARE

## 2025-01-21 VITALS
SYSTOLIC BLOOD PRESSURE: 168 MMHG | BODY MASS INDEX: 32 KG/M2 | HEIGHT: 62 IN | RESPIRATION RATE: 18 BRPM | TEMPERATURE: 97 F | HEART RATE: 53 BPM | WEIGHT: 173.9 LBS | DIASTOLIC BLOOD PRESSURE: 76 MMHG | OXYGEN SATURATION: 96 %

## 2025-01-21 DIAGNOSIS — Z87.19 S/P REPAIR OF VENTRAL HERNIA: Primary | ICD-10-CM

## 2025-01-21 DIAGNOSIS — Z98.890 S/P REPAIR OF VENTRAL HERNIA: Primary | ICD-10-CM

## 2025-01-21 DIAGNOSIS — R10.9 ABDOMINAL PAIN, UNSPECIFIED ABDOMINAL LOCATION: ICD-10-CM

## 2025-01-21 PROBLEM — K43.2 INCISIONAL HERNIA, WITHOUT OBSTRUCTION OR GANGRENE: Status: RESOLVED | Noted: 2023-10-03 | Resolved: 2025-01-21

## 2025-01-21 PROCEDURE — 99212 OFFICE O/P EST SF 10 MIN: CPT | Performed by: SURGERY

## 2025-01-21 RX ORDER — LORAZEPAM 0.5 MG/1
TABLET ORAL
COMMUNITY
Start: 2025-01-12

## 2025-01-21 NOTE — PROGRESS NOTES
Chief Complaint   Patient presents with    Follow-up     OV - 5 month f/u, pt c/o occasional pinching/pulling sensation 1-2x/week pt states it can be painful 6/10 when happening. Lifting anything over 8 lbs causes this reaction. Patient denies n/v, abd pain, occasional bm issues         Assessment:  1. S/P abdominal wall reconstruction, TAR, mesh; mesenteric lymph node biopsy 1/2/24    2. Abdominal pain, unspecified abdominal location           Plan:  Activity as tolerated  Return in about 1 year (around 1/21/2026).      Subjective:  Doing very well.  Has occasional intermittent sharp stabbing right lateral and left lateral abdominal pain.  She does restrict herself to not lifting anything too heavy.  She continues to follow-up with oncology.  She has no evidence of progression of her lymphoma or recurrence of her cancer.  She is here with her daughter today    Objective:   BP (!) 168/76 (Site: Right Upper Arm, Position: Sitting, Cuff Size: Medium Adult)   Pulse 53   Temp 97 °F (36.1 °C) (Temporal)   Resp 18   Ht 1.575 m (5' 2\")   Wt 78.9 kg (173 lb 14.4 oz)   SpO2 96%   BMI 31.81 kg/m²   General-pleasant, 69-year-old white female  Abdomen-midline surgical scar healing well with no tenderness and no evidence of hernia

## 2025-01-30 RX ORDER — LANOLIN ALCOHOL/MO/W.PET/CERES
1000 CREAM (GRAM) TOPICAL DAILY
Qty: 30 TABLET | Refills: 3 | Status: SHIPPED | OUTPATIENT
Start: 2025-01-30

## 2025-01-30 RX ORDER — PNV NO.95/FERROUS FUM/FOLIC AC 28MG-0.8MG
1 TABLET ORAL
Qty: 60 TABLET | Refills: 1 | Status: SHIPPED | OUTPATIENT
Start: 2025-01-30

## 2025-02-04 ENCOUNTER — OFFICE VISIT (OUTPATIENT)
Dept: FAMILY MEDICINE CLINIC | Age: 70
End: 2025-02-04

## 2025-02-04 VITALS
DIASTOLIC BLOOD PRESSURE: 68 MMHG | RESPIRATION RATE: 19 BRPM | HEART RATE: 57 BPM | BODY MASS INDEX: 31.39 KG/M2 | TEMPERATURE: 97.6 F | WEIGHT: 170.6 LBS | SYSTOLIC BLOOD PRESSURE: 102 MMHG | HEIGHT: 62 IN | OXYGEN SATURATION: 96 %

## 2025-02-04 DIAGNOSIS — I10 ESSENTIAL HYPERTENSION, BENIGN: ICD-10-CM

## 2025-02-04 DIAGNOSIS — J06.9 URI WITH COUGH AND CONGESTION: Primary | ICD-10-CM

## 2025-02-04 DIAGNOSIS — R05.9 COUGH, UNSPECIFIED TYPE: ICD-10-CM

## 2025-02-04 LAB
ANION GAP SERPL CALCULATED.3IONS-SCNC: 13 MMOL/L (ref 7–16)
BUN BLDV-MCNC: 15 MG/DL (ref 6–23)
CALCIUM SERPL-MCNC: 9.5 MG/DL (ref 8.6–10.2)
CHLORIDE BLD-SCNC: 95 MMOL/L (ref 98–107)
CO2: 28 MMOL/L (ref 22–29)
CREAT SERPL-MCNC: 0.9 MG/DL (ref 0.5–1)
GFR, ESTIMATED: 70 ML/MIN/1.73M2
GLUCOSE BLD-MCNC: 102 MG/DL (ref 74–99)
INFLUENZA A ANTIBODY: NORMAL
INFLUENZA B ANTIBODY: NEGATIVE
Lab: NORMAL
PERFORMING INSTRUMENT: NORMAL
POTASSIUM SERPL-SCNC: 3.5 MMOL/L (ref 3.5–5)
QC PASS/FAIL: NORMAL
S PYO AG THROAT QL: NORMAL
SARS-COV-2, POC: NORMAL
SODIUM BLD-SCNC: 136 MMOL/L (ref 132–146)

## 2025-02-04 RX ORDER — ONDANSETRON 4 MG/1
4 TABLET, ORALLY DISINTEGRATING ORAL 3 TIMES DAILY PRN
Qty: 21 TABLET | Refills: 0 | Status: SHIPPED | OUTPATIENT
Start: 2025-02-04

## 2025-02-04 RX ORDER — BENZONATATE 100 MG/1
100 CAPSULE ORAL 3 TIMES DAILY PRN
Qty: 30 CAPSULE | Refills: 0 | Status: SHIPPED | OUTPATIENT
Start: 2025-02-04 | End: 2025-02-14

## 2025-02-04 RX ORDER — METHYLPREDNISOLONE 4 MG/1
TABLET ORAL
Qty: 1 KIT | Refills: 0 | Status: SHIPPED | OUTPATIENT
Start: 2025-02-04

## 2025-02-04 NOTE — PROGRESS NOTES
2025     Zelda Lowery 69 y.o. female    : 1955   Chief Complaint:   Cough (X2 days), Congestion, and Headache      History of Present Illness   Source of history provided by:  patient.  History of Present Illness  The patient is a 69-year-old female who presents for evaluation of sore throat.    She reports experiencing a sore throat, which has been present for an unspecified duration. She also mentions a recent episode of fever but is uncertain about any potential exposure to influenza. She expresses concern about the possibility of strep throat, given that her grandson was diagnosed with it a few days prior. She reports no gastrointestinal symptoms such as nausea, vomiting, or diarrhea. However, she does report a decreased appetite. She has been self-medicating with Robitussin and Vicks for symptom management.    ALLERGIES  The patient has no known allergies.    MEDICATIONS  Robitussin, Vicks       ROS   Past Medical History:   Past Medical History:   Diagnosis Date    Anxiety As pril     Cancer (HCC)     breast    Roque's pouch of intestine (HCC) 2023    Hyperlipidemia     Hypertension     Incisional hernia, without obstruction or gangrene 10/03/2023    Osteoarthritis     Peritonitis (HCC) 2022    Vitamin D deficiency      Past Surgical History:  has a past surgical history that includes Breast surgery (Bilateral); Ovary surgery; Appendectomy; Hysterectomy; Tonsillectomy; laparotomy (N/A, 2022); Abdomen surgery (N/A, 2022); Colonoscopy (); colostomy; Colonoscopy (N/A, 2023); joint replacement (Right, ); hip surgery (); colostomy (N/A, 2023); Hysterectomy, total abdominal; laparotomy (N/A, 2024); Abdomen surgery (N/A, 2024); and hernia repair (N/A, 2024).  Social History:  reports that she has never smoked. She has never used smokeless tobacco. She reports that she does not drink alcohol and does not use drugs.  Family  [address] [facility]  Progress Note    Conner Cruz is a 79year old male. Patient presents with: Follow - Up: F/u Bilateral  great toenails Fungus.- no pain. HPI:     This is a pleasant male with CKD stage 3. He presents to clinic today due to bilateral hallux toenails being thick and discolored. He denies any pain. He is using OTC antifungal solution which is helping. Allergies: Molds & Smuts and Penicillins   Current Outpatient Medications   Medication Sig Dispense Refill    Multiple Vitamins-Minerals (PRESERVISION AREDS 2+MULTI VIT OR)       Tiotropium Bromide-Olodaterol 2.5-2.5 MCG/ACT Inhalation Aero Soln Inhale 2 puffs into the lungs daily. 3 each 3    albuterol 108 (90 Base) MCG/ACT Inhalation Aero Soln Inhale 2 puffs into the lungs every 6 (six) hours as needed for Wheezing. inhale 2 puff by inhalation route  every 4 - 6 hours as needed 3 each 4    MULTIVITAMIN TAB/CAP Take 1 tablet by mouth daily.         Past Medical History:   Diagnosis Date    Aortic atherosclerosis ([facility] [address]     CXR 9-11    Stubbs esophagus 09/2016    EGD 9-16 with large paraesophageal hiatal hernia, Stubbs's esophagus, biopsy negative for dysplasia    BCC (basal cell carcinoma of skin) 2014    scalp    Carcinoma in situ of colon 09/2016    transverse    Chronic kidney disease, stage 3 (HCC)     Diverticulosis of large intestine     Dyslipidemia     GERD (gastroesophageal reflux disease)     Hiatal hernia     Moderate    ILD (interstitial lung disease) (HCC)     UIP/IPF vs fibrotic NSIP; PFTs 10-22 with severe restriction with TLC 3.08 L (51%)    Iron deficiency anemia 06/2016    Melanoma in situ of cheek ([address] 07/2014    Paraesophageal hiatal hernia     Pneumonia 1985      Past Surgical History:   Procedure Laterality Date    APPENDECTOMY  1956    CATARACT EXTRACTION Bilateral  2015    COLONOSCOPY N/A 11/10/2018    Procedure: COLONOSCOPY;  Surgeon: Mary Jane Alvarenga MD;  Location: [facility] ENDOSCOPY    COLONOSCOPY N/A 2021    Procedure: COLONOSCOPY;  Surgeon: Rima Moreno MD;  Location: [address] [place] [address] ENDOSCOPY    COLONOSCOPY & POLYPECTOMY  9/15/16    EGD  9/15/16    INGUINAL HERNIA REPAIR Right     LAPAROSCOPY, SURGICAL; COLECTOMY, PARTIAL, W/ ANASTOMOS Right 2016    REPAIR RECURR [id number] [facility] Right 1975    With right orchiectomy    SKIN SURGERY  2014    Excision melanoma in situ right cheek and BCCa scalp    TONSILLECTOMY  1948      Family History   Problem Relation Age of Onset    Breast Cancer Mother 79        Metastatic to brain    Other (COPD) Father     Breast Cancer Sister 48    Other (COPD) Sister     Hypertension Brother     Diabetes Brother       Social History    Socioeconomic History      Marital status:       Number of children: 1    Occupational History      Occupation: [employer]    Tobacco Use      Smoking status: Former        Packs/day: 1.00        Years: 36.00        Additional pack years: 0.00        Total pack years: 36.00        Types: Cigarettes        Start date: 35        Quit date: 1992        Years since quittin.2      Smokeless tobacco: Never      Tobacco comments: Started at age 15    Vaping Use      Vaping Use: Never used    Substance and Sexual Activity      Alcohol use: Yes        Alcohol/week: 0.0 standard drinks of alcohol        Comment: Infrequent      Drug use: No    Other Topics      Concerns:        Pt has a pacemaker: No        Pt has a defibrillator: No        Reaction to local anesthetic: No        Caffeine Concern: Yes          Coffee 3 cups daily          REVIEW OF SYSTEMS:   Denies nausea, fever, chills  No calf pain  No other muscle or joint aches  Denies chest pain or shortness of breath. EXAM:   There were no vitals taken for this visit. Constitutional:   Patient in no apparent distress. Well kept. Of normal body habitus. Alert and oriented to person, place, and time.   Vascular Examination:  DP pulse is 2/4  PT pulse is 2/4  Capillary refill is immediate  Edema is present bilateral ankles  Integumentary Examination:   The patient's nails appear severely thickened, elongated, dystrophic, discolored with subungual debris b/l hallux toenails  Digital hair growth is absent  Skin is of diminished texture and decreased turgor. Neurological Examination:  Sharp/dull is present to right and is present to left. Parasthesias absent. Musculoskeletal Examination:  Muscle Strength is 5/5. LABS & IMAGING:     Lab Results   Component Value Date     (H) 02/03/2023    BUN 17 02/03/2023    CREATSERUM 1.33 (H) 02/03/2023    BUNCREA 12.8 02/03/2023    ANIONGAP 6 02/03/2023    GFRAA 46 (L) 03/03/2022    GFRNAA 40 (L) 03/03/2022    CA 9.3 02/03/2023     02/03/2023    K 4.6 02/03/2023     02/03/2023    CO2 29.0 02/03/2023    OSMOCALC 296 (H) 02/03/2023        No results found for: \"EAG\", \"A1C\"     No results found. ASSESSMENT AND PLAN:   Diagnoses and all orders for this visit:    Onychomycosis    Onychogryphosis          Plan:     Reviewed treatment options for nail dystrophy / onychomycosis including:   No treatment and monitoring, topical meds, oral meds, nail removal   Advantages and disadvantages of each reviewed. Using oral agents would require regular blood test monitoring due to risk of hepatotoxicity and other adverse reactions including drug interactions. Topical meds are much safer yet carry very low efficacy. Did briefly discuss possible b/l total hallux matrixectomy, pt would like to hold off at this time. Pt will cont with OTC antifungal solution    RTC PRN    No follow-ups on file.     Michael Mckeon DPM  9/29/23

## 2025-02-12 DIAGNOSIS — F41.9 ANXIETY: ICD-10-CM

## 2025-02-13 RX ORDER — ESCITALOPRAM OXALATE 10 MG/1
10 TABLET ORAL DAILY
Qty: 30 TABLET | Refills: 0 | Status: SHIPPED | OUTPATIENT
Start: 2025-02-13

## 2025-02-18 ENCOUNTER — OFFICE VISIT (OUTPATIENT)
Dept: ONCOLOGY | Age: 70
End: 2025-02-18

## 2025-02-18 ENCOUNTER — TELEPHONE (OUTPATIENT)
Dept: INFUSION THERAPY | Age: 70
End: 2025-02-18

## 2025-02-18 ENCOUNTER — HOSPITAL ENCOUNTER (OUTPATIENT)
Dept: INFUSION THERAPY | Age: 70
Discharge: HOME | End: 2025-02-18
Payer: MEDICARE

## 2025-02-18 VITALS
WEIGHT: 171.8 LBS | OXYGEN SATURATION: 97 % | HEART RATE: 53 BPM | SYSTOLIC BLOOD PRESSURE: 127 MMHG | DIASTOLIC BLOOD PRESSURE: 72 MMHG | TEMPERATURE: 96.8 F | HEIGHT: 62 IN | BODY MASS INDEX: 31.62 KG/M2

## 2025-02-18 DIAGNOSIS — D64.9 ANEMIA, UNSPECIFIED TYPE: ICD-10-CM

## 2025-02-18 DIAGNOSIS — C82.10 FOLLICULAR LYMPHOMA GRADE II, UNSPECIFIED BODY REGION (HCC): ICD-10-CM

## 2025-02-18 DIAGNOSIS — D64.9 ANEMIA, UNSPECIFIED TYPE: Primary | ICD-10-CM

## 2025-02-18 LAB
ALBUMIN SERPL-MCNC: 4.6 G/DL (ref 3.5–5.2)
ALP SERPL-CCNC: 61 U/L (ref 35–104)
ALT SERPL-CCNC: 16 U/L (ref 0–32)
ANION GAP SERPL CALCULATED.3IONS-SCNC: 15 MMOL/L (ref 7–16)
AST SERPL-CCNC: 14 U/L (ref 0–31)
BASOPHILS # BLD: 0.02 K/UL (ref 0–0.2)
BASOPHILS NFR BLD: 0 % (ref 0–2)
BILIRUB SERPL-MCNC: 0.9 MG/DL (ref 0–1.2)
BUN SERPL-MCNC: 13 MG/DL (ref 6–23)
CALCIUM SERPL-MCNC: 9.7 MG/DL (ref 8.6–10.2)
CHLORIDE SERPL-SCNC: 94 MMOL/L (ref 98–107)
CO2 SERPL-SCNC: 27 MMOL/L (ref 22–29)
CREAT SERPL-MCNC: 0.6 MG/DL (ref 0.5–1)
EOSINOPHIL # BLD: 0.02 K/UL (ref 0.05–0.5)
EOSINOPHILS RELATIVE PERCENT: 0 % (ref 0–6)
ERYTHROCYTE [DISTWIDTH] IN BLOOD BY AUTOMATED COUNT: 13.4 % (ref 11.5–15)
FERRITIN SERPL-MCNC: 242 NG/ML
GFR, ESTIMATED: >90 ML/MIN/1.73M2
GLUCOSE SERPL-MCNC: 111 MG/DL (ref 74–99)
HCT VFR BLD AUTO: 38.6 % (ref 34–48)
HGB BLD-MCNC: 12.4 G/DL (ref 11.5–15.5)
IMM GRANULOCYTES # BLD AUTO: 0.04 K/UL (ref 0–0.58)
IMM GRANULOCYTES NFR BLD: 1 % (ref 0–5)
IRON SATN MFR SERPL: 33 % (ref 15–50)
IRON SERPL-MCNC: 90 UG/DL (ref 37–145)
LDH SERPL-CCNC: 159 U/L (ref 135–214)
LYMPHOCYTES NFR BLD: 1.91 K/UL (ref 1.5–4)
LYMPHOCYTES RELATIVE PERCENT: 25 % (ref 20–42)
MCH RBC QN AUTO: 24.8 PG (ref 26–35)
MCHC RBC AUTO-ENTMCNC: 32.1 G/DL (ref 32–34.5)
MCV RBC AUTO: 77 FL (ref 80–99.9)
MONOCYTES NFR BLD: 0.66 K/UL (ref 0.1–0.95)
MONOCYTES NFR BLD: 9 % (ref 2–12)
NEUTROPHILS NFR BLD: 66 % (ref 43–80)
NEUTS SEG NFR BLD: 5.13 K/UL (ref 1.8–7.3)
PLATELET # BLD AUTO: 306 K/UL (ref 130–450)
PMV BLD AUTO: 9.8 FL (ref 7–12)
POTASSIUM SERPL-SCNC: 3.1 MMOL/L (ref 3.5–5)
PROT SERPL-MCNC: 7.3 G/DL (ref 6.4–8.3)
RBC # BLD AUTO: 5.01 M/UL (ref 3.5–5.5)
SODIUM SERPL-SCNC: 136 MMOL/L (ref 132–146)
TIBC SERPL-MCNC: 274 UG/DL (ref 250–450)
WBC OTHER # BLD: 7.8 K/UL (ref 4.5–11.5)

## 2025-02-18 PROCEDURE — 83615 LACTATE (LD) (LDH) ENZYME: CPT

## 2025-02-18 PROCEDURE — 83540 ASSAY OF IRON: CPT

## 2025-02-18 PROCEDURE — 83550 IRON BINDING TEST: CPT

## 2025-02-18 PROCEDURE — 36415 COLL VENOUS BLD VENIPUNCTURE: CPT

## 2025-02-18 PROCEDURE — 85025 COMPLETE CBC W/AUTO DIFF WBC: CPT

## 2025-02-18 PROCEDURE — 80053 COMPREHEN METABOLIC PANEL: CPT

## 2025-02-18 PROCEDURE — 82728 ASSAY OF FERRITIN: CPT

## 2025-02-18 RX ORDER — POTASSIUM CHLORIDE 1500 MG/1
20 TABLET, EXTENDED RELEASE ORAL 2 TIMES DAILY
Qty: 28 TABLET | Refills: 0 | Status: SHIPPED | OUTPATIENT
Start: 2025-02-18

## 2025-02-18 NOTE — PROGRESS NOTES
[facility] MED ONCOLOGY  [address] [address]  Dept: [phone]  Loc: [phone]  Attending progress note      Reason for Visit:   Follicular lymphoma.    Referring Physician:  Domenico Mi MD    PCP:  Davian Brooks DO    History of Present Illness:    Mrs. Lowery is a very pleasant 69-year-old lady, with a past medical history significant for bilateral breast cancer, she was diagnosed with left breast cancer in 1993, she had a left breast lumpectomy done followed by radiation therapy, in 2008 she was diagnosed with right breast cancer, had a lumpectomy done, followed by adjuvant radiation therapy, she did not receive adjuvant chemotherapy or endocrine therapy, also history of hyperlipidemia, hypertension, anxiety, and perforated stercoral sigmoid colitis status post ex lap with Jay's procedure on 11/22/2022, and colostomy reversal on 4/17/2023.  The patient had presented with abdominal wall asymmetry and pressure, CT scan of the abdomen the pelvis from 9/23/2023 has revealed an interval increase in the lymphadenopathy along the root of the mesentery, concerning for an underlying lymphoproliferative disorder, PET scan was done on 10/27/2023 revealing hypermetabolic mesenteric lymphadenopathy, had not significantly changed since 2022, had decreased from 2007, and increased right thyroid uptake.  Neck ultrasound had revealed 9 mm mid to lower pole TR 5 nodule, which is being followed.  The patient underwent on 1/3/2024 ex lap, incisional hernia repair, and central lymph node biopsy, pathology was consistent with classic follicular lymphoma, grade 1-2.     The patient returns for a follow-up visit, she is doing well overall, feeling tired, no unexplained weight loss or drenching night sweats.    Review of Systems;  CONSTITUTIONAL: No fever, chills.  Fair appetite, positive for fatigue.  ENMT: Eyes: No diplopia; Nose: No epistaxis. Mouth: No

## 2025-02-18 NOTE — TELEPHONE ENCOUNTER
Call to patient to inform that a script for potassium 20meq bid for 2 weeks has been sent to her pharmacy.  Voices understanding.

## 2025-03-09 DIAGNOSIS — F41.9 ANXIETY: ICD-10-CM

## 2025-03-09 DIAGNOSIS — F41.9 ANXIETY: Primary | ICD-10-CM

## 2025-03-09 DIAGNOSIS — E78.2 ELEVATED TRIGLYCERIDES WITH HIGH CHOLESTEROL: ICD-10-CM

## 2025-03-10 RX ORDER — LORAZEPAM 0.5 MG/1
TABLET ORAL
Qty: 60 TABLET | Refills: 1 | Status: SHIPPED | OUTPATIENT
Start: 2025-03-10 | End: 2025-04-09

## 2025-03-10 RX ORDER — OMEGA-3-ACID ETHYL ESTERS 1 G/1
1 CAPSULE, LIQUID FILLED ORAL 2 TIMES DAILY
Qty: 60 CAPSULE | Refills: 0 | Status: SHIPPED | OUTPATIENT
Start: 2025-03-10

## 2025-03-10 RX ORDER — ESCITALOPRAM OXALATE 10 MG/1
10 TABLET ORAL DAILY
Qty: 30 TABLET | Refills: 0 | Status: SHIPPED | OUTPATIENT
Start: 2025-03-10

## 2025-03-16 SDOH — ECONOMIC STABILITY: FOOD INSECURITY: WITHIN THE PAST 12 MONTHS, YOU WORRIED THAT YOUR FOOD WOULD RUN OUT BEFORE YOU GOT MONEY TO BUY MORE.: SOMETIMES TRUE

## 2025-03-16 SDOH — ECONOMIC STABILITY: TRANSPORTATION INSECURITY
IN THE PAST 12 MONTHS, HAS THE LACK OF TRANSPORTATION KEPT YOU FROM MEDICAL APPOINTMENTS OR FROM GETTING MEDICATIONS?: NO

## 2025-03-16 SDOH — ECONOMIC STABILITY: FOOD INSECURITY: WITHIN THE PAST 12 MONTHS, THE FOOD YOU BOUGHT JUST DIDN'T LAST AND YOU DIDN'T HAVE MONEY TO GET MORE.: NEVER TRUE

## 2025-03-16 SDOH — ECONOMIC STABILITY: INCOME INSECURITY: IN THE LAST 12 MONTHS, WAS THERE A TIME WHEN YOU WERE NOT ABLE TO PAY THE MORTGAGE OR RENT ON TIME?: NO

## 2025-03-18 ENCOUNTER — RESULTS FOLLOW-UP (OUTPATIENT)
Dept: PRIMARY CARE CLINIC | Age: 70
End: 2025-03-18

## 2025-03-18 ENCOUNTER — OFFICE VISIT (OUTPATIENT)
Dept: PRIMARY CARE CLINIC | Age: 70
End: 2025-03-18
Payer: MEDICARE

## 2025-03-18 VITALS
BODY MASS INDEX: 31.1 KG/M2 | WEIGHT: 169 LBS | OXYGEN SATURATION: 97 % | HEIGHT: 62 IN | HEART RATE: 60 BPM | SYSTOLIC BLOOD PRESSURE: 124 MMHG | TEMPERATURE: 97.8 F | RESPIRATION RATE: 18 BRPM | DIASTOLIC BLOOD PRESSURE: 76 MMHG

## 2025-03-18 DIAGNOSIS — B96.89 SINUSITIS, BACTERIAL: ICD-10-CM

## 2025-03-18 DIAGNOSIS — E78.49 OTHER HYPERLIPIDEMIA: ICD-10-CM

## 2025-03-18 DIAGNOSIS — I10 ESSENTIAL HYPERTENSION, BENIGN: ICD-10-CM

## 2025-03-18 DIAGNOSIS — C82.13 FOLLICULAR LYMPHOMA GRADE II OF INTRA-ABDOMINAL LYMPH NODES (HCC): ICD-10-CM

## 2025-03-18 DIAGNOSIS — E55.9 VITAMIN D DEFICIENCY: ICD-10-CM

## 2025-03-18 DIAGNOSIS — R05.2 SUBACUTE COUGH: ICD-10-CM

## 2025-03-18 DIAGNOSIS — J32.9 SINUSITIS, BACTERIAL: ICD-10-CM

## 2025-03-18 DIAGNOSIS — E78.49 OTHER HYPERLIPIDEMIA: Primary | ICD-10-CM

## 2025-03-18 LAB
BASOPHILS ABSOLUTE: 0.02 K/UL (ref 0–0.2)
BASOPHILS RELATIVE PERCENT: 0 % (ref 0–2)
EOSINOPHILS ABSOLUTE: 0.06 K/UL (ref 0.05–0.5)
EOSINOPHILS RELATIVE PERCENT: 1 % (ref 0–6)
HCT VFR BLD CALC: 38.8 % (ref 34–48)
HEMOGLOBIN: 12.3 G/DL (ref 11.5–15.5)
IMMATURE GRANULOCYTES %: 0 % (ref 0–5)
IMMATURE GRANULOCYTES ABSOLUTE: <0.03 K/UL (ref 0–0.58)
LYMPHOCYTES ABSOLUTE: 2.08 K/UL (ref 1.5–4)
LYMPHOCYTES RELATIVE PERCENT: 33 % (ref 20–42)
MCH RBC QN AUTO: 25.3 PG (ref 26–35)
MCHC RBC AUTO-ENTMCNC: 31.7 G/DL (ref 32–34.5)
MCV RBC AUTO: 79.7 FL (ref 80–99.9)
MONOCYTES ABSOLUTE: 0.64 K/UL (ref 0.1–0.95)
MONOCYTES RELATIVE PERCENT: 10 % (ref 2–12)
NEUTROPHILS ABSOLUTE: 3.5 K/UL (ref 1.8–7.3)
NEUTROPHILS RELATIVE PERCENT: 56 % (ref 43–80)
PDW BLD-RTO: 14.2 % (ref 11.5–15)
PLATELET # BLD: 349 K/UL (ref 130–450)
PMV BLD AUTO: 10.2 FL (ref 7–12)
RBC # BLD: 4.87 M/UL (ref 3.5–5.5)
WBC # BLD: 6.3 K/UL (ref 4.5–11.5)

## 2025-03-18 PROCEDURE — 3017F COLORECTAL CA SCREEN DOC REV: CPT | Performed by: FAMILY MEDICINE

## 2025-03-18 PROCEDURE — 1036F TOBACCO NON-USER: CPT | Performed by: FAMILY MEDICINE

## 2025-03-18 PROCEDURE — 99214 OFFICE O/P EST MOD 30 MIN: CPT | Performed by: FAMILY MEDICINE

## 2025-03-18 PROCEDURE — 3074F SYST BP LT 130 MM HG: CPT | Performed by: FAMILY MEDICINE

## 2025-03-18 PROCEDURE — 1090F PRES/ABSN URINE INCON ASSESS: CPT | Performed by: FAMILY MEDICINE

## 2025-03-18 PROCEDURE — 3078F DIAST BP <80 MM HG: CPT | Performed by: FAMILY MEDICINE

## 2025-03-18 PROCEDURE — G8399 PT W/DXA RESULTS DOCUMENT: HCPCS | Performed by: FAMILY MEDICINE

## 2025-03-18 PROCEDURE — G8427 DOCREV CUR MEDS BY ELIG CLIN: HCPCS | Performed by: FAMILY MEDICINE

## 2025-03-18 PROCEDURE — 1124F ACP DISCUSS-NO DSCNMKR DOCD: CPT | Performed by: FAMILY MEDICINE

## 2025-03-18 PROCEDURE — G8417 CALC BMI ABV UP PARAM F/U: HCPCS | Performed by: FAMILY MEDICINE

## 2025-03-18 PROCEDURE — 1159F MED LIST DOCD IN RCRD: CPT | Performed by: FAMILY MEDICINE

## 2025-03-18 RX ORDER — CEFUROXIME AXETIL 250 MG/1
250 TABLET ORAL 2 TIMES DAILY
Qty: 20 TABLET | Refills: 0 | Status: SHIPPED | OUTPATIENT
Start: 2025-03-18 | End: 2025-03-28

## 2025-03-18 ASSESSMENT — ENCOUNTER SYMPTOMS
BACK PAIN: 0
SINUS PRESSURE: 0
COLOR CHANGE: 0
WHEEZING: 0
ABDOMINAL PAIN: 0
NAUSEA: 0
COUGH: 1
ALLERGIC/IMMUNOLOGIC NEGATIVE: 1
CHEST TIGHTNESS: 0
APNEA: 0
VOMITING: 0
FACIAL SWELLING: 0
PHOTOPHOBIA: 0
SHORTNESS OF BREATH: 0
SORE THROAT: 0
BLOOD IN STOOL: 0
DIARRHEA: 0

## 2025-03-18 NOTE — PROGRESS NOTES
Chief Complaint:   Chief Complaint   Patient presents with    3 Month Follow-Up    Hypertension       Hypertension  This is a chronic problem. The current episode started more than 1 year ago. The problem has been resolved since onset. The problem is controlled. Pertinent negatives include no chest pain, headaches, palpitations or shortness of breath. Past treatments include beta blockers and diuretics. The current treatment provides significant improvement.   Hyperlipidemia  This is a chronic problem. The current episode started more than 1 year ago. The problem is controlled. Recent lipid tests were reviewed and are normal. Pertinent negatives include no chest pain, myalgias or shortness of breath. Current antihyperlipidemic treatment includes statins. The current treatment provides significant improvement of lipids. There are no compliance problems.    Cough  This is a recurrent problem. The current episode started more than 1 month ago. The problem has been resolved. The problem occurs constantly. The cough is Non-productive. Pertinent negatives include no chest pain, headaches, myalgias, rash, sore throat, shortness of breath or wheezing.       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

## 2025-03-19 LAB
ALBUMIN: 4.5 G/DL (ref 3.5–5.2)
ALP BLD-CCNC: 47 U/L (ref 35–104)
ALT SERPL-CCNC: 13 U/L (ref 0–32)
ANION GAP SERPL CALCULATED.3IONS-SCNC: 16 MMOL/L (ref 7–16)
AST SERPL-CCNC: 16 U/L (ref 0–31)
BILIRUB SERPL-MCNC: 0.8 MG/DL (ref 0–1.2)
BUN BLDV-MCNC: 12 MG/DL (ref 6–23)
CALCIUM SERPL-MCNC: 9.6 MG/DL (ref 8.6–10.2)
CHLORIDE BLD-SCNC: 97 MMOL/L (ref 98–107)
CHOLESTEROL, TOTAL: 138 MG/DL
CO2: 22 MMOL/L (ref 22–29)
CREAT SERPL-MCNC: 0.6 MG/DL (ref 0.5–1)
GFR, ESTIMATED: >90 ML/MIN/1.73M2
GLUCOSE BLD-MCNC: 105 MG/DL (ref 74–99)
HDLC SERPL-MCNC: 47 MG/DL
LDL CHOLESTEROL: 41 MG/DL
POTASSIUM SERPL-SCNC: 4 MMOL/L (ref 3.5–5)
SODIUM BLD-SCNC: 135 MMOL/L (ref 132–146)
TOTAL PROTEIN: 7.4 G/DL (ref 6.4–8.3)
TRIGL SERPL-MCNC: 252 MG/DL
TSH SERPL DL<=0.05 MIU/L-ACNC: 1.07 UIU/ML (ref 0.27–4.2)
VITAMIN D 25-HYDROXY: 53.8 NG/ML (ref 30–100)
VLDLC SERPL CALC-MCNC: 50 MG/DL

## 2025-03-25 LAB — MAMMOGRAPHY, EXTERNAL: NORMAL

## 2025-04-09 DIAGNOSIS — F41.9 ANXIETY: ICD-10-CM

## 2025-04-10 RX ORDER — ESCITALOPRAM OXALATE 10 MG/1
10 TABLET ORAL DAILY
Qty: 30 TABLET | Refills: 0 | Status: SHIPPED | OUTPATIENT
Start: 2025-04-10

## 2025-04-29 RX ORDER — MELOXICAM 7.5 MG/1
TABLET ORAL
Qty: 28 TABLET | Refills: 0 | Status: SHIPPED | OUTPATIENT
Start: 2025-04-29

## 2025-05-12 DIAGNOSIS — N32.81 OAB (OVERACTIVE BLADDER): ICD-10-CM

## 2025-05-12 DIAGNOSIS — F41.9 ANXIETY: ICD-10-CM

## 2025-05-13 RX ORDER — OXYBUTYNIN CHLORIDE 15 MG/1
15 TABLET, EXTENDED RELEASE ORAL DAILY
Qty: 30 TABLET | Refills: 0 | Status: SHIPPED | OUTPATIENT
Start: 2025-05-13

## 2025-05-13 RX ORDER — LORAZEPAM 0.5 MG/1
TABLET ORAL
Qty: 60 TABLET | Refills: 0 | Status: SHIPPED | OUTPATIENT
Start: 2025-05-13 | End: 2025-06-12

## 2025-05-13 RX ORDER — ESCITALOPRAM OXALATE 10 MG/1
10 TABLET ORAL DAILY
Qty: 30 TABLET | Refills: 0 | Status: SHIPPED | OUTPATIENT
Start: 2025-05-13

## 2025-05-23 ENCOUNTER — HOSPITAL ENCOUNTER (OUTPATIENT)
Dept: INFUSION THERAPY | Age: 70
Discharge: HOME | End: 2025-05-23
Payer: MEDICARE

## 2025-05-23 ENCOUNTER — OFFICE VISIT (OUTPATIENT)
Age: 70
End: 2025-05-23
Payer: MEDICARE

## 2025-05-23 VITALS
HEART RATE: 53 BPM | HEIGHT: 62 IN | SYSTOLIC BLOOD PRESSURE: 130 MMHG | BODY MASS INDEX: 30.53 KG/M2 | DIASTOLIC BLOOD PRESSURE: 61 MMHG | TEMPERATURE: 97.3 F | WEIGHT: 165.9 LBS | OXYGEN SATURATION: 98 %

## 2025-05-23 DIAGNOSIS — R93.89 ABNORMAL X-RAY: ICD-10-CM

## 2025-05-23 DIAGNOSIS — D64.9 ANEMIA, UNSPECIFIED TYPE: Primary | ICD-10-CM

## 2025-05-23 DIAGNOSIS — D64.9 ANEMIA, UNSPECIFIED TYPE: ICD-10-CM

## 2025-05-23 DIAGNOSIS — C82.10 FOLLICULAR LYMPHOMA GRADE II, UNSPECIFIED BODY REGION (HCC): ICD-10-CM

## 2025-05-23 LAB
ALBUMIN SERPL-MCNC: 4.5 G/DL (ref 3.5–5.2)
ALP SERPL-CCNC: 49 U/L (ref 35–104)
ALT SERPL-CCNC: 11 U/L (ref 0–35)
ANION GAP SERPL CALCULATED.3IONS-SCNC: 14 MMOL/L (ref 7–16)
AST SERPL-CCNC: 16 U/L (ref 0–35)
BASOPHILS # BLD: 0.02 K/UL (ref 0–0.2)
BASOPHILS NFR BLD: 0 % (ref 0–2)
BILIRUB SERPL-MCNC: 0.9 MG/DL (ref 0–1.2)
BUN SERPL-MCNC: 13 MG/DL (ref 8–23)
CALCIUM SERPL-MCNC: 9.8 MG/DL (ref 8.8–10.2)
CHLORIDE SERPL-SCNC: 98 MMOL/L (ref 98–107)
CO2 SERPL-SCNC: 25 MMOL/L (ref 22–29)
CREAT SERPL-MCNC: 0.6 MG/DL (ref 0.5–1)
EOSINOPHIL # BLD: 0.06 K/UL (ref 0.05–0.5)
EOSINOPHILS RELATIVE PERCENT: 1 % (ref 0–6)
ERYTHROCYTE [DISTWIDTH] IN BLOOD BY AUTOMATED COUNT: 13.3 % (ref 11.5–15)
FERRITIN SERPL-MCNC: 185 NG/ML
GFR, ESTIMATED: >90 ML/MIN/1.73M2
GLUCOSE SERPL-MCNC: 115 MG/DL (ref 74–99)
HCT VFR BLD AUTO: 36.4 % (ref 34–48)
HGB BLD-MCNC: 12.1 G/DL (ref 11.5–15.5)
IMM GRANULOCYTES # BLD AUTO: <0.03 K/UL (ref 0–0.58)
IMM GRANULOCYTES NFR BLD: 0 % (ref 0–5)
IRON SATN MFR SERPL: 30 % (ref 15–50)
IRON SERPL-MCNC: 92 UG/DL (ref 37–145)
LDH SERPL-CCNC: 151 U/L (ref 135–214)
LYMPHOCYTES NFR BLD: 1.87 K/UL (ref 1.5–4)
LYMPHOCYTES RELATIVE PERCENT: 32 % (ref 20–42)
MCH RBC QN AUTO: 25.6 PG (ref 26–35)
MCHC RBC AUTO-ENTMCNC: 33.2 G/DL (ref 32–34.5)
MCV RBC AUTO: 77.1 FL (ref 80–99.9)
MONOCYTES NFR BLD: 0.62 K/UL (ref 0.1–0.95)
MONOCYTES NFR BLD: 11 % (ref 2–12)
NEUTROPHILS NFR BLD: 56 % (ref 43–80)
NEUTS SEG NFR BLD: 3.33 K/UL (ref 1.8–7.3)
PLATELET # BLD AUTO: 312 K/UL (ref 130–450)
PMV BLD AUTO: 9.9 FL (ref 7–12)
POTASSIUM SERPL-SCNC: 3.5 MMOL/L (ref 3.5–5.1)
PROT SERPL-MCNC: 7.2 G/DL (ref 6.4–8.3)
RBC # BLD AUTO: 4.72 M/UL (ref 3.5–5.5)
SODIUM SERPL-SCNC: 136 MMOL/L (ref 136–145)
TIBC SERPL-MCNC: 310 UG/DL (ref 250–450)
WBC OTHER # BLD: 5.9 K/UL (ref 4.5–11.5)

## 2025-05-23 PROCEDURE — 3075F SYST BP GE 130 - 139MM HG: CPT | Performed by: INTERNAL MEDICINE

## 2025-05-23 PROCEDURE — 83550 IRON BINDING TEST: CPT

## 2025-05-23 PROCEDURE — G8427 DOCREV CUR MEDS BY ELIG CLIN: HCPCS | Performed by: INTERNAL MEDICINE

## 2025-05-23 PROCEDURE — 99214 OFFICE O/P EST MOD 30 MIN: CPT | Performed by: INTERNAL MEDICINE

## 2025-05-23 PROCEDURE — 3078F DIAST BP <80 MM HG: CPT | Performed by: INTERNAL MEDICINE

## 2025-05-23 PROCEDURE — 36415 COLL VENOUS BLD VENIPUNCTURE: CPT

## 2025-05-23 PROCEDURE — 1160F RVW MEDS BY RX/DR IN RCRD: CPT | Performed by: INTERNAL MEDICINE

## 2025-05-23 PROCEDURE — 1124F ACP DISCUSS-NO DSCNMKR DOCD: CPT | Performed by: INTERNAL MEDICINE

## 2025-05-23 PROCEDURE — G8399 PT W/DXA RESULTS DOCUMENT: HCPCS | Performed by: INTERNAL MEDICINE

## 2025-05-23 PROCEDURE — 82728 ASSAY OF FERRITIN: CPT

## 2025-05-23 PROCEDURE — 3017F COLORECTAL CA SCREEN DOC REV: CPT | Performed by: INTERNAL MEDICINE

## 2025-05-23 PROCEDURE — 1090F PRES/ABSN URINE INCON ASSESS: CPT | Performed by: INTERNAL MEDICINE

## 2025-05-23 PROCEDURE — 80053 COMPREHEN METABOLIC PANEL: CPT

## 2025-05-23 PROCEDURE — 99213 OFFICE O/P EST LOW 20 MIN: CPT | Performed by: INTERNAL MEDICINE

## 2025-05-23 PROCEDURE — 83540 ASSAY OF IRON: CPT

## 2025-05-23 PROCEDURE — 1159F MED LIST DOCD IN RCRD: CPT | Performed by: INTERNAL MEDICINE

## 2025-05-23 PROCEDURE — 85025 COMPLETE CBC W/AUTO DIFF WBC: CPT

## 2025-05-23 PROCEDURE — 83615 LACTATE (LD) (LDH) ENZYME: CPT

## 2025-05-23 PROCEDURE — G8417 CALC BMI ABV UP PARAM F/U: HCPCS | Performed by: INTERNAL MEDICINE

## 2025-05-23 PROCEDURE — 1036F TOBACCO NON-USER: CPT | Performed by: INTERNAL MEDICINE

## 2025-05-23 PROCEDURE — 1125F AMNT PAIN NOTED PAIN PRSNT: CPT | Performed by: INTERNAL MEDICINE

## 2025-05-23 RX ORDER — LANOLIN ALCOHOL/MO/W.PET/CERES
1000 CREAM (GRAM) TOPICAL DAILY
Qty: 90 TABLET | Refills: 3 | Status: SHIPPED | OUTPATIENT
Start: 2025-05-23

## 2025-05-23 NOTE — PROGRESS NOTES
[facility] MED ONCOLOGY  [address] [facility] [address]  Dept: [phone]  Loc: [phone]  Attending progress note      Reason for Visit:   Follicular lymphoma.    Referring Physician:  Domenico Mi MD    PCP:  Davian Brooks DO    History of Present Illness:    Mrs. Lowery is a very pleasant 70-year-old lady, with a past medical history significant for bilateral breast cancer, she was diagnosed with left breast cancer in 1993, she had a left breast lumpectomy done followed by radiation therapy, in 2008 she was diagnosed with right breast cancer, had a lumpectomy done, followed by adjuvant radiation therapy, she did not receive adjuvant chemotherapy or endocrine therapy, also history of hyperlipidemia, hypertension, anxiety, and perforated stercoral sigmoid colitis status post ex lap with Jay's procedure on 11/22/2022, and colostomy reversal on 4/17/2023.  The patient had presented with abdominal wall asymmetry and pressure, CT scan of the abdomen the pelvis from 9/23/2023 has revealed an interval increase in the lymphadenopathy along the root of the mesentery, concerning for an underlying lymphoproliferative disorder, PET scan was done on 10/27/2023 revealing hypermetabolic mesenteric lymphadenopathy, had not significantly changed since 2022, had decreased from 2007, and increased right thyroid uptake.  Neck ultrasound had revealed 9 mm mid to lower pole TR 5 nodule, which is being followed.  The patient underwent on 1/3/2024 ex lap, incisional hernia repair, and central lymph node biopsy, pathology was consistent with classic follicular lymphoma, grade 1-2.     The patient returns for a follow-up visit, she had on 2 occasions swelling, was not drenching.  The patient is following with Ortho for hip pain.    Review of Systems;  CONSTITUTIONAL: No fever, chills.  Fair appetite, positive for fatigue.  ENMT: Eyes: No diplopia; Nose: No epistaxis.

## 2025-05-29 RX ORDER — PNV NO.95/FERROUS FUM/FOLIC AC 28MG-0.8MG
1 TABLET ORAL DAILY
Qty: 60 TABLET | Refills: 2 | Status: SHIPPED | OUTPATIENT
Start: 2025-05-29

## 2025-05-29 RX ORDER — MELOXICAM 7.5 MG/1
TABLET ORAL
Qty: 28 TABLET | Refills: 0 | Status: SHIPPED | OUTPATIENT
Start: 2025-05-29

## 2025-06-02 NOTE — PROGRESS NOTES
[facility] RHEUMATOLOGY  [address] [facility] [address]  Dept: [phone]  Dept Fax: [phone]    Zelda Lowery 1955 is a 70 y.o. female, here for evaluation of the following chief complaint(s): Follow-up (6 month follow up on Osteoporosis)      Subjective   SUBJECTIVE/OBJECTIVE:    HPI: Zelda Lowery is a 70 y.o. female with a history of b/l breast ca s/p b/l lumpectomy and radiation (no chemotherapy), perforated sigmoid colitis, HTN, HLD, anxiety, OC, and non-Hodgkin's follicular lymphoma (no treatment, monitoring), and osteoporosis presenting for follow-up.    Initially presented to [facility] care 12/2024, referred for hand pain, suspected neuropathy and ordered EMG/NCT. Also ordered SSA/SSB given sicca symptoms and hx of lymphoma (negative). Started on Reclast for OP (previously didn't tolerate PO bisphosphonate). EMG demonstrated b/l CTS. Recommended nocturnal splinting.     Today Zelda states hands are still bothering her, she did wear nocturnal splints for about a month with improvement but they became bothersome and made it difficult for her to sleep. No new/different joint pain. Tolerate first Reclast infusion but had relatively significant flu-like symptoms for 3-4 days following. No falls or fractures. Takes Vit D supplement and gets dietary Ca.     Current treatment:  - Reclast (1st infusion 01/09/2025)  - Vitamin D supplement    Social: Denies tobacco and EtOH     DEXA 08/2023: lowest T -2.8 @ L forearm; T -2.2 L spine  DEXA 03/2025: LFN T -1.8, L total hip -1.5, spine -1.6    Review of Systems  10 point ROS negative except as noted in HPI         Objective   Vitals:    06/03/25 0957   BP: 128/83   Pulse: 61   SpO2: 94%        Physical Exam  General appearance: Pleasant, cooperative, in no acute distress  Eyes: Conjunctiva clear, PERRL  HENT: External ears and nose normal  Respiratory: Normal effort and rate; CTAB; no wheezes, rales, or rhonchi  Cardiovascular: RRR; no

## 2025-06-03 ENCOUNTER — OFFICE VISIT (OUTPATIENT)
Dept: RHEUMATOLOGY | Age: 70
End: 2025-06-03
Payer: MEDICARE

## 2025-06-03 VITALS
HEIGHT: 61 IN | WEIGHT: 165 LBS | SYSTOLIC BLOOD PRESSURE: 128 MMHG | BODY MASS INDEX: 31.15 KG/M2 | OXYGEN SATURATION: 94 % | HEART RATE: 61 BPM | DIASTOLIC BLOOD PRESSURE: 83 MMHG

## 2025-06-03 DIAGNOSIS — M81.0 AGE-RELATED OSTEOPOROSIS WITHOUT CURRENT PATHOLOGICAL FRACTURE: Primary | ICD-10-CM

## 2025-06-03 DIAGNOSIS — G56.03 BILATERAL CARPAL TUNNEL SYNDROME: ICD-10-CM

## 2025-06-03 PROCEDURE — 3074F SYST BP LT 130 MM HG: CPT | Performed by: STUDENT IN AN ORGANIZED HEALTH CARE EDUCATION/TRAINING PROGRAM

## 2025-06-03 PROCEDURE — 99214 OFFICE O/P EST MOD 30 MIN: CPT | Performed by: STUDENT IN AN ORGANIZED HEALTH CARE EDUCATION/TRAINING PROGRAM

## 2025-06-03 PROCEDURE — 1124F ACP DISCUSS-NO DSCNMKR DOCD: CPT | Performed by: STUDENT IN AN ORGANIZED HEALTH CARE EDUCATION/TRAINING PROGRAM

## 2025-06-03 PROCEDURE — G8399 PT W/DXA RESULTS DOCUMENT: HCPCS | Performed by: STUDENT IN AN ORGANIZED HEALTH CARE EDUCATION/TRAINING PROGRAM

## 2025-06-03 PROCEDURE — 1160F RVW MEDS BY RX/DR IN RCRD: CPT | Performed by: STUDENT IN AN ORGANIZED HEALTH CARE EDUCATION/TRAINING PROGRAM

## 2025-06-03 PROCEDURE — 1036F TOBACCO NON-USER: CPT | Performed by: STUDENT IN AN ORGANIZED HEALTH CARE EDUCATION/TRAINING PROGRAM

## 2025-06-03 PROCEDURE — G8417 CALC BMI ABV UP PARAM F/U: HCPCS | Performed by: STUDENT IN AN ORGANIZED HEALTH CARE EDUCATION/TRAINING PROGRAM

## 2025-06-03 PROCEDURE — G2211 COMPLEX E/M VISIT ADD ON: HCPCS | Performed by: STUDENT IN AN ORGANIZED HEALTH CARE EDUCATION/TRAINING PROGRAM

## 2025-06-03 PROCEDURE — 3079F DIAST BP 80-89 MM HG: CPT | Performed by: STUDENT IN AN ORGANIZED HEALTH CARE EDUCATION/TRAINING PROGRAM

## 2025-06-03 PROCEDURE — G8427 DOCREV CUR MEDS BY ELIG CLIN: HCPCS | Performed by: STUDENT IN AN ORGANIZED HEALTH CARE EDUCATION/TRAINING PROGRAM

## 2025-06-03 PROCEDURE — 3017F COLORECTAL CA SCREEN DOC REV: CPT | Performed by: STUDENT IN AN ORGANIZED HEALTH CARE EDUCATION/TRAINING PROGRAM

## 2025-06-03 PROCEDURE — 1090F PRES/ABSN URINE INCON ASSESS: CPT | Performed by: STUDENT IN AN ORGANIZED HEALTH CARE EDUCATION/TRAINING PROGRAM

## 2025-06-03 PROCEDURE — 1159F MED LIST DOCD IN RCRD: CPT | Performed by: STUDENT IN AN ORGANIZED HEALTH CARE EDUCATION/TRAINING PROGRAM

## 2025-06-03 NOTE — PROGRESS NOTES
Zelda is here today for a 6 month follow up on Osteoporosis.  She confirms having her first Reclast infusion.  She reports having flu-like symptoms 3 days after. She has joint pain continuously.

## 2025-06-13 DIAGNOSIS — F41.9 ANXIETY: ICD-10-CM

## 2025-06-13 RX ORDER — LORAZEPAM 0.5 MG/1
TABLET ORAL
Qty: 60 TABLET | Refills: 0 | Status: SHIPPED | OUTPATIENT
Start: 2025-06-13 | End: 2025-07-13

## 2025-06-13 RX ORDER — ESCITALOPRAM OXALATE 10 MG/1
10 TABLET ORAL DAILY
Qty: 30 TABLET | Refills: 0 | Status: SHIPPED | OUTPATIENT
Start: 2025-06-13

## 2025-06-20 ENCOUNTER — HOSPITAL ENCOUNTER (OUTPATIENT)
Dept: MRI IMAGING | Age: 70
Discharge: HOME | End: 2025-06-22
Attending: INTERNAL MEDICINE
Payer: MEDICARE

## 2025-06-20 DIAGNOSIS — R93.89 ABNORMAL X-RAY: ICD-10-CM

## 2025-06-20 PROCEDURE — A9579 GAD-BASE MR CONTRAST NOS,1ML: HCPCS | Performed by: RADIOLOGY

## 2025-06-20 PROCEDURE — 2500000003 HC RX 250 WO HCPCS: Performed by: RADIOLOGY

## 2025-06-20 PROCEDURE — 6360000004 HC RX CONTRAST MEDICATION: Performed by: RADIOLOGY

## 2025-06-20 PROCEDURE — 73723 MRI JOINT LWR EXTR W/O&W/DYE: CPT

## 2025-06-20 RX ORDER — GADOTERIDOL 279.3 MG/ML
15 INJECTION INTRAVENOUS
Status: COMPLETED | OUTPATIENT
Start: 2025-06-20 | End: 2025-06-20

## 2025-06-20 RX ORDER — SODIUM CHLORIDE 0.9 % (FLUSH) 0.9 %
5-40 SYRINGE (ML) INJECTION 2 TIMES DAILY
Status: DISCONTINUED | OUTPATIENT
Start: 2025-06-20 | End: 2025-06-23 | Stop reason: HOSPADM

## 2025-06-20 RX ADMIN — GADOTERIDOL 15 ML: 279.3 INJECTION, SOLUTION INTRAVENOUS at 10:19

## 2025-06-20 RX ADMIN — Medication 10 ML: at 10:18

## 2025-06-22 DIAGNOSIS — E78.2 ELEVATED TRIGLYCERIDES WITH HIGH CHOLESTEROL: ICD-10-CM

## 2025-06-23 RX ORDER — MELOXICAM 7.5 MG/1
TABLET ORAL
Qty: 28 TABLET | Refills: 0 | Status: SHIPPED | OUTPATIENT
Start: 2025-06-23

## 2025-06-23 RX ORDER — CHOLECALCIFEROL (VITAMIN D3) 50 MCG
1 TABLET ORAL DAILY
Qty: 90 TABLET | Refills: 0 | Status: SHIPPED | OUTPATIENT
Start: 2025-06-23

## 2025-06-23 RX ORDER — OMEGA-3-ACID ETHYL ESTERS 1 G/1
1 CAPSULE, LIQUID FILLED ORAL 2 TIMES DAILY
Qty: 60 CAPSULE | Refills: 0 | Status: SHIPPED | OUTPATIENT
Start: 2025-06-23

## 2025-06-26 ENCOUNTER — TELEPHONE (OUTPATIENT)
Dept: INFUSION THERAPY | Age: 70
End: 2025-06-26

## 2025-06-26 DIAGNOSIS — M81.0 AGE-RELATED OSTEOPOROSIS WITHOUT CURRENT PATHOLOGICAL FRACTURE: Primary | ICD-10-CM

## 2025-06-26 NOTE — TELEPHONE ENCOUNTER
Called and left a voicemail for patient to call the office back when she can. Need to pass along the following message from Dr. Addison Phipps:    For a hematology/oncology standpoint the is nothing worrisome on the MRI. She does have a tear in the superior acetabular labrum which could be the cause of the pain. Needs a ortho referral and we just need to know which provider she would like to see.

## 2025-07-14 DIAGNOSIS — F41.9 ANXIETY: ICD-10-CM

## 2025-07-14 RX ORDER — ESCITALOPRAM OXALATE 10 MG/1
10 TABLET ORAL DAILY
Qty: 30 TABLET | Refills: 0 | Status: SHIPPED | OUTPATIENT
Start: 2025-07-14

## 2025-07-20 DIAGNOSIS — F41.9 ANXIETY: ICD-10-CM

## 2025-07-21 RX ORDER — LORAZEPAM 0.5 MG/1
TABLET ORAL
Qty: 60 TABLET | Refills: 0 | Status: SHIPPED | OUTPATIENT
Start: 2025-07-21 | End: 2025-08-20

## 2025-07-21 RX ORDER — MELOXICAM 7.5 MG/1
TABLET ORAL
Qty: 28 TABLET | Refills: 0 | Status: SHIPPED | OUTPATIENT
Start: 2025-07-21

## 2025-08-16 DIAGNOSIS — F41.9 ANXIETY: ICD-10-CM

## 2025-08-17 DIAGNOSIS — F41.9 ANXIETY: ICD-10-CM

## 2025-08-18 RX ORDER — ESCITALOPRAM OXALATE 10 MG/1
10 TABLET ORAL DAILY
Qty: 30 TABLET | Refills: 0 | Status: SHIPPED | OUTPATIENT
Start: 2025-08-18

## 2025-08-18 RX ORDER — LORAZEPAM 0.5 MG/1
0.5 TABLET ORAL EVERY 8 HOURS PRN
Qty: 60 TABLET | Refills: 1 | Status: SHIPPED | OUTPATIENT
Start: 2025-08-18 | End: 2025-09-17

## 2025-08-29 ENCOUNTER — OFFICE VISIT (OUTPATIENT)
Dept: PHYSICAL MEDICINE AND REHAB | Age: 70
End: 2025-08-29

## 2025-08-29 VITALS
WEIGHT: 165 LBS | BODY MASS INDEX: 31.15 KG/M2 | DIASTOLIC BLOOD PRESSURE: 84 MMHG | HEART RATE: 53 BPM | HEIGHT: 61 IN | SYSTOLIC BLOOD PRESSURE: 153 MMHG

## 2025-08-29 DIAGNOSIS — G56.02 LEFT CARPAL TUNNEL SYNDROME: Primary | ICD-10-CM

## 2025-08-29 RX ORDER — TRIAMCINOLONE ACETONIDE 40 MG/ML
40 INJECTION, SUSPENSION INTRA-ARTICULAR; INTRAMUSCULAR ONCE
Status: COMPLETED | OUTPATIENT
Start: 2025-08-29 | End: 2025-08-29

## 2025-08-29 RX ADMIN — TRIAMCINOLONE ACETONIDE 40 MG: 40 INJECTION, SUSPENSION INTRA-ARTICULAR; INTRAMUSCULAR at 15:23

## 2025-08-29 RX ADMIN — Medication 1 ML: at 15:24

## (undated) DEVICE — DRESSING HYDROFIBER AQUACEL AG ADVANTAGE 3.5X10 IN

## (undated) DEVICE — LAPAROSCOPIC SCISSORS: Brand: EPIX LAPAROSCOPIC SCISSORS

## (undated) DEVICE — RELOAD STPL L75MM OPN H3.8MM CLS 1.5MM WIRE DIA0.2MM REG

## (undated) DEVICE — SYRINGE IRRIG 60ML SFT PLIABLE BLB EZ TO GRP 1 HND USE W/

## (undated) DEVICE — 1LYRTR 16FR10ML100%SIL UMS SNP: Brand: MEDLINE INDUSTRIES, INC.

## (undated) DEVICE — INSUFFLATION NEEDLE TO ESTABLISH PNEUMOPERITONEUM.: Brand: INSUFFLATION NEEDLE

## (undated) DEVICE — SYRINGE MED 10ML TRNSLUC BRL PLUNG BLK MRK POLYPR CTRL

## (undated) DEVICE — STAPLER INT L60MM DIA12MM STD TISS TI LNAR CUT LN 6 ROW

## (undated) DEVICE — TROCAR: Brand: KII FIOS FIRST ENTRY

## (undated) DEVICE — BLADE,STAINLESS-STEEL,15,STRL,DISPOSABLE: Brand: MEDLINE

## (undated) DEVICE — PAD,ABDOMINAL,5"X9",ST,LF,25/BX: Brand: MEDLINE INDUSTRIES, INC.

## (undated) DEVICE — SEALER ENDOSCP NANO COAT OPN DIV CRV L JAW LIGASURE IMPACT

## (undated) DEVICE — SPONGE,DRAIN,NONWVN,4"X4",6PLY,STRL,LF: Brand: MEDLINE

## (undated) DEVICE — ELECTRODE PT RET AD L9FT HI MOIST COND ADH HYDRGEL CORDED

## (undated) DEVICE — GENERAL LAPAROSCOPY: Brand: MEDLINE INDUSTRIES, INC.

## (undated) DEVICE — GLOVE ORANGE PI 7 1/2   MSG9075

## (undated) DEVICE — GLOVE SURG SZ 8 L12IN FNGR THK79MIL GRN LTX FREE

## (undated) DEVICE — GOWN,SIRUS,FABRNF,XL,20/CS: Brand: MEDLINE

## (undated) DEVICE — SOLUTION IV IRRIG WATER 1000ML POUR BRL 2F7114

## (undated) DEVICE — TRAP,MUCUS SPECIMEN,40CC: Brand: MEDLINE

## (undated) DEVICE — STAPLER INT L75MM CUT LN L73MM STPL LN L77MM BLU B FRM 8

## (undated) DEVICE — Device: Brand: SENSURA MIO

## (undated) DEVICE — INTENDED FOR TISSUE SEPARATION, AND OTHER PROCEDURES THAT REQUIRE A SHARP SURGICAL BLADE TO PUNCTURE OR CUT.: Brand: BARD-PARKER ® STAINLESS STEEL BLADES

## (undated) DEVICE — SUTURE PDS II SZ 0 L27IN ABSRB VLT L36MM CT-1 1/2 CIR Z340H

## (undated) DEVICE — ACCESS PLATFORM FOR MINIMALLY INVASIVE SURGERY.: Brand: GELPORT® LAPAROSCOPIC  SYSTEM

## (undated) DEVICE — SYRINGE 10ML HYPO W/O NDL W/ LUER SLP TP

## (undated) DEVICE — SOLUTION IRRIG 3000ML 0.9% SOD CHL USP UROMATIC PLAS CONT

## (undated) DEVICE — UNIVERSAL DRAPE: Brand: MEDLINE INDUSTRIES, INC.

## (undated) DEVICE — SUTURE VCRL + SZ 3-0 L27IN ABSRB UD L26MM SH 1/2 CIR VCP416H

## (undated) DEVICE — READY WET SKIN SCRUB TRAY-LF: Brand: MEDLINE INDUSTRIES, INC.

## (undated) DEVICE — SOLUTION IRRIG 1000ML STRL H2O USP PLAS POUR BTL

## (undated) DEVICE — CONNECTOR IRRIGATION AUXILIARY H2O JET W/ PRT MTL THRD HYDR

## (undated) DEVICE — STAPLER INT SZ 31MM H1.5-2.2MM OPN LEG L5.2MM PWR ADJ HT W/

## (undated) DEVICE — SUTURE MCRYL SZ 4-0 L18IN ABSRB UD L19MM PS-2 3/8 CIR PRIM Y496G

## (undated) DEVICE — STANDARD HYPODERMIC NEEDLE,ALUMINUM HUB: Brand: MONOJECT

## (undated) DEVICE — GLOVE ORANGE PI 7   MSG9070

## (undated) DEVICE — BASIC SINGLE BASIN 1-LF: Brand: MEDLINE INDUSTRIES, INC.

## (undated) DEVICE — BANDAGE,GAUZE,4.5"X4.1YD,STERILE,LF: Brand: MEDLINE

## (undated) DEVICE — SUTURE MCRYL + SZ 4-0 L27IN ABSRB UD L19MM PS-2 3/8 CIR MCP426H

## (undated) DEVICE — SOLUTION IRRIG 1000ML 0.9% SOD CHL USP POUR PLAS BTL

## (undated) DEVICE — LIQUIBAND RAPID ADHESIVE 36/CS 0.8ML: Brand: MEDLINE

## (undated) DEVICE — 3M™ MEDIPORE™ + PAD 3564: Brand: 3M™ MEDIPORE™

## (undated) DEVICE — SPONGE,LAP,8"X36",XRAY,ST,5/PK,40PK/CS: Brand: MEDLINE INDUSTRIES, INC.

## (undated) DEVICE — CLEANER,CAUTERY TIP,2X2",STERILE: Brand: MEDLINE

## (undated) DEVICE — DRAIN SURG 15FR L3/16IN DIA4.7MM SIL CHN RND HUBLESS FULL

## (undated) DEVICE — SUTURE MCRYL SZ 3 0 L18IN ABSRB UD PS 2 3 8 CIR REV CUT NDL MCP497G

## (undated) DEVICE — Device

## (undated) DEVICE — SUTURE SILK BLK BR 3-0 18IN SH (24/BX C0135

## (undated) DEVICE — APPLICATOR PREP 26ML 0.7% IOD POVACRYLEX 74% ISO ALC ST

## (undated) DEVICE — TRAUMA: Brand: MEDLINE INDUSTRIES, INC.

## (undated) DEVICE — ELECTROSURGICAL PENCIL BUTTON SWITCH E-Z CLEAN COATED BLADE ELECTRODE 10 FT (3 M) CORD HOLSTER: Brand: MEGADYNE

## (undated) DEVICE — SUTURE PDS II SZ 2-0 L27IN ABSRB VLT L36MM CT-1 1/2 CIR Z339H

## (undated) DEVICE — DEFENDO AIR WATER SUCTION AND BIOPSY VALVE KIT FOR  OLYMPUS: Brand: DEFENDO AIR/WATER/SUCTION AND BIOPSY VALVE

## (undated) DEVICE — GLOVE SURG SZ 75 L12IN FNGR THK94MIL TRNSLUC YEL LTX

## (undated) DEVICE — SOLUTION IV IRRIG POUR BRL 0.9% SODIUM CHL 2F7124

## (undated) DEVICE — GAUZE,SPONGE,4"X4",8PLY,STRL,LF,10/TRAY: Brand: MEDLINE

## (undated) DEVICE — PUMP SUC IRR TBNG L10FT W/ HNDPC ASSEMB STRYKEFLOW 2

## (undated) DEVICE — PACK,AURORA,LAVH: Brand: MEDLINE

## (undated) DEVICE — SUTURE MCRYL + SZ 4-0 L18IN ABSRB UD L19MM PS-2 3/8 CIR MCP496G

## (undated) DEVICE — ADHESIVE SKIN CLSR 0.7ML TOP DERMBND ADV

## (undated) DEVICE — DRAPE,REIN 53X77,STERILE: Brand: MEDLINE

## (undated) DEVICE — PACK,HEAVY DRAINAGE,STERILE: Brand: MEDLINE INDUSTRIES, INC.

## (undated) DEVICE — GLOVE SURG 7.5 PF POLYMER WHT STRL SIGN LTX ESSENTIAL LTX

## (undated) DEVICE — Z DISCONTINUED NO SUB IDED TUBING ETCO2 AD L6.5FT NSL ORAL CVD PRNG NONFLARED TIP OVR

## (undated) DEVICE — SPONGE LAP W18XL18IN WHT COT 4 PLY FLD STRUNG RADPQ DISP ST 2 PER PACK

## (undated) DEVICE — 3M™ IOBAN™ 2 ANTIMICROBIAL INCISE DRAPE 6640EZ: Brand: IOBAN™ 2

## (undated) DEVICE — TOTAL TRAY, 16FR 10ML SIL FOLEY, URN: Brand: MEDLINE

## (undated) DEVICE — INSUFFLATION TUBING SET WITH FILTER, FUNNEL CONNECTOR AND LUER LOCK: Brand: JOSNOE MEDICAL INC

## (undated) DEVICE — 3M(TM) MEDIPORE(TM) +PAD SOFT CLOTH ADHESIVE WOUND DRESSING 3570: Brand: 3M™ MEDIPORE™

## (undated) DEVICE — SEALER ENDOSCP L37CM NANO COAT BLNT TIP LAP DIV

## (undated) DEVICE — RELOAD STPL H4.1X2MM DIA60MM THCK TISS GRN 6 ROW PWR GST B

## (undated) DEVICE — SOLUTION SURG PREP 26 CC PURPREP